# Patient Record
Sex: FEMALE | Race: WHITE | NOT HISPANIC OR LATINO | Employment: FULL TIME | URBAN - METROPOLITAN AREA
[De-identification: names, ages, dates, MRNs, and addresses within clinical notes are randomized per-mention and may not be internally consistent; named-entity substitution may affect disease eponyms.]

---

## 2017-04-29 ENCOUNTER — HOSPITAL ENCOUNTER (OUTPATIENT)
Dept: LAB | Facility: HOSPITAL | Age: 22
Discharge: HOME | End: 2017-04-29
Attending: NURSE PRACTITIONER | Admitting: NURSE PRACTITIONER

## 2017-04-29 LAB
BASOPHILS # BLD AUTO: 0 10*3/UL (ref 0–0.2)
BASOPHILS NFR BLD AUTO: 1 % (ref 0–2)
D DIMER PPP FEU-MCNC: 0.91 MG/L FEU (ref 0.17–0.59)
DIFFERENTIAL METHOD BLD: (no result)
EOSINOPHIL # BLD AUTO: 0.2 10*3/UL (ref 0–0.3)
EOSINOPHIL # BLD AUTO: 4 % (ref 0–3)
ERYTHROCYTE [DISTWIDTH] IN BLOOD BY AUTOMATED COUNT: 15.5 % (ref 11.5–14.5)
HCT VFR BLD AUTO: 34.7 % (ref 35–49)
HGB BLD-MCNC: 11.9 G/DL (ref 12–15)
LYMPHOCYTES # BLD AUTO: 1.1 10*3/UL (ref 0.8–4.8)
LYMPHOCYTES NFR BLD AUTO: 24 % (ref 18–42)
MCH RBC QN AUTO: 26.6 PG (ref 26–32)
MCHC RBC AUTO-ENTMCNC: 34.3 G/DL (ref 32–36)
MCV RBC AUTO: 77.7 FL (ref 80–94)
MONOCYTES # BLD AUTO: 0.6 10*3/UL (ref 0.1–1.3)
MONOCYTES NFR BLD AUTO: 13 % (ref 2–11)
NEUTROPHILS # BLD AUTO: 2.7 10*3/UL (ref 2.3–8.6)
NEUTROPHILS NFR BLD AUTO: 58 % (ref 50–75)
NRBC BLD AUTO-RTO: 0 /100{WBCS}
NRBC/RBC NFR BLD MANUAL: 0 10*3/UL
PLATELET # BLD AUTO: 303 10*3/UL (ref 150–450)
PMV BLD AUTO: 7.9 FL (ref 7.4–10.4)
RBC # BLD AUTO: 4.46 10*6/UL (ref 4–5.4)
WBC # BLD AUTO: 4.7 10*3/UL (ref 4.5–11.5)

## 2017-05-01 ENCOUNTER — HOSPITAL ENCOUNTER (OUTPATIENT)
Dept: CARDIOLOGY | Facility: HOSPITAL | Age: 22
Discharge: HOME | End: 2017-05-01
Attending: NURSE PRACTITIONER | Admitting: NURSE PRACTITIONER

## 2020-02-03 ENCOUNTER — APPOINTMENT (OUTPATIENT)
Dept: CT IMAGING | Facility: HOSPITAL | Age: 25
End: 2020-02-03

## 2020-02-03 ENCOUNTER — APPOINTMENT (OUTPATIENT)
Dept: GENERAL RADIOLOGY | Facility: HOSPITAL | Age: 25
End: 2020-02-03

## 2020-02-03 ENCOUNTER — HOSPITAL ENCOUNTER (EMERGENCY)
Facility: HOSPITAL | Age: 25
Discharge: HOME | End: 2020-02-03
Admitting: EMERGENCY MEDICINE

## 2020-02-03 VITALS
WEIGHT: 205.03 LBS | HEIGHT: 59 IN | BODY MASS INDEX: 41.33 KG/M2 | TEMPERATURE: 99.5 F | OXYGEN SATURATION: 100 % | HEART RATE: 104 BPM | SYSTOLIC BLOOD PRESSURE: 115 MMHG | DIASTOLIC BLOOD PRESSURE: 81 MMHG | RESPIRATION RATE: 18 BRPM

## 2020-02-03 DIAGNOSIS — J18.9 PNEUMONIA OF RIGHT LOWER LOBE DUE TO INFECTIOUS ORGANISM: Primary | ICD-10-CM

## 2020-02-03 LAB
ALBUMIN SERPL-MCNC: 4.3 G/DL (ref 3.5–5.2)
ALBUMIN/GLOB SERPL: 1.1 G/DL
ALP SERPL-CCNC: 78 U/L (ref 39–117)
ALT SERPL W P-5'-P-CCNC: 21 U/L (ref 1–33)
ANION GAP SERPL CALCULATED.3IONS-SCNC: 18 MMOL/L (ref 5–15)
AST SERPL-CCNC: 25 U/L (ref 1–32)
B-HCG UR QL: NEGATIVE
BACTERIA UR QL AUTO: ABNORMAL /HPF
BASOPHILS # BLD AUTO: 0.1 10*3/MM3 (ref 0–0.2)
BASOPHILS NFR BLD AUTO: 0.5 % (ref 0–1.5)
BILIRUB SERPL-MCNC: 0.6 MG/DL (ref 0.2–1.2)
BILIRUB UR QL STRIP: NEGATIVE
BUN BLD-MCNC: 11 MG/DL (ref 6–20)
BUN/CREAT SERPL: 13.3 (ref 7–25)
CALCIUM SPEC-SCNC: 9.5 MG/DL (ref 8.6–10.5)
CHLORIDE SERPL-SCNC: 100 MMOL/L (ref 98–107)
CLARITY UR: CLEAR
CO2 SERPL-SCNC: 22 MMOL/L (ref 22–29)
COLOR UR: YELLOW
CREAT BLD-MCNC: 0.83 MG/DL (ref 0.57–1)
D-LACTATE SERPL-SCNC: 2 MMOL/L (ref 0.5–2)
DEPRECATED RDW RBC AUTO: 41.6 FL (ref 37–54)
EOSINOPHIL # BLD AUTO: 0.1 10*3/MM3 (ref 0–0.4)
EOSINOPHIL NFR BLD AUTO: 0.4 % (ref 0.3–6.2)
ERYTHROCYTE [DISTWIDTH] IN BLOOD BY AUTOMATED COUNT: 14.9 % (ref 12.3–15.4)
FLUAV SUBTYP SPEC NAA+PROBE: NOT DETECTED
FLUBV RNA ISLT QL NAA+PROBE: NOT DETECTED
GFR SERPL CREATININE-BSD FRML MDRD: 85 ML/MIN/1.73
GLOBULIN UR ELPH-MCNC: 3.9 GM/DL
GLUCOSE BLD-MCNC: 135 MG/DL (ref 65–99)
GLUCOSE UR STRIP-MCNC: NEGATIVE MG/DL
HCT VFR BLD AUTO: 36.6 % (ref 34–46.6)
HGB BLD-MCNC: 12.3 G/DL (ref 12–15.9)
HGB UR QL STRIP.AUTO: NEGATIVE
HOLD SPECIMEN: NORMAL
HOLD SPECIMEN: NORMAL
HYALINE CASTS UR QL AUTO: ABNORMAL /LPF
KETONES UR QL STRIP: NEGATIVE
LEUKOCYTE ESTERASE UR QL STRIP.AUTO: NEGATIVE
LIPASE SERPL-CCNC: 29 U/L (ref 13–60)
LYMPHOCYTES # BLD AUTO: 0.6 10*3/MM3 (ref 0.7–3.1)
LYMPHOCYTES NFR BLD AUTO: 2.7 % (ref 19.6–45.3)
MCH RBC QN AUTO: 26.3 PG (ref 26.6–33)
MCHC RBC AUTO-ENTMCNC: 33.5 G/DL (ref 31.5–35.7)
MCV RBC AUTO: 78.5 FL (ref 79–97)
MONOCYTES # BLD AUTO: 0.7 10*3/MM3 (ref 0.1–0.9)
MONOCYTES NFR BLD AUTO: 3.1 % (ref 5–12)
NEUTROPHILS # BLD AUTO: 21.5 10*3/MM3 (ref 1.7–7)
NEUTROPHILS NFR BLD AUTO: 93.3 % (ref 42.7–76)
NITRITE UR QL STRIP: NEGATIVE
NRBC BLD AUTO-RTO: 0.1 /100 WBC (ref 0–0.2)
PH UR STRIP.AUTO: 6.5 [PH] (ref 5–8)
PLATELET # BLD AUTO: 379 10*3/MM3 (ref 140–450)
PMV BLD AUTO: 7.9 FL (ref 6–12)
POTASSIUM BLD-SCNC: 3.6 MMOL/L (ref 3.5–5.2)
PROT SERPL-MCNC: 8.2 G/DL (ref 6–8.5)
PROT UR QL STRIP: ABNORMAL
RBC # BLD AUTO: 4.66 10*6/MM3 (ref 3.77–5.28)
RBC # UR: ABNORMAL /HPF
REF LAB TEST METHOD: ABNORMAL
S PYO AG THROAT QL: NEGATIVE
SODIUM BLD-SCNC: 140 MMOL/L (ref 136–145)
SP GR UR STRIP: 1.02 (ref 1–1.03)
SQUAMOUS #/AREA URNS HPF: ABNORMAL /HPF
UROBILINOGEN UR QL STRIP: ABNORMAL
WBC NRBC COR # BLD: 23 10*3/MM3 (ref 3.4–10.8)
WBC UR QL AUTO: ABNORMAL /HPF
WHOLE BLOOD HOLD SPECIMEN: NORMAL
WHOLE BLOOD HOLD SPECIMEN: NORMAL

## 2020-02-03 PROCEDURE — 96375 TX/PRO/DX INJ NEW DRUG ADDON: CPT

## 2020-02-03 PROCEDURE — 96365 THER/PROPH/DIAG IV INF INIT: CPT

## 2020-02-03 PROCEDURE — 81001 URINALYSIS AUTO W/SCOPE: CPT | Performed by: PHYSICIAN ASSISTANT

## 2020-02-03 PROCEDURE — 87651 STREP A DNA AMP PROBE: CPT | Performed by: PHYSICIAN ASSISTANT

## 2020-02-03 PROCEDURE — 87150 DNA/RNA AMPLIFIED PROBE: CPT | Performed by: PHYSICIAN ASSISTANT

## 2020-02-03 PROCEDURE — 81025 URINE PREGNANCY TEST: CPT | Performed by: PHYSICIAN ASSISTANT

## 2020-02-03 PROCEDURE — 71045 X-RAY EXAM CHEST 1 VIEW: CPT

## 2020-02-03 PROCEDURE — 25010000002 LEVOFLOXACIN PER 250 MG

## 2020-02-03 PROCEDURE — 74176 CT ABD & PELVIS W/O CONTRAST: CPT

## 2020-02-03 PROCEDURE — 87147 CULTURE TYPE IMMUNOLOGIC: CPT | Performed by: PHYSICIAN ASSISTANT

## 2020-02-03 PROCEDURE — 87502 INFLUENZA DNA AMP PROBE: CPT | Performed by: PHYSICIAN ASSISTANT

## 2020-02-03 PROCEDURE — 80053 COMPREHEN METABOLIC PANEL: CPT | Performed by: PHYSICIAN ASSISTANT

## 2020-02-03 PROCEDURE — 83605 ASSAY OF LACTIC ACID: CPT | Performed by: PHYSICIAN ASSISTANT

## 2020-02-03 PROCEDURE — 81015 MICROSCOPIC EXAM OF URINE: CPT | Performed by: PHYSICIAN ASSISTANT

## 2020-02-03 PROCEDURE — 25010000002 KETOROLAC TROMETHAMINE PER 15 MG: Performed by: PHYSICIAN ASSISTANT

## 2020-02-03 PROCEDURE — 25010000002 PROMETHAZINE PER 50 MG: Performed by: PHYSICIAN ASSISTANT

## 2020-02-03 PROCEDURE — 83690 ASSAY OF LIPASE: CPT | Performed by: PHYSICIAN ASSISTANT

## 2020-02-03 PROCEDURE — 85025 COMPLETE CBC W/AUTO DIFF WBC: CPT | Performed by: PHYSICIAN ASSISTANT

## 2020-02-03 PROCEDURE — 81003 URINALYSIS AUTO W/O SCOPE: CPT | Performed by: PHYSICIAN ASSISTANT

## 2020-02-03 PROCEDURE — 99284 EMERGENCY DEPT VISIT MOD MDM: CPT

## 2020-02-03 PROCEDURE — 87040 BLOOD CULTURE FOR BACTERIA: CPT | Performed by: PHYSICIAN ASSISTANT

## 2020-02-03 RX ORDER — SODIUM CHLORIDE 0.9 % (FLUSH) 0.9 %
10 SYRINGE (ML) INJECTION AS NEEDED
Status: DISCONTINUED | OUTPATIENT
Start: 2020-02-03 | End: 2020-02-03 | Stop reason: HOSPADM

## 2020-02-03 RX ORDER — GUAIFENESIN AND CODEINE PHOSPHATE 100; 10 MG/5ML; MG/5ML
5 SOLUTION ORAL 4 TIMES DAILY PRN
Qty: 60 ML | Refills: 0 | Status: SHIPPED | OUTPATIENT
Start: 2020-02-03 | End: 2021-03-13

## 2020-02-03 RX ORDER — KETOROLAC TROMETHAMINE 15 MG/ML
15 INJECTION, SOLUTION INTRAMUSCULAR; INTRAVENOUS ONCE
Status: COMPLETED | OUTPATIENT
Start: 2020-02-03 | End: 2020-02-03

## 2020-02-03 RX ORDER — LEVOFLOXACIN 5 MG/ML
INJECTION, SOLUTION INTRAVENOUS
Status: COMPLETED
Start: 2020-02-03 | End: 2020-02-03

## 2020-02-03 RX ORDER — LEVOFLOXACIN 750 MG/1
750 TABLET ORAL DAILY
Qty: 6 TABLET | Refills: 0 | Status: SHIPPED | OUTPATIENT
Start: 2020-02-03 | End: 2021-03-13

## 2020-02-03 RX ORDER — LEVOFLOXACIN 5 MG/ML
750 INJECTION, SOLUTION INTRAVENOUS ONCE
Status: COMPLETED | OUTPATIENT
Start: 2020-02-03 | End: 2020-02-03

## 2020-02-03 RX ORDER — IBUPROFEN 600 MG/1
600 TABLET ORAL ONCE
Status: COMPLETED | OUTPATIENT
Start: 2020-02-03 | End: 2020-02-03

## 2020-02-03 RX ORDER — GUAIFENESIN AND CODEINE PHOSPHATE 100; 10 MG/5ML; MG/5ML
5 SOLUTION ORAL ONCE
Status: COMPLETED | OUTPATIENT
Start: 2020-02-03 | End: 2020-02-03

## 2020-02-03 RX ADMIN — LEVOFLOXACIN 750 MG: 5 INJECTION, SOLUTION INTRAVENOUS at 02:46

## 2020-02-03 RX ADMIN — IBUPROFEN 600 MG: 600 TABLET, FILM COATED ORAL at 04:19

## 2020-02-03 RX ADMIN — PROMETHAZINE HYDROCHLORIDE 12.5 MG: 25 INJECTION INTRAMUSCULAR; INTRAVENOUS at 00:44

## 2020-02-03 RX ADMIN — SODIUM CHLORIDE 1000 ML: 900 INJECTION, SOLUTION INTRAVENOUS at 00:46

## 2020-02-03 RX ADMIN — KETOROLAC TROMETHAMINE 15 MG: 15 INJECTION, SOLUTION INTRAMUSCULAR; INTRAVENOUS at 00:46

## 2020-02-03 RX ADMIN — GUAIFENESIN AND CODEINE PHOSPHATE 5 ML: 100; 10 SOLUTION ORAL at 03:19

## 2020-02-03 NOTE — ED PROVIDER NOTES
"Subjective   24-year-old with the onset of fever starting in the last 24 hours the patient also had the onset of flank pain today.  He has no history of kidney stones.  The patient says that she has had some sore throat as well as muscle aches and pains for several days.  She states that she is was dizzy today with exertion.  No reports of syncope or hemoptysis.  There is no reports of vomiting or diarrhea          Review of Systems    No past medical history on file.    Allergies   Allergen Reactions   • Cephalosporins Unknown - Low Severity   • Penicillins Unknown - Low Severity       No past surgical history on file.    No family history on file.    Social History     Socioeconomic History   • Marital status: Single     Spouse name: Not on file   • Number of children: Not on file   • Years of education: Not on file   • Highest education level: Not on file     Patient works in home health      Objective   Physical Exam  Alert [place] Coma Scale 15   HEENT: Pupils equal and reactive to light. Conjunctivae are not injected. normal tympanic membranes. Oropharynx and nares are normal.   Neck: Supple. Midline trachea. No JVD. No goiter.   Chest: Basilar rales noted on the right and equal breath sounds bilaterally regular rate and rhythm without murmur or rub.   Abdomen: Positive bowel sounds nontender nondistended. No rebound or peritoneal signs. No CVA tenderness.   Extremities no clubbing cyanosis or edema motor sensory exam is normal the full range of motion is intact   skin: Warm and dry, no rashes or petechia.   Lymphatic: No regional lymphadenopathy. No calf pain, swelling or Rebecca's sign    Procedures           ED Course  ED Course as of Feb 03 0250   Mon Feb 03, 2020   0033 Reports 1 day of right flank pain radiating to front. No associated nausea, vomiting. Also reports \"flu-like symptoms\" - such as dizziness, sore throat, and myalgias of 1 day. Primarily her back is bothering her more. Patient is very nervous " during interview and crying because of pain and nerves    [MG]   0048 Elevated WBC with tachycardia rate of 146. Patient's temperature oral 99.9. Possible kidney stone vs influenza source. Sepsis protocol initiated. 1L NS, toradol, phenergan, and rocephin IV ordered    [MG]   0218 She has history of cephalosporin allergy.  This was held.    [TH]      ED Course User Index  [MG] Karina Hazel PA-C  [TH] Rajeev Sorenson MD                                               [facility]  Number of Diagnoses or Management Options     Amount and/or Complexity of Data Reviewed  Clinical lab tests: reviewed  Tests in the radiology section of CPT®: reviewed  Decide to obtain previous medical records or to obtain history from someone other than the patient: yes  Independent visualization of images, tracings, or specimens: yes    Risk of Complications, Morbidity, and/or Mortality  Presenting problems: high  Diagnostic procedures: high  Management options: high  General comments: The patient was stable on the emergency department and stated she felt better with ER therapy.  The risks and benefits of fluoroquinolone therapy were discussed and the patient was given 750 mg of Levaquin IV.  The patient will be discharged with a prescription for Levaquin and Cheratussin AC.  The patient was stable at discharge and the mom vocalized understanding of discharge instructions and warnings    Patient Progress  Patient progress: improved      Final diagnoses:   Pneumonia of right lower lobe due to infectious organism (CMS/[facility])            Rajeev Sorenson MD  02/03/20 5730

## 2020-02-07 LAB
BACTERIA BLD CULT: ABNORMAL
BOTTLE TYPE: ABNORMAL

## 2020-02-08 LAB
BACTERIA SPEC AEROBE CULT: ABNORMAL
BACTERIA SPEC AEROBE CULT: NORMAL
GRAM STN SPEC: ABNORMAL
ISOLATED FROM: ABNORMAL

## 2020-03-25 ENCOUNTER — TRANSCRIBE ORDERS (OUTPATIENT)
Dept: ADMINISTRATIVE | Facility: HOSPITAL | Age: 25
End: 2020-03-25

## 2020-03-25 DIAGNOSIS — R10.11 ABDOMINAL PAIN, RIGHT UPPER QUADRANT: Primary | ICD-10-CM

## 2020-05-15 ENCOUNTER — TRANSCRIBE ORDERS (OUTPATIENT)
Dept: ADMINISTRATIVE | Facility: HOSPITAL | Age: 25
End: 2020-05-15

## 2020-05-15 DIAGNOSIS — R10.9 ABDOMINAL PAIN, UNSPECIFIED ABDOMINAL LOCATION: Primary | ICD-10-CM

## 2020-05-21 ENCOUNTER — HOSPITAL ENCOUNTER (OUTPATIENT)
Dept: ULTRASOUND IMAGING | Facility: HOSPITAL | Age: 25
Discharge: HOME | End: 2020-05-21
Admitting: FAMILY MEDICINE

## 2020-05-21 ENCOUNTER — HOSPITAL ENCOUNTER (OUTPATIENT)
Dept: NUCLEAR MEDICINE | Facility: HOSPITAL | Age: 25
Discharge: HOME | End: 2020-05-21

## 2020-05-21 DIAGNOSIS — R10.9 ABDOMINAL PAIN, UNSPECIFIED ABDOMINAL LOCATION: ICD-10-CM

## 2020-05-21 DIAGNOSIS — R10.11 ABDOMINAL PAIN, RIGHT UPPER QUADRANT: ICD-10-CM

## 2020-05-21 PROCEDURE — A9537 TC99M MEBROFENIN: HCPCS | Performed by: FAMILY MEDICINE

## 2020-05-21 PROCEDURE — 76705 ECHO EXAM OF ABDOMEN: CPT

## 2020-05-21 PROCEDURE — 0 TECHNETIUM TC 99M MEBROFENIN KIT: Performed by: FAMILY MEDICINE

## 2020-05-21 PROCEDURE — 78227 HEPATOBIL SYST IMAGE W/DRUG: CPT

## 2020-05-21 RX ORDER — KIT FOR THE PREPARATION OF TECHNETIUM TC 99M MEBROFENIN 45 MG/10ML
1 INJECTION, POWDER, LYOPHILIZED, FOR SOLUTION INTRAVENOUS
Status: COMPLETED | OUTPATIENT
Start: 2020-05-21 | End: 2020-05-21

## 2020-05-21 RX ADMIN — MEBROFENIN 1 DOSE: 45 INJECTION, POWDER, LYOPHILIZED, FOR SOLUTION INTRAVENOUS at 08:15

## 2020-09-10 ENCOUNTER — LAB (OUTPATIENT)
Dept: LAB | Facility: HOSPITAL | Age: 25
End: 2020-09-10

## 2020-09-10 ENCOUNTER — TRANSCRIBE ORDERS (OUTPATIENT)
Dept: ADMINISTRATIVE | Facility: HOSPITAL | Age: 25
End: 2020-09-10

## 2020-09-10 DIAGNOSIS — R73.9 HYPERGLYCEMIA: ICD-10-CM

## 2020-09-10 DIAGNOSIS — R53.83 FATIGUE, UNSPECIFIED TYPE: Primary | ICD-10-CM

## 2020-09-10 DIAGNOSIS — R53.83 FATIGUE, UNSPECIFIED TYPE: ICD-10-CM

## 2020-09-10 LAB
ALBUMIN SERPL-MCNC: 3.9 G/DL (ref 3.5–5.2)
ALBUMIN/GLOB SERPL: 0.8 G/DL
ALP SERPL-CCNC: 135 U/L (ref 39–117)
ALT SERPL W P-5'-P-CCNC: 134 U/L (ref 1–33)
ANION GAP SERPL CALCULATED.3IONS-SCNC: 16.6 MMOL/L (ref 5–15)
AST SERPL-CCNC: 69 U/L (ref 1–32)
BASOPHILS # BLD AUTO: 0.04 10*3/MM3 (ref 0–0.2)
BASOPHILS NFR BLD AUTO: 0.7 % (ref 0–1.5)
BILIRUB SERPL-MCNC: 0.9 MG/DL (ref 0–1.2)
BUN SERPL-MCNC: 34 MG/DL (ref 6–20)
BUN/CREAT SERPL: 14.8 (ref 7–25)
CALCIUM SPEC-SCNC: 9.4 MG/DL (ref 8.6–10.5)
CHLORIDE SERPL-SCNC: 97 MMOL/L (ref 98–107)
CO2 SERPL-SCNC: 21.4 MMOL/L (ref 22–29)
CREAT SERPL-MCNC: 2.29 MG/DL (ref 0.57–1)
DEPRECATED RDW RBC AUTO: 38.5 FL (ref 37–54)
EOSINOPHIL # BLD AUTO: 0.32 10*3/MM3 (ref 0–0.4)
EOSINOPHIL NFR BLD AUTO: 5.4 % (ref 0.3–6.2)
ERYTHROCYTE [DISTWIDTH] IN BLOOD BY AUTOMATED COUNT: 13.4 % (ref 12.3–15.4)
GFR SERPL CREATININE-BSD FRML MDRD: 26 ML/MIN/1.73
GLOBULIN UR ELPH-MCNC: 4.6 GM/DL
GLUCOSE SERPL-MCNC: 86 MG/DL (ref 65–99)
HBA1C MFR BLD: 4.6 % (ref 3.5–5.6)
HCT VFR BLD AUTO: 26.8 % (ref 34–46.6)
HGB BLD-MCNC: 9.2 G/DL (ref 12–15.9)
IMM GRANULOCYTES # BLD AUTO: 0.03 10*3/MM3 (ref 0–0.05)
IMM GRANULOCYTES NFR BLD AUTO: 0.5 % (ref 0–0.5)
LYMPHOCYTES # BLD AUTO: 0.82 10*3/MM3 (ref 0.7–3.1)
LYMPHOCYTES NFR BLD AUTO: 13.9 % (ref 19.6–45.3)
MCH RBC QN AUTO: 26.9 PG (ref 26.6–33)
MCHC RBC AUTO-ENTMCNC: 34.3 G/DL (ref 31.5–35.7)
MCV RBC AUTO: 78.4 FL (ref 79–97)
MONOCYTES # BLD AUTO: 0.56 10*3/MM3 (ref 0.1–0.9)
MONOCYTES NFR BLD AUTO: 9.5 % (ref 5–12)
NEUTROPHILS NFR BLD AUTO: 4.11 10*3/MM3 (ref 1.7–7)
NEUTROPHILS NFR BLD AUTO: 70 % (ref 42.7–76)
NRBC BLD AUTO-RTO: 0 /100 WBC (ref 0–0.2)
PLATELET # BLD AUTO: 385 10*3/MM3 (ref 140–450)
PMV BLD AUTO: 10.6 FL (ref 6–12)
POTASSIUM SERPL-SCNC: 3.1 MMOL/L (ref 3.5–5.2)
PROT SERPL-MCNC: 8.5 G/DL (ref 6–8.5)
RBC # BLD AUTO: 3.42 10*6/MM3 (ref 3.77–5.28)
SODIUM SERPL-SCNC: 135 MMOL/L (ref 136–145)
TSH SERPL DL<=0.05 MIU/L-ACNC: 3.94 UIU/ML (ref 0.27–4.2)
WBC # BLD AUTO: 5.88 10*3/MM3 (ref 3.4–10.8)

## 2020-09-10 PROCEDURE — 85025 COMPLETE CBC W/AUTO DIFF WBC: CPT

## 2020-09-10 PROCEDURE — 83036 HEMOGLOBIN GLYCOSYLATED A1C: CPT

## 2020-09-10 PROCEDURE — 36415 COLL VENOUS BLD VENIPUNCTURE: CPT

## 2020-09-10 PROCEDURE — 80053 COMPREHEN METABOLIC PANEL: CPT

## 2020-09-10 PROCEDURE — 84443 ASSAY THYROID STIM HORMONE: CPT

## 2020-10-09 ENCOUNTER — TRANSCRIBE ORDERS (OUTPATIENT)
Dept: ADMINISTRATIVE | Facility: HOSPITAL | Age: 25
End: 2020-10-09

## 2020-10-09 ENCOUNTER — LAB (OUTPATIENT)
Dept: LAB | Facility: HOSPITAL | Age: 25
End: 2020-10-09

## 2020-10-09 DIAGNOSIS — E87.6 HYPOPOTASSEMIA: ICD-10-CM

## 2020-10-09 DIAGNOSIS — N17.9 ACUTE RENAL FAILURE, UNSPECIFIED ACUTE RENAL FAILURE TYPE (HCC): ICD-10-CM

## 2020-10-09 DIAGNOSIS — E55.9 VITAMIN D DEFICIENCY: ICD-10-CM

## 2020-10-09 DIAGNOSIS — N17.9 ACUTE RENAL FAILURE, UNSPECIFIED ACUTE RENAL FAILURE TYPE (HCC): Primary | ICD-10-CM

## 2020-10-09 DIAGNOSIS — R80.9 PROTEINURIA, UNSPECIFIED TYPE: ICD-10-CM

## 2020-10-09 LAB
25(OH)D3 SERPL-MCNC: 35 NG/ML (ref 30–100)
ALBUMIN SERPL-MCNC: 4.2 G/DL (ref 3.5–5.2)
ALBUMIN/GLOB SERPL: 1.4 G/DL
ALP SERPL-CCNC: 73 U/L (ref 39–117)
ALT SERPL W P-5'-P-CCNC: 46 U/L (ref 1–33)
ANION GAP SERPL CALCULATED.3IONS-SCNC: 11.1 MMOL/L (ref 5–15)
AST SERPL-CCNC: 39 U/L (ref 1–32)
BACTERIA UR QL AUTO: ABNORMAL /HPF
BASOPHILS # BLD AUTO: 0.05 10*3/MM3 (ref 0–0.2)
BASOPHILS NFR BLD AUTO: 0.8 % (ref 0–1.5)
BILIRUB SERPL-MCNC: 0.6 MG/DL (ref 0–1.2)
BILIRUB UR QL STRIP: NEGATIVE
BUN SERPL-MCNC: 25 MG/DL (ref 6–20)
BUN/CREAT SERPL: 28.4 (ref 7–25)
C3 SERPL-MCNC: 154 MG/DL (ref 82–167)
C4 SERPL-MCNC: 20 MG/DL (ref 14–44)
CALCIUM SPEC-SCNC: 8.8 MG/DL (ref 8.6–10.5)
CHLORIDE SERPL-SCNC: 101 MMOL/L (ref 98–107)
CK SERPL-CCNC: 31 U/L (ref 20–180)
CLARITY UR: ABNORMAL
CO2 SERPL-SCNC: 25.9 MMOL/L (ref 22–29)
COLOR UR: YELLOW
CREAT SERPL-MCNC: 0.88 MG/DL (ref 0.57–1)
CREAT UR-MCNC: 75.3 MG/DL
DEPRECATED RDW RBC AUTO: 42 FL (ref 37–54)
EOSINOPHIL # BLD AUTO: 0.32 10*3/MM3 (ref 0–0.4)
EOSINOPHIL NFR BLD AUTO: 5 % (ref 0.3–6.2)
ERYTHROCYTE [DISTWIDTH] IN BLOOD BY AUTOMATED COUNT: 14.1 % (ref 12.3–15.4)
GFR SERPL CREATININE-BSD FRML MDRD: 79 ML/MIN/1.73
GLOBULIN UR ELPH-MCNC: 2.9 GM/DL
GLUCOSE SERPL-MCNC: 81 MG/DL (ref 65–99)
GLUCOSE UR STRIP-MCNC: NEGATIVE MG/DL
HBV SURFACE AG SERPL QL IA: NORMAL
HCT VFR BLD AUTO: 32.7 % (ref 34–46.6)
HCV AB SER DONR QL: NORMAL
HGB BLD-MCNC: 10.8 G/DL (ref 12–15.9)
HGB UR QL STRIP.AUTO: NEGATIVE
HYALINE CASTS UR QL AUTO: ABNORMAL /LPF
IMM GRANULOCYTES # BLD AUTO: 0.09 10*3/MM3 (ref 0–0.05)
IMM GRANULOCYTES NFR BLD AUTO: 1.4 % (ref 0–0.5)
IRON 24H UR-MRATE: 79 MCG/DL (ref 37–145)
KETONES UR QL STRIP: NEGATIVE
LEUKOCYTE ESTERASE UR QL STRIP.AUTO: ABNORMAL
LYMPHOCYTES # BLD AUTO: 0.99 10*3/MM3 (ref 0.7–3.1)
LYMPHOCYTES NFR BLD AUTO: 15.5 % (ref 19.6–45.3)
MAGNESIUM SERPL-MCNC: 1.4 MG/DL (ref 1.6–2.6)
MCH RBC QN AUTO: 27 PG (ref 26.6–33)
MCHC RBC AUTO-ENTMCNC: 33 G/DL (ref 31.5–35.7)
MCV RBC AUTO: 81.8 FL (ref 79–97)
MONOCYTES # BLD AUTO: 0.66 10*3/MM3 (ref 0.1–0.9)
MONOCYTES NFR BLD AUTO: 10.3 % (ref 5–12)
NEUTROPHILS NFR BLD AUTO: 4.29 10*3/MM3 (ref 1.7–7)
NEUTROPHILS NFR BLD AUTO: 67 % (ref 42.7–76)
NITRITE UR QL STRIP: NEGATIVE
NRBC BLD AUTO-RTO: 0 /100 WBC (ref 0–0.2)
PH UR STRIP.AUTO: 6.5 [PH] (ref 5–8)
PHOSPHATE SERPL-MCNC: 3.8 MG/DL (ref 2.5–4.5)
PLATELET # BLD AUTO: 296 10*3/MM3 (ref 140–450)
PMV BLD AUTO: 9.7 FL (ref 6–12)
POTASSIUM SERPL-SCNC: 3.9 MMOL/L (ref 3.5–5.2)
PROT SERPL-MCNC: 7.1 G/DL (ref 6–8.5)
PROT UR QL STRIP: NEGATIVE
PROT UR-MCNC: 8 MG/DL
PTH-INTACT SERPL-MCNC: 94.5 PG/ML (ref 15–65)
RBC # BLD AUTO: 4 10*6/MM3 (ref 3.77–5.28)
RBC # UR: ABNORMAL /HPF
REF LAB TEST METHOD: ABNORMAL
SODIUM SERPL-SCNC: 138 MMOL/L (ref 136–145)
SP GR UR STRIP: 1.02 (ref 1–1.03)
SQUAMOUS #/AREA URNS HPF: ABNORMAL /HPF
TSH SERPL DL<=0.05 MIU/L-ACNC: 4.41 UIU/ML (ref 0.27–4.2)
URATE SERPL-MCNC: 4.6 MG/DL (ref 2.4–5.7)
UROBILINOGEN UR QL STRIP: ABNORMAL
WBC # BLD AUTO: 6.4 10*3/MM3 (ref 3.4–10.8)
WBC UR QL AUTO: ABNORMAL /HPF

## 2020-10-09 PROCEDURE — 86334 IMMUNOFIX E-PHORESIS SERUM: CPT

## 2020-10-09 PROCEDURE — 83540 ASSAY OF IRON: CPT

## 2020-10-09 PROCEDURE — 87340 HEPATITIS B SURFACE AG IA: CPT

## 2020-10-09 PROCEDURE — 83970 ASSAY OF PARATHORMONE: CPT

## 2020-10-09 PROCEDURE — 80053 COMPREHEN METABOLIC PANEL: CPT

## 2020-10-09 PROCEDURE — 84100 ASSAY OF PHOSPHORUS: CPT

## 2020-10-09 PROCEDURE — 86160 COMPLEMENT ANTIGEN: CPT

## 2020-10-09 PROCEDURE — 86256 FLUORESCENT ANTIBODY TITER: CPT

## 2020-10-09 PROCEDURE — 82570 ASSAY OF URINE CREATININE: CPT

## 2020-10-09 PROCEDURE — 82784 ASSAY IGA/IGD/IGG/IGM EACH: CPT

## 2020-10-09 PROCEDURE — 83735 ASSAY OF MAGNESIUM: CPT

## 2020-10-09 PROCEDURE — 87086 URINE CULTURE/COLONY COUNT: CPT

## 2020-10-09 PROCEDURE — 82306 VITAMIN D 25 HYDROXY: CPT

## 2020-10-09 PROCEDURE — 84550 ASSAY OF BLOOD/URIC ACID: CPT

## 2020-10-09 PROCEDURE — 86038 ANTINUCLEAR ANTIBODIES: CPT

## 2020-10-09 PROCEDURE — 86803 HEPATITIS C AB TEST: CPT

## 2020-10-09 PROCEDURE — 81001 URINALYSIS AUTO W/SCOPE: CPT

## 2020-10-09 PROCEDURE — 85025 COMPLETE CBC W/AUTO DIFF WBC: CPT

## 2020-10-09 PROCEDURE — 84443 ASSAY THYROID STIM HORMONE: CPT

## 2020-10-09 PROCEDURE — 84156 ASSAY OF PROTEIN URINE: CPT

## 2020-10-09 PROCEDURE — 83520 IMMUNOASSAY QUANT NOS NONAB: CPT

## 2020-10-09 PROCEDURE — 82550 ASSAY OF CK (CPK): CPT

## 2020-10-09 PROCEDURE — 36415 COLL VENOUS BLD VENIPUNCTURE: CPT

## 2020-10-10 LAB — BACTERIA SPEC AEROBE CULT: NORMAL

## 2020-10-12 ENCOUNTER — LAB (OUTPATIENT)
Dept: LAB | Facility: HOSPITAL | Age: 25
End: 2020-10-12

## 2020-10-12 DIAGNOSIS — E87.6 HYPOPOTASSEMIA: ICD-10-CM

## 2020-10-12 DIAGNOSIS — E55.9 VITAMIN D DEFICIENCY: ICD-10-CM

## 2020-10-12 DIAGNOSIS — R80.9 PROTEINURIA, UNSPECIFIED TYPE: ICD-10-CM

## 2020-10-12 DIAGNOSIS — N17.9 ACUTE RENAL FAILURE, UNSPECIFIED ACUTE RENAL FAILURE TYPE (HCC): ICD-10-CM

## 2020-10-12 LAB
ANA SER QL: NEGATIVE
IGA SERPL-MCNC: 183 MG/DL (ref 87–352)
IGG SERPL-MCNC: 797 MG/DL (ref 586–1602)
IGM SERPL-MCNC: 104 MG/DL (ref 26–217)
PROT PATTERN SERPL IFE-IMP: NORMAL

## 2020-10-12 PROCEDURE — 86335 IMMUNFIX E-PHORSIS/URINE/CSF: CPT

## 2020-10-13 LAB
C-ANCA TITR SER IF: ABNORMAL TITER
MYELOPEROXIDASE AB SER IA-ACNC: <9 U/ML (ref 0–9)
P-ANCA ATYPICAL TITR SER IF: ABNORMAL TITER
P-ANCA TITR SER IF: ABNORMAL TITER
PROTEINASE3 AB SER IA-ACNC: <3.5 U/ML (ref 0–3.5)

## 2020-10-15 LAB — INTERPRETATION UR IFE-IMP: NORMAL

## 2021-03-13 PROBLEM — R21 RASH: Status: ACTIVE | Noted: 2019-02-27

## 2021-03-13 PROBLEM — B34.9 VIRAL INFECTION: Status: ACTIVE | Noted: 2021-03-13

## 2021-03-13 PROBLEM — L03.019 PARONYCHIA, FINGER: Status: ACTIVE | Noted: 2019-02-27

## 2021-03-13 PROBLEM — F32.A DEPRESSION: Status: ACTIVE | Noted: 2021-03-13

## 2021-03-13 PROBLEM — F90.9 ATTENTION DEFICIT HYPERACTIVITY DISORDER (ADHD): Status: ACTIVE | Noted: 2021-03-13

## 2021-07-15 ENCOUNTER — HOSPITAL ENCOUNTER (OUTPATIENT)
Facility: HOSPITAL | Age: 26
Setting detail: OBSERVATION
Discharge: HOME | End: 2021-07-16
Attending: INTERNAL MEDICINE | Admitting: INTERNAL MEDICINE

## 2021-07-15 ENCOUNTER — APPOINTMENT (OUTPATIENT)
Dept: GENERAL RADIOLOGY | Facility: HOSPITAL | Age: 26
End: 2021-07-15

## 2021-07-15 ENCOUNTER — APPOINTMENT (OUTPATIENT)
Dept: CT IMAGING | Facility: HOSPITAL | Age: 26
End: 2021-07-15

## 2021-07-15 DIAGNOSIS — R07.9 CHEST PAIN, UNSPECIFIED TYPE: ICD-10-CM

## 2021-07-15 DIAGNOSIS — R55 SYNCOPE, UNSPECIFIED SYNCOPE TYPE: ICD-10-CM

## 2021-07-15 DIAGNOSIS — R41.3 AMNESIA MEMORY LOSS: Primary | ICD-10-CM

## 2021-07-15 LAB
ALBUMIN SERPL-MCNC: 4.3 G/DL (ref 3.5–5.2)
ALBUMIN/GLOB SERPL: 1.3 G/DL
ALP SERPL-CCNC: 68 U/L (ref 39–117)
ALT SERPL W P-5'-P-CCNC: 13 U/L (ref 1–33)
AMPHET+METHAMPHET UR QL: NEGATIVE
ANION GAP SERPL CALCULATED.3IONS-SCNC: 14 MMOL/L (ref 5–15)
APAP SERPL-MCNC: <5 MCG/ML (ref 0–30)
APTT PPP: 31.3 SECONDS (ref 24–31)
AST SERPL-CCNC: 18 U/L (ref 1–32)
B-HCG UR QL: NEGATIVE
BACTERIA UR QL AUTO: ABNORMAL /HPF
BARBITURATES UR QL SCN: NEGATIVE
BASOPHILS # BLD AUTO: 0 10*3/MM3 (ref 0–0.2)
BASOPHILS NFR BLD AUTO: 0.6 % (ref 0–1.5)
BENZODIAZ UR QL SCN: NEGATIVE
BILIRUB SERPL-MCNC: 0.2 MG/DL (ref 0–1.2)
BILIRUB UR QL STRIP: NEGATIVE
BUN SERPL-MCNC: 11 MG/DL (ref 6–20)
BUN/CREAT SERPL: 13.9 (ref 7–25)
CALCIUM SPEC-SCNC: 9.1 MG/DL (ref 8.6–10.5)
CANNABINOIDS SERPL QL: NEGATIVE
CHLORIDE SERPL-SCNC: 105 MMOL/L (ref 98–107)
CLARITY UR: CLEAR
CO2 SERPL-SCNC: 21 MMOL/L (ref 22–29)
COCAINE UR QL: NEGATIVE
COLOR UR: YELLOW
CREAT SERPL-MCNC: 0.79 MG/DL (ref 0.57–1)
DEPRECATED RDW RBC AUTO: 43.8 FL (ref 37–54)
EOSINOPHIL # BLD AUTO: 0.1 10*3/MM3 (ref 0–0.4)
EOSINOPHIL NFR BLD AUTO: 1.7 % (ref 0.3–6.2)
ERYTHROCYTE [DISTWIDTH] IN BLOOD BY AUTOMATED COUNT: 16.3 % (ref 12.3–15.4)
ETHANOL UR QL: <0.01 %
GFR SERPL CREATININE-BSD FRML MDRD: 89 ML/MIN/1.73
GLOBULIN UR ELPH-MCNC: 3.4 GM/DL
GLUCOSE SERPL-MCNC: 94 MG/DL (ref 65–99)
GLUCOSE UR STRIP-MCNC: NEGATIVE MG/DL
HCT VFR BLD AUTO: 35.8 % (ref 34–46.6)
HGB BLD-MCNC: 11.9 G/DL (ref 12–15.9)
HGB UR QL STRIP.AUTO: ABNORMAL
HOLD SPECIMEN: NORMAL
HYALINE CASTS UR QL AUTO: ABNORMAL /LPF
INR PPP: 1.06 (ref 0.93–1.1)
KETONES UR QL STRIP: NEGATIVE
LEUKOCYTE ESTERASE UR QL STRIP.AUTO: NEGATIVE
LYMPHOCYTES # BLD AUTO: 1.2 10*3/MM3 (ref 0.7–3.1)
LYMPHOCYTES NFR BLD AUTO: 20.8 % (ref 19.6–45.3)
MCH RBC QN AUTO: 25.5 PG (ref 26.6–33)
MCHC RBC AUTO-ENTMCNC: 33.2 G/DL (ref 31.5–35.7)
MCV RBC AUTO: 76.9 FL (ref 79–97)
METHADONE UR QL SCN: NEGATIVE
MONOCYTES # BLD AUTO: 0.4 10*3/MM3 (ref 0.1–0.9)
MONOCYTES NFR BLD AUTO: 7.9 % (ref 5–12)
NEUTROPHILS NFR BLD AUTO: 3.8 10*3/MM3 (ref 1.7–7)
NEUTROPHILS NFR BLD AUTO: 69 % (ref 42.7–76)
NITRITE UR QL STRIP: NEGATIVE
NRBC BLD AUTO-RTO: 0 /100 WBC (ref 0–0.2)
OPIATES UR QL: NEGATIVE
OXYCODONE UR QL SCN: NEGATIVE
PH UR STRIP.AUTO: 7 [PH] (ref 5–8)
PLATELET # BLD AUTO: 342 10*3/MM3 (ref 140–450)
PMV BLD AUTO: 7.5 FL (ref 6–12)
POTASSIUM SERPL-SCNC: 3.5 MMOL/L (ref 3.5–5.2)
PROT SERPL-MCNC: 7.7 G/DL (ref 6–8.5)
PROT UR QL STRIP: ABNORMAL
PROTHROMBIN TIME: 11.7 SECONDS (ref 9.6–11.7)
RBC # BLD AUTO: 4.65 10*6/MM3 (ref 3.77–5.28)
RBC # UR: ABNORMAL /HPF
REF LAB TEST METHOD: ABNORMAL
SALICYLATES SERPL-MCNC: 0.8 MG/DL
SODIUM SERPL-SCNC: 140 MMOL/L (ref 136–145)
SP GR UR STRIP: 1.01 (ref 1–1.03)
SQUAMOUS #/AREA URNS HPF: ABNORMAL /HPF
TROPONIN T SERPL-MCNC: <0.01 NG/ML (ref 0–0.03)
UROBILINOGEN UR QL STRIP: ABNORMAL
WBC # BLD AUTO: 5.5 10*3/MM3 (ref 3.4–10.8)
WBC UR QL AUTO: ABNORMAL /HPF

## 2021-07-15 PROCEDURE — 80307 DRUG TEST PRSMV CHEM ANLYZR: CPT | Performed by: PHYSICIAN ASSISTANT

## 2021-07-15 PROCEDURE — 87635 SARS-COV-2 COVID-19 AMP PRB: CPT | Performed by: INTERNAL MEDICINE

## 2021-07-15 PROCEDURE — 71045 X-RAY EXAM CHEST 1 VIEW: CPT

## 2021-07-15 PROCEDURE — 81025 URINE PREGNANCY TEST: CPT | Performed by: PHYSICIAN ASSISTANT

## 2021-07-15 PROCEDURE — C9803 HOPD COVID-19 SPEC COLLECT: HCPCS

## 2021-07-15 PROCEDURE — 99285 EMERGENCY DEPT VISIT HI MDM: CPT

## 2021-07-15 PROCEDURE — 93005 ELECTROCARDIOGRAM TRACING: CPT

## 2021-07-15 PROCEDURE — 82077 ASSAY SPEC XCP UR&BREATH IA: CPT | Performed by: PHYSICIAN ASSISTANT

## 2021-07-15 PROCEDURE — 93005 ELECTROCARDIOGRAM TRACING: CPT | Performed by: INTERNAL MEDICINE

## 2021-07-15 PROCEDURE — 81001 URINALYSIS AUTO W/SCOPE: CPT | Performed by: PHYSICIAN ASSISTANT

## 2021-07-15 PROCEDURE — 85730 THROMBOPLASTIN TIME PARTIAL: CPT | Performed by: PHYSICIAN ASSISTANT

## 2021-07-15 PROCEDURE — 84484 ASSAY OF TROPONIN QUANT: CPT | Performed by: PHYSICIAN ASSISTANT

## 2021-07-15 PROCEDURE — 70450 CT HEAD/BRAIN W/O DYE: CPT

## 2021-07-15 PROCEDURE — 80179 DRUG ASSAY SALICYLATE: CPT | Performed by: PHYSICIAN ASSISTANT

## 2021-07-15 PROCEDURE — 80143 DRUG ASSAY ACETAMINOPHEN: CPT | Performed by: PHYSICIAN ASSISTANT

## 2021-07-15 PROCEDURE — 85610 PROTHROMBIN TIME: CPT | Performed by: PHYSICIAN ASSISTANT

## 2021-07-15 PROCEDURE — 80053 COMPREHEN METABOLIC PANEL: CPT | Performed by: PHYSICIAN ASSISTANT

## 2021-07-15 PROCEDURE — 85025 COMPLETE CBC W/AUTO DIFF WBC: CPT | Performed by: PHYSICIAN ASSISTANT

## 2021-07-15 RX ORDER — SODIUM CHLORIDE 0.9 % (FLUSH) 0.9 %
10 SYRINGE (ML) INJECTION AS NEEDED
Status: DISCONTINUED | OUTPATIENT
Start: 2021-07-15 | End: 2021-07-16 | Stop reason: HOSPADM

## 2021-07-15 RX ORDER — HYDROXYZINE HYDROCHLORIDE 25 MG/1
25 TABLET, FILM COATED ORAL ONCE
Status: COMPLETED | OUTPATIENT
Start: 2021-07-15 | End: 2021-07-15

## 2021-07-15 RX ADMIN — SODIUM CHLORIDE 1000 ML: 9 INJECTION, SOLUTION INTRAVENOUS at 21:26

## 2021-07-15 RX ADMIN — HYDROXYZINE HYDROCHLORIDE 25 MG: 25 TABLET, FILM COATED ORAL at 23:37

## 2021-07-15 NOTE — ED NOTES
Patient brought in by EMS when asking patient why she was brought in she doesn't recall.  She states all she remembers is driving in a van then ending up here.  She denies any c/o other than some slight chest pain.  Reports hx of anxiety and anxiety attacks.  When asked if that is what she thinks happened she states kind of but not for sure.       Candelario Montelongo RN  07/15/21 1950

## 2021-07-16 ENCOUNTER — APPOINTMENT (OUTPATIENT)
Dept: MRI IMAGING | Facility: HOSPITAL | Age: 26
End: 2021-07-16

## 2021-07-16 VITALS
HEART RATE: 82 BPM | DIASTOLIC BLOOD PRESSURE: 71 MMHG | HEIGHT: 60 IN | TEMPERATURE: 98.6 F | OXYGEN SATURATION: 98 % | BODY MASS INDEX: 44.74 KG/M2 | WEIGHT: 227.9 LBS | RESPIRATION RATE: 19 BRPM | SYSTOLIC BLOOD PRESSURE: 116 MMHG

## 2021-07-16 PROBLEM — F44.9 CONVERSION DISORDER: Status: ACTIVE | Noted: 2021-07-16

## 2021-07-16 PROBLEM — F41.1 GAD (GENERALIZED ANXIETY DISORDER): Status: ACTIVE | Noted: 2021-07-16

## 2021-07-16 LAB
QT INTERVAL: 386 MS
SARS-COV-2 RNA PNL SPEC NAA+PROBE: NOT DETECTED
TROPONIN T SERPL-MCNC: <0.01 NG/ML (ref 0–0.03)
TSH SERPL DL<=0.05 MIU/L-ACNC: 5 UIU/ML (ref 0.27–4.2)

## 2021-07-16 PROCEDURE — 84484 ASSAY OF TROPONIN QUANT: CPT | Performed by: PHYSICIAN ASSISTANT

## 2021-07-16 PROCEDURE — 36415 COLL VENOUS BLD VENIPUNCTURE: CPT | Performed by: PHYSICIAN ASSISTANT

## 2021-07-16 PROCEDURE — G0378 HOSPITAL OBSERVATION PER HR: HCPCS

## 2021-07-16 PROCEDURE — 99244 OFF/OP CNSLTJ NEW/EST MOD 40: CPT | Performed by: PSYCHIATRY & NEUROLOGY

## 2021-07-16 PROCEDURE — 84443 ASSAY THYROID STIM HORMONE: CPT | Performed by: INTERNAL MEDICINE

## 2021-07-16 PROCEDURE — 70551 MRI BRAIN STEM W/O DYE: CPT

## 2021-07-16 PROCEDURE — 99243 OFF/OP CNSLTJ NEW/EST LOW 30: CPT | Performed by: PSYCHIATRY & NEUROLOGY

## 2021-07-16 RX ORDER — FEXOFENADINE HCL 180 MG/1
180 TABLET ORAL DAILY
Status: ON HOLD | COMMUNITY
End: 2021-07-16

## 2021-07-16 RX ORDER — SODIUM CHLORIDE 0.9 % (FLUSH) 0.9 %
3-10 SYRINGE (ML) INJECTION AS NEEDED
Status: DISCONTINUED | OUTPATIENT
Start: 2021-07-16 | End: 2021-07-16 | Stop reason: HOSPADM

## 2021-07-16 RX ORDER — ACETAMINOPHEN 325 MG/1
650 TABLET ORAL EVERY 4 HOURS PRN
Status: DISCONTINUED | OUTPATIENT
Start: 2021-07-16 | End: 2021-07-16 | Stop reason: HOSPADM

## 2021-07-16 RX ORDER — SODIUM CHLORIDE 0.9 % (FLUSH) 0.9 %
3 SYRINGE (ML) INJECTION EVERY 12 HOURS SCHEDULED
Status: DISCONTINUED | OUTPATIENT
Start: 2021-07-16 | End: 2021-07-16 | Stop reason: HOSPADM

## 2021-07-16 RX ORDER — ONDANSETRON 2 MG/ML
4 INJECTION INTRAMUSCULAR; INTRAVENOUS EVERY 6 HOURS PRN
Status: DISCONTINUED | OUTPATIENT
Start: 2021-07-16 | End: 2021-07-16 | Stop reason: HOSPADM

## 2021-07-16 RX ADMIN — ACETAMINOPHEN 650 MG: 325 TABLET, FILM COATED ORAL at 02:14

## 2021-07-16 NOTE — CASE MANAGEMENT/SOCIAL WORK
Discharge Planning Assessment  [facility]     Patient Name: Arlene Brady  MRN: [id number]  Today's Date: 7/16/2021    Admit Date: 7/15/2021    Discharge Needs Assessment     Row Name 07/16/21 1715       Living Environment    Lives With  parent(s)    Current Living Arrangements  home/apartment/condo    Primary Care Provided by  self    Able to Return to Prior Arrangements  yes       Resource/Environmental Concerns    Resource/Environmental Concerns  none    Transportation Concerns  car, none       Transition Planning    Patient/Family Anticipates Transition to  home with family    Patient/Family Anticipated Services at Transition  none    Transportation Anticipated  car, drives self;family or friend will provide       Discharge Needs Assessment    Equipment Currently Used at Home  none    Concerns to be Addressed  no discharge needs identified    Anticipated Changes Related to Illness  none    Equipment Needed After Discharge  none        Discharge Plan     Row Name 07/16/21 1716       Plan    Plan  Routine d/c  to home      Plan Comments  Patient out of room for testing - spoke to mother in room. Patient is I with ADL's and  drives. PCP is [facility] and pharmacy is [facility] [employer] on [address]. [place] Today with no needs.        Continued Care and Services - Discharged on 7/16/2021 Admission date: 7/15/2021 - Discharge disposition: Home or Self Care       Expected Discharge Date and Time     Expected Discharge Date Expected Discharge Time    Jul 16, 2021         Demographic Summary     Row Name 07/16/21 1714       General Information    Admission Type  observation    Arrived From  emergency department    Referral Source  admission list    Reason for Consult  discharge planning    Preferred Language  English        Functional Status     Row Name 07/16/21 1715       Functional Status    Functional Status Comments  works full time       Functional Status, IADL    Medications  independent    Meal Preparation   independent    Housekeeping  independent    Laundry  independent    Shopping  independent       Mental Status Summary    Mental Status Comments  Patient out of room for testing - spoke to mother in room        Judith Cabral RN, CM  Office Phone [phone]  Cell [phone]

## 2021-07-16 NOTE — PLAN OF CARE
Goal Outcome Evaluation:      Patient to discharge home with planned follow up. Discharge education and instructions  provided. Patient's mother at bedside and will provide transportation.

## 2021-07-16 NOTE — H&P
"    Patient Care Team:  Daniel Sam MD as PCP - General (Family Medicine)    Chief complaint altered mental status    Subjective     Patient is a 25 y.o. female with history of anxiety and depression who presents with to the ER via ambulance from her dentist office.  Patient was in her usual state of health yesterday morning.  She had several fillings done at the dentist office yesterday afternoon.  She had not eaten all day.  She did well during the dental exam.  As she got up to leave however she felt dizzy.  She then sat down and appeared to go to sleep.  Her vital signs were normal.  Her mother was called to the office.  When her mother tried to talk to her she seemed to respond to her voice but would only briefly open her eyes.  When there was discussion of sending her to the hospital she had some shaking of her arms and legs.  She does not recall any events from arrival at the dentist office until she was in the ambulance on the way to the ER.  She states this morning she feels \"fine\" she did not sleep well but otherwise denies complaints.  She reports having anxiety issues for many years.  At some point during high school she took citalopram and then Wellbutrin.  She recalls stopping those medications because she felt she did not need them any longer and she had side effects of weight gain.  She has had intermittent problems with anxiety since then which have significantly escalated in the last few months.  She states she has had previous episodes of becoming unresponsive during periods of extreme stress.  She states that fireworks and gunshots are triggers for these episodes.  She has never sought medical attention for these episodes.    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  Growing up she had developmental delays, academic difficulties and fine motor difficulties.  Mother states she has never had any formal genetic testing and there has never been a clear " answer as to whether or not she has any congenital/genetic syndrome.    Review of Systems   Constitutional: Negative for activity change, appetite change, diaphoresis, fatigue and fever.   HENT: Negative for facial swelling.    Eyes: Negative for visual disturbance.   Respiratory: Negative for cough, shortness of breath, wheezing and stridor.    Cardiovascular: Negative for chest pain, palpitations and leg swelling.   Gastrointestinal: Negative for abdominal pain, diarrhea, nausea and vomiting.   Endocrine: Negative for polyuria.   Genitourinary: Negative for dysuria.   Musculoskeletal: Negative for arthralgias, back pain and gait problem.   Skin: Negative for rash and wound.   Neurological: Positive for tremors, syncope and weakness. Negative for speech difficulty, light-headedness and headaches.   Psychiatric/Behavioral: Negative for confusion and suicidal ideas. The patient is nervous/anxious.           History  Past Medical History:   Diagnosis Date   • ADHD (attention deficit hyperactivity disorder)    • Arthritis    • Asthma     exercise induced asthma    • Depression    • Disease of thyroid gland     thyroid nodule in the past      Past Surgical History:   Procedure Laterality Date   • ABDOMINAL SURGERY      choley   • TONSILLECTOMY       Family History   Problem Relation Age of Onset   • Hypertension Father    • Heart disease Father    • Hyperlipidemia Father    • Cancer Paternal Grandmother      Social History     Tobacco Use   • Smoking status: Never Smoker   • Smokeless tobacco: Never Used   Vaping Use   • Vaping Use: Never used   Substance Use Topics   • Alcohol use: Yes     Comment: very infrequent    • Drug use: Never     Medications Prior to Admission   Medication Sig Dispense Refill Last Dose   • Norethin-Eth Estrad-Fe Biphas (LO LOESTRIN FE PO) Take 1 tablet by mouth Daily.   Past Week at Unknown time     Allergies:  Cephalosporins and Penicillins    Objective     Vital Signs  Temp:  [97.4 °F (36.3  °C)-98.3 °F (36.8 °C)] 98.3 °F (36.8 °C)  Heart Rate:  [71-98] 98  Resp:  [13-18] 15  BP: (105-155)/(55-88) 117/76     Physical Exam:      General Appearance:    Alert, cooperative, in no acute distress, anxious   Head:    Normocephalic, without obvious abnormality, atraumatic   Eyes:            Lids and lashes normal, conjunctivae and sclerae normal, no   icterus, no pallor, corneas clear, PERRLA   Ears:    Ears appear intact with no abnormalities noted   Throat:   No oral lesions, no thrush, oral mucosa moist   Neck:   No adenopathy, supple, trachea midline, no thyromegaly, no   carotid bruit, no JVD   Lungs:     Clear to auscultation,respirations regular, even and                  unlabored    Heart:    Regular rhythm and normal rate, normal S1 and S2, no            murmur, no gallop, no rub, no click   Chest Wall:    No abnormalities observed   Abdomen:     Normal bowel sounds, no masses, no organomegaly, soft        non-tender, non-distended, no guarding, no rebound                tenderness   Extremities:  Short stature; small hands and feet with short fingers; short neck   Pulses:   Pulses palpable and equal bilaterally   Skin:   No bleeding, bruising or rash   Lymph nodes:   No palpable adenopathy   Neurologic:   Cranial nerves 2 - 12 grossly intact, sensation intact, DTR       present and equal bilaterally       Results Review:     Imaging Results (Last 24 Hours)     Procedure Component Value Units Date/Time    MRI Brain Without Contrast [[id number]] Collected: 07/16/21 1243     Updated: 07/16/21 1301    Narrative:         DATE OF EXAM:   7/16/2021 11:17 AM     PROCEDURE:   MRI BRAIN WO CONTRAST-     INDICATIONS:   memory loss; R41.3-Other amnesia; R07.9-Chest pain, unspecified;  R55-Syncope and collapse 25-year-old female     COMPARISON:  Head CT without contrast dated 7/15/2021     TECHNIQUE:   Routine magnetic resonance imaging of the brain was performed without  administration of contrast.     FINDINGS:    No restricted diffusion to suggest acute or subacute infarct.  No  intracranial mass, midline shift, intracranial hemorrhage, or pathologic  extraaxial fluid collection. The ventricular system is nondilated.   Brain volume is normal for patient's age.  The gray and white matter  signal characteristics are normal. No abnormal T2 signal changes.  The major intracranial flow voids are maintained.  The mastoid air cells  and paranasal sinuses are clear. The globes and extraocular muscles are  normal.  No cerebellar pontine angle masses.  Craniocervical junction is  normal.  Pituitary gland has normal size and signal intensity for  patient's age and gender.          Impression:      Normal brain MRI examination.        Electronically Signed By-Domenico Allen DO On:7/16/2021 12:59 PM  This report was finalized on [id number] by  Domenico Allen DO.    CT Head Without Contrast [[id number]] Collected: 07/15/21 2248     Updated: 07/15/21 2252    Narrative:         DATE OF EXAM:  7/15/2021 10:20 PM     PROCEDURE:   CT HEAD WO CONTRAST-     INDICATIONS: Dental procedure, doesn't feel right.  Mental status change, unknown cause     COMPARISON:  No Comparisons Available     TECHNIQUE:   Routine transaxial cuts were obtained through the head without the  administration of contrast. Automated exposure control and iterative  reconstruction methods were used.      FINDINGS:  There is no acute intracranial hemorrhage. No mass effect or midline  shift. Ventricles and cortical sulci are normally configured. No  calvarial fracture. There is paranasal sinus chronic thickening  involving the maxillary and ethmoid sinuses. The right frontal sinus is  fluid-filled. The posterior fossa is without acute abnormality.       Impression:      1. No acute intracranial abnormality.  2. Paranasal sinus disease.     Electronically Signed By-Cole Lynne MD On:7/15/2021 10:50 PM  This report was finalized on [id number] by  Cole Lynne MD.     XR Chest 1 View [[id number]] Collected: 07/15/21 2025     Updated: 07/15/21 2027    Narrative:         DATE OF EXAM:   7/15/2021 8:08 PM     PROCEDURE:   XR CHEST 1 VW-     INDICATIONS:   Chest pain protocol     COMPARISON:  03/16/2021     TECHNIQUE:   Portable chest radiograph.     FINDINGS:    Patient slightly rotated to left. Heart size within normal limits for  technique. Stable slight elevation of the right hemidiaphragm. No focal  consolidation, pneumothorax, or large effusion.       Impression:      No acute process.      Electronically Signed By-Cole Lynne MD On:7/15/2021 8:25 PM  This report was finalized on [id number] by  Cole yLnne MD.           Lab Results (last 24 hours)     Procedure Component Value Units Date/Time    Troponin [[id number]]  (Normal) Collected: 07/16/21 0115    Specimen: Blood Updated: 07/16/21 0141     Troponin T <0.010 ng/mL     Narrative:      Troponin T Reference Range:  <= 0.03 ng/mL-   Negative for AMI  >0.03 ng/mL-     Abnormal for myocardial necrosis.  Clinicians would have to utilize clinical acumen, EKG, Troponin and serial changes to determine if it is an Acute Myocardial Infarction or myocardial injury due to an underlying chronic condition.       Results may be falsely decreased if patient taking Biotin.      COVID PRE-OP / PRE-PROCEDURE SCREENING ORDER (NO ISOLATION) - Swab, Nasopharynx [[id number]]  (Normal) Collected: 07/15/21 2340    Specimen: Swab from Nasopharynx Updated: 07/16/21 0008    Narrative:      The following orders were created for panel order COVID PRE-OP / PRE-PROCEDURE SCREENING ORDER (NO ISOLATION) - Swab, Nasopharynx.  Procedure                               Abnormality         Status                     ---------                               -----------         ------                     COVID-19,CEPHEID,COR/JUAN...[[id number]]  Normal              Final result                 Please view results for these tests on the individual orders.     COVID-19,CEPHEID,COR/JUAN/PAD/ELDA IN-HOUSE(OR EMERGENT/ADD-ON),NP SWAB IN TRANSPORT MEDIA 3-4 HR TAT, RT-PCR - Swab, Nasopharynx [[id number]]  (Normal) Collected: 07/15/21 2340    Specimen: Swab from Nasopharynx Updated: 07/16/21 0008     COVID19 Not Detected    Narrative:      Fact sheet for providers: https://www.fda.gov/media/603991/download     Fact sheet for patients: https://www.fda.gov/media/223318/download  Fact sheet for providers: https://www.fda.gov/media/158893/download    Fact sheet for patients: https://www.fda.gov/media/885112/download    Test performed by PCR.    Ethanol [[id number]] Collected: 07/15/21 1956    Specimen: Blood Updated: 07/15/21 2340     Ethanol % <0.010 %     Narrative:      Plasma Ethanol Clinical Symptoms:    ETOH (%)               Clinical Symptom  .01-.05              No apparent influence  .03-.12              Euphoria, Diminished judgment and attention   .09-.25              Impaired comprehension, Muscle incoordination  .18-.30              Confusion, Staggered gait, Slurred speech  .25-.40              Markedly decreased response to stimuli, unable to stand or                        walk, vomitting, sleep or stupor  .35-.50              Comatose, Anesthesia, Subnormal body temperature        Extra Tubes [[id number]] Collected: 07/15/21 1956    Specimen: Blood Updated: 07/15/21 2100    Narrative:      The following orders were created for panel order Extra Tubes.  Procedure                               Abnormality         Status                     ---------                               -----------         ------                     Gold Top - SST[[id number]]                                   Final result                 Please view results for these tests on the individual orders.    Gold Top - SST [[id number]] Collected: 07/15/21 1956    Specimen: Blood Updated: 07/15/21 2100     Extra Tube Hold for add-ons.     Comment: Auto resulted.       Urine Drug Screen - Urine, Clean  Catch [[id number]]  (Normal) Collected: 07/15/21 1956    Specimen: Urine, Clean Catch Updated: 07/15/21 2033     Amphet/Methamphet, Screen Negative     Barbiturates Screen, Urine Negative     Benzodiazepine Screen, Urine Negative     Cocaine Screen, Urine Negative     Opiate Screen Negative     THC, Screen, Urine Negative     Methadone Screen, Urine Negative     Oxycodone Screen, Urine Negative    Narrative:      Negative Thresholds Per Drugs Screened:    Amphetamines                 500 ng/ml  Barbiturates                 200 ng/ml  Benzodiazepines              100 ng/ml  Cocaine                      300 ng/ml  Methadone                    300 ng/ml  Opiates                      300 ng/ml  Oxycodone                    100 ng/ml  THC                           50 ng/ml    The Normal Value for all drugs tested is negative. This report includes final unconfirmed screening results to be used for medical treatment purposes only. Unconfirmed results must not be used for non-medical purposes such as employment or legal testing. Clinical consideration should be applied to any drug of abuse test, particularly when unconfirmed results are used.          All urine drugs of abuse requests without chain of custody are for medical screening purposes only.  False positives are possible.      Salicylate Level [[id number]]  (Normal) Collected: 07/15/21 1956    Specimen: Blood Updated: 07/15/21 2028     Salicylate 0.8 mg/dL     Comprehensive Metabolic Panel [[id number]]  (Abnormal) Collected: 07/15/21 1956    Specimen: Blood Updated: 07/15/21 2028     Glucose 94 mg/dL      BUN 11 mg/dL      Creatinine 0.79 mg/dL      Sodium 140 mmol/L      Potassium 3.5 mmol/L      Chloride 105 mmol/L      CO2 21.0 mmol/L      Calcium 9.1 mg/dL      Total Protein 7.7 g/dL      Albumin 4.30 g/dL      ALT (SGPT) 13 U/L      AST (SGOT) 18 U/L      Alkaline Phosphatase 68 U/L      Total Bilirubin 0.2 mg/dL      eGFR Non African Amer 89 mL/min/1.73       Globulin 3.4 gm/dL      A/G Ratio 1.3 g/dL      BUN/Creatinine Ratio 13.9     Anion Gap 14.0 mmol/L     Narrative:      GFR Normal >60  Chronic Kidney Disease <60  Kidney Failure <15      Troponin [[id number]]  (Normal) Collected: 07/15/21 1956    Specimen: Blood Updated: 07/15/21 2028     Troponin T <0.010 ng/mL     Narrative:      Troponin T Reference Range:  <= 0.03 ng/mL-   Negative for AMI  >0.03 ng/mL-     Abnormal for myocardial necrosis.  Clinicians would have to utilize clinical acumen, EKG, Troponin and serial changes to determine if it is an Acute Myocardial Infarction or myocardial injury due to an underlying chronic condition.       Results may be falsely decreased if patient taking Biotin.      Acetaminophen Level [[id number]]  (Normal) Collected: 07/15/21 1956    Specimen: Blood Updated: 07/15/21 2028     Acetaminophen <5.0 mcg/mL     Narrative:      Acetaminophen Therapeutic Range  5-20 ug/mL      Hours after ingestion            Toxic Value    4 Hours                           150 ug/mL    8 Hours                            70 ug/mL   12 Hours                            40 ug/mL   16 Hours                            20 ug/mL    These values apply to a single ingestion only.     Protime-INR [[id number]]  (Normal) Collected: 07/15/21 1956    Specimen: Blood Updated: 07/15/21 2020     Protime 11.7 Seconds      INR 1.06    aPTT [[id number]]  (Abnormal) Collected: 07/15/21 1956    Specimen: Blood Updated: 07/15/21 2020     PTT 31.3 seconds     Urinalysis With Culture If Indicated - Urine, Clean Catch [[id number]]  (Abnormal) Collected: 07/15/21 1956    Specimen: Urine, Clean Catch Updated: 07/15/21 2014     Color, UA Yellow     Appearance, UA Clear     pH, UA 7.0     Specific Gravity, UA 1.010     Glucose, UA Negative     Ketones, UA Negative     Bilirubin, UA Negative     Blood, UA Large (3+)     Protein,  mg/dL (2+)     Leuk Esterase, UA Negative     Nitrite, UA Negative     Urobilinogen, UA 0.2  E.U./dL    Urinalysis, Microscopic Only - Urine, Clean Catch [[id number]]  (Abnormal) Collected: 07/15/21 1956    Specimen: Urine, Clean Catch Updated: 07/15/21 2014     RBC, UA 6-12 /HPF      WBC, UA 0-2 /HPF      Bacteria, UA None Seen /HPF      Squamous Epithelial Cells, UA 3-6 /HPF      Hyaline Casts, UA None Seen /LPF      Methodology Automated Microscopy    Pregnancy, Urine - Urine, Clean Catch [[id number]]  (Normal) Collected: 07/15/21 1956    Specimen: Urine, Clean Catch Updated: 07/15/21 2009     HCG, Urine QL Negative    CBC & Differential [[id number]]  (Abnormal) Collected: 07/15/21 1956    Specimen: Blood Updated: 07/15/21 2006    Narrative:      The following orders were created for panel order CBC & Differential.  Procedure                               Abnormality         Status                     ---------                               -----------         ------                     CBC Auto Differential[[id number]]        Abnormal            Final result                 Please view results for these tests on the individual orders.    CBC Auto Differential [[id number]]  (Abnormal) Collected: 07/15/21 1956    Specimen: Blood Updated: 07/15/21 2006     WBC 5.50 10*3/mm3      RBC 4.65 10*6/mm3      Hemoglobin 11.9 g/dL      Hematocrit 35.8 %      MCV 76.9 fL      MCH 25.5 pg      MCHC 33.2 g/dL      RDW 16.3 %      RDW-SD 43.8 fl      MPV 7.5 fL      Platelets 342 10*3/mm3      Neutrophil % 69.0 %      Lymphocyte % 20.8 %      Monocyte % 7.9 %      Eosinophil % 1.7 %      Basophil % 0.6 %      Neutrophils, Absolute 3.80 10*3/mm3      Lymphocytes, Absolute 1.20 10*3/mm3      Monocytes, Absolute 0.40 10*3/mm3      Eosinophils, Absolute 0.10 10*3/mm3      Basophils, Absolute 0.00 10*3/mm3      nRBC 0.0 /100 WBC            I reviewed the patient's new clinical results.    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 Gonzalez has been consulted.  Short stature -will discuss with patient and mother whether they want to pursue any sort of genetic evaluation to explain her cognitive dysfunction and physical dysmorphisms.     Anticipate discharge this afternoon.     I discussed the patient's findings and my recommendations with patient.     Arabella Phillips MD  07/16/21  13:49 EDT

## 2021-07-16 NOTE — CASE MANAGEMENT/SOCIAL WORK
Case Management Discharge Note                Selected Continued Care - Discharged on 7/16/2021 Admission date: 7/15/2021 - Discharge disposition: Home or Self Care                 Final Discharge Disposition Code: 01 - home or self-care
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
## 2021-07-16 NOTE — PLAN OF CARE
Problem: Adult Inpatient Plan of Care  Goal: Plan of Care Review  7/16/2021 0813 by Janeen Stiles, RN  Outcome: Ongoing, Progressing  Flowsheets  Taken 7/16/2021 0813  Progress: improving  Plan of Care Reviewed With: patient  Taken 7/16/2021 0125  Outcome Summary: pt is resting comfortably in bed with mother at bedside, VSS pt gets upset at thought of being sick at this time  7/16/2021 0125 by Janeen Stiles, RN  Outcome: Ongoing, Progressing  Flowsheets (Taken 7/16/2021 0125)  Progress: improving  Plan of Care Reviewed With: patient  Outcome Summary: pt is resting comfortably in bed with mother at bedside, VSS pt gets upset at thought of being sick at this time  Goal: Patient-Specific Goal (Individualized)  7/16/2021 0813 by Janeen Stiles RN  Outcome: Ongoing, Progressing  7/16/2021 0125 by Janeen Stiles RN  Outcome: Ongoing, Progressing  Goal: Absence of Hospital-Acquired Illness or Injury  7/16/2021 0813 by Janeen Stiles RN  Outcome: Ongoing, Progressing  7/16/2021 0125 by Janeen Stiles RN  Outcome: Ongoing, Progressing  Intervention: Identify and Manage Fall Risk  Recent Flowsheet Documentation  Taken 7/16/2021 0330 by Janeen Stiles RN  Safety Promotion/Fall Prevention:   safety round/check completed   room organization consistent   nonskid shoes/slippers when out of bed   lighting adjusted   fall prevention program maintained   clutter free environment maintained   assistive device/personal items within reach  Intervention: Prevent Skin Injury  Recent Flowsheet Documentation  Taken 7/16/2021 0100 by Janeen Stiles RN  Body Position: position changed independently  Intervention: Prevent Infection  Recent Flowsheet Documentation  Taken 7/16/2021 0330 by Janeen Stiles RN  Infection Prevention:   single patient room provided   rest/sleep promoted   hand hygiene promoted  Taken 7/16/2021 0100 by Janeen Stiles RN  Infection Prevention:   single patient room provided   rest/sleep promoted   hand hygiene  promoted  Goal: Optimal Comfort and Wellbeing  7/16/2021 0813 by Janeen Stiles, RN  Outcome: Ongoing, Progressing  7/16/2021 0125 by Janeen Stiles RN  Outcome: Ongoing, Progressing  Intervention: Provide Person-Centered Care  Recent Flowsheet Documentation  Taken 7/16/2021 0100 by Janeen Stiles, RN  Trust Relationship/Rapport:   choices provided   care explained   reassurance provided   questions encouraged   questions answered   empathic listening provided   emotional support provided  Goal: Readiness for Transition of Care  7/16/2021 0813 by Janeen Stiles, RN  Outcome: Ongoing, Progressing  7/16/2021 0125 by Janeen Stiles, RN  Outcome: Ongoing, Progressing  Intervention: Mutually Develop Transition Plan  Recent Flowsheet Documentation  Taken 7/16/2021 0636 by Janeen Stiles RN  Equipment Currently Used at Home: none  Transportation Anticipated: family or friend will provide  Patient/Family Anticipated Services at Transition: none  Patient/Family Anticipates Transition to: home   Goal Outcome Evaluation:  Plan of Care Reviewed With: patient        Progress: improving  Outcome Summary: pt is resting comfortably in bed with mother at bedside, VSS pt gets upset at thought of being sick at this time

## 2021-07-16 NOTE — PLAN OF CARE
Problem: Adult Inpatient Plan of Care  Goal: Plan of Care Review  Outcome: Ongoing, Progressing  Flowsheets (Taken 7/16/2021 0125)  Progress: improving  Plan of Care Reviewed With: patient  Outcome Summary: pt is resting comfortably in bed with mother at bedside, VSS pt gets upset at thought of being sick at this time  Goal: Patient-Specific Goal (Individualized)  Outcome: Ongoing, Progressing  Goal: Absence of Hospital-Acquired Illness or Injury  Outcome: Ongoing, Progressing  Goal: Optimal Comfort and Wellbeing  Outcome: Ongoing, Progressing  Goal: Readiness for Transition of Care  Outcome: Ongoing, Progressing   Goal Outcome Evaluation:  Plan of Care Reviewed With: patient        Progress: improving  Outcome Summary: pt is resting comfortably in bed with mother at bedside, VSS pt gets upset at thought of being sick at this time

## 2021-07-16 NOTE — CONSULTS
Primary Care Provider: Daniel Sam MD     Consult requested by: Arabella Phillips MD    Reason for Consultation: Neurological evaluation for memory problems    Arlene Brady is a 25 y.o. female *    History taken from: patient chart family    Chief complaint: syncope and memory problems.         SUBJECTIVE:    History of present illness: The patient is a 25 year old lady with multiple medical problems including ADHD, depression, anxiety disorder and panic attacks who went for dental work at dentist office where she had a possible episode of loss of consciousness.  The patient did not have bowel and bladder incontinence.  She did not remember the events before loss of consciousness.  She underwent MRI of Brain which was unremarkable. She denies double vision, speech and swallowing problems, focal weakness.     Review of Systems   Constitutional: Negative  HENT: Negative.    Eyes: Negative.    Respiratory: Negative.    Cardiovascular: Negative.    Gastrointestinal: Negative.    Genitourinary: Negative.    Musculoskeletal: Negative  Skin: Negative.    Neurological: Negative.    Hematological: Negative.    Psychiatric/Behavioral: depression, ADHD        PATIENT HISTORY:  Past Medical History:   Diagnosis Date   • ADHD (attention deficit hyperactivity disorder)    • Arthritis    • Asthma     exercise induced asthma    • Depression    • Disease of thyroid gland     thyroid nodule in the past    ,   Past Surgical History:   Procedure Laterality Date   • ABDOMINAL SURGERY      choley   • TONSILLECTOMY     ,   Family History   Problem Relation Age of Onset   • Hypertension Father    • Heart disease Father    • Hyperlipidemia Father    • Cancer Paternal Grandmother    ,   Social History     Tobacco Use   • Smoking status: Never Smoker   • Smokeless tobacco: Never Used   Vaping Use   • Vaping Use: Never used   Substance Use Topics   • Alcohol use: Yes     Comment: very infrequent    • Drug use: Never   ,   No  medications prior to admission.   , Scheduled Meds:  sodium chloride, 3 mL, Intravenous, Q12H    , Continuous Infusions:   , PRN Meds:  •  acetaminophen  •  ondansetron  •  sodium chloride  •  sodium chloride, Allergies:  Cephalosporins and Penicillins    ________________________________________________________        OBJECTIVE:  Upon today's exam, The patient is lying in bed in no apparent distress.  Head NC, AT, Neck supple, trachea midline.  Lungs CTA, no added breath sounds.  CV  S1-S2 no murmur.  Abdomen soft, non tender.  Ext no edema, no cyanosis.          Neurologic Exam  The patient is awake, alert, oriented x 3, speech is good. Follow commands.  CN EOMI, no facial droop. Tongue midline. Motor 5/5.  Sensory light touch intact.  Reflexes + , plantar mute. Cerebellum finger to nose intact.   ________________________________________________________   RESULTS REVIEW:    VITAL SIGNS:   Temp:  [97.4 °F (36.3 °C)-98.6 °F (37 °C)] 98.6 °F (37 °C)  Heart Rate:  [71-98] 82  Resp:  [13-19] 19  BP: (105-155)/(55-88) 116/71     LABS:  WBC   Date Value Ref Range Status   07/15/2021 5.50 3.40 - 10.80 10*3/mm3 Final     RBC   Date Value Ref Range Status   07/15/2021 4.65 3.77 - 5.28 10*6/mm3 Final     Hemoglobin   Date Value Ref Range Status   07/15/2021 11.9 (L) 12.0 - 15.9 g/dL Final     Hematocrit   Date Value Ref Range Status   07/15/2021 35.8 34.0 - 46.6 % Final     MCV   Date Value Ref Range Status   07/15/2021 76.9 (L) 79.0 - 97.0 fL Final     MCH   Date Value Ref Range Status   07/15/2021 25.5 (L) 26.6 - 33.0 pg Final     MCHC   Date Value Ref Range Status   07/15/2021 33.2 31.5 - 35.7 g/dL Final     RDW   Date Value Ref Range Status   07/15/2021 16.3 (H) 12.3 - 15.4 % Final     RDW-SD   Date Value Ref Range Status   07/15/2021 43.8 37.0 - 54.0 fl Final     MPV   Date Value Ref Range Status   07/15/2021 7.5 6.0 - 12.0 fL Final     Platelets   Date Value Ref Range Status   07/15/2021 342 140 - 450 10*3/mm3 Final      Neutrophil %   Date Value Ref Range Status   07/15/2021 69.0 42.7 - 76.0 % Final     Lymphocyte %   Date Value Ref Range Status   07/15/2021 20.8 19.6 - 45.3 % Final     Monocyte %   Date Value Ref Range Status   07/15/2021 7.9 5.0 - 12.0 % Final     Eosinophil %   Date Value Ref Range Status   07/15/2021 1.7 0.3 - 6.2 % Final     Basophil %   Date Value Ref Range Status   07/15/2021 0.6 0.0 - 1.5 % Final     Neutrophils, Absolute   Date Value Ref Range Status   07/15/2021 3.80 1.70 - 7.00 10*3/mm3 Final     Lymphocytes, Absolute   Date Value Ref Range Status   07/15/2021 1.20 0.70 - 3.10 10*3/mm3 Final     Monocytes, Absolute   Date Value Ref Range Status   07/15/2021 0.40 0.10 - 0.90 10*3/mm3 Final     Eosinophils, Absolute   Date Value Ref Range Status   07/15/2021 0.10 0.00 - 0.40 10*3/mm3 Final     Basophils, Absolute   Date Value Ref Range Status   07/15/2021 0.00 0.00 - 0.20 10*3/mm3 Final     nRBC   Date Value Ref Range Status   07/15/2021 0.0 0.0 - 0.2 /100 WBC Final     Glucose   Date Value Ref Range Status   07/15/2021 94 65 - 99 mg/dL Final     BUN   Date Value Ref Range Status   07/15/2021 11 6 - 20 mg/dL Final     Creatinine   Date Value Ref Range Status   07/15/2021 0.79 0.57 - 1.00 mg/dL Final     Sodium   Date Value Ref Range Status   07/15/2021 140 136 - 145 mmol/L Final     Potassium   Date Value Ref Range Status   07/15/2021 3.5 3.5 - 5.2 mmol/L Final     Chloride   Date Value Ref Range Status   07/15/2021 105 98 - 107 mmol/L Final     CO2   Date Value Ref Range Status   07/15/2021 21.0 (L) 22.0 - 29.0 mmol/L Final     Calcium   Date Value Ref Range Status   07/15/2021 9.1 8.6 - 10.5 mg/dL Final     Total Protein   Date Value Ref Range Status   07/15/2021 7.7 6.0 - 8.5 g/dL Final     Albumin   Date Value Ref Range Status   07/15/2021 4.30 3.50 - 5.20 g/dL Final     ALT (SGPT)   Date Value Ref Range Status   07/15/2021 13 1 - 33 U/L Final     AST (SGOT)   Date Value Ref Range Status    07/15/2021 18 1 - 32 U/L Final     Alkaline Phosphatase   Date Value Ref Range Status   07/15/2021 68 39 - 117 U/L Final     Total Bilirubin   Date Value Ref Range Status   07/15/2021 0.2 0.0 - 1.2 mg/dL Final     eGFR Non  Amer   Date Value Ref Range Status   07/15/2021 89 >60 mL/min/1.73 Final     BUN/Creatinine Ratio   Date Value Ref Range Status   07/15/2021 13.9 7.0 - 25.0 Final     Anion Gap   Date Value Ref Range Status   07/15/2021 14.0 5.0 - 15.0 mmol/L Final       Lab Results   Component Value Date    TSH 5.000 (H) 07/16/2021    HGBA1C 4.6 09/10/2020         IMAGING STUDIES:  CT Head Without Contrast    Result Date: 7/15/2021  1. No acute intracranial abnormality. 2. Paranasal sinus disease.  Electronically Signed By-Cole Lynne MD On:7/15/2021 10:50 PM This report was finalized on [id number] by  Cole Lynne MD.    MRI Brain Without Contrast    Result Date: 7/16/2021  Normal brain MRI examination.   Electronically Signed By-Domenico Allen DO On:7/16/2021 12:59 PM This report was finalized on [id number] by  Domenico Allen DO.    XR Chest 1 View    Result Date: 7/15/2021  No acute process.  Electronically Signed By-Cole Lynne MD On:7/15/2021 8:25 PM This report was finalized on [id number] by  Cole Lynne MD.      I reviewed the patient's new clinical results.      ________________________________________________________     PROBLEM LIST:    Amnesia memory loss    Conversion disorder    ISAIAS (generalized anxiety disorder)          Assessment/Plan   ASSESSMENT/PLAN:  The patient is a 25 year old lady with ADHD, anxiety disorder who had an episode of amnesia while she was undergoing dental procedure.  The clinical features are suggestive of vaso-vagal syncope.  Patient is encouraged to increase fluid intake.   ADHD, anxiety disorder  As per primary care MD.         I discussed the patient's findings and my recommendations with patient and nursing staff    Jt Masters,  MD  07/16/21  16:52 EDT

## 2021-07-26 ENCOUNTER — HOSPITAL ENCOUNTER (EMERGENCY)
Facility: HOSPITAL | Age: 26
Discharge: HOME | End: 2021-07-26
Attending: EMERGENCY MEDICINE | Admitting: EMERGENCY MEDICINE

## 2021-07-26 VITALS
RESPIRATION RATE: 20 BRPM | BODY MASS INDEX: 40.22 KG/M2 | HEIGHT: 63 IN | HEART RATE: 75 BPM | WEIGHT: 227 LBS | TEMPERATURE: 98.9 F | OXYGEN SATURATION: 100 % | DIASTOLIC BLOOD PRESSURE: 77 MMHG | SYSTOLIC BLOOD PRESSURE: 121 MMHG

## 2021-07-26 DIAGNOSIS — E86.0 DEHYDRATION: ICD-10-CM

## 2021-07-26 DIAGNOSIS — R55 SYNCOPE, UNSPECIFIED SYNCOPE TYPE: Primary | ICD-10-CM

## 2021-07-26 LAB
ALBUMIN SERPL-MCNC: 4.3 G/DL (ref 3.5–5.2)
ALBUMIN/GLOB SERPL: 1.3 G/DL
ALP SERPL-CCNC: 72 U/L (ref 39–117)
ALT SERPL W P-5'-P-CCNC: 22 U/L (ref 1–33)
AMPHET+METHAMPHET UR QL: NEGATIVE
ANION GAP SERPL CALCULATED.3IONS-SCNC: 13 MMOL/L (ref 5–15)
AST SERPL-CCNC: 25 U/L (ref 1–32)
B-HCG UR QL: NEGATIVE
BACTERIA UR QL AUTO: ABNORMAL /HPF
BARBITURATES UR QL SCN: NEGATIVE
BASOPHILS # BLD AUTO: 0 10*3/MM3 (ref 0–0.2)
BASOPHILS NFR BLD AUTO: 0.7 % (ref 0–1.5)
BENZODIAZ UR QL SCN: NEGATIVE
BILIRUB SERPL-MCNC: 0.4 MG/DL (ref 0–1.2)
BILIRUB UR QL STRIP: NEGATIVE
BUN SERPL-MCNC: 9 MG/DL (ref 6–20)
BUN/CREAT SERPL: 11.3 (ref 7–25)
CALCIUM SPEC-SCNC: 8.9 MG/DL (ref 8.6–10.5)
CANNABINOIDS SERPL QL: NEGATIVE
CHLORIDE SERPL-SCNC: 104 MMOL/L (ref 98–107)
CLARITY UR: CLEAR
CO2 SERPL-SCNC: 20 MMOL/L (ref 22–29)
COCAINE UR QL: NEGATIVE
COLOR UR: YELLOW
CREAT SERPL-MCNC: 0.8 MG/DL (ref 0.57–1)
DEPRECATED RDW RBC AUTO: 43.8 FL (ref 37–54)
EOSINOPHIL # BLD AUTO: 0.1 10*3/MM3 (ref 0–0.4)
EOSINOPHIL NFR BLD AUTO: 1.8 % (ref 0.3–6.2)
ERYTHROCYTE [DISTWIDTH] IN BLOOD BY AUTOMATED COUNT: 16.4 % (ref 12.3–15.4)
ETHANOL UR QL: <0.01 %
GFR SERPL CREATININE-BSD FRML MDRD: 88 ML/MIN/1.73
GLOBULIN UR ELPH-MCNC: 3.4 GM/DL
GLUCOSE SERPL-MCNC: 90 MG/DL (ref 65–99)
GLUCOSE UR STRIP-MCNC: NEGATIVE MG/DL
HCT VFR BLD AUTO: 33.8 % (ref 34–46.6)
HGB BLD-MCNC: 11.3 G/DL (ref 12–15.9)
HGB UR QL STRIP.AUTO: NEGATIVE
HOLD SPECIMEN: NORMAL
HYALINE CASTS UR QL AUTO: ABNORMAL /LPF
KETONES UR QL STRIP: NEGATIVE
LEUKOCYTE ESTERASE UR QL STRIP.AUTO: NEGATIVE
LYMPHOCYTES # BLD AUTO: 1.4 10*3/MM3 (ref 0.7–3.1)
LYMPHOCYTES NFR BLD AUTO: 23 % (ref 19.6–45.3)
MAGNESIUM SERPL-MCNC: 1.4 MG/DL (ref 1.6–2.6)
MCH RBC QN AUTO: 25.6 PG (ref 26.6–33)
MCHC RBC AUTO-ENTMCNC: 33.3 G/DL (ref 31.5–35.7)
MCV RBC AUTO: 76.9 FL (ref 79–97)
METHADONE UR QL SCN: NEGATIVE
MONOCYTES # BLD AUTO: 0.5 10*3/MM3 (ref 0.1–0.9)
MONOCYTES NFR BLD AUTO: 8.1 % (ref 5–12)
NEUTROPHILS NFR BLD AUTO: 4.1 10*3/MM3 (ref 1.7–7)
NEUTROPHILS NFR BLD AUTO: 66.4 % (ref 42.7–76)
NITRITE UR QL STRIP: NEGATIVE
NRBC BLD AUTO-RTO: 0 /100 WBC (ref 0–0.2)
OPIATES UR QL: NEGATIVE
OXYCODONE UR QL SCN: NEGATIVE
PH UR STRIP.AUTO: 7 [PH] (ref 5–8)
PLATELET # BLD AUTO: 351 10*3/MM3 (ref 140–450)
PMV BLD AUTO: 7.9 FL (ref 6–12)
POTASSIUM SERPL-SCNC: 3.4 MMOL/L (ref 3.5–5.2)
PROT SERPL-MCNC: 7.7 G/DL (ref 6–8.5)
PROT UR QL STRIP: ABNORMAL
RBC # BLD AUTO: 4.4 10*6/MM3 (ref 3.77–5.28)
RBC # UR: ABNORMAL /HPF
REF LAB TEST METHOD: ABNORMAL
SODIUM SERPL-SCNC: 137 MMOL/L (ref 136–145)
SP GR UR STRIP: 1.01 (ref 1–1.03)
SQUAMOUS #/AREA URNS HPF: ABNORMAL /HPF
TSH SERPL DL<=0.05 MIU/L-ACNC: 3.71 UIU/ML (ref 0.27–4.2)
UROBILINOGEN UR QL STRIP: ABNORMAL
WBC # BLD AUTO: 6.1 10*3/MM3 (ref 3.4–10.8)
WBC UR QL AUTO: ABNORMAL /HPF

## 2021-07-26 PROCEDURE — 80307 DRUG TEST PRSMV CHEM ANLYZR: CPT | Performed by: EMERGENCY MEDICINE

## 2021-07-26 PROCEDURE — 82077 ASSAY SPEC XCP UR&BREATH IA: CPT | Performed by: EMERGENCY MEDICINE

## 2021-07-26 PROCEDURE — 93005 ELECTROCARDIOGRAM TRACING: CPT | Performed by: EMERGENCY MEDICINE

## 2021-07-26 PROCEDURE — 84443 ASSAY THYROID STIM HORMONE: CPT | Performed by: EMERGENCY MEDICINE

## 2021-07-26 PROCEDURE — 83735 ASSAY OF MAGNESIUM: CPT | Performed by: EMERGENCY MEDICINE

## 2021-07-26 PROCEDURE — 81025 URINE PREGNANCY TEST: CPT | Performed by: EMERGENCY MEDICINE

## 2021-07-26 PROCEDURE — 80053 COMPREHEN METABOLIC PANEL: CPT | Performed by: EMERGENCY MEDICINE

## 2021-07-26 PROCEDURE — 81001 URINALYSIS AUTO W/SCOPE: CPT | Performed by: EMERGENCY MEDICINE

## 2021-07-26 PROCEDURE — 99284 EMERGENCY DEPT VISIT MOD MDM: CPT

## 2021-07-26 PROCEDURE — 85025 COMPLETE CBC W/AUTO DIFF WBC: CPT | Performed by: EMERGENCY MEDICINE

## 2021-07-26 RX ORDER — SODIUM CHLORIDE 0.9 % (FLUSH) 0.9 %
10 SYRINGE (ML) INJECTION AS NEEDED
Status: DISCONTINUED | OUTPATIENT
Start: 2021-07-26 | End: 2021-07-26 | Stop reason: HOSPADM

## 2021-07-26 RX ADMIN — SODIUM CHLORIDE 1000 ML: 9 INJECTION, SOLUTION INTRAVENOUS at 18:59

## 2021-07-26 NOTE — ED PROVIDER NOTES
Subjective   History of Present Illness  Possible syncope  25-year-old female was at work today and states she had not eaten today because she not yet taken her lunch break she started to work at 6 AM.  States she is not drinking much.  She reportedly has had some nasal congestion and a scratchy throat but otherwise reports no shortness of breath or chest pain or headache or focal numbness or weakness.  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  Parents report that she had been evaluated for this about a week ago with similar symptoms and had negative MRI and diagnosed with pseudoseizures.  Review of Systems   HENT: Positive for congestion and sore throat.    Eyes: Negative.    Respiratory: Negative.    Cardiovascular: Negative.    Gastrointestinal: Negative.    Genitourinary: Negative.    Musculoskeletal: Negative.    Skin: Negative.    Neurological: Positive for syncope and weakness.   Psychiatric/Behavioral: Negative.        Past Medical History:   Diagnosis Date   • ADHD (attention deficit hyperactivity disorder)    • Arthritis    • Asthma     exercise induced asthma    • Depression    • Disease of thyroid gland     thyroid nodule in the past        Allergies   Allergen Reactions   • Cephalosporins Rash   • Penicillins Rash       Past Surgical History:   Procedure Laterality Date   • ABDOMINAL SURGERY      choley   • TONSILLECTOMY         Family History   Problem Relation Age of Onset   • Hypertension Father    • Heart disease Father    • Hyperlipidemia Father    • Cancer Paternal Grandmother        Social History     Socioeconomic History   • Marital status: Single     Spouse name: Not on file   • Number of children: Not on file   • Years of education: Not on file   • Highest education level: Not on file   Tobacco Use   • Smoking status: Never Smoker   • Smokeless tobacco: Never Used   Vaping Use   • Vaping Use: Never used   Substance and Sexual Activity   • Alcohol use: Yes      "Comment: very infrequent    • Drug use: Never   • Sexual activity: Not Currently        Prior to Admission medications    Medication Sig Start Date End Date Taking? Authorizing Provider   Norethin-Eth Estrad-Fe Biphas (LO LOESTRIN FE PO) Take 1 tablet by mouth Daily.    Emergency, Nurse Kya RN       /68   Pulse 73   Temp 97.8 °F (36.6 °C) (Oral)   Resp 18   Ht 160 cm (63\")   Wt 103 kg (227 lb)   LMP 07/16/2021   SpO2 100%   BMI 40.21 kg/m²   I examined the patient using the appropriate personal protective equipment.          Objective   Physical Exam  General: Well-developed obese female awake and alert, no acute distress  Eyes: Pupils round and reactive, sclera nonicteric  HEENT: Mucous membranes somewhat dry, no mucosal swelling  Neck: Supple, no nuchal rigidity, no lymphadenopathy  Respirations: Respirations nonlabored, equal breath sounds bilaterally, clear lungs  Heart regular rate and rhythm, no murmurs rubs or gallops,   Abdomen soft, mildly tender palpation mid abdomen, no rebound or guarding, nondistended, no hepatosplenomegaly, no hernia, no mass, normal bowel sounds, no CVA tenderness  Extremities no clubbing cyanosis or edema, calves are symmetric and nontender  Neuro cranial nerves grossly intact, no focal limb deficits  Psych oriented, somewhat anxious,  Skin no rash, brisk cap refill  Procedures           ED Course      Prior to Admission medications    Medication Sig Start Date End Date Taking? Authorizing Provider   Norethin-Eth Estrad-Fe Biphas (LO LOESTRIN FE PO) Take 1 tablet by mouth Daily.    Emergency, Nurse Epic, RN     Results for orders placed or performed during the hospital encounter of 07/26/21   Pregnancy, Urine - Urine, Clean Catch    Specimen: Urine, Clean Catch   Result Value Ref Range    HCG, Urine QL Negative Negative   Comprehensive Metabolic Panel    Specimen: Arm, Right; Blood   Result Value Ref Range    Glucose 90 65 - 99 mg/dL    BUN 9 6 - 20 mg/dL    " Creatinine 0.80 0.57 - 1.00 mg/dL    Sodium 137 136 - 145 mmol/L    Potassium 3.4 (L) 3.5 - 5.2 mmol/L    Chloride 104 98 - 107 mmol/L    CO2 20.0 (L) 22.0 - 29.0 mmol/L    Calcium 8.9 8.6 - 10.5 mg/dL    Total Protein 7.7 6.0 - 8.5 g/dL    Albumin 4.30 3.50 - 5.20 g/dL    ALT (SGPT) 22 1 - 33 U/L    AST (SGOT) 25 1 - 32 U/L    Alkaline Phosphatase 72 39 - 117 U/L    Total Bilirubin 0.4 0.0 - 1.2 mg/dL    eGFR Non African Amer 88 >60 mL/min/1.73    Globulin 3.4 gm/dL    A/G Ratio 1.3 g/dL    BUN/Creatinine Ratio 11.3 7.0 - 25.0    Anion Gap 13.0 5.0 - 15.0 mmol/L   TSH    Specimen: Arm, Right; Blood   Result Value Ref Range    TSH 3.710 0.270 - 4.200 uIU/mL   Magnesium    Specimen: Arm, Right; Blood   Result Value Ref Range    Magnesium 1.4 (L) 1.6 - 2.6 mg/dL   Ethanol    Specimen: Arm, Right; Blood   Result Value Ref Range    Ethanol % <0.010 %   Urine Drug Screen - Urine, Clean Catch    Specimen: Urine, Clean Catch   Result Value Ref Range    Amphet/Methamphet, Screen Negative Negative    Barbiturates Screen, Urine Negative Negative    Benzodiazepine Screen, Urine Negative Negative    Cocaine Screen, Urine Negative Negative    Opiate Screen Negative Negative    THC, Screen, Urine Negative Negative    Methadone Screen, Urine Negative Negative    Oxycodone Screen, Urine Negative Negative   Urinalysis With Microscopic If Indicated (No Culture) - Urine, Clean Catch    Specimen: Urine, Clean Catch   Result Value Ref Range    Color, UA Yellow Yellow, Straw    Appearance, UA Clear Clear    pH, UA 7.0 5.0 - 8.0    Specific Gravity, UA 1.013 1.005 - 1.030    Glucose, UA Negative Negative    Ketones, UA Negative Negative    Bilirubin, UA Negative Negative    Blood, UA Negative Negative    Protein,  mg/dL (2+) (A) Negative    Leuk Esterase, UA Negative Negative    Nitrite, UA Negative Negative    Urobilinogen, UA 1.0 E.U./dL 0.2 - 1.0 E.U./dL   CBC Auto Differential    Specimen: Arm, Right; Blood   Result Value Ref  Range    WBC 6.10 3.40 - 10.80 10*3/mm3    RBC 4.40 3.77 - 5.28 10*6/mm3    Hemoglobin 11.3 (L) 12.0 - 15.9 g/dL    Hematocrit 33.8 (L) 34.0 - 46.6 %    MCV 76.9 (L) 79.0 - 97.0 fL    MCH 25.6 (L) 26.6 - 33.0 pg    MCHC 33.3 31.5 - 35.7 g/dL    RDW 16.4 (H) 12.3 - 15.4 %    RDW-SD 43.8 37.0 - 54.0 fl    MPV 7.9 6.0 - 12.0 fL    Platelets 351 140 - 450 10*3/mm3    Neutrophil % 66.4 42.7 - 76.0 %    Lymphocyte % 23.0 19.6 - 45.3 %    Monocyte % 8.1 5.0 - 12.0 %    Eosinophil % 1.8 0.3 - 6.2 %    Basophil % 0.7 0.0 - 1.5 %    Neutrophils, Absolute 4.10 1.70 - 7.00 10*3/mm3    Lymphocytes, Absolute 1.40 0.70 - 3.10 10*3/mm3    Monocytes, Absolute 0.50 0.10 - 0.90 10*3/mm3    Eosinophils, Absolute 0.10 0.00 - 0.40 10*3/mm3    Basophils, Absolute 0.00 0.00 - 0.20 10*3/mm3    nRBC 0.0 0.0 - 0.2 /100 WBC   Urinalysis, Microscopic Only - Urine, Clean Catch    Specimen: Urine, Clean Catch   Result Value Ref Range    RBC, UA 31-50 (A) None Seen /HPF    WBC, UA 6-12 (A) None Seen /HPF    Bacteria, UA Trace (A) None Seen /HPF    Squamous Epithelial Cells, UA 3-6 (A) None Seen, 0-2 /HPF    Hyaline Casts, UA 0-2 None Seen /LPF    Methodology Automated Microscopy    ECG 12 Lead   Result Value Ref Range    QT Interval 399 ms   Gold Top - SST   Result Value Ref Range    Extra Tube Hold for add-ons.         My EKG interpretation sinus rhythm, rate of 74, no acute abnormalities                                  MDM  I reviewed the MRI report from recent visits which was normal brain MRI.  Patient has normal neurologic exam.  She was feeling much better after IV fluids.  She describes having fasted since 630 yesterday evening and has had little fluid intake.  I suspect this is contributing to what is likely a syncopal episode today.  She states she was diagnosed with pseudoseizures on the last hospital presentation.  They report she has not had any EEG.  We discussed neurology follow-up and referral for further rule out of seizures and  consideration of EEG and they are agreeable to that outpatient work-up plan.  She is discharged in good condition was given warning signs for return.  Final diagnoses:   Syncope, unspecified syncope type   Dehydration       ED Disposition  ED Disposition     ED Disposition Condition Comment    Discharge Stable           Grief, Daniel RICE MD  [address]  [facility] [address]  [phone]    Schedule an appointment as soon as possible for a visit in 3 days      [facility]  [address]  [facility] [address]  [phone]  Schedule an appointment as soon as possible for a visit in 3 days           Medication List      No changes were made to your prescriptions during this visit.          Wayne Quintanilla MD  07/26/21 2019

## 2021-07-27 LAB — QT INTERVAL: 399 MS

## 2021-07-27 NOTE — DISCHARGE INSTRUCTIONS
Rest, drink plenty of fluids, avoid prolonged fasting, eat a potassium/magnesium rich food daily.  Return for altered mental status, recurrent fainting, vomiting, shortness of breath, fever or any other concerns.  No driving until cleared by your primary care physician and neurology.

## 2022-02-18 ENCOUNTER — PATIENT ROUNDING (BHMG ONLY) (OUTPATIENT)
Dept: PSYCHIATRY | Facility: CLINIC | Age: 27
End: 2022-02-18

## 2022-02-18 ENCOUNTER — OFFICE VISIT (OUTPATIENT)
Dept: PSYCHIATRY | Facility: CLINIC | Age: 27
End: 2022-02-18

## 2022-02-18 DIAGNOSIS — F33.1 MAJOR DEPRESSIVE DISORDER, RECURRENT EPISODE, MODERATE: Primary | Chronic | ICD-10-CM

## 2022-02-18 DIAGNOSIS — F41.1 GAD (GENERALIZED ANXIETY DISORDER): Chronic | ICD-10-CM

## 2022-02-18 DIAGNOSIS — F90.0 ADHD, PREDOMINANTLY INATTENTIVE TYPE: Chronic | ICD-10-CM

## 2022-02-18 DIAGNOSIS — F43.12 CHRONIC POSTTRAUMATIC STRESS DISORDER: Chronic | ICD-10-CM

## 2022-02-18 PROCEDURE — 90792 PSYCH DIAG EVAL W/MED SRVCS: CPT | Performed by: PSYCHIATRY & NEUROLOGY

## 2022-02-18 RX ORDER — HYDROXYZINE HYDROCHLORIDE 10 MG/1
10 TABLET, FILM COATED ORAL DAILY PRN
Qty: 30 TABLET | Refills: 1 | Status: SHIPPED | OUTPATIENT
Start: 2022-02-18 | End: 2022-03-24
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
## 2022-02-18 NOTE — PROGRESS NOTES
February 18, 2022    Hello, may I speak with Arlene Brady?    My name is Radha Moctezuma      I am  with [facility]  [facility] [place] [address].    Before we get started may I verify your date of birth? 1995    I am calling to officially welcome you to our practice and ask about your recent visit. Is this a good time to talk? yes    Tell me about your visit with us. What things went well?  The patient reported the visit was pretty good. I asked her what she meant and  she shared that she was asked some pretty hard questions. She said the provider explained she had to ask these types of questions to determine what medication she felt would work best for her and if therapy should be recommended as well. The patient stated the provider recommended both medication and therapy. A referral list for therapist was shared with her at the end of her visit.  She asked me why we didn't have therapy here. I told her we are looking at expanding our services in the near future. She was very happy to hear that.        We're always looking for ways to make our patients' experiences even better. Do you have recommendations on ways we may improve?  Yes  She would like therapy at this location.    Overall were you satisfied with your first visit to our practice? yes       I appreciate you taking the time to speak with me today. Is there anything else I can do for you? no      Thank you, and have a great day.

## 2022-02-18 NOTE — PATIENT INSTRUCTIONS
Attention Deficit Hyperactivity Disorder, Adult  Attention deficit hyperactivity disorder (ADHD) is a mental health disorder that starts during childhood (neurodevelopmental disorder). For many people with ADHD, the disorder continues into the adult years. Treatment can help you manage your symptoms.  What are the causes?  The exact cause of ADHD is not known. Most experts believe genetics and environmental factors contribute to ADHD.  What increases the risk?  The following factors may make you more likely to develop this condition:  · Having a family history of ADHD.  · Being male.  · Being born to a mother who smoked or drank alcohol during pregnancy.  · Being exposed to lead or other toxins in the womb or early in life.  · Being born before 37 weeks of pregnancy (prematurely) or at a low birth weight.  · Having experienced a brain injury.  What are the signs or symptoms?  Symptoms of this condition depend on the type of ADHD. The two main types are inattentive and hyperactive-impulsive. Some people may have symptoms of both types.  Symptoms of the inattentive type include:  · Difficulty paying attention.  · Making careless mistakes.  · Not following instructions.  · Being disorganized.  · Avoiding tasks that require time and attention.  · Losing and forgetting things.  · Being easily distracted.  Symptoms of the hyperactive-impulsive type include:  · Restlessness.  · Talking too much.  · Interrupting.  · Difficulty with:  ? Sitting still.  ? Feeling motivated.  ? Relaxing.  ? Waiting in line or waiting for a turn.  In adults, this condition may lead to certain problems, such as:  · Keeping jobs.  · Performing tasks at work.  · Having stable relationships.  · Being on time or keeping to a schedule.  How is this diagnosed?  This condition is diagnosed based on your current symptoms and your history of symptoms. The diagnosis can be made by a health care provider such as a primary care provider or a mental health  care specialist.  Your health care provider may use a symptom checklist or a behavior rating scale to evaluate your symptoms. He or she may also want to talk with people who have observed your behaviors throughout your life.  How is this treated?  This condition can be treated with medicines and behavior therapy. Medicines may be the best option to reduce impulsive behaviors and improve attention. Your health care provider may recommend:  · Stimulant medicines. These are the most common medicines used for adult ADHD. They affect certain chemicals in the brain (neurotransmitters) and improve your ability to control your symptoms.  · A non-stimulant medicine for adult ADHD (atomoxetine). This medicine increases a neurotransmitter called norepinephrine. It may take weeks to months to see effects from this medicine.  Counseling and behavioral management are also important for treating ADHD. Counseling is often used along with medicine. Your health care provider may suggest:  · Cognitive behavioral therapy (CBT). This type of therapy teaches you to replace negative thoughts and actions with positive thoughts and actions. When used as part of ADHD treatment, this therapy may also include:  ? Coping strategies for organization, time management, impulse control, and stress reduction.  ? Mindfulness and meditation training.  · Behavioral management. You may work with a  who is specially trained to help people with ADHD manage and organize activities and function more effectively.  Follow these instructions at home:  Medicines    · Take over-the-counter and prescription medicines only as told by your health care provider.  · Talk with your health care provider about the possible side effects of your medicines and how to manage them.    Lifestyle    · Do not use drugs.  · Do not drink alcohol if:  ? Your health care provider tells you not to drink.  ? You are pregnant, may be pregnant, or are planning to become  pregnant.  · If you drink alcohol:  ? Limit how much you use to:  § 0-1 drink a day for women.  § 0-2 drinks a day for men.  ? Be aware of how much alcohol is in your drink. In the [place] one drink equals one 12 oz bottle of beer (355 mL), one 5 oz glass of wine (148 mL), or one 1½ oz glass of hard liquor (44 mL).  · Get enough sleep.  · Eat a healthy diet.  · Exercise regularly. Exercise can help to reduce stress and anxiety.    General instructions  · Learn as much as you can about adult ADHD, and work closely with your health care providers to find the treatments that work best for you.  · Follow the same schedule each day.  · Use reminder devices like notes, calendars, and phone apps to stay on time and organized.  · Keep all follow-up visits as told by your health care provider and therapist. This is important.  Where to find more information  A health care provider may be able to recommend resources that are available online or over the phone. You could start with:  · Attention Deficit Disorder Association (ADDA): www.add.org  · National [place] of Mental Health (NIM): www.nimh.nih.gov  Contact a health care provider if:  · Your symptoms continue to cause problems.  · You have side effects from your medicine, such as:  ? Repeated muscle twitches, coughing, or speech outbursts.  ? Sleep problems.  ? Loss of appetite.  ? Dizziness.  ? Unusually fast heartbeat.  ? Stomach pains.  ? Headaches.  · You are struggling with anxiety, depression, or substance abuse.  Get help right away if you:  · Have a severe reaction to a medicine.  If you ever feel like you may hurt yourself or others, or have thoughts about taking your own life, get help right away. You can go to the nearest emergency department or call:  · Your local emergency services (911 in the [place]  · A suicide crisis helpline, such as the National Suicide Prevention Lifeline at 1-839.584.8932. This is open 24 hours a day.  Summary  · ADHD is a mental  health disorder that starts during childhood (neurodevelopmental disorder) and often continues into the adult years.  · The exact cause of ADHD is not known. Most experts believe genetics and environmental factors contribute to ADHD.  · There is no cure for ADHD, but treatment with medicine, cognitive behavioral therapy, or behavioral management can help you manage your condition.  This information is not intended to replace advice given to you by your health care provider. Make sure you discuss any questions you have with your health care provider.  Document Revised: 05/11/2020 Document Reviewed: 05/11/2020  [employer] Patient Education © 2021 [employer]

## 2022-03-23 DIAGNOSIS — F41.1 GAD (GENERALIZED ANXIETY DISORDER): Chronic | ICD-10-CM

## 2022-03-24 RX ORDER — HYDROXYZINE HYDROCHLORIDE 10 MG/1
TABLET, FILM COATED ORAL
Qty: 30 TABLET | Refills: 0 | Status: SHIPPED | OUTPATIENT
Start: 2022-03-24 | End: 2022-04-19

## 2022-04-01 ENCOUNTER — OFFICE VISIT (OUTPATIENT)
Dept: PSYCHIATRY | Facility: CLINIC | Age: 27
End: 2022-04-01

## 2022-04-01 DIAGNOSIS — F44.9 CONVERSION DISORDER: ICD-10-CM

## 2022-04-01 DIAGNOSIS — F33.1 MAJOR DEPRESSIVE DISORDER, RECURRENT EPISODE, MODERATE: Primary | Chronic | ICD-10-CM

## 2022-04-01 DIAGNOSIS — F41.1 GAD (GENERALIZED ANXIETY DISORDER): Chronic | ICD-10-CM

## 2022-04-01 DIAGNOSIS — F90.0 ADHD, PREDOMINANTLY INATTENTIVE TYPE: Chronic | ICD-10-CM

## 2022-04-01 PROCEDURE — 99214 OFFICE O/P EST MOD 30 MIN: CPT | Performed by: PSYCHIATRY & NEUROLOGY

## 2022-04-01 RX ORDER — SERTRALINE HYDROCHLORIDE 100 MG/1
100 TABLET, FILM COATED ORAL DAILY
Qty: 30 TABLET | Refills: 2 | Status: SHIPPED | OUTPATIENT
Start: 2022-04-01 | End: 2022-04-26
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
## 2022-04-17 DIAGNOSIS — F41.1 GAD (GENERALIZED ANXIETY DISORDER): Chronic | ICD-10-CM

## 2022-04-19 RX ORDER — HYDROXYZINE HYDROCHLORIDE 10 MG/1
TABLET, FILM COATED ORAL
Qty: 30 TABLET | Refills: 0 | Status: SHIPPED | OUTPATIENT
Start: 2022-04-19 | End: 2022-04-27 | Stop reason: SDUPTHER

## 2022-04-26 RX ORDER — SERTRALINE HYDROCHLORIDE 100 MG/1
TABLET, FILM COATED ORAL
Qty: 30 TABLET | Refills: 2 | Status: SHIPPED | OUTPATIENT
Start: 2022-04-26 | End: 2022-08-02

## 2022-04-27 ENCOUNTER — TELEPHONE (OUTPATIENT)
Dept: PSYCHIATRY | Facility: CLINIC | Age: 27
End: 2022-04-27

## 2022-04-27 DIAGNOSIS — F41.1 GAD (GENERALIZED ANXIETY DISORDER): Chronic | ICD-10-CM

## 2022-04-27 RX ORDER — HYDROXYZINE HYDROCHLORIDE 10 MG/1
10 TABLET, FILM COATED ORAL DAILY PRN
Qty: 90 TABLET | Refills: 1 | Status: SHIPPED | OUTPATIENT
Start: 2022-04-27

## 2022-04-27 NOTE — TELEPHONE ENCOUNTER
Fax from [facility] in [place] [address] requesting 90 day supply of hydroxyzine hcl 10mg tablets.

## 2022-05-17 ENCOUNTER — APPOINTMENT (OUTPATIENT)
Dept: GENERAL RADIOLOGY | Facility: HOSPITAL | Age: 27
End: 2022-05-17

## 2022-05-17 ENCOUNTER — APPOINTMENT (OUTPATIENT)
Dept: CT IMAGING | Facility: HOSPITAL | Age: 27
End: 2022-05-17

## 2022-05-17 ENCOUNTER — HOSPITAL ENCOUNTER (EMERGENCY)
Facility: HOSPITAL | Age: 27
Discharge: HOME | End: 2022-05-17
Attending: EMERGENCY MEDICINE | Admitting: EMERGENCY MEDICINE

## 2022-05-17 VITALS
SYSTOLIC BLOOD PRESSURE: 106 MMHG | RESPIRATION RATE: 16 BRPM | OXYGEN SATURATION: 99 % | TEMPERATURE: 98.5 F | HEART RATE: 74 BPM | HEIGHT: 65 IN | BODY MASS INDEX: 37.82 KG/M2 | DIASTOLIC BLOOD PRESSURE: 71 MMHG | WEIGHT: 227 LBS
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
## 2022-05-17 DIAGNOSIS — M25.561 ACUTE PAIN OF BOTH KNEES: ICD-10-CM

## 2022-05-17 DIAGNOSIS — S23.9XXA THORACIC SPRAIN: ICD-10-CM

## 2022-05-17 DIAGNOSIS — F44.5 PSEUDOSEIZURE: ICD-10-CM

## 2022-05-17 DIAGNOSIS — S13.9XXA NECK SPRAIN, INITIAL ENCOUNTER: ICD-10-CM

## 2022-05-17 DIAGNOSIS — R79.0 LOW MAGNESIUM LEVEL: ICD-10-CM

## 2022-05-17 DIAGNOSIS — S33.5XXA LUMBAR SPRAIN, INITIAL ENCOUNTER: ICD-10-CM

## 2022-05-17 DIAGNOSIS — M25.562 ACUTE PAIN OF BOTH KNEES: ICD-10-CM

## 2022-05-17 LAB
ALBUMIN SERPL-MCNC: 3.8 G/DL (ref 3.5–5.2)
ALBUMIN/GLOB SERPL: 1.2 G/DL
ALP SERPL-CCNC: 93 U/L (ref 39–117)
ALT SERPL W P-5'-P-CCNC: 52 U/L (ref 1–33)
ANION GAP SERPL CALCULATED.3IONS-SCNC: 13 MMOL/L (ref 5–15)
AST SERPL-CCNC: 39 U/L (ref 1–32)
BASOPHILS # BLD AUTO: 0 10*3/MM3 (ref 0–0.2)
BASOPHILS NFR BLD AUTO: 0.6 % (ref 0–1.5)
BILIRUB SERPL-MCNC: 0.5 MG/DL (ref 0–1.2)
BUN SERPL-MCNC: 15 MG/DL (ref 6–20)
BUN/CREAT SERPL: 21.1 (ref 7–25)
CALCIUM SPEC-SCNC: 8.5 MG/DL (ref 8.6–10.5)
CHLORIDE SERPL-SCNC: 102 MMOL/L (ref 98–107)
CK SERPL-CCNC: 86 U/L (ref 20–180)
CO2 SERPL-SCNC: 23 MMOL/L (ref 22–29)
CREAT SERPL-MCNC: 0.71 MG/DL (ref 0.57–1)
DEPRECATED RDW RBC AUTO: 42.9 FL (ref 37–54)
EGFRCR SERPLBLD CKD-EPI 2021: 121.2 ML/MIN/1.73
EOSINOPHIL # BLD AUTO: 0.2 10*3/MM3 (ref 0–0.4)
EOSINOPHIL NFR BLD AUTO: 3.1 % (ref 0.3–6.2)
ERYTHROCYTE [DISTWIDTH] IN BLOOD BY AUTOMATED COUNT: 15 % (ref 12.3–15.4)
GLOBULIN UR ELPH-MCNC: 3.1 GM/DL
GLUCOSE SERPL-MCNC: 116 MG/DL (ref 65–99)
HCT VFR BLD AUTO: 33.6 % (ref 34–46.6)
HGB BLD-MCNC: 11.1 G/DL (ref 12–15.9)
LYMPHOCYTES # BLD AUTO: 0.8 10*3/MM3 (ref 0.7–3.1)
LYMPHOCYTES NFR BLD AUTO: 12.4 % (ref 19.6–45.3)
MAGNESIUM SERPL-MCNC: 1.3 MG/DL (ref 1.6–2.6)
MCH RBC QN AUTO: 26.8 PG (ref 26.6–33)
MCHC RBC AUTO-ENTMCNC: 32.9 G/DL (ref 31.5–35.7)
MCV RBC AUTO: 81.6 FL (ref 79–97)
MONOCYTES # BLD AUTO: 0.4 10*3/MM3 (ref 0.1–0.9)
MONOCYTES NFR BLD AUTO: 7 % (ref 5–12)
NEUTROPHILS NFR BLD AUTO: 4.9 10*3/MM3 (ref 1.7–7)
NEUTROPHILS NFR BLD AUTO: 76.9 % (ref 42.7–76)
NRBC BLD AUTO-RTO: 0 /100 WBC (ref 0–0.2)
PLATELET # BLD AUTO: 302 10*3/MM3 (ref 140–450)
PMV BLD AUTO: 7.7 FL (ref 6–12)
POTASSIUM SERPL-SCNC: 3.6 MMOL/L (ref 3.5–5.2)
PROT SERPL-MCNC: 6.9 G/DL (ref 6–8.5)
RBC # BLD AUTO: 4.12 10*6/MM3 (ref 3.77–5.28)
SODIUM SERPL-SCNC: 138 MMOL/L (ref 136–145)
WBC NRBC COR # BLD: 6.4 10*3/MM3 (ref 3.4–10.8)

## 2022-05-17 PROCEDURE — 72072 X-RAY EXAM THORAC SPINE 3VWS: CPT

## 2022-05-17 PROCEDURE — 99284 EMERGENCY DEPT VISIT MOD MDM: CPT

## 2022-05-17 PROCEDURE — 71046 X-RAY EXAM CHEST 2 VIEWS: CPT

## 2022-05-17 PROCEDURE — 72110 X-RAY EXAM L-2 SPINE 4/>VWS: CPT

## 2022-05-17 PROCEDURE — 70450 CT HEAD/BRAIN W/O DYE: CPT

## 2022-05-17 PROCEDURE — 93005 ELECTROCARDIOGRAM TRACING: CPT | Performed by: EMERGENCY MEDICINE

## 2022-05-17 PROCEDURE — 85025 COMPLETE CBC W/AUTO DIFF WBC: CPT | Performed by: EMERGENCY MEDICINE

## 2022-05-17 PROCEDURE — 82550 ASSAY OF CK (CPK): CPT | Performed by: EMERGENCY MEDICINE

## 2022-05-17 PROCEDURE — 73564 X-RAY EXAM KNEE 4 OR MORE: CPT

## 2022-05-17 PROCEDURE — 80053 COMPREHEN METABOLIC PANEL: CPT | Performed by: EMERGENCY MEDICINE

## 2022-05-17 PROCEDURE — 83735 ASSAY OF MAGNESIUM: CPT | Performed by: EMERGENCY MEDICINE

## 2022-05-17 PROCEDURE — 72125 CT NECK SPINE W/O DYE: CPT

## 2022-05-17 RX ORDER — ACETAMINOPHEN 500 MG
1000 TABLET ORAL ONCE
Status: COMPLETED | OUTPATIENT
Start: 2022-05-17 | End: 2022-05-17

## 2022-05-17 RX ADMIN — Medication 400 MG: at 09:27

## 2022-05-17 RX ADMIN — ACETAMINOPHEN 1000 MG: 500 TABLET ORAL at 08:16
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
## 2022-05-17 NOTE — DISCHARGE INSTRUCTIONS
Rest plenty of fluids take a magnesium supplement daily.  Follow-up with your neurologist call today and follow-up this week.  No driving no heights or ladders or any other activity if you have a seizure you could hurt yourself or others.  Unable to resume these activities until cleared by your neurologist.  Return for reoccurrence of seizures altered mental status fever vomiting rash or  any other new or worsening problems or concerns.

## 2022-05-22 LAB — QT INTERVAL: 383 MS

## 2022-06-03 ENCOUNTER — TELEPHONE (OUTPATIENT)
Dept: PSYCHIATRY | Facility: CLINIC | Age: 27
End: 2022-06-03

## 2022-06-03 NOTE — TELEPHONE ENCOUNTER
She can try to take zoloft at bed time , if still has severe fatigue, then decrease zoloft back to 50 mg and see if sxs improve

## 2022-06-03 NOTE — TELEPHONE ENCOUNTER
Pt says she already takes zoloft at night, but will start taking the 50 mg and call us before she needs a new script.
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
## 2022-07-17 ENCOUNTER — APPOINTMENT (OUTPATIENT)
Dept: GENERAL RADIOLOGY | Facility: HOSPITAL | Age: 27
End: 2022-07-17

## 2022-07-17 ENCOUNTER — HOSPITAL ENCOUNTER (EMERGENCY)
Facility: HOSPITAL | Age: 27
Discharge: HOME | End: 2022-07-17
Attending: EMERGENCY MEDICINE | Admitting: EMERGENCY MEDICINE

## 2022-07-17 VITALS
HEIGHT: 60 IN | HEART RATE: 111 BPM | TEMPERATURE: 98 F | DIASTOLIC BLOOD PRESSURE: 78 MMHG | BODY MASS INDEX: 46.31 KG/M2 | OXYGEN SATURATION: 98 % | WEIGHT: 235.89 LBS | RESPIRATION RATE: 18 BRPM | SYSTOLIC BLOOD PRESSURE: 117 MMHG

## 2022-07-17 DIAGNOSIS — S63.616A SPRAIN OF RIGHT LITTLE FINGER, UNSPECIFIED SITE OF DIGIT, INITIAL ENCOUNTER: Primary | ICD-10-CM

## 2022-07-17 PROCEDURE — 99283 EMERGENCY DEPT VISIT LOW MDM: CPT

## 2022-07-17 PROCEDURE — 73130 X-RAY EXAM OF HAND: CPT

## 2022-07-17 RX ORDER — ACETAMINOPHEN 500 MG
1000 TABLET ORAL ONCE
Status: COMPLETED | OUTPATIENT
Start: 2022-07-17 | End: 2022-07-17

## 2022-07-17 RX ADMIN — ACETAMINOPHEN 1000 MG: 500 TABLET, FILM COATED ORAL at 21:47
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
## 2022-08-02 RX ORDER — SERTRALINE HYDROCHLORIDE 100 MG/1
TABLET, FILM COATED ORAL
Qty: 90 TABLET | Refills: 1 | Status: SHIPPED | OUTPATIENT
Start: 2022-08-02 | End: 2022-10-19

## 2022-09-28 ENCOUNTER — OFFICE (OUTPATIENT)
Dept: URBAN - METROPOLITAN AREA CLINIC 64 | Facility: CLINIC | Age: 27
End: 2022-09-28

## 2022-09-28 VITALS
WEIGHT: 237 LBS | DIASTOLIC BLOOD PRESSURE: 70 MMHG | HEART RATE: 80 BPM | SYSTOLIC BLOOD PRESSURE: 114 MMHG | HEIGHT: 60 IN

## 2022-09-28 DIAGNOSIS — K60.2 ANAL FISSURE, UNSPECIFIED: ICD-10-CM

## 2022-09-28 DIAGNOSIS — R19.4 CHANGE IN BOWEL HABIT: ICD-10-CM

## 2022-09-28 DIAGNOSIS — R10.30 LOWER ABDOMINAL PAIN, UNSPECIFIED: ICD-10-CM

## 2022-09-28 DIAGNOSIS — K59.00 CONSTIPATION, UNSPECIFIED: ICD-10-CM

## 2022-09-28 DIAGNOSIS — K62.5 HEMORRHAGE OF ANUS AND RECTUM: ICD-10-CM

## 2022-09-28 PROCEDURE — 99204 OFFICE O/P NEW MOD 45 MIN: CPT | Performed by: INTERNAL MEDICINE

## 2022-10-04 ENCOUNTER — ON CAMPUS - OUTPATIENT (OUTPATIENT)
Dept: URBAN - METROPOLITAN AREA HOSPITAL 2 | Facility: HOSPITAL | Age: 27
End: 2022-10-04

## 2022-10-04 VITALS
DIASTOLIC BLOOD PRESSURE: 75 MMHG | SYSTOLIC BLOOD PRESSURE: 148 MMHG | WEIGHT: 236 LBS | RESPIRATION RATE: 14 BRPM | HEART RATE: 83 BPM | HEART RATE: 80 BPM | RESPIRATION RATE: 16 BRPM | SYSTOLIC BLOOD PRESSURE: 134 MMHG | DIASTOLIC BLOOD PRESSURE: 63 MMHG | SYSTOLIC BLOOD PRESSURE: 114 MMHG | DIASTOLIC BLOOD PRESSURE: 78 MMHG | OXYGEN SATURATION: 95 % | HEIGHT: 60 IN | HEART RATE: 84 BPM | SYSTOLIC BLOOD PRESSURE: 136 MMHG | HEART RATE: 103 BPM | SYSTOLIC BLOOD PRESSURE: 126 MMHG | HEART RATE: 104 BPM | RESPIRATION RATE: 17 BRPM | RESPIRATION RATE: 18 BRPM | OXYGEN SATURATION: 98 % | DIASTOLIC BLOOD PRESSURE: 86 MMHG | TEMPERATURE: 97.2 F | SYSTOLIC BLOOD PRESSURE: 120 MMHG | DIASTOLIC BLOOD PRESSURE: 67 MMHG | HEART RATE: 98 BPM | DIASTOLIC BLOOD PRESSURE: 32 MMHG | HEART RATE: 90 BPM | OXYGEN SATURATION: 99 % | DIASTOLIC BLOOD PRESSURE: 97 MMHG | SYSTOLIC BLOOD PRESSURE: 130 MMHG

## 2022-10-04 DIAGNOSIS — K59.00 CONSTIPATION, UNSPECIFIED: ICD-10-CM

## 2022-10-04 DIAGNOSIS — K62.5 HEMORRHAGE OF ANUS AND RECTUM: ICD-10-CM

## 2022-10-04 PROCEDURE — 45378 DIAGNOSTIC COLONOSCOPY: CPT | Performed by: INTERNAL MEDICINE

## 2022-10-19 ENCOUNTER — OFFICE VISIT (OUTPATIENT)
Dept: PSYCHIATRY | Facility: CLINIC | Age: 27
End: 2022-10-19

## 2022-10-19 DIAGNOSIS — F90.0 ADHD, PREDOMINANTLY INATTENTIVE TYPE: Chronic | ICD-10-CM

## 2022-10-19 DIAGNOSIS — F33.1 MAJOR DEPRESSIVE DISORDER, RECURRENT EPISODE, MODERATE: Primary | Chronic | ICD-10-CM

## 2022-10-19 DIAGNOSIS — F44.9 CONVERSION DISORDER: ICD-10-CM

## 2022-10-19 DIAGNOSIS — F41.1 GAD (GENERALIZED ANXIETY DISORDER): ICD-10-CM

## 2022-10-19 PROCEDURE — 99214 OFFICE O/P EST MOD 30 MIN: CPT | Performed by: PSYCHIATRY & NEUROLOGY

## 2022-10-19 RX ORDER — ATOMOXETINE 40 MG/1
CAPSULE ORAL
Qty: 60 CAPSULE | Refills: 2 | Status: SHIPPED | OUTPATIENT
Start: 2022-10-19 | End: 2022-11-11
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
## 2022-11-11 DIAGNOSIS — F90.0 ADHD, PREDOMINANTLY INATTENTIVE TYPE: Chronic | ICD-10-CM

## 2022-11-11 RX ORDER — ATOMOXETINE 40 MG/1
CAPSULE ORAL
Qty: 60 CAPSULE | Refills: 2 | Status: SHIPPED | OUTPATIENT
Start: 2022-11-11

## 2022-11-11 NOTE — TELEPHONE ENCOUNTER
Rx Refill Note  Requested Prescriptions     Pending Prescriptions Disp Refills   • atomoxetine (STRATTERA) 40 MG capsule [Pharmacy Med Name: ATOMOXETINE HCL 40 MG CAPSULE] 60 capsule 2     Sig: TAKE 1 CAPSULE BY MOUTH EVERY MORNING FOR 5 DAYS, THEN INCREASE TO 2 CAPS EVERY MORNING      Last office visit with prescribing clinician: 10/19/2022      Next office visit with prescribing clinician: 4/19/2023   Office Visit with Gemma Gonzalez MD (10/19/2022)       Urine Drug Screen - Urine, Clean Catch (07/26/2021 18:47)      Eula Garcia MA  11/11/22, 09:43 EST

## 2022-12-01 ENCOUNTER — OFFICE (OUTPATIENT)
Dept: URBAN - METROPOLITAN AREA CLINIC 64 | Facility: CLINIC | Age: 27
End: 2022-12-01

## 2022-12-01 VITALS
DIASTOLIC BLOOD PRESSURE: 69 MMHG | SYSTOLIC BLOOD PRESSURE: 107 MMHG | HEIGHT: 60 IN | WEIGHT: 239 LBS | HEART RATE: 84 BPM

## 2022-12-01 DIAGNOSIS — K62.5 HEMORRHAGE OF ANUS AND RECTUM: ICD-10-CM

## 2022-12-01 DIAGNOSIS — K64.8 OTHER HEMORRHOIDS: ICD-10-CM

## 2022-12-01 DIAGNOSIS — K64.4 RESIDUAL HEMORRHOIDAL SKIN TAGS: ICD-10-CM

## 2022-12-01 DIAGNOSIS — K59.00 CONSTIPATION, UNSPECIFIED: ICD-10-CM

## 2022-12-01 PROCEDURE — 99213 OFFICE O/P EST LOW 20 MIN: CPT | Performed by: NURSE PRACTITIONER

## 2023-01-12 ENCOUNTER — OFFICE (OUTPATIENT)
Dept: URBAN - METROPOLITAN AREA CLINIC 64 | Facility: CLINIC | Age: 28
End: 2023-01-12

## 2023-01-12 VITALS — HEIGHT: 60 IN

## 2023-01-12 DIAGNOSIS — K64.8 OTHER HEMORRHOIDS: ICD-10-CM

## 2023-01-12 PROCEDURE — 46221 LIGATION OF HEMORRHOID(S): CPT | Performed by: INTERNAL MEDICINE

## 2023-01-12 RX ORDER — LINACLOTIDE 72 UG/1
72 CAPSULE, GELATIN COATED ORAL
Qty: 90 | Refills: 3 | Status: ACTIVE
Start: 2023-01-12

## 2023-01-12 NOTE — SERVICEHPINOTES
br
 Still Constipated, not taking any laxatives.  Felt like Linzess helped but only took the samples.  Feels like hemorrhoids stick out and stay sticking out, at least grade 2-3.br
br

## 2023-04-12 DIAGNOSIS — F33.1 MAJOR DEPRESSIVE DISORDER, RECURRENT EPISODE, MODERATE: Chronic | ICD-10-CM

## 2023-04-12 DIAGNOSIS — F41.1 GAD (GENERALIZED ANXIETY DISORDER): ICD-10-CM

## 2023-04-19 ENCOUNTER — OFFICE VISIT (OUTPATIENT)
Dept: PSYCHIATRY | Facility: CLINIC | Age: 28
End: 2023-04-19
Payer: COMMERCIAL

## 2023-04-19 DIAGNOSIS — F33.1 MAJOR DEPRESSIVE DISORDER, RECURRENT EPISODE, MODERATE: Primary | Chronic | ICD-10-CM

## 2023-04-19 DIAGNOSIS — F44.9 CONVERSION DISORDER: ICD-10-CM

## 2023-04-19 DIAGNOSIS — F90.0 ADHD, PREDOMINANTLY INATTENTIVE TYPE: Chronic | ICD-10-CM

## 2023-04-19 DIAGNOSIS — F41.1 GAD (GENERALIZED ANXIETY DISORDER): ICD-10-CM

## 2023-04-19 RX ORDER — ATOMOXETINE 80 MG/1
80 CAPSULE ORAL DAILY
Qty: 90 CAPSULE | Refills: 0 | Status: SHIPPED | OUTPATIENT
Start: 2023-04-19
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
## 2023-06-19 ENCOUNTER — TELEPHONE (OUTPATIENT)
Dept: ONCOLOGY | Facility: CLINIC | Age: 28
End: 2023-06-19
Payer: COMMERCIAL

## 2023-06-21 PROBLEM — R19.4 CHANGE IN BOWEL HABITS: Status: ACTIVE | Noted: 2023-06-21

## 2023-06-21 PROBLEM — K64.8 HEMORRHOIDS, INTERNAL: Status: ACTIVE | Noted: 2023-06-21

## 2023-06-21 PROBLEM — K64.4 HEMORRHOIDS, EXTERNAL: Status: ACTIVE | Noted: 2023-06-21

## 2023-06-21 PROBLEM — J45.998 OTHER ASTHMA: Status: ACTIVE | Noted: 2023-06-21

## 2023-06-21 PROBLEM — R10.30 LOWER ABDOMINAL PAIN, UNSPECIFIED: Status: ACTIVE | Noted: 2023-06-21

## 2023-06-21 PROBLEM — K58.9 IRRITABLE BOWEL SYNDROME: Status: ACTIVE | Noted: 2023-06-21

## 2023-06-21 PROBLEM — K60.2 ANAL FISSURE: Status: ACTIVE | Noted: 2023-06-21

## 2023-06-21 PROBLEM — M54.9 BACK PAIN: Status: ACTIVE | Noted: 2023-06-21

## 2023-06-21 PROBLEM — K62.5 RECTAL BLEEDING: Status: ACTIVE | Noted: 2023-06-21

## 2023-06-21 PROBLEM — R56.9 UNSPECIFIED CONVULSIONS: Status: ACTIVE | Noted: 2023-06-21

## 2023-06-21 PROBLEM — F32.9 MAJOR DEPRESSIVE DISORDER, SINGLE EPISODE, UNSPECIFIED: Status: ACTIVE | Noted: 2023-06-21

## 2023-07-06 PROBLEM — D64.9 ANEMIA: Status: ACTIVE | Noted: 2023-07-06

## 2023-07-06 PROBLEM — R07.9 CHEST PAIN: Status: ACTIVE | Noted: 2023-07-06

## 2023-07-07 PROBLEM — E83.42 HYPOMAGNESEMIA: Status: ACTIVE | Noted: 2023-07-07

## 2023-07-08 ENCOUNTER — INPATIENT HOSPITAL (OUTPATIENT)
Dept: URBAN - METROPOLITAN AREA HOSPITAL 84 | Facility: HOSPITAL | Age: 28
End: 2023-07-08

## 2023-07-08 DIAGNOSIS — K62.5 HEMORRHAGE OF ANUS AND RECTUM: ICD-10-CM

## 2023-07-08 PROCEDURE — 99222 1ST HOSP IP/OBS MODERATE 55: CPT | Performed by: NURSE PRACTITIONER

## 2023-07-25 ENCOUNTER — HOSPITAL ENCOUNTER (OUTPATIENT)
Dept: INFUSION THERAPY | Facility: HOSPITAL | Age: 28
Discharge: HOME | End: 2023-07-25
Payer: COMMERCIAL

## 2023-07-25 DIAGNOSIS — D50.9 IRON DEFICIENCY ANEMIA, UNSPECIFIED IRON DEFICIENCY ANEMIA TYPE: Primary | ICD-10-CM

## 2023-07-25 DIAGNOSIS — D50.9 IRON DEFICIENCY ANEMIA, UNSPECIFIED IRON DEFICIENCY ANEMIA TYPE: ICD-10-CM

## 2023-07-25 LAB
ABO GROUP BLD: NORMAL
ANISOCYTOSIS BLD QL: ABNORMAL
BASOPHILS # BLD MANUAL: 0.24 10*3/MM3 (ref 0–0.2)
BASOPHILS NFR BLD MANUAL: 3 % (ref 0–1.5)
BB HOLD TUBE: NORMAL
BLD GP AB SCN SERPL QL: POSITIVE
DACRYOCYTES BLD QL SMEAR: ABNORMAL
DEPRECATED RDW RBC AUTO: 68.7 FL (ref 37–54)
EOSINOPHIL # BLD MANUAL: 0.41 10*3/MM3 (ref 0–0.4)
EOSINOPHIL NFR BLD MANUAL: 5 % (ref 0.3–6.2)
ERYTHROCYTE [DISTWIDTH] IN BLOOD BY AUTOMATED COUNT: 19.8 % (ref 12.3–15.4)
HCT VFR BLD AUTO: 22.6 % (ref 34–46.6)
HGB BLD-MCNC: 7.3 G/DL (ref 12–15.9)
LYMPHOCYTES # BLD MANUAL: 0.89 10*3/MM3 (ref 0.7–3.1)
LYMPHOCYTES NFR BLD MANUAL: 2 % (ref 5–12)
MACROCYTES BLD QL SMEAR: ABNORMAL
MCH RBC QN AUTO: 32.5 PG (ref 26.6–33)
MCHC RBC AUTO-ENTMCNC: 32.2 G/DL (ref 31.5–35.7)
MCV RBC AUTO: 100.9 FL (ref 79–97)
METAMYELOCYTES NFR BLD MANUAL: 7 % (ref 0–0)
MONOCYTES # BLD: 0.16 10*3/MM3 (ref 0.1–0.9)
NEUTROPHILS # BLD AUTO: 5.83 10*3/MM3 (ref 1.7–7)
NEUTROPHILS NFR BLD MANUAL: 50 % (ref 42.7–76)
NEUTS BAND NFR BLD MANUAL: 22 % (ref 0–5)
NRBC SPEC MANUAL: 1 /100 WBC (ref 0–0.2)
PLAT MORPH BLD: NORMAL
PLATELET # BLD AUTO: 343 10*3/MM3 (ref 140–450)
PMV BLD AUTO: 6.7 FL (ref 6–12)
POIKILOCYTOSIS BLD QL SMEAR: ABNORMAL
POLYCHROMASIA BLD QL SMEAR: ABNORMAL
RBC # BLD AUTO: 2.24 10*6/MM3 (ref 3.77–5.28)
RH BLD: POSITIVE
SCAN SLIDE: NORMAL
T&S EXPIRATION DATE: NORMAL
TOXIC GRANULATION: ABNORMAL
VARIANT LYMPHS NFR BLD MANUAL: 1 % (ref 0–5)
VARIANT LYMPHS NFR BLD MANUAL: 10 % (ref 19.6–45.3)
WBC NRBC COR # BLD: 8.1 10*3/MM3 (ref 3.4–10.8)

## 2023-07-25 PROCEDURE — 86870 RBC ANTIBODY IDENTIFICATION: CPT | Performed by: INTERNAL MEDICINE

## 2023-07-25 PROCEDURE — 85025 COMPLETE CBC W/AUTO DIFF WBC: CPT | Performed by: INTERNAL MEDICINE

## 2023-07-25 PROCEDURE — 86850 RBC ANTIBODY SCREEN: CPT | Performed by: INTERNAL MEDICINE

## 2023-07-25 PROCEDURE — 85007 BL SMEAR W/DIFF WBC COUNT: CPT | Performed by: INTERNAL MEDICINE

## 2023-07-25 PROCEDURE — 86922 COMPATIBILITY TEST ANTIGLOB: CPT

## 2023-07-25 PROCEDURE — 86900 BLOOD TYPING SEROLOGIC ABO: CPT | Performed by: INTERNAL MEDICINE

## 2023-07-25 PROCEDURE — 86901 BLOOD TYPING SEROLOGIC RH(D): CPT | Performed by: INTERNAL MEDICINE

## 2023-07-25 RX ORDER — SODIUM CHLORIDE 9 MG/ML
250 INJECTION, SOLUTION INTRAVENOUS AS NEEDED
Status: CANCELLED | OUTPATIENT
Start: 2023-07-25

## 2023-07-25 RX ORDER — SODIUM CHLORIDE 9 MG/ML
250 INJECTION, SOLUTION INTRAVENOUS AS NEEDED
Status: DISCONTINUED | OUTPATIENT
Start: 2023-07-25 | End: 2023-07-27 | Stop reason: HOSPADM

## 2023-07-26 ENCOUNTER — HOSPITAL ENCOUNTER (OUTPATIENT)
Dept: INFUSION THERAPY | Facility: HOSPITAL | Age: 28
Discharge: HOME | End: 2023-07-26
Payer: COMMERCIAL

## 2023-07-26 VITALS
SYSTOLIC BLOOD PRESSURE: 134 MMHG | TEMPERATURE: 98.2 F | OXYGEN SATURATION: 97 % | DIASTOLIC BLOOD PRESSURE: 75 MMHG | RESPIRATION RATE: 16 BRPM | HEART RATE: 84 BPM

## 2023-07-26 DIAGNOSIS — D64.9 ANEMIA, UNSPECIFIED TYPE: ICD-10-CM

## 2023-07-26 DIAGNOSIS — D64.9 ANEMIA, UNSPECIFIED TYPE: Primary | ICD-10-CM

## 2023-07-26 LAB — WARM AUTOANTIBODY: NORMAL

## 2023-07-26 PROCEDURE — 36415 COLL VENOUS BLD VENIPUNCTURE: CPT

## 2023-07-26 PROCEDURE — 96374 THER/PROPH/DIAG INJ IV PUSH: CPT

## 2023-07-26 PROCEDURE — 63710000001 DIPHENHYDRAMINE PER 50 MG: Performed by: INTERNAL MEDICINE

## 2023-07-26 PROCEDURE — 36430 TRANSFUSION BLD/BLD COMPNT: CPT

## 2023-07-26 PROCEDURE — 25010000002 METHYLPREDNISOLONE PER 125 MG: Performed by: INTERNAL MEDICINE

## 2023-07-26 PROCEDURE — P9040 RBC LEUKOREDUCED IRRADIATED: HCPCS

## 2023-07-26 PROCEDURE — 86900 BLOOD TYPING SEROLOGIC ABO: CPT

## 2023-07-26 PROCEDURE — P9016 RBC LEUKOCYTES REDUCED: HCPCS

## 2023-07-26 RX ORDER — METHYLPREDNISOLONE SODIUM SUCCINATE 125 MG/2ML
60 INJECTION, POWDER, LYOPHILIZED, FOR SOLUTION INTRAMUSCULAR; INTRAVENOUS ONCE
Status: CANCELLED | OUTPATIENT
Start: 2023-07-26 | End: 2023-07-26

## 2023-07-26 RX ORDER — METHYLPREDNISOLONE SODIUM SUCCINATE 125 MG/2ML
60 INJECTION, POWDER, LYOPHILIZED, FOR SOLUTION INTRAMUSCULAR; INTRAVENOUS ONCE
Status: COMPLETED | OUTPATIENT
Start: 2023-07-26 | End: 2023-07-26

## 2023-07-26 RX ORDER — DIPHENHYDRAMINE HCL 25 MG
25 CAPSULE ORAL ONCE
Status: CANCELLED | OUTPATIENT
Start: 2023-07-26 | End: 2023-07-26

## 2023-07-26 RX ORDER — ACETAMINOPHEN 325 MG/1
650 TABLET ORAL ONCE
Status: COMPLETED | OUTPATIENT
Start: 2023-07-26 | End: 2023-07-26

## 2023-07-26 RX ORDER — ACETAMINOPHEN 325 MG/1
650 TABLET ORAL ONCE
Status: CANCELLED | OUTPATIENT
Start: 2023-07-26 | End: 2023-07-26

## 2023-07-26 RX ORDER — SODIUM CHLORIDE 9 MG/ML
250 INJECTION, SOLUTION INTRAVENOUS AS NEEDED
Status: CANCELLED | OUTPATIENT
Start: 2023-07-26

## 2023-07-26 RX ORDER — DIPHENHYDRAMINE HCL 25 MG
25 CAPSULE ORAL ONCE
Status: COMPLETED | OUTPATIENT
Start: 2023-07-26 | End: 2023-07-26

## 2023-07-26 RX ORDER — SODIUM CHLORIDE 9 MG/ML
250 INJECTION, SOLUTION INTRAVENOUS AS NEEDED
Status: DISCONTINUED | OUTPATIENT
Start: 2023-07-26 | End: 2023-07-28 | Stop reason: HOSPADM

## 2023-07-26 RX ADMIN — DIPHENHYDRAMINE HYDROCHLORIDE 25 MG: 25 CAPSULE ORAL at 12:24

## 2023-07-26 RX ADMIN — ACETAMINOPHEN 650 MG: 325 TABLET, FILM COATED ORAL at 12:24

## 2023-07-26 RX ADMIN — METHYLPREDNISOLONE SODIUM SUCCINATE 60 MG: 125 INJECTION, POWDER, FOR SOLUTION INTRAMUSCULAR; INTRAVENOUS at 12:27

## 2023-07-27 LAB
BH BB BLOOD EXPIRATION DATE: NORMAL
BH BB BLOOD TYPE BARCODE: 600
BH BB DISPENSE STATUS: NORMAL
BH BB PRODUCT CODE: NORMAL
BH BB UNIT NUMBER: NORMAL
CROSSMATCH INTERPRETATION: NORMAL
UNIT  ABO: NORMAL
UNIT  RH: NORMAL

## 2023-07-28 NOTE — PROGRESS NOTES
Hematology/Oncology Outpatient Follow Up    PATIENT NAME:Arlene Brady  :1995  MRN: [id number]  PRIMARY CARE PHYSICIAN: Daniel Sam MD  REFERRING PHYSICIAN: Daniel Sam MD    Chief Complaint   Patient presents with    Follow-up     Iron deficiency anemia        HISTORY OF PRESENT ILLNESS:     This is a 27 year old female has been referred secondary to anemia.  Patient has a longtime history of anemia.  She has rectal bleeding and had colonoscopy done a few days ago by Dr Vinny Lagos . She has internal and external hemorrhoids. She has a follow up with Dr Lagos.     Patient is amenorrheic for about 6 months.  She had history of menorrhagia. She is on hormone pills to regulate her cycles.   She has low energy, she was on oral iron supplements for a month. She has since ran out of her iron tablets.     Review of her CBCs indicate that she has had anemia with hemoglobin ranging between 8 and 12.3 g per DL.  Today her white count is 8, hemoglobin is 8, MCV is 101 and platelets are 352 with essentially unremarkable differentials.     She denies having blood in her urine     She is single, She is a CNA     Patient does not smoke and drinks occasionally     There is no family history of hematological problems.     Her mother had colon cancer,      2023: Methylmalonic acid level was normal at 343 patient had anemia work-up including a ferritin level which was 167, C-reactive protein was high at 1.96 BUN was 13, creatinine 0.9 LDH was 323 iron profile showed a elevated serum iron and iron saturation, haptoglobin was normal, reticulocyte count was elevated to 16 she has low folate at 3.1  Past Medical History:   Diagnosis Date    ADHD (attention deficit hyperactivity disorder)     Arthritis     Asthma     exercise induced asthma     Depression     Seizures        Past Surgical History:   Procedure Laterality Date    ABDOMINAL SURGERY      choley    TONSILLECTOMY           Current Outpatient  Medications:     nystatin-triamcinolone (MYCOLOG) 848488-1.1 UNIT/GM-% ointment, APPLY 1 APPLICATION TOPICALLY TWICE DAILY FOR 21 DAYS, Disp: , Rfl:     silver sulfadiazine (SILVADENE, SSD) 1 % cream, APPLY 1 APPLICATION TOPICALLY TWICE A DAY FOR 20 DAYS, Disp: , Rfl:     atomoxetine (STRATTERA) 80 MG capsule, Take 1 capsule by mouth Daily., Disp: 90 capsule, Rfl: 0    fexofenadine (ALLEGRA) 180 MG tablet, Take 1 tablet by mouth Daily., Disp: , Rfl:     folic acid (FOLVITE) 1 MG tablet, Take 1 tablet by mouth Daily., Disp: 90 tablet, Rfl: 0    hydrocortisone (ANUSOL-HC) 25 MG suppository, Insert 1 suppository into the rectum 2 (Two) Times a Day., Disp: 14 suppository, Rfl: 0    Junel 1/20 1-20 MG-MCG per tablet, Take 1 tablet by mouth Every Evening., Disp: , Rfl:     OXcarbazepine (TRILEPTAL) 300 MG tablet, Take 1.5 tablets by mouth 2 (Two) Times a Day., Disp: , Rfl:     sertraline (ZOLOFT) 50 MG tablet, Take 1 tablet by mouth Daily., Disp: 90 tablet, Rfl: 1    Allergies   Allergen Reactions    Cephalosporins Rash    Penicillins Rash       Family History   Problem Relation Age of Onset    Hypertension Father     Heart disease Father     Hyperlipidemia Father     Cancer Paternal Grandmother        Cancer-related family history includes Cancer in her paternal grandmother.    Social History     Tobacco Use    Smoking status: Never    Smokeless tobacco: Never   Vaping Use    Vaping Use: Never used   Substance Use Topics    Alcohol use: Yes     Comment: very infrequent     Drug use: Never         I have reviewed and confirmed the accuracy of the patient's history: Chief complaint, HPI, ROS, and Subjective as entered by the MA/LPN/RN. Bronwyn Burns MD 07/31/23          SUBJECTIVE:     Patient is here today for follow-up    REVIEW OF SYSTEMS:    Review of Systems   Constitutional:  Positive for fatigue. Negative for chills and fever.   HENT:  Negative for congestion, drooling, ear discharge, rhinorrhea, sinus  "pressure and tinnitus.    Eyes:  Negative for photophobia, pain and discharge.   Respiratory:  Negative for apnea, choking and stridor.    Cardiovascular:  Negative for palpitations.   Gastrointestinal:  Negative for abdominal distention, abdominal pain and anal bleeding.   Endocrine: Negative for polydipsia and polyphagia.   Genitourinary:  Negative for decreased urine volume, flank pain and genital sores.   Musculoskeletal:  Negative for gait problem, neck pain and neck stiffness.   Skin:  Negative for color change, rash and wound.   Neurological:  Negative for tremors, seizures, syncope, facial asymmetry and speech difficulty.   Hematological:  Negative for adenopathy.   Psychiatric/Behavioral:  Negative for agitation, confusion, hallucinations and self-injury. The patient is not hyperactive.        OBJECTIVE:    Vitals:    07/31/23 1042   BP: 137/84   Pulse: 89   Resp: 20   Temp: 98 °F (36.7 °C)   TempSrc: Infrared   SpO2: 99%   Weight: 108 kg (237 lb)   Height: 152.4 cm (60\")   PainSc: 0-No pain     Body mass index is 46.29 kg/m².    ECOG  (1) Restricted in physically strenuous activity, ambulatory and able to do work of light nature    Physical Exam  Vitals and nursing note reviewed.   Constitutional:       General: She is not in acute distress.     Appearance: She is not diaphoretic.   HENT:      Head: Normocephalic and atraumatic.   Eyes:      General: No scleral icterus.        Right eye: No discharge.         Left eye: No discharge.      Conjunctiva/sclera: Conjunctivae normal.   Neck:      Thyroid: No thyromegaly.   Cardiovascular:      Rate and Rhythm: Normal rate and regular rhythm.      Heart sounds: Normal heart sounds.      No friction rub. No gallop.   Pulmonary:      Effort: Pulmonary effort is normal. No respiratory distress.      Breath sounds: No stridor. No wheezing.   Abdominal:      General: Bowel sounds are normal.      Palpations: Abdomen is soft. There is no mass.      Tenderness: There is " no abdominal tenderness. There is no guarding or rebound.   Musculoskeletal:         General: No tenderness. Normal range of motion.      Cervical back: Normal range of motion and neck supple.   Lymphadenopathy:      Cervical: No cervical adenopathy.   Skin:     General: Skin is warm.      Findings: No erythema or rash.   Neurological:      Mental Status: She is alert and oriented to person, place, and time.      Motor: No abnormal muscle tone.   Psychiatric:         Behavior: Behavior normal.         RECENT LABS    WBC   Date Value Ref Range Status   07/31/2023 7.45 3.40 - 10.80 10*3/mm3 Final     RBC   Date Value Ref Range Status   07/31/2023 2.68 (L) 3.77 - 5.28 10*6/mm3 Final     Hemoglobin   Date Value Ref Range Status   07/31/2023 8.2 (L) 12.0 - 15.9 g/dL Final     Hematocrit   Date Value Ref Range Status   07/31/2023 26.8 (L) 34.0 - 46.6 % Final     MCV   Date Value Ref Range Status   07/31/2023 100.0 (H) 79.0 - 97.0 fL Final     MCH   Date Value Ref Range Status   07/31/2023 30.6 26.6 - 33.0 pg Final     MCHC   Date Value Ref Range Status   07/31/2023 30.6 (L) 31.5 - 35.7 g/dL Final     RDW   Date Value Ref Range Status   07/31/2023 18.9 (H) 12.3 - 15.4 % Final     RDW-SD   Date Value Ref Range Status   07/31/2023 63.9 (H) 37.0 - 54.0 fl Final     MPV   Date Value Ref Range Status   07/31/2023 9.0 6.0 - 12.0 fL Final     Platelets   Date Value Ref Range Status   07/31/2023 307 140 - 450 10*3/mm3 Final     Neutrophil %   Date Value Ref Range Status   07/31/2023 73.5 42.7 - 76.0 % Final     Lymphocyte %   Date Value Ref Range Status   07/31/2023 15.8 (L) 19.6 - 45.3 % Final     Monocyte %   Date Value Ref Range Status   07/31/2023 6.8 5.0 - 12.0 % Final     Eosinophil %   Date Value Ref Range Status   07/31/2023 3.4 0.3 - 6.2 % Final     Basophil %   Date Value Ref Range Status   07/31/2023 0.5 0.0 - 1.5 % Final     Immature Grans %   Date Value Ref Range Status   10/09/2020 1.4 (H) 0.0 - 0.5 % Final      Neutrophils, Absolute   Date Value Ref Range Status   07/31/2023 5.47 1.70 - 7.00 10*3/mm3 Final     Lymphocytes, Absolute   Date Value Ref Range Status   07/31/2023 1.18 0.70 - 3.10 10*3/mm3 Final     Monocytes, Absolute   Date Value Ref Range Status   07/31/2023 0.51 0.10 - 0.90 10*3/mm3 Final     Eosinophils, Absolute   Date Value Ref Range Status   07/31/2023 0.25 0.00 - 0.40 10*3/mm3 Final     Basophils, Absolute   Date Value Ref Range Status   07/31/2023 0.04 0.00 - 0.20 10*3/mm3 Final     Immature Grans, Absolute   Date Value Ref Range Status   10/09/2020 0.09 (H) 0.00 - 0.05 10*3/mm3 Final     nRBC   Date Value Ref Range Status   07/25/2023 1.0 (H) 0.0 - 0.2 /100 WBC Final   07/07/2023 0.3 (H) 0.0 - 0.2 /100 WBC Final       Lab Results   Component Value Date    GLUCOSE 121 (H) 07/09/2023    BUN 22 (H) 07/09/2023    CREATININE 0.78 07/09/2023    EGFRIFNONA 88 07/26/2021    BCR 28.2 (H) 07/09/2023    K 3.5 07/09/2023    CO2 20.0 (L) 07/09/2023    CALCIUM 8.6 07/09/2023    PROTENTOTREF 6.8 06/26/2023    ALBUMIN 3.7 07/06/2023    LABIL2 1.0 06/26/2023    AST 19 07/06/2023    ALT 24 07/06/2023         Assessment & Plan     Anemia, unspecified type  - CBC & Differential    Iron deficiency anemia, unspecified iron deficiency anemia type  - CBC & Differential      ASSESSMENT:    Iron deficiency anemia, unspecified iron deficiency anemia type  - CBC & Differential        Macrocytic Anemia work-up reviewed.  Peripheral smear was unremarkable and additional workup will be recommended  Fatigue secondary to anemia  Folate deficiency  History of seizures  Rectal bleeding : Recent  colonoscopy by Dr Vinny Lagos .  Per patient was found to have hemorrhoids.  I have requested for the records.  May benefit from hemorrhoidal banding           Plans     Workup ordered to include CMP, iron studies, G6PD haptoglobin level reticulocyte count LDH and  GI records  Follow-up in 4 weeks        Patient verbalized understanding and  is in agreement of the above plan.              I spent 30 total minutes, face-to-face, caring for Arlene today. 90% of this time involved counseling and/or coordination of care as documented within this note.

## 2023-07-31 ENCOUNTER — OFFICE VISIT (OUTPATIENT)
Dept: ONCOLOGY | Facility: CLINIC | Age: 28
End: 2023-07-31
Payer: COMMERCIAL

## 2023-07-31 ENCOUNTER — LAB (OUTPATIENT)
Dept: LAB | Facility: HOSPITAL | Age: 28
End: 2023-07-31
Payer: COMMERCIAL

## 2023-07-31 VITALS
SYSTOLIC BLOOD PRESSURE: 137 MMHG | HEIGHT: 60 IN | TEMPERATURE: 98 F | RESPIRATION RATE: 20 BRPM | OXYGEN SATURATION: 99 % | WEIGHT: 237 LBS | BODY MASS INDEX: 46.53 KG/M2 | DIASTOLIC BLOOD PRESSURE: 84 MMHG | HEART RATE: 89 BPM

## 2023-07-31 DIAGNOSIS — D64.9 ANEMIA, UNSPECIFIED TYPE: Primary | ICD-10-CM

## 2023-07-31 DIAGNOSIS — D50.9 IRON DEFICIENCY ANEMIA, UNSPECIFIED IRON DEFICIENCY ANEMIA TYPE: ICD-10-CM

## 2023-07-31 PROBLEM — D50.0 IRON DEFICIENCY ANEMIA DUE TO CHRONIC BLOOD LOSS: Status: ACTIVE | Noted: 2023-07-31

## 2023-07-31 LAB
ALBUMIN SERPL-MCNC: 3.8 G/DL (ref 3.5–5.2)
ALBUMIN/GLOB SERPL: 1.1 G/DL
ALP SERPL-CCNC: 82 U/L (ref 39–117)
ALT SERPL W P-5'-P-CCNC: 24 U/L (ref 1–33)
ANION GAP SERPL CALCULATED.3IONS-SCNC: 15 MMOL/L (ref 5–15)
AST SERPL-CCNC: 26 U/L (ref 1–32)
BASOPHILS # BLD AUTO: 0.04 10*3/MM3 (ref 0–0.2)
BASOPHILS NFR BLD AUTO: 0.5 % (ref 0–1.5)
BILIRUB SERPL-MCNC: 1 MG/DL (ref 0–1.2)
BUN SERPL-MCNC: 22 MG/DL (ref 6–20)
BUN/CREAT SERPL: 31.9 (ref 7–25)
CALCIUM SPEC-SCNC: 9.1 MG/DL (ref 8.6–10.5)
CHLORIDE SERPL-SCNC: 101 MMOL/L (ref 98–107)
CO2 SERPL-SCNC: 20 MMOL/L (ref 22–29)
CREAT SERPL-MCNC: 0.69 MG/DL (ref 0.57–1)
DEPRECATED RDW RBC AUTO: 63.9 FL (ref 37–54)
EGFRCR SERPLBLD CKD-EPI 2021: 122.9 ML/MIN/1.73
EOSINOPHIL # BLD AUTO: 0.25 10*3/MM3 (ref 0–0.4)
EOSINOPHIL NFR BLD AUTO: 3.4 % (ref 0.3–6.2)
ERYTHROCYTE [DISTWIDTH] IN BLOOD BY AUTOMATED COUNT: 18.9 % (ref 12.3–15.4)
FERRITIN SERPL-MCNC: 201.1 NG/ML (ref 13–150)
GLOBULIN UR ELPH-MCNC: 3.5 GM/DL
GLUCOSE SERPL-MCNC: 110 MG/DL (ref 65–99)
HAPTOGLOB SERPL-MCNC: 75 MG/DL (ref 30–200)
HCT VFR BLD AUTO: 26.8 % (ref 34–46.6)
HGB BLD-MCNC: 8.2 G/DL (ref 12–15.9)
HOLD SPECIMEN: NORMAL
HOLD SPECIMEN: NORMAL
IRON 24H UR-MRATE: 113 MCG/DL (ref 37–145)
IRON SATN MFR SERPL: 27 % (ref 20–50)
LDH SERPL-CCNC: 342 U/L (ref 135–214)
LYMPHOCYTES # BLD AUTO: 1.18 10*3/MM3 (ref 0.7–3.1)
LYMPHOCYTES NFR BLD AUTO: 15.8 % (ref 19.6–45.3)
MCH RBC QN AUTO: 30.6 PG (ref 26.6–33)
MCHC RBC AUTO-ENTMCNC: 30.6 G/DL (ref 31.5–35.7)
MCV RBC AUTO: 100 FL (ref 79–97)
MONOCYTES # BLD AUTO: 0.51 10*3/MM3 (ref 0.1–0.9)
MONOCYTES NFR BLD AUTO: 6.8 % (ref 5–12)
NEUTROPHILS NFR BLD AUTO: 5.47 10*3/MM3 (ref 1.7–7)
NEUTROPHILS NFR BLD AUTO: 73.5 % (ref 42.7–76)
PLATELET # BLD AUTO: 307 10*3/MM3 (ref 140–450)
PMV BLD AUTO: 9 FL (ref 6–12)
POTASSIUM SERPL-SCNC: 3.6 MMOL/L (ref 3.5–5.2)
PROT SERPL-MCNC: 7.3 G/DL (ref 6–8.5)
RBC # BLD AUTO: 2.68 10*6/MM3 (ref 3.77–5.28)
RETICS # AUTO: 0.48 10*6/MM3 (ref 0.02–0.13)
RETICS/RBC NFR AUTO: 17.99 % (ref 0.7–1.9)
SODIUM SERPL-SCNC: 136 MMOL/L (ref 136–145)
TIBC SERPL-MCNC: 411 MCG/DL (ref 298–536)
TRANSFERRIN SERPL-MCNC: 276 MG/DL (ref 200–360)
WBC NRBC COR # BLD: 7.45 10*3/MM3 (ref 3.4–10.8)

## 2023-07-31 PROCEDURE — 83615 LACTATE (LD) (LDH) ENZYME: CPT | Performed by: INTERNAL MEDICINE

## 2023-07-31 PROCEDURE — 36415 COLL VENOUS BLD VENIPUNCTURE: CPT

## 2023-07-31 PROCEDURE — 83010 ASSAY OF HAPTOGLOBIN QUANT: CPT | Performed by: INTERNAL MEDICINE

## 2023-07-31 PROCEDURE — 82728 ASSAY OF FERRITIN: CPT | Performed by: INTERNAL MEDICINE

## 2023-07-31 PROCEDURE — 85045 AUTOMATED RETICULOCYTE COUNT: CPT | Performed by: INTERNAL MEDICINE

## 2023-07-31 PROCEDURE — 84466 ASSAY OF TRANSFERRIN: CPT | Performed by: INTERNAL MEDICINE

## 2023-07-31 PROCEDURE — 83540 ASSAY OF IRON: CPT | Performed by: INTERNAL MEDICINE

## 2023-07-31 PROCEDURE — 80053 COMPREHEN METABOLIC PANEL: CPT | Performed by: INTERNAL MEDICINE

## 2023-07-31 PROCEDURE — 85025 COMPLETE CBC W/AUTO DIFF WBC: CPT

## 2023-07-31 RX ORDER — NYSTATIN AND TRIAMCINOLONE ACETONIDE 100000; 1 [USP'U]/G; MG/G
OINTMENT TOPICAL
COMMUNITY
Start: 2023-07-11

## 2023-08-02 LAB
G6PD BLD QN: 513 U/10E12 RBC (ref 155–399)
RBC # BLD AUTO: 2.8 X10E6/UL (ref 3.77–5.28)
STFR SERPL-SCNC: 102.7 NMOL/L (ref 12.2–27.3)

## 2023-08-03 ENCOUNTER — DOCUMENTATION (OUTPATIENT)
Dept: ONCOLOGY | Facility: CLINIC | Age: 28
End: 2023-08-03
Payer: COMMERCIAL

## 2023-08-04 ENCOUNTER — OFFICE VISIT (OUTPATIENT)
Dept: ONCOLOGY | Facility: CLINIC | Age: 28
End: 2023-08-04
Payer: COMMERCIAL

## 2023-08-04 ENCOUNTER — DOCUMENTATION (OUTPATIENT)
Dept: ONCOLOGY | Facility: CLINIC | Age: 28
End: 2023-08-04

## 2023-08-04 ENCOUNTER — HOSPITAL ENCOUNTER (OUTPATIENT)
Dept: ONCOLOGY | Facility: HOSPITAL | Age: 28
Discharge: HOME | End: 2023-08-04
Payer: COMMERCIAL

## 2023-08-04 VITALS
TEMPERATURE: 98 F | RESPIRATION RATE: 16 BRPM | HEIGHT: 60 IN | DIASTOLIC BLOOD PRESSURE: 63 MMHG | WEIGHT: 241 LBS | BODY MASS INDEX: 47.32 KG/M2 | OXYGEN SATURATION: 99 % | SYSTOLIC BLOOD PRESSURE: 98 MMHG | HEART RATE: 83 BPM

## 2023-08-04 VITALS
BODY MASS INDEX: 47.32 KG/M2 | SYSTOLIC BLOOD PRESSURE: 114 MMHG | DIASTOLIC BLOOD PRESSURE: 75 MMHG | RESPIRATION RATE: 18 BRPM | WEIGHT: 241 LBS | OXYGEN SATURATION: 100 % | TEMPERATURE: 98 F | HEART RATE: 87 BPM | HEIGHT: 60 IN

## 2023-08-04 DIAGNOSIS — D50.0 IRON DEFICIENCY ANEMIA DUE TO CHRONIC BLOOD LOSS: Primary | ICD-10-CM

## 2023-08-04 DIAGNOSIS — D50.9 IRON DEFICIENCY ANEMIA, UNSPECIFIED IRON DEFICIENCY ANEMIA TYPE: ICD-10-CM

## 2023-08-04 DIAGNOSIS — D64.9 ANEMIA, UNSPECIFIED TYPE: Primary | ICD-10-CM

## 2023-08-04 DIAGNOSIS — D64.9 ANEMIA, UNSPECIFIED TYPE: ICD-10-CM

## 2023-08-04 LAB
BASOPHILS # BLD AUTO: 0.04 10*3/MM3 (ref 0–0.2)
BASOPHILS NFR BLD AUTO: 0.4 % (ref 0–1.5)
DEPRECATED RDW RBC AUTO: 63.9 FL (ref 37–54)
EOSINOPHIL # BLD AUTO: 0.24 10*3/MM3 (ref 0–0.4)
EOSINOPHIL NFR BLD AUTO: 2.5 % (ref 0.3–6.2)
ERYTHROCYTE [DISTWIDTH] IN BLOOD BY AUTOMATED COUNT: 18.9 % (ref 12.3–15.4)
HCT VFR BLD AUTO: 24.2 % (ref 34–46.6)
HGB BLD-MCNC: 7.5 G/DL (ref 12–15.9)
LYMPHOCYTES # BLD AUTO: 1.01 10*3/MM3 (ref 0.7–3.1)
LYMPHOCYTES NFR BLD AUTO: 10.4 % (ref 19.6–45.3)
MCH RBC QN AUTO: 32.1 PG (ref 26.6–33)
MCHC RBC AUTO-ENTMCNC: 31 G/DL (ref 31.5–35.7)
MCV RBC AUTO: 103.4 FL (ref 79–97)
MONOCYTES # BLD AUTO: 0.31 10*3/MM3 (ref 0.1–0.9)
MONOCYTES NFR BLD AUTO: 3.2 % (ref 5–12)
NEUTROPHILS NFR BLD AUTO: 8.13 10*3/MM3 (ref 1.7–7)
NEUTROPHILS NFR BLD AUTO: 83.5 % (ref 42.7–76)
PLATELET # BLD AUTO: 330 10*3/MM3 (ref 140–450)
PMV BLD AUTO: 9.2 FL (ref 6–12)
RBC # BLD AUTO: 2.34 10*6/MM3 (ref 3.77–5.28)
WBC NRBC COR # BLD: 9.73 10*3/MM3 (ref 3.4–10.8)

## 2023-08-04 PROCEDURE — 25010000002 METHYLPREDNISOLONE PER 125 MG: Performed by: NURSE PRACTITIONER

## 2023-08-04 PROCEDURE — 25010000002 IRON SUCROSE PER 1 MG: Performed by: INTERNAL MEDICINE

## 2023-08-04 PROCEDURE — 85025 COMPLETE CBC W/AUTO DIFF WBC: CPT

## 2023-08-04 PROCEDURE — 96366 THER/PROPH/DIAG IV INF ADDON: CPT

## 2023-08-04 PROCEDURE — 96365 THER/PROPH/DIAG IV INF INIT: CPT

## 2023-08-04 PROCEDURE — 96375 TX/PRO/DX INJ NEW DRUG ADDON: CPT

## 2023-08-04 PROCEDURE — 25010000002 DIPHENHYDRAMINE PER 50 MG: Performed by: NURSE PRACTITIONER

## 2023-08-04 RX ORDER — SODIUM CHLORIDE 9 MG/ML
250 INJECTION, SOLUTION INTRAVENOUS ONCE
OUTPATIENT
Start: 2023-08-11

## 2023-08-04 RX ORDER — PANTOPRAZOLE SODIUM 40 MG/1
40 TABLET, DELAYED RELEASE ORAL DAILY
Qty: 30 TABLET | Refills: 2 | Status: SHIPPED | OUTPATIENT
Start: 2023-08-04

## 2023-08-04 RX ORDER — METHYLPREDNISOLONE SODIUM SUCCINATE 125 MG/2ML
60 INJECTION, POWDER, LYOPHILIZED, FOR SOLUTION INTRAMUSCULAR; INTRAVENOUS ONCE
Status: COMPLETED | OUTPATIENT
Start: 2023-08-04 | End: 2023-08-04

## 2023-08-04 RX ORDER — DIPHENHYDRAMINE HYDROCHLORIDE 50 MG/ML
25 INJECTION INTRAMUSCULAR; INTRAVENOUS ONCE
Status: COMPLETED | OUTPATIENT
Start: 2023-08-04 | End: 2023-08-04

## 2023-08-04 RX ORDER — SODIUM CHLORIDE 9 MG/ML
250 INJECTION, SOLUTION INTRAVENOUS ONCE
Status: COMPLETED | OUTPATIENT
Start: 2023-08-04 | End: 2023-08-04

## 2023-08-04 RX ORDER — ACETAMINOPHEN 325 MG/1
650 TABLET ORAL ONCE
OUTPATIENT
Start: 2023-08-11

## 2023-08-04 RX ORDER — METHYLPREDNISOLONE SODIUM SUCCINATE 125 MG/2ML
60 INJECTION, POWDER, LYOPHILIZED, FOR SOLUTION INTRAMUSCULAR; INTRAVENOUS ONCE
OUTPATIENT
Start: 2023-08-11

## 2023-08-04 RX ORDER — METHYLPREDNISOLONE SODIUM SUCCINATE 125 MG/2ML
60 INJECTION, POWDER, LYOPHILIZED, FOR SOLUTION INTRAMUSCULAR; INTRAVENOUS ONCE
Status: CANCELLED | OUTPATIENT
Start: 2023-08-04

## 2023-08-04 RX ORDER — ACETAMINOPHEN 325 MG/1
650 TABLET ORAL ONCE
Status: COMPLETED | OUTPATIENT
Start: 2023-08-04 | End: 2023-08-04

## 2023-08-04 RX ORDER — SODIUM CHLORIDE 9 MG/ML
250 INJECTION, SOLUTION INTRAVENOUS ONCE
OUTPATIENT
Start: 2023-08-18

## 2023-08-04 RX ORDER — METHYLPREDNISOLONE SODIUM SUCCINATE 125 MG/2ML
60 INJECTION, POWDER, LYOPHILIZED, FOR SOLUTION INTRAMUSCULAR; INTRAVENOUS ONCE
OUTPATIENT
Start: 2023-08-18

## 2023-08-04 RX ORDER — PREDNISONE 50 MG/1
50 TABLET ORAL 2 TIMES DAILY
Qty: 28 TABLET | Refills: 0 | Status: SHIPPED | OUTPATIENT
Start: 2023-08-04

## 2023-08-04 RX ORDER — ACETAMINOPHEN 325 MG/1
650 TABLET ORAL ONCE
Status: CANCELLED | OUTPATIENT
Start: 2023-08-04

## 2023-08-04 RX ORDER — ACETAMINOPHEN 325 MG/1
650 TABLET ORAL ONCE
OUTPATIENT
Start: 2023-08-18

## 2023-08-04 RX ADMIN — ACETAMINOPHEN 650 MG: 325 TABLET ORAL at 10:36

## 2023-08-04 RX ADMIN — DIPHENHYDRAMINE HYDROCHLORIDE 25 MG: 50 INJECTION, SOLUTION INTRAMUSCULAR; INTRAVENOUS at 09:30

## 2023-08-04 RX ADMIN — METHYLPREDNISOLONE SODIUM SUCCINATE 60 MG: 125 INJECTION, POWDER, FOR SOLUTION INTRAMUSCULAR; INTRAVENOUS at 10:37

## 2023-08-04 RX ADMIN — IRON SUCROSE 300 MG: 20 INJECTION, SOLUTION INTRAVENOUS at 08:38

## 2023-08-04 RX ADMIN — SODIUM CHLORIDE 250 ML: 9 INJECTION, SOLUTION INTRAVENOUS at 08:35

## 2023-08-07 ENCOUNTER — LAB (OUTPATIENT)
Dept: LAB | Facility: HOSPITAL | Age: 28
End: 2023-08-07
Payer: COMMERCIAL

## 2023-08-07 DIAGNOSIS — D64.9 ANEMIA, UNSPECIFIED TYPE: ICD-10-CM

## 2023-08-07 DIAGNOSIS — D50.0 IRON DEFICIENCY ANEMIA DUE TO CHRONIC BLOOD LOSS: Primary | ICD-10-CM

## 2023-08-07 DIAGNOSIS — D50.0 IRON DEFICIENCY ANEMIA DUE TO CHRONIC BLOOD LOSS: ICD-10-CM

## 2023-08-07 LAB
BASOPHILS # BLD AUTO: 0.03 10*3/MM3 (ref 0–0.2)
BASOPHILS NFR BLD AUTO: 0.3 % (ref 0–1.5)
DEPRECATED RDW RBC AUTO: 71.3 FL (ref 37–54)
EOSINOPHIL # BLD AUTO: 0.01 10*3/MM3 (ref 0–0.4)
EOSINOPHIL NFR BLD AUTO: 0.1 % (ref 0.3–6.2)
ERYTHROCYTE [DISTWIDTH] IN BLOOD BY AUTOMATED COUNT: 19.6 % (ref 12.3–15.4)
HCT VFR BLD AUTO: 26.5 % (ref 34–46.6)
HGB A MFR BLD ELPH: 97.2 % (ref 96.4–98.8)
HGB A2 MFR BLD ELPH: 2.1 % (ref 1.8–3.2)
HGB BLD-MCNC: 8 G/DL (ref 12–15.9)
HGB F MFR BLD ELPH: 0.7 % (ref 0–2)
HGB FRACT BLD-IMP: NORMAL
HGB S MFR BLD ELPH: 0 %
LYMPHOCYTES # BLD AUTO: 1.62 10*3/MM3 (ref 0.7–3.1)
LYMPHOCYTES NFR BLD AUTO: 13.9 % (ref 19.6–45.3)
MCH RBC QN AUTO: 32.3 PG (ref 26.6–33)
MCHC RBC AUTO-ENTMCNC: 30.2 G/DL (ref 31.5–35.7)
MCV RBC AUTO: 106.9 FL (ref 79–97)
MONOCYTES # BLD AUTO: 0.77 10*3/MM3 (ref 0.1–0.9)
MONOCYTES NFR BLD AUTO: 6.6 % (ref 5–12)
NEUTROPHILS NFR BLD AUTO: 79.1 % (ref 42.7–76)
NEUTROPHILS NFR BLD AUTO: 9.19 10*3/MM3 (ref 1.7–7)
PLATELET # BLD AUTO: 353 10*3/MM3 (ref 140–450)
PMV BLD AUTO: 8.8 FL (ref 6–12)
RBC # BLD AUTO: 2.48 10*6/MM3 (ref 3.77–5.28)
WBC NRBC COR # BLD: 11.62 10*3/MM3 (ref 3.4–10.8)

## 2023-08-07 PROCEDURE — 36415 COLL VENOUS BLD VENIPUNCTURE: CPT

## 2023-08-07 PROCEDURE — 85025 COMPLETE CBC W/AUTO DIFF WBC: CPT

## 2023-08-07 NOTE — PROGRESS NOTES
Hematology/Oncology Outpatient Follow Up    PATIENT NAME:Arlene Brady  :1995  MRN: [id number]  PRIMARY CARE PHYSICIAN: Daniel Sam MD  REFERRING PHYSICIAN: Daniel Sam MD    Chief Complaint   Patient presents with    Follow-up     Anemia, unspecified type            HISTORY OF PRESENT ILLNESS:     This is a 27 year old female has been referred secondary to anemia.  Patient has a longtime history of anemia.  She has rectal bleeding and had colonoscopy done a few days ago by Dr Vinny Lagos . She has internal and external hemorrhoids. She has a follow up with Dr Lagos.     Patient is amenorrheic for about 6 months.  She had history of menorrhagia. She is on hormone pills to regulate her cycles.   She has low energy, she was on oral iron supplements for a month. She has since ran out of her iron tablets.     Review of her CBCs indicate that she has had anemia with hemoglobin ranging between 8 and 12.3 g per DL.  Today her white count is 8, hemoglobin is 8, MCV is 101 and platelets are 352 with essentially unremarkable differentials.     She denies having blood in her urine     She is single, She is a CNA     Patient does not smoke and drinks occasionally     There is no family history of hematological problems.     Her mother had colon cancer,      2023: Methylmalonic acid level was normal at 343 patient had anemia work-up including a ferritin level which was 167, C-reactive protein was high at 1.96 BUN was 13, creatinine 0.9 LDH was 323 iron profile showed a elevated serum iron and iron saturation, haptoglobin was normal, reticulocyte count was elevated to 16 she has low folate at 3.1  Past Medical History:   Diagnosis Date    ADHD (attention deficit hyperactivity disorder)     Arthritis     Asthma     exercise induced asthma     Depression     Seizures        Past Surgical History:   Procedure Laterality Date    ABDOMINAL SURGERY      choley    TONSILLECTOMY           Current  Outpatient Medications:     atomoxetine (STRATTERA) 80 MG capsule, Take 1 capsule by mouth Daily., Disp: 90 capsule, Rfl: 0    fexofenadine (ALLEGRA) 180 MG tablet, Take 1 tablet by mouth Daily., Disp: , Rfl:     folic acid (FOLVITE) 1 MG tablet, Take 1 tablet by mouth Daily., Disp: 90 tablet, Rfl: 0    hydrocortisone (ANUSOL-HC) 25 MG suppository, Insert 1 suppository into the rectum 2 (Two) Times a Day., Disp: 14 suppository, Rfl: 0    Junel 1/20 1-20 MG-MCG per tablet, Take 1 tablet by mouth Every Evening., Disp: , Rfl:     nystatin-triamcinolone (MYCOLOG) 325187-4.1 UNIT/GM-% ointment, APPLY 1 APPLICATION TOPICALLY TWICE DAILY FOR 21 DAYS, Disp: , Rfl:     OXcarbazepine (TRILEPTAL) 300 MG tablet, Take 1.5 tablets by mouth 2 (Two) Times a Day., Disp: , Rfl:     pantoprazole (PROTONIX) 40 MG EC tablet, Take 1 tablet by mouth Daily., Disp: 30 tablet, Rfl: 2    predniSONE (DELTASONE) 50 MG tablet, Take 1 tablet by mouth 2 (Two) Times a Day. Take with food, Disp: 28 tablet, Rfl: 0    sertraline (ZOLOFT) 50 MG tablet, Take 1 tablet by mouth Daily., Disp: 90 tablet, Rfl: 1    silver sulfadiazine (SILVADENE, SSD) 1 % cream, APPLY 1 APPLICATION TOPICALLY TWICE A DAY FOR 20 DAYS, Disp: , Rfl:   No current facility-administered medications for this visit.    Allergies   Allergen Reactions    Cephalosporins Rash    Penicillins Rash       Family History   Problem Relation Age of Onset    Hypertension Father     Heart disease Father     Hyperlipidemia Father     Cancer Paternal Grandmother        Cancer-related family history includes Cancer in her paternal grandmother.    Social History     Tobacco Use    Smoking status: Never    Smokeless tobacco: Never   Vaping Use    Vaping Use: Never used   Substance Use Topics    Alcohol use: Yes     Comment: very infrequent     Drug use: Never     I have reviewed and confirmed the accuracy of the patient's history: Chief complaint, HPI, ROS, and Subjective as entered by the MA/LPN/RN.  "Bronwyn Burns MD 08/11/23        SUBJECTIVE:      Beginning to feel slightly better.  Patient is on steroids.  Hemoglobin is up to 9.7 g per DL today.  She is still having low energy.        REVIEW OF SYSTEMS:      Review of Systems   Constitutional:  Positive for fatigue.   Neurological:  Positive for weakness.     OBJECTIVE:    Vitals:    08/11/23 0817   BP: 112/59   Pulse: 71   Resp: 16   Temp: 97.6 øF (36.4 øC)   TempSrc: Oral   SpO2: 98%   Weight: 109 kg (240 lb 4.8 oz)   Height: 152.4 cm (60\")       Body mass index is 46.93 kg/mý.    ECOG  (1) Restricted in physically strenuous activity, ambulatory and able to do work of light nature    Physical Exam  Vitals reviewed.   Constitutional:       General: She is not in acute distress.     Appearance: Normal appearance. She is not ill-appearing, toxic-appearing or diaphoretic.      Comments: Drowsy   HENT:      Head: Normocephalic and atraumatic.   Eyes:      Extraocular Movements: Extraocular movements intact.   Cardiovascular:      Rate and Rhythm: Normal rate and regular rhythm.      Heart sounds: No murmur heard.  Pulmonary:      Effort: Pulmonary effort is normal. No respiratory distress.      Breath sounds: Normal breath sounds. No stridor. No wheezing.   Abdominal:      General: Bowel sounds are normal.      Palpations: Abdomen is soft.   Musculoskeletal:         General: Normal range of motion.      Cervical back: Normal range of motion.   Skin:     General: Skin is warm and dry.      Findings: No rash.   Neurological:      Mental Status: She is alert and oriented to person, place, and time.   Psychiatric:         Mood and Affect: Mood normal.         Behavior: Behavior normal.     I have reexamined the patient and the results are consistent with the previously documented exam. Bronwyn Burns MD        RECENT LABS    WBC   Date Value Ref Range Status   08/11/2023 12.72 (H) 3.40 - 10.80 10*3/mm3 Final     RBC   Date Value Ref Range Status "   08/11/2023 3.05 (L) 3.77 - 5.28 10*6/mm3 Final   07/31/2023 2.80 (L) 3.77 - 5.28 x10E6/uL Final     Hemoglobin   Date Value Ref Range Status   08/11/2023 9.7 (L) 12.0 - 15.9 g/dL Final     Hematocrit   Date Value Ref Range Status   08/11/2023 31.6 (L) 34.0 - 46.6 % Final     MCV   Date Value Ref Range Status   08/11/2023 103.6 (H) 79.0 - 97.0 fL Final     MCH   Date Value Ref Range Status   08/11/2023 31.8 26.6 - 33.0 pg Final     MCHC   Date Value Ref Range Status   08/11/2023 30.7 (L) 31.5 - 35.7 g/dL Final     RDW   Date Value Ref Range Status   08/11/2023 17.2 (H) 12.3 - 15.4 % Final     RDW-SD   Date Value Ref Range Status   08/11/2023 61.9 (H) 37.0 - 54.0 fl Final     MPV   Date Value Ref Range Status   08/11/2023 8.9 6.0 - 12.0 fL Final     Platelets   Date Value Ref Range Status   08/11/2023 385 140 - 450 10*3/mm3 Final     Neutrophil %   Date Value Ref Range Status   08/11/2023 88.5 (H) 42.7 - 76.0 % Final     Lymphocyte %   Date Value Ref Range Status   08/11/2023 8.2 (L) 19.6 - 45.3 % Final     Monocyte %   Date Value Ref Range Status   08/11/2023 2.9 (L) 5.0 - 12.0 % Final     Eosinophil %   Date Value Ref Range Status   08/11/2023 0.2 (L) 0.3 - 6.2 % Final     Basophil %   Date Value Ref Range Status   08/11/2023 0.2 0.0 - 1.5 % Final     Immature Grans %   Date Value Ref Range Status   10/09/2020 1.4 (H) 0.0 - 0.5 % Final     Neutrophils, Absolute   Date Value Ref Range Status   08/11/2023 11.27 (H) 1.70 - 7.00 10*3/mm3 Final     Lymphocytes, Absolute   Date Value Ref Range Status   08/11/2023 1.04 0.70 - 3.10 10*3/mm3 Final     Monocytes, Absolute   Date Value Ref Range Status   08/11/2023 0.37 0.10 - 0.90 10*3/mm3 Final     Eosinophils, Absolute   Date Value Ref Range Status   08/11/2023 0.02 0.00 - 0.40 10*3/mm3 Final     Basophils, Absolute   Date Value Ref Range Status   08/11/2023 0.02 0.00 - 0.20 10*3/mm3 Final     Immature Grans, Absolute   Date Value Ref Range Status   10/09/2020 0.09 (H)  0.00 - 0.05 10*3/mm3 Final     nRBC   Date Value Ref Range Status   07/25/2023 1.0 (H) 0.0 - 0.2 /100 WBC Final   07/07/2023 0.3 (H) 0.0 - 0.2 /100 WBC Final       Lab Results   Component Value Date    GLUCOSE 110 (H) 07/31/2023    BUN 22 (H) 07/31/2023    CREATININE 0.69 07/31/2023    EGFRIFNONA 88 07/26/2021    BCR 31.9 (H) 07/31/2023    K 3.6 07/31/2023    CO2 20.0 (L) 07/31/2023    CALCIUM 9.1 07/31/2023    PROTENTOTREF 6.8 06/26/2023    ALBUMIN 3.8 07/31/2023    LABIL2 1.0 06/26/2023    AST 26 07/31/2023    ALT 24 07/31/2023         Assessment & Plan     Anemia, unspecified type  - CBC & Differential  - Protein Elec + Interp, Serum  - Immunofixation electrophoresis  - IgG  - IgM  - IgA    Chest pain, unspecified type  - CT Soft Tissue Neck With Contrast  - CT Chest With Contrast  - CT Abdomen Pelvis With Contrast    Autoimmune hemolytic anemia  - CT Soft Tissue Neck With Contrast  - CT Chest With Contrast  - CT Abdomen Pelvis With Contrast        ASSESSMENT:    Iron deficiency anemia, unspecified iron deficiency anemia type  - CBC & Differential        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  Rule out lymphoproliferative disease.  Peripheral blood for flow cytometry shows increased kappa lambda ratio detected rare phenotypic aberrancy of the neutrophils as well as monocytes, possibility of a myeloid disorder was also entertained.  PNH screen was negative and G6PD was not deficient.  For now patient will continue on steroids.  Folate deficiency: On folate replacement therapy  History of seizures  Rectal bleeding : Recent  colonoscopy by Dr Vinny Lagos .  I have requested for the records  Possible sensitivity reaction to Venofer: Patient's epigastric chest pain complaints seem to predate the infusion but she does also complain of itching on the bilateral forearms during the infusion.  Venofer was stopped and normal saline was ran along with 25 mg Benadryl IV given with  resolution of the symptoms.           Plans     Obtain CT scan neck chest abdomen pelvis with contrast to evaluate for lymphoproliferative disorder  Check SPEP with [facility] and quantitative immunoglobulins today  Repeat hemolysis labs today  Patient will continue to stay off work for now, notes given till 18th of August  Follow-up with NP next week  Premedicate with Tylenol 650 mg, Solu-Medrol 60 mg, Benadryl 25 mg IV prior to resuming Venofer today and prior to subsequent cycle days.  Schedule bone marrow aspiration and biopsy to further evaluate the aberrancy noted on her flow cytometry to rule out a myeloid disorder  Discussed with patient and her mother who is present today  All questions answered          Patient verbalized understanding and is in agreement of the above plan.            I spent 40 total minutes, face-to-face, caring for Arlene today. 90% of this time involved counseling and/or coordination of care as documented within this note.

## 2023-08-10 LAB — CD59 CELLS BLD QL: NORMAL

## 2023-08-11 ENCOUNTER — HOSPITAL ENCOUNTER (OUTPATIENT)
Dept: ONCOLOGY | Facility: HOSPITAL | Age: 28
Discharge: HOME | End: 2023-08-11
Payer: COMMERCIAL

## 2023-08-11 ENCOUNTER — OFFICE VISIT (OUTPATIENT)
Dept: ONCOLOGY | Facility: CLINIC | Age: 28
End: 2023-08-11
Payer: COMMERCIAL

## 2023-08-11 VITALS
HEART RATE: 64 BPM | RESPIRATION RATE: 16 BRPM | HEIGHT: 60 IN | TEMPERATURE: 97.6 F | DIASTOLIC BLOOD PRESSURE: 68 MMHG | WEIGHT: 239.3 LBS | SYSTOLIC BLOOD PRESSURE: 112 MMHG | OXYGEN SATURATION: 98 % | BODY MASS INDEX: 46.98 KG/M2

## 2023-08-11 VITALS
SYSTOLIC BLOOD PRESSURE: 112 MMHG | RESPIRATION RATE: 16 BRPM | BODY MASS INDEX: 47.18 KG/M2 | DIASTOLIC BLOOD PRESSURE: 59 MMHG | HEIGHT: 60 IN | TEMPERATURE: 97.6 F | HEART RATE: 71 BPM | WEIGHT: 240.3 LBS | OXYGEN SATURATION: 98 %

## 2023-08-11 DIAGNOSIS — R07.9 CHEST PAIN, UNSPECIFIED TYPE: ICD-10-CM

## 2023-08-11 DIAGNOSIS — D64.9 ANEMIA, UNSPECIFIED TYPE: ICD-10-CM

## 2023-08-11 DIAGNOSIS — D64.9 ANEMIA, UNSPECIFIED TYPE: Primary | ICD-10-CM

## 2023-08-11 DIAGNOSIS — D59.10 AUTOIMMUNE HEMOLYTIC ANEMIA: ICD-10-CM

## 2023-08-11 DIAGNOSIS — D50.0 IRON DEFICIENCY ANEMIA DUE TO CHRONIC BLOOD LOSS: Primary | ICD-10-CM

## 2023-08-11 LAB
ANNOTATION COMMENT IMP: NORMAL
ASSESSMENT OF LEUKOCYTES: NORMAL
BASOPHILS # BLD AUTO: 0.02 10*3/MM3 (ref 0–0.2)
BASOPHILS NFR BLD AUTO: 0.2 % (ref 0–1.5)
CLINICAL INFO: NORMAL
DEPRECATED RDW RBC AUTO: 61.9 FL (ref 37–54)
EOSINOPHIL # BLD AUTO: 0.02 10*3/MM3 (ref 0–0.4)
EOSINOPHIL NFR BLD AUTO: 0.2 % (ref 0.3–6.2)
ERYTHROCYTE [DISTWIDTH] IN BLOOD BY AUTOMATED COUNT: 17.2 % (ref 12.3–15.4)
GATING STRATEGY: NORMAL
HCT VFR BLD AUTO: 31.6 % (ref 34–46.6)
HGB BLD-MCNC: 9.7 G/DL (ref 12–15.9)
IMMUNOPHENOTYPING STUDY: NORMAL
LABORATORY COMMENT REPORT: NORMAL
LYMPHOCYTES # BLD AUTO: 1.04 10*3/MM3 (ref 0.7–3.1)
LYMPHOCYTES NFR BLD AUTO: 8.2 % (ref 19.6–45.3)
MCH RBC QN AUTO: 31.8 PG (ref 26.6–33)
MCHC RBC AUTO-ENTMCNC: 30.7 G/DL (ref 31.5–35.7)
MCV RBC AUTO: 103.6 FL (ref 79–97)
MONOCYTES # BLD AUTO: 0.37 10*3/MM3 (ref 0.1–0.9)
MONOCYTES NFR BLD AUTO: 2.9 % (ref 5–12)
NEUTROPHILS NFR BLD AUTO: 11.27 10*3/MM3 (ref 1.7–7)
NEUTROPHILS NFR BLD AUTO: 88.5 % (ref 42.7–76)
PATH INTERP SPEC-IMP: NORMAL
PATHOLOGIST NAME: NORMAL
PATHOLOGY REVIEW: YES
PLATELET # BLD AUTO: 385 10*3/MM3 (ref 140–450)
PMV BLD AUTO: 8.9 FL (ref 6–12)
RBC # BLD AUTO: 3.05 10*6/MM3 (ref 3.77–5.28)
RETICS # AUTO: 0.48 10*6/MM3 (ref 0.02–0.13)
RETICS/RBC NFR AUTO: 15.92 % (ref 0.7–1.9)
SPECIMEN SOURCE: NORMAL
VIABLE CELLS NFR SPEC: NORMAL %
WBC NRBC COR # BLD: 12.72 10*3/MM3 (ref 3.4–10.8)

## 2023-08-11 PROCEDURE — 25010000002 DIPHENHYDRAMINE PER 50 MG: Performed by: NURSE PRACTITIONER

## 2023-08-11 PROCEDURE — 85045 AUTOMATED RETICULOCYTE COUNT: CPT | Performed by: INTERNAL MEDICINE

## 2023-08-11 PROCEDURE — 96367 TX/PROPH/DG ADDL SEQ IV INF: CPT

## 2023-08-11 PROCEDURE — 25010000002 METHYLPREDNISOLONE PER 125 MG: Performed by: NURSE PRACTITIONER

## 2023-08-11 PROCEDURE — 25010000002 IRON SUCROSE PER 1 MG: Performed by: NURSE PRACTITIONER

## 2023-08-11 PROCEDURE — 96366 THER/PROPH/DIAG IV INF ADDON: CPT

## 2023-08-11 PROCEDURE — 96365 THER/PROPH/DIAG IV INF INIT: CPT

## 2023-08-11 PROCEDURE — 85025 COMPLETE CBC W/AUTO DIFF WBC: CPT | Performed by: NURSE PRACTITIONER

## 2023-08-11 RX ORDER — ACETAMINOPHEN 325 MG/1
650 TABLET ORAL ONCE
Status: COMPLETED | OUTPATIENT
Start: 2023-08-11 | End: 2023-08-11

## 2023-08-11 RX ORDER — SODIUM CHLORIDE 9 MG/ML
250 INJECTION, SOLUTION INTRAVENOUS ONCE
Status: COMPLETED | OUTPATIENT
Start: 2023-08-11 | End: 2023-08-11

## 2023-08-11 RX ORDER — METHYLPREDNISOLONE SODIUM SUCCINATE 125 MG/2ML
60 INJECTION, POWDER, LYOPHILIZED, FOR SOLUTION INTRAMUSCULAR; INTRAVENOUS ONCE
Status: COMPLETED | OUTPATIENT
Start: 2023-08-11 | End: 2023-08-11

## 2023-08-11 RX ADMIN — ACETAMINOPHEN 650 MG: 325 TABLET ORAL at 08:42

## 2023-08-11 RX ADMIN — DIPHENHYDRAMINE HYDROCHLORIDE 25 MG: 50 INJECTION, SOLUTION INTRAMUSCULAR; INTRAVENOUS at 08:53

## 2023-08-11 RX ADMIN — SODIUM CHLORIDE 250 ML: 9 INJECTION, SOLUTION INTRAVENOUS at 08:41

## 2023-08-11 RX ADMIN — IRON SUCROSE 300 MG: 20 INJECTION, SOLUTION INTRAVENOUS at 09:17

## 2023-08-11 RX ADMIN — METHYLPREDNISOLONE SODIUM SUCCINATE 60 MG: 125 INJECTION, POWDER, FOR SOLUTION INTRAMUSCULAR; INTRAVENOUS at 08:43

## 2023-08-11 NOTE — LETTER
2023       No Recipients    Patient: Arlene Brady   YOB: 1995   Date of Visit: 2023       Dear Daniel Sam MD    Arlene Brady was in my office today. Below is a copy of my note.    If you have questions, please do not hesitate to call me. I look forward to following Arlene along with you.         Sincerely,        Bronwyn Burns MD        CC:   No Recipients     Hematology/Oncology Outpatient Follow Up    PATIENT NAME:Arlene Brady  :1995  MRN: [id number]  PRIMARY CARE PHYSICIAN: Daniel Sam MD  REFERRING PHYSICIAN: Daniel Sam MD    Chief Complaint   Patient presents with    Follow-up     Anemia, unspecified type            HISTORY OF PRESENT ILLNESS:     This is a 27 year old female has been referred secondary to anemia.  Patient has a longtime history of anemia.  She has rectal bleeding and had colonoscopy done a few days ago by Dr Vinny Lagos . She has internal and external hemorrhoids. She has a follow up with Dr Lagos.     Patient is amenorrheic for about 6 months.  She had history of menorrhagia. She is on hormone pills to regulate her cycles.   She has low energy, she was on oral iron supplements for a month. She has since ran out of her iron tablets.     Review of her CBCs indicate that she has had anemia with hemoglobin ranging between 8 and 12.3 g per DL.  Today her white count is 8, hemoglobin is 8, MCV is 101 and platelets are 352 with essentially unremarkable differentials.     She denies having blood in her urine     She is single, She is a CNA     Patient does not smoke and drinks occasionally     There is no family history of hematological problems.     Her mother had colon cancer,      2023: Methylmalonic acid level was normal at 343 patient had anemia work-up including a ferritin level which was 167, C-reactive protein was high at 1.96 BUN was 13, creatinine 0.9 LDH was 323 iron profile showed a elevated  serum iron and iron saturation, haptoglobin was normal, reticulocyte count was elevated to 16 she has low folate at 3.1  Past Medical History:   Diagnosis Date    ADHD (attention deficit hyperactivity disorder)     Arthritis     Asthma     exercise induced asthma     Depression     Seizures        Past Surgical History:   Procedure Laterality Date    ABDOMINAL SURGERY      choley    TONSILLECTOMY           Current Outpatient Medications:     atomoxetine (STRATTERA) 80 MG capsule, Take 1 capsule by mouth Daily., Disp: 90 capsule, Rfl: 0    fexofenadine (ALLEGRA) 180 MG tablet, Take 1 tablet by mouth Daily., Disp: , Rfl:     folic acid (FOLVITE) 1 MG tablet, Take 1 tablet by mouth Daily., Disp: 90 tablet, Rfl: 0    hydrocortisone (ANUSOL-HC) 25 MG suppository, Insert 1 suppository into the rectum 2 (Two) Times a Day., Disp: 14 suppository, Rfl: 0    Junel 1/20 1-20 MG-MCG per tablet, Take 1 tablet by mouth Every Evening., Disp: , Rfl:     nystatin-triamcinolone (MYCOLOG) 024038-2.1 UNIT/GM-% ointment, APPLY 1 APPLICATION TOPICALLY TWICE DAILY FOR 21 DAYS, Disp: , Rfl:     OXcarbazepine (TRILEPTAL) 300 MG tablet, Take 1.5 tablets by mouth 2 (Two) Times a Day., Disp: , Rfl:     pantoprazole (PROTONIX) 40 MG EC tablet, Take 1 tablet by mouth Daily., Disp: 30 tablet, Rfl: 2    predniSONE (DELTASONE) 50 MG tablet, Take 1 tablet by mouth 2 (Two) Times a Day. Take with food, Disp: 28 tablet, Rfl: 0    sertraline (ZOLOFT) 50 MG tablet, Take 1 tablet by mouth Daily., Disp: 90 tablet, Rfl: 1    silver sulfadiazine (SILVADENE, SSD) 1 % cream, APPLY 1 APPLICATION TOPICALLY TWICE A DAY FOR 20 DAYS, Disp: , Rfl:   No current facility-administered medications for this visit.    Allergies   Allergen Reactions    Cephalosporins Rash    Penicillins Rash       Family History   Problem Relation Age of Onset    Hypertension Father     Heart disease Father     Hyperlipidemia Father     Cancer Paternal Grandmother   "      Cancer-related family history includes Cancer in her paternal grandmother.    Social History     Tobacco Use    Smoking status: Never    Smokeless tobacco: Never   Vaping Use    Vaping Use: Never used   Substance Use Topics    Alcohol use: Yes     Comment: very infrequent     Drug use: Never     I have reviewed and confirmed the accuracy of the patient's history: Chief complaint, HPI, ROS, and Subjective as entered by the MA/LPN/RN. Bronwyn Shelley Burns MD 08/11/23        SUBJECTIVE:      Beginning to feel slightly better.  Patient is on steroids.  Hemoglobin is up to 9.7 g per DL today.  She is still having low energy.        REVIEW OF SYSTEMS:      Review of Systems   Constitutional:  Positive for fatigue.   Neurological:  Positive for weakness.     OBJECTIVE:    Vitals:    08/11/23 0817   BP: 112/59   Pulse: 71   Resp: 16   Temp: 97.6 øF (36.4 øC)   TempSrc: Oral   SpO2: 98%   Weight: 109 kg (240 lb 4.8 oz)   Height: 152.4 cm (60\")       Body mass index is 46.93 kg/mý.    ECOG  (1) Restricted in physically strenuous activity, ambulatory and able to do work of light nature    Physical Exam  Vitals reviewed.   Constitutional:       General: She is not in acute distress.     Appearance: Normal appearance. She is not ill-appearing, toxic-appearing or diaphoretic.      Comments: Drowsy   HENT:      Head: Normocephalic and atraumatic.   Eyes:      Extraocular Movements: Extraocular movements intact.   Cardiovascular:      Rate and Rhythm: Normal rate and regular rhythm.      Heart sounds: No murmur heard.  Pulmonary:      Effort: Pulmonary effort is normal. No respiratory distress.      Breath sounds: Normal breath sounds. No stridor. No wheezing.   Abdominal:      General: Bowel sounds are normal.      Palpations: Abdomen is soft.   Musculoskeletal:         General: Normal range of motion.      Cervical back: Normal range of motion.   Skin:     General: Skin is warm and dry.      Findings: No rash. "   Neurological:      Mental Status: She is alert and oriented to person, place, and time.   Psychiatric:         Mood and Affect: Mood normal.         Behavior: Behavior normal.     I have reexamined the patient and the results are consistent with the previously documented exam. Bronwyn Burns MD        RECENT LABS    WBC   Date Value Ref Range Status   08/11/2023 12.72 (H) 3.40 - 10.80 10*3/mm3 Final     RBC   Date Value Ref Range Status   08/11/2023 3.05 (L) 3.77 - 5.28 10*6/mm3 Final   07/31/2023 2.80 (L) 3.77 - 5.28 x10E6/uL Final     Hemoglobin   Date Value Ref Range Status   08/11/2023 9.7 (L) 12.0 - 15.9 g/dL Final     Hematocrit   Date Value Ref Range Status   08/11/2023 31.6 (L) 34.0 - 46.6 % Final     MCV   Date Value Ref Range Status   08/11/2023 103.6 (H) 79.0 - 97.0 fL Final     MCH   Date Value Ref Range Status   08/11/2023 31.8 26.6 - 33.0 pg Final     MCHC   Date Value Ref Range Status   08/11/2023 30.7 (L) 31.5 - 35.7 g/dL Final     RDW   Date Value Ref Range Status   08/11/2023 17.2 (H) 12.3 - 15.4 % Final     RDW-SD   Date Value Ref Range Status   08/11/2023 61.9 (H) 37.0 - 54.0 fl Final     MPV   Date Value Ref Range Status   08/11/2023 8.9 6.0 - 12.0 fL Final     Platelets   Date Value Ref Range Status   08/11/2023 385 140 - 450 10*3/mm3 Final     Neutrophil %   Date Value Ref Range Status   08/11/2023 88.5 (H) 42.7 - 76.0 % Final     Lymphocyte %   Date Value Ref Range Status   08/11/2023 8.2 (L) 19.6 - 45.3 % Final     Monocyte %   Date Value Ref Range Status   08/11/2023 2.9 (L) 5.0 - 12.0 % Final     Eosinophil %   Date Value Ref Range Status   08/11/2023 0.2 (L) 0.3 - 6.2 % Final     Basophil %   Date Value Ref Range Status   08/11/2023 0.2 0.0 - 1.5 % Final     Immature Grans %   Date Value Ref Range Status   10/09/2020 1.4 (H) 0.0 - 0.5 % Final     Neutrophils, Absolute   Date Value Ref Range Status   08/11/2023 11.27 (H) 1.70 - 7.00 10*3/mm3 Final     Lymphocytes, Absolute   Date  Value Ref Range Status   08/11/2023 1.04 0.70 - 3.10 10*3/mm3 Final     Monocytes, Absolute   Date Value Ref Range Status   08/11/2023 0.37 0.10 - 0.90 10*3/mm3 Final     Eosinophils, Absolute   Date Value Ref Range Status   08/11/2023 0.02 0.00 - 0.40 10*3/mm3 Final     Basophils, Absolute   Date Value Ref Range Status   08/11/2023 0.02 0.00 - 0.20 10*3/mm3 Final     Immature Grans, Absolute   Date Value Ref Range Status   10/09/2020 0.09 (H) 0.00 - 0.05 10*3/mm3 Final     nRBC   Date Value Ref Range Status   07/25/2023 1.0 (H) 0.0 - 0.2 /100 WBC Final   07/07/2023 0.3 (H) 0.0 - 0.2 /100 WBC Final       Lab Results   Component Value Date    GLUCOSE 110 (H) 07/31/2023    BUN 22 (H) 07/31/2023    CREATININE 0.69 07/31/2023    EGFRIFNONA 88 07/26/2021    BCR 31.9 (H) 07/31/2023    K 3.6 07/31/2023    CO2 20.0 (L) 07/31/2023    CALCIUM 9.1 07/31/2023    PROTENTOTREF 6.8 06/26/2023    ALBUMIN 3.8 07/31/2023    LABIL2 1.0 06/26/2023    AST 26 07/31/2023    ALT 24 07/31/2023         Assessment & Plan     Anemia, unspecified type  - CBC & Differential  - Protein Elec + Interp, Serum  - Immunofixation electrophoresis  - IgG  - IgM  - IgA    Chest pain, unspecified type  - CT Soft Tissue Neck With Contrast  - CT Chest With Contrast  - CT Abdomen Pelvis With Contrast    Autoimmune hemolytic anemia  - CT Soft Tissue Neck With Contrast  - CT Chest With Contrast  - CT Abdomen Pelvis With Contrast        ASSESSMENT:    Iron deficiency anemia, unspecified iron deficiency anemia type  - CBC & Differential        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  Rule out lymphoproliferative disease.  Peripheral blood for flow cytometry shows increased kappa lambda ratio detected rare phenotypic aberrancy of the neutrophils as well as monocytes, possibility of a myeloid disorder was also entertained.  PNH screen was negative and G6PD was not deficient.  For now patient will continue on  steroids.  Folate deficiency: On folate replacement therapy  History of seizures  Rectal bleeding : Recent  colonoscopy by Dr Vinny Lagos .  I have requested for the records  Possible sensitivity reaction to Venofer: Patient's epigastric chest pain complaints seem to predate the infusion but she does also complain of itching on the bilateral forearms during the infusion.  Venofer was stopped and normal saline was ran along with 25 mg Benadryl IV given with resolution of the symptoms.           Plans     Obtain CT scan neck chest abdomen pelvis with contrast to evaluate for lymphoproliferative disorder  Check SPEP with [facility]CE[facility] and quantitative immunoglobulins today  Repeat hemolysis labs today  Patient will continue to stay off work for now, notes given till 18th of August  Follow-up with NP next week  Premedicate with Tylenol 650 mg, Solu-Medrol 60 mg, Benadryl 25 mg IV prior to resuming Venofer today and prior to subsequent cycle days.  Schedule bone marrow aspiration and biopsy to further evaluate the aberrancy noted on her flow cytometry to rule out a myeloid disorder  Discussed with patient and her mother who is present today  All questions answered          Patient verbalized understanding and is in agreement of the above plan.            I spent 40 total minutes, face-to-face, caring for Arlene today. 90% of this time involved counseling and/or coordination of care as documented within this note.

## 2023-08-14 ENCOUNTER — TELEPHONE (OUTPATIENT)
Dept: ONCOLOGY | Facility: CLINIC | Age: 28
End: 2023-08-14

## 2023-08-14 DIAGNOSIS — D64.9 ANEMIA, UNSPECIFIED TYPE: Primary | ICD-10-CM

## 2023-08-14 DIAGNOSIS — D59.10 AUTOIMMUNE HEMOLYTIC ANEMIA: ICD-10-CM

## 2023-08-14 LAB
ALBUMIN SERPL ELPH-MCNC: 3.5 G/DL (ref 2.9–4.4)
ALBUMIN/GLOB SERPL: 1.1 {RATIO} (ref 0.7–1.7)
ALPHA1 GLOB SERPL ELPH-MCNC: 0.3 G/DL (ref 0–0.4)
ALPHA2 GLOB SERPL ELPH-MCNC: 0.9 G/DL (ref 0.4–1)
B-GLOBULIN SERPL ELPH-MCNC: 1.1 G/DL (ref 0.7–1.3)
GAMMA GLOB SERPL ELPH-MCNC: 1.1 G/DL (ref 0.4–1.8)
GLOBULIN SER-MCNC: 3.4 G/DL (ref 2.2–3.9)
IGA SERPL-MCNC: 225 MG/DL (ref 87–352)
IGG SERPL-MCNC: 940 MG/DL (ref 586–1602)
IGM SERPL-MCNC: 146 MG/DL (ref 26–217)
INTERPRETATION SERPL IEP-IMP: NORMAL
LAB AP CASE REPORT: NORMAL
LABORATORY COMMENT REPORT: NORMAL
M PROTEIN SERPL ELPH-MCNC: NORMAL G/DL
PATH REPORT.FINAL DX SPEC: NORMAL
PROT SERPL-MCNC: 6.9 G/DL (ref 6–8.5)

## 2023-08-14 NOTE — TELEPHONE ENCOUNTER
Caller: Arlene Brady    \  Best call back number: [phone]    What is the best time to reach you: ANYTIME    Who are you requesting to speak with (clinical staff, provider,  specific staff member): CLINICAL     What was the call regarding: PATIENT CALLING HER CT BONE MARROW IS SCHEDULED FOR 8/25/2023, IS THAT OKAY OR DOES IT NEED TO BE SEEN SOONER?    CALL TO DISCUSS

## 2023-08-16 ENCOUNTER — HOSPITAL ENCOUNTER (OUTPATIENT)
Dept: PET IMAGING | Facility: HOSPITAL | Age: 28
Discharge: HOME | End: 2023-08-16
Admitting: INTERNAL MEDICINE
Payer: COMMERCIAL

## 2023-08-16 DIAGNOSIS — D59.10 AUTOIMMUNE HEMOLYTIC ANEMIA: ICD-10-CM

## 2023-08-16 DIAGNOSIS — R07.9 CHEST PAIN, UNSPECIFIED TYPE: ICD-10-CM

## 2023-08-16 PROCEDURE — 25510000001 IOPAMIDOL PER 1 ML: Performed by: INTERNAL MEDICINE

## 2023-08-16 PROCEDURE — 74177 CT ABD & PELVIS W/CONTRAST: CPT

## 2023-08-16 PROCEDURE — 71260 CT THORAX DX C+: CPT

## 2023-08-16 PROCEDURE — 70491 CT SOFT TISSUE NECK W/DYE: CPT

## 2023-08-16 PROCEDURE — 25510000001 IOPAMIDOL 61 % SOLUTION: Performed by: INTERNAL MEDICINE

## 2023-08-16 RX ADMIN — IOPAMIDOL 100 ML: 755 INJECTION, SOLUTION INTRAVENOUS at 09:00

## 2023-08-16 RX ADMIN — IOPAMIDOL 30 ML: 612 INJECTION, SOLUTION INTRAVENOUS at 09:33

## 2023-08-17 NOTE — PROGRESS NOTES
Hematology/Oncology Outpatient Follow Up    PATIENT NAME:Arlene Brady  :1995  MRN: [id number]  PRIMARY CARE PHYSICIAN: Daniel Sam MD  REFERRING PHYSICIAN: No ref. provider found    Chief Complaint   Patient presents with    Follow-up     Iron deficiency anemia due to chronic blood loss          HISTORY OF PRESENT ILLNESS:     This is a 27 year old female has been referred secondary to anemia.  Patient has a longtime history of anemia.  She has rectal bleeding and had colonoscopy done a few days ago by Dr Vinny Lagos . She has internal and external hemorrhoids. She has a follow up with Dr Lagos.     Patient is amenorrheic for about 6 months.  She had history of menorrhagia. She is on hormone pills to regulate her cycles.   She has low energy, she was on oral iron supplements for a month. She has since ran out of her iron tablets.     Review of her CBCs indicate that she has had anemia with hemoglobin ranging between 8 and 12.3 g per DL.  Today her white count is 8, hemoglobin is 8, MCV is 101 and platelets are 352 with essentially unremarkable differentials.     She denies having blood in her urine     She is single, She is a CNA     Patient does not smoke and drinks occasionally     There is no family history of hematological problems.     Her mother had colon cancer,      2023: Methylmalonic acid level was normal at 343 patient had anemia work-up including a ferritin level which was 167, C-reactive protein was high at 1.96 BUN was 13, creatinine 0.9 LDH was 323 iron profile showed a elevated serum iron and iron saturation, haptoglobin was normal, reticulocyte count was elevated to 16 she has low folate at 3.1  2023: WBC 11.62, hemoglobin 8.0 g/dL, .9, platelets 353,000.  Flow cytometry showed an increased kappa lambda ratio, neutrophils with left shifted maturation and rare phenotypic aberrancy, monocytes with phenotypic aberrancy.  Bone marrow biopsy with molecular and  cytogenetic studies indicated to evaluate for myeloid neoplasm.  8/16/2023 CT of the neck, chest, abdomen and pelvis with contrast showed no evidence of definite pathologic lymphadenopathy concerning for lymphoproliferative disorder.  No acute findings present in the neck, chest, abdomen or pelvis.      Past Medical History:   Diagnosis Date    ADHD (attention deficit hyperactivity disorder)     Arthritis     Asthma     exercise induced asthma     Depression     Seizures        Past Surgical History:   Procedure Laterality Date    ABDOMINAL SURGERY      choley    TONSILLECTOMY           Current Outpatient Medications:     atomoxetine (STRATTERA) 80 MG capsule, Take 1 capsule by mouth Daily., Disp: 90 capsule, Rfl: 0    fexofenadine (ALLEGRA) 180 MG tablet, Take 1 tablet by mouth Daily., Disp: , Rfl:     folic acid (FOLVITE) 1 MG tablet, Take 1 tablet by mouth Daily., Disp: 90 tablet, Rfl: 0    hydrocortisone (ANUSOL-HC) 25 MG suppository, Insert 1 suppository into the rectum 2 (Two) Times a Day., Disp: 14 suppository, Rfl: 0    Junel 1/20 1-20 MG-MCG per tablet, Take 1 tablet by mouth Every Evening., Disp: , Rfl:     nystatin-triamcinolone (MYCOLOG) 874113-2.1 UNIT/GM-% ointment, APPLY 1 APPLICATION TOPICALLY TWICE DAILY FOR 21 DAYS, Disp: , Rfl:     OXcarbazepine (TRILEPTAL) 300 MG tablet, Take 1.5 tablets by mouth 2 (Two) Times a Day., Disp: , Rfl:     pantoprazole (PROTONIX) 40 MG EC tablet, Take 1 tablet by mouth Daily., Disp: 30 tablet, Rfl: 2    predniSONE (DELTASONE) 50 MG tablet, Take 1 tablet by mouth 2 (Two) Times a Day. Take with food, Disp: 28 tablet, Rfl: 0    sertraline (ZOLOFT) 50 MG tablet, Take 1 tablet by mouth Daily., Disp: 90 tablet, Rfl: 1    silver sulfadiazine (SILVADENE, SSD) 1 % cream, APPLY 1 APPLICATION TOPICALLY TWICE A DAY FOR 20 DAYS, Disp: , Rfl:   No current facility-administered medications for this visit.    Facility-Administered Medications Ordered in Other Visits:      "methylPREDNISolone sodium succinate (SOLU-Medrol) injection 60 mg, 60 mg, Intravenous, Once, Cony Puri, APRN    Allergies   Allergen Reactions    Cephalosporins Rash    Penicillins Rash       Family History   Problem Relation Age of Onset    Hypertension Father     Heart disease Father     Hyperlipidemia Father     Cancer Paternal Grandmother        Cancer-related family history includes Cancer in her paternal grandmother.    Social History     Tobacco Use    Smoking status: Never    Smokeless tobacco: Never   Vaping Use    Vaping Use: Never used   Substance Use Topics    Alcohol use: Yes     Comment: very infrequent     Drug use: Never     I have reviewed and confirmed the accuracy of the patient's history: Chief complaint, HPI, ROS, and Subjective as entered by the MA/LPN/RN. ELSY Ruiz 08/18/23        SUBJECTIVE:  Arlene is here today for her routine follow-up and her last week of IV iron replacement.  She is accompanied by her mother at the visit.  She reports that she has been compliant with her twice daily steroids and also with her PPI for GI protection.  She has been having improvement in her energy.  She still does continue to have intermittent chest pain and notes that she feels dizzy when straining with bowel movements.          REVIEW OF SYSTEMS:      Review of Systems   Constitutional:  Positive for fatigue.   Neurological:  Positive for weakness.   Per subjective  OBJECTIVE:    Vitals:    08/18/23 0816   BP: 118/77   Pulse: 87   Resp: 16   SpO2: 97%   Weight: 107 kg (235 lb 14.3 oz)   Height: 152.4 cm (60\")   PainSc: 0-No pain       Body mass index is 46.07 kg/mý.    ECOG  (1) Restricted in physically strenuous activity, ambulatory and able to do work of light nature    Physical Exam  Vitals reviewed.   Constitutional:       General: She is not in acute distress.     Appearance: Normal appearance. She is not ill-appearing, toxic-appearing or diaphoretic.   HENT:      Head: " Normocephalic and atraumatic.   Eyes:      Extraocular Movements: Extraocular movements intact.   Cardiovascular:      Rate and Rhythm: Normal rate and regular rhythm.      Heart sounds: No murmur heard.  Pulmonary:      Effort: Pulmonary effort is normal. No respiratory distress.      Breath sounds: Normal breath sounds. No stridor. No wheezing.   Abdominal:      General: Bowel sounds are normal.      Palpations: Abdomen is soft.   Musculoskeletal:         General: Normal range of motion.      Cervical back: Normal range of motion.   Skin:     General: Skin is warm and dry.      Findings: No rash.   Neurological:      Mental Status: She is alert and oriented to person, place, and time.   Psychiatric:         Mood and Affect: Mood normal.         Behavior: Behavior normal.     I have reexamined the patient and the results are consistent with the previously documented exam. Cony Puri, ELSY        RECENT LABS    WBC   Date Value Ref Range Status   08/18/2023 10.15 3.40 - 10.80 10*3/mm3 Final     RBC   Date Value Ref Range Status   08/18/2023 3.54 (L) 3.77 - 5.28 10*6/mm3 Final   07/31/2023 2.80 (L) 3.77 - 5.28 x10E6/uL Final     Hemoglobin   Date Value Ref Range Status   08/18/2023 11.4 (L) 12.0 - 15.9 g/dL Final     Hematocrit   Date Value Ref Range Status   08/18/2023 35.6 34.0 - 46.6 % Final     MCV   Date Value Ref Range Status   08/18/2023 100.6 (H) 79.0 - 97.0 fL Final     MCH   Date Value Ref Range Status   08/18/2023 32.2 26.6 - 33.0 pg Final     MCHC   Date Value Ref Range Status   08/18/2023 32.0 31.5 - 35.7 g/dL Final     RDW   Date Value Ref Range Status   08/18/2023 14.9 12.3 - 15.4 % Final     RDW-SD   Date Value Ref Range Status   08/18/2023 53.5 37.0 - 54.0 fl Final     MPV   Date Value Ref Range Status   08/18/2023 8.9 6.0 - 12.0 fL Final     Platelets   Date Value Ref Range Status   08/18/2023 332 140 - 450 10*3/mm3 Final     Neutrophil %   Date Value Ref Range Status   08/18/2023 86.6 (H)  42.7 - 76.0 % Final     Lymphocyte %   Date Value Ref Range Status   08/18/2023 7.7 (L) 19.6 - 45.3 % Final     Monocyte %   Date Value Ref Range Status   08/18/2023 5.5 5.0 - 12.0 % Final     Eosinophil %   Date Value Ref Range Status   08/18/2023 0.1 (L) 0.3 - 6.2 % Final     Basophil %   Date Value Ref Range Status   08/18/2023 0.1 0.0 - 1.5 % Final     Immature Grans %   Date Value Ref Range Status   10/09/2020 1.4 (H) 0.0 - 0.5 % Final     Neutrophils, Absolute   Date Value Ref Range Status   08/18/2023 8.79 (H) 1.70 - 7.00 10*3/mm3 Final     Lymphocytes, Absolute   Date Value Ref Range Status   08/18/2023 0.78 0.70 - 3.10 10*3/mm3 Final     Monocytes, Absolute   Date Value Ref Range Status   08/18/2023 0.56 0.10 - 0.90 10*3/mm3 Final     Eosinophils, Absolute   Date Value Ref Range Status   08/18/2023 0.01 0.00 - 0.40 10*3/mm3 Final     Basophils, Absolute   Date Value Ref Range Status   08/18/2023 0.01 0.00 - 0.20 10*3/mm3 Final     Immature Grans, Absolute   Date Value Ref Range Status   10/09/2020 0.09 (H) 0.00 - 0.05 10*3/mm3 Final     nRBC   Date Value Ref Range Status   07/25/2023 1.0 (H) 0.0 - 0.2 /100 WBC Final   07/07/2023 0.3 (H) 0.0 - 0.2 /100 WBC Final       Lab Results   Component Value Date    GLUCOSE 110 (H) 07/31/2023    BUN 22 (H) 07/31/2023    CREATININE 0.69 07/31/2023    EGFRIFNONA 88 07/26/2021    BCR 31.9 (H) 07/31/2023    K 3.6 07/31/2023    CO2 20.0 (L) 07/31/2023    CALCIUM 9.1 07/31/2023    PROTENTOTREF 6.9 08/11/2023    ALBUMIN 3.5 08/11/2023    LABIL2 1.1 08/11/2023    AST 26 07/31/2023    ALT 24 07/31/2023         Assessment & Plan     Anemia, unspecified type  - CBC & Differential  - Comprehensive Metabolic Panel        ASSESSMENT:    Iron deficiency anemia, unspecified iron deficiency anemia type  - CBC & Differential        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  Rule out lymphoproliferative disease.  Peripheral blood for flow cytometry  shows increased kappa lambda ratio detected rare phenotypic aberrancy of the neutrophils as well as monocytes, possibility of a myeloid disorder was also entertained.  PNH screen was negative and G6PD was not deficient.  For now patient will continue on steroids.  Folate deficiency: On folate replacement therapy  History of seizures  Rectal bleeding : Recent  colonoscopy by Dr Vinny Lagos .  I have requested for the records  Possible sensitivity reaction to Venofer: Patient's epigastric chest pain complaints seem to predate the infusion but she does also complain of itching on the bilateral forearms during the infusion.  Venofer was stopped and normal saline was ran along with 25 mg Benadryl IV given with resolution of the symptoms.           Plans     Continue prednisone 50 mg p.o. twice daily with food and with PPI prophylaxis at this time, likely will begin to wean next week after recheck of CBC  Reviewed CT neck, chest, abdomen and pelvis with the patient and her mother  Repeat hemolysis labs today  Patient is feeling improved and will check with her work to see if there may be a ability to return to work with an 8-hour day restriction and light duty for the next 2 weeks until reassessed.  Recheck CBC in 1 week  Premedicate with Tylenol 650 mg, Solu-Medrol 60 mg, Benadryl 25 mg IV prior to resuming Venofer today and prior to subsequent cycle days.  Schedule bone marrow aspiration and biopsy to further evaluate the aberrancy noted on her flow cytometry to rule out a myeloid disorder.  This has been scheduled for 8/25/2023  Discussed with patient and her mother who is present today  Follow-up with Dr. Burns on 8/29/2023 as previously scheduled  All questions answered          Patient verbalized understanding and is in agreement of the above plan.            I spent 30 total minutes, face-to-face, caring for Arlene today. 90% of this time involved counseling and/or coordination of care as documented within this  note.

## 2023-08-18 ENCOUNTER — HOSPITAL ENCOUNTER (OUTPATIENT)
Dept: ONCOLOGY | Facility: HOSPITAL | Age: 28
Discharge: HOME | End: 2023-08-18
Payer: COMMERCIAL

## 2023-08-18 ENCOUNTER — OFFICE VISIT (OUTPATIENT)
Dept: ONCOLOGY | Facility: CLINIC | Age: 28
End: 2023-08-18
Payer: COMMERCIAL

## 2023-08-18 VITALS
OXYGEN SATURATION: 97 % | HEART RATE: 64 BPM | RESPIRATION RATE: 16 BRPM | DIASTOLIC BLOOD PRESSURE: 80 MMHG | WEIGHT: 235.5 LBS | SYSTOLIC BLOOD PRESSURE: 121 MMHG | HEIGHT: 60 IN | BODY MASS INDEX: 46.23 KG/M2

## 2023-08-18 VITALS
BODY MASS INDEX: 46.31 KG/M2 | DIASTOLIC BLOOD PRESSURE: 77 MMHG | OXYGEN SATURATION: 97 % | HEART RATE: 87 BPM | WEIGHT: 235.89 LBS | HEIGHT: 60 IN | RESPIRATION RATE: 16 BRPM | SYSTOLIC BLOOD PRESSURE: 118 MMHG

## 2023-08-18 DIAGNOSIS — D59.10 AUTOIMMUNE HEMOLYTIC ANEMIA: ICD-10-CM

## 2023-08-18 DIAGNOSIS — D64.9 ANEMIA, UNSPECIFIED TYPE: Primary | ICD-10-CM

## 2023-08-18 DIAGNOSIS — D50.0 IRON DEFICIENCY ANEMIA DUE TO CHRONIC BLOOD LOSS: Primary | ICD-10-CM

## 2023-08-18 LAB
ALBUMIN SERPL-MCNC: 4 G/DL (ref 3.5–5.2)
ALBUMIN/GLOB SERPL: 1.6 G/DL
ALP SERPL-CCNC: 44 U/L (ref 39–117)
ALT SERPL W P-5'-P-CCNC: 50 U/L (ref 1–33)
ANION GAP SERPL CALCULATED.3IONS-SCNC: 14 MMOL/L (ref 5–15)
AST SERPL-CCNC: 16 U/L (ref 1–32)
BASOPHILS # BLD AUTO: 0.01 10*3/MM3 (ref 0–0.2)
BASOPHILS NFR BLD AUTO: 0.1 % (ref 0–1.5)
BILIRUB SERPL-MCNC: 0.4 MG/DL (ref 0–1.2)
BUN SERPL-MCNC: 34 MG/DL (ref 6–20)
BUN/CREAT SERPL: 38.2 (ref 7–25)
CALCIUM SPEC-SCNC: 8.6 MG/DL (ref 8.6–10.5)
CHLORIDE SERPL-SCNC: 105 MMOL/L (ref 98–107)
CO2 SERPL-SCNC: 20 MMOL/L (ref 22–29)
CREAT SERPL-MCNC: 0.89 MG/DL (ref 0.57–1)
DEPRECATED RDW RBC AUTO: 53.5 FL (ref 37–54)
EGFRCR SERPLBLD CKD-EPI 2021: 91.8 ML/MIN/1.73
EOSINOPHIL # BLD AUTO: 0.01 10*3/MM3 (ref 0–0.4)
EOSINOPHIL NFR BLD AUTO: 0.1 % (ref 0.3–6.2)
ERYTHROCYTE [DISTWIDTH] IN BLOOD BY AUTOMATED COUNT: 14.9 % (ref 12.3–15.4)
GLOBULIN UR ELPH-MCNC: 2.5 GM/DL
GLUCOSE SERPL-MCNC: 125 MG/DL (ref 65–99)
HAPTOGLOB SERPL-MCNC: 213 MG/DL (ref 30–200)
HCT VFR BLD AUTO: 35.6 % (ref 34–46.6)
HGB BLD-MCNC: 11.4 G/DL (ref 12–15.9)
LDH SERPL-CCNC: 225 U/L (ref 135–214)
LYMPHOCYTES # BLD AUTO: 0.78 10*3/MM3 (ref 0.7–3.1)
LYMPHOCYTES NFR BLD AUTO: 7.7 % (ref 19.6–45.3)
MCH RBC QN AUTO: 32.2 PG (ref 26.6–33)
MCHC RBC AUTO-ENTMCNC: 32 G/DL (ref 31.5–35.7)
MCV RBC AUTO: 100.6 FL (ref 79–97)
MONOCYTES # BLD AUTO: 0.56 10*3/MM3 (ref 0.1–0.9)
MONOCYTES NFR BLD AUTO: 5.5 % (ref 5–12)
NEUTROPHILS NFR BLD AUTO: 8.79 10*3/MM3 (ref 1.7–7)
NEUTROPHILS NFR BLD AUTO: 86.6 % (ref 42.7–76)
PLATELET # BLD AUTO: 332 10*3/MM3 (ref 140–450)
PMV BLD AUTO: 8.9 FL (ref 6–12)
POTASSIUM SERPL-SCNC: 3.7 MMOL/L (ref 3.5–5.2)
PROT SERPL-MCNC: 6.5 G/DL (ref 6–8.5)
RBC # BLD AUTO: 3.54 10*6/MM3 (ref 3.77–5.28)
SODIUM SERPL-SCNC: 139 MMOL/L (ref 136–145)
WBC NRBC COR # BLD: 10.15 10*3/MM3 (ref 3.4–10.8)

## 2023-08-18 PROCEDURE — 96365 THER/PROPH/DIAG IV INF INIT: CPT

## 2023-08-18 PROCEDURE — 80053 COMPREHEN METABOLIC PANEL: CPT | Performed by: NURSE PRACTITIONER

## 2023-08-18 PROCEDURE — 96366 THER/PROPH/DIAG IV INF ADDON: CPT

## 2023-08-18 PROCEDURE — 25010000002 DIPHENHYDRAMINE PER 50 MG: Performed by: NURSE PRACTITIONER

## 2023-08-18 PROCEDURE — 36415 COLL VENOUS BLD VENIPUNCTURE: CPT | Performed by: NURSE PRACTITIONER

## 2023-08-18 PROCEDURE — 25010000002 IRON SUCROSE PER 1 MG: Performed by: NURSE PRACTITIONER

## 2023-08-18 PROCEDURE — 83615 LACTATE (LD) (LDH) ENZYME: CPT | Performed by: INTERNAL MEDICINE

## 2023-08-18 PROCEDURE — 96367 TX/PROPH/DG ADDL SEQ IV INF: CPT

## 2023-08-18 PROCEDURE — 85025 COMPLETE CBC W/AUTO DIFF WBC: CPT | Performed by: NURSE PRACTITIONER

## 2023-08-18 PROCEDURE — 83010 ASSAY OF HAPTOGLOBIN QUANT: CPT | Performed by: INTERNAL MEDICINE

## 2023-08-18 RX ORDER — METHYLPREDNISOLONE SODIUM SUCCINATE 125 MG/2ML
60 INJECTION, POWDER, LYOPHILIZED, FOR SOLUTION INTRAMUSCULAR; INTRAVENOUS ONCE
Status: DISCONTINUED | OUTPATIENT
Start: 2023-08-18 | End: 2023-08-19 | Stop reason: HOSPADM

## 2023-08-18 RX ORDER — ACETAMINOPHEN 325 MG/1
650 TABLET ORAL ONCE
Status: COMPLETED | OUTPATIENT
Start: 2023-08-18 | End: 2023-08-18

## 2023-08-18 RX ORDER — SODIUM CHLORIDE 9 MG/ML
250 INJECTION, SOLUTION INTRAVENOUS ONCE
Status: COMPLETED | OUTPATIENT
Start: 2023-08-18 | End: 2023-08-18

## 2023-08-18 RX ADMIN — DIPHENHYDRAMINE HYDROCHLORIDE 25 MG: 50 INJECTION, SOLUTION INTRAMUSCULAR; INTRAVENOUS at 08:43

## 2023-08-18 RX ADMIN — IRON SUCROSE 300 MG: 20 INJECTION, SOLUTION INTRAVENOUS at 09:28

## 2023-08-18 RX ADMIN — SODIUM CHLORIDE 250 ML: 9 INJECTION, SOLUTION INTRAVENOUS at 08:40

## 2023-08-18 RX ADMIN — ACETAMINOPHEN 650 MG: 325 TABLET ORAL at 08:29

## 2023-08-18 NOTE — PROGRESS NOTES
Patient is her for venofer 300mg 3rd dose. Cony FORDE NP notified of patient being on prednisone 50mg BID and asked if she still needed the solu-medrol today. Patient was about to take her home dose of steroids. Per Cony FORDE NP do not give the solu-medrol since she is taking her home dose of prednisone 50mg and that should be good. Discussed with mom and patient and they both verbalized understanding. PIV started with positive blood return and labs sent per protocol. Patient tolerated treatment-she was very sleep at discharged and a little dizzy and a small amount of chest pain that mom stated was normal for her and she had one of her last visits, she had also talked to the Cony FORDE NP about it today in her appt. Patient stated that the dizziness was better and the chest pain went away after sitting up for a bit. Patient stated she was ready to be discharged when asked. Patient discharged with mom. Cony FORDE NP was notified of the chest pain at discharge and stated she had told them to follow-up with there PCP during the appt to see what is causing the chest pain.

## 2023-08-25 ENCOUNTER — HOSPITAL ENCOUNTER (OUTPATIENT)
Dept: CT IMAGING | Facility: HOSPITAL | Age: 28
Discharge: HOME | End: 2023-08-25
Payer: COMMERCIAL

## 2023-08-25 VITALS
RESPIRATION RATE: 12 BRPM | DIASTOLIC BLOOD PRESSURE: 44 MMHG | SYSTOLIC BLOOD PRESSURE: 98 MMHG | TEMPERATURE: 98.4 F | HEART RATE: 72 BPM | OXYGEN SATURATION: 99 %

## 2023-08-25 DIAGNOSIS — D64.9 ANEMIA, UNSPECIFIED TYPE: ICD-10-CM

## 2023-08-25 DIAGNOSIS — D59.10 AUTOIMMUNE HEMOLYTIC ANEMIA: ICD-10-CM

## 2023-08-25 LAB
APTT PPP: 26.5 SECONDS (ref 24–31)
B-HCG UR QL: NEGATIVE
BASOPHILS # BLD AUTO: 0 10*3/MM3 (ref 0–0.2)
BASOPHILS NFR BLD AUTO: 0.7 % (ref 0–1.5)
DEPRECATED RDW RBC AUTO: 59.1 FL (ref 37–54)
EOSINOPHIL # BLD AUTO: 0.2 10*3/MM3 (ref 0–0.4)
EOSINOPHIL NFR BLD AUTO: 4.7 % (ref 0.3–6.2)
ERYTHROCYTE [DISTWIDTH] IN BLOOD BY AUTOMATED COUNT: 16.9 % (ref 12.3–15.4)
HCT VFR BLD AUTO: 30.5 % (ref 34–46.6)
HGB BLD-MCNC: 10.2 G/DL (ref 12–15.9)
INR PPP: <0.93 (ref 0.93–1.1)
LYMPHOCYTES # BLD AUTO: 1.1 10*3/MM3 (ref 0.7–3.1)
LYMPHOCYTES NFR BLD AUTO: 23.5 % (ref 19.6–45.3)
MCH RBC QN AUTO: 33.5 PG (ref 26.6–33)
MCHC RBC AUTO-ENTMCNC: 33.5 G/DL (ref 31.5–35.7)
MCV RBC AUTO: 99.9 FL (ref 79–97)
MONOCYTES # BLD AUTO: 0.5 10*3/MM3 (ref 0.1–0.9)
MONOCYTES NFR BLD AUTO: 10.1 % (ref 5–12)
NEUTROPHILS NFR BLD AUTO: 3 10*3/MM3 (ref 1.7–7)
NEUTROPHILS NFR BLD AUTO: 61 % (ref 42.7–76)
NRBC BLD AUTO-RTO: 0.1 /100 WBC (ref 0–0.2)
PLATELET # BLD AUTO: 219 10*3/MM3 (ref 140–450)
PMV BLD AUTO: 7.2 FL (ref 6–12)
PROTHROMBIN TIME: 9.8 SECONDS (ref 9.6–11.7)
RBC # BLD AUTO: 3.06 10*6/MM3 (ref 3.77–5.28)
WBC NRBC COR # BLD: 4.9 10*3/MM3 (ref 3.4–10.8)

## 2023-08-25 PROCEDURE — 85610 PROTHROMBIN TIME: CPT | Performed by: RADIOLOGY

## 2023-08-25 PROCEDURE — 25010000002 ONDANSETRON PER 1 MG: Performed by: RADIOLOGY

## 2023-08-25 PROCEDURE — 25010000002 DIPHENHYDRAMINE PER 50 MG: Performed by: RADIOLOGY

## 2023-08-25 PROCEDURE — 25010000002 FENTANYL CITRATE (PF) 50 MCG/ML SOLUTION: Performed by: RADIOLOGY

## 2023-08-25 PROCEDURE — 81025 URINE PREGNANCY TEST: CPT | Performed by: RADIOLOGY

## 2023-08-25 PROCEDURE — C1830 POWER BONE MARROW BX NEEDLE: HCPCS

## 2023-08-25 PROCEDURE — 85025 COMPLETE CBC W/AUTO DIFF WBC: CPT | Performed by: RADIOLOGY

## 2023-08-25 PROCEDURE — 99152 MOD SED SAME PHYS/QHP 5/>YRS: CPT

## 2023-08-25 PROCEDURE — 77012 CT SCAN FOR NEEDLE BIOPSY: CPT

## 2023-08-25 PROCEDURE — 0 LIDOCAINE 1 % SOLUTION: Performed by: RADIOLOGY

## 2023-08-25 PROCEDURE — 85730 THROMBOPLASTIN TIME PARTIAL: CPT | Performed by: RADIOLOGY

## 2023-08-25 PROCEDURE — 25010000002 MIDAZOLAM PER 1 MG: Performed by: RADIOLOGY

## 2023-08-25 RX ORDER — FENTANYL CITRATE 50 UG/ML
INJECTION, SOLUTION INTRAMUSCULAR; INTRAVENOUS AS NEEDED
Status: DISCONTINUED | OUTPATIENT
Start: 2023-08-25 | End: 2023-08-26 | Stop reason: HOSPADM

## 2023-08-25 RX ORDER — ONDANSETRON 2 MG/ML
INJECTION INTRAMUSCULAR; INTRAVENOUS AS NEEDED
Status: DISCONTINUED | OUTPATIENT
Start: 2023-08-25 | End: 2023-08-26 | Stop reason: HOSPADM

## 2023-08-25 RX ORDER — SODIUM CHLORIDE 0.9 % (FLUSH) 0.9 %
10 SYRINGE (ML) INJECTION EVERY 12 HOURS SCHEDULED
Status: DISCONTINUED | OUTPATIENT
Start: 2023-08-25 | End: 2023-08-26 | Stop reason: HOSPADM

## 2023-08-25 RX ORDER — DIPHENHYDRAMINE HYDROCHLORIDE 50 MG/ML
50 INJECTION INTRAMUSCULAR; INTRAVENOUS ONCE
Status: COMPLETED | OUTPATIENT
Start: 2023-08-25 | End: 2023-08-25

## 2023-08-25 RX ORDER — SODIUM CHLORIDE 9 MG/ML
75 INJECTION, SOLUTION INTRAVENOUS CONTINUOUS
Status: DISCONTINUED | OUTPATIENT
Start: 2023-08-25 | End: 2023-08-26 | Stop reason: HOSPADM

## 2023-08-25 RX ORDER — LIDOCAINE HYDROCHLORIDE 10 MG/ML
INJECTION, SOLUTION INFILTRATION; PERINEURAL AS NEEDED
Status: DISCONTINUED | OUTPATIENT
Start: 2023-08-25 | End: 2023-08-26 | Stop reason: HOSPADM

## 2023-08-25 RX ORDER — SODIUM CHLORIDE 0.9 % (FLUSH) 0.9 %
10 SYRINGE (ML) INJECTION AS NEEDED
Status: DISCONTINUED | OUTPATIENT
Start: 2023-08-25 | End: 2023-08-26 | Stop reason: HOSPADM

## 2023-08-25 RX ORDER — MIDAZOLAM HYDROCHLORIDE 1 MG/ML
INJECTION INTRAMUSCULAR; INTRAVENOUS AS NEEDED
Status: DISCONTINUED | OUTPATIENT
Start: 2023-08-25 | End: 2023-08-26 | Stop reason: HOSPADM

## 2023-08-25 RX ADMIN — MIDAZOLAM 1 MG: 1 INJECTION INTRAMUSCULAR; INTRAVENOUS at 09:43

## 2023-08-25 RX ADMIN — FENTANYL CITRATE 100 MCG: 50 INJECTION, SOLUTION INTRAMUSCULAR; INTRAVENOUS at 09:37

## 2023-08-25 RX ADMIN — SODIUM CHLORIDE 75 ML/HR: 9 INJECTION, SOLUTION INTRAVENOUS at 09:06

## 2023-08-25 RX ADMIN — MIDAZOLAM 1 MG: 1 INJECTION INTRAMUSCULAR; INTRAVENOUS at 09:37

## 2023-08-25 RX ADMIN — Medication 10 ML: at 09:06

## 2023-08-25 RX ADMIN — ONDANSETRON 4 MG: 2 INJECTION INTRAMUSCULAR; INTRAVENOUS at 09:41

## 2023-08-25 RX ADMIN — FENTANYL CITRATE 50 MCG: 50 INJECTION, SOLUTION INTRAMUSCULAR; INTRAVENOUS at 09:55

## 2023-08-25 RX ADMIN — FENTANYL CITRATE 50 MCG: 50 INJECTION, SOLUTION INTRAMUSCULAR; INTRAVENOUS at 09:53

## 2023-08-25 RX ADMIN — DIPHENHYDRAMINE HYDROCHLORIDE 50 MG: 50 INJECTION, SOLUTION INTRAMUSCULAR; INTRAVENOUS at 10:45

## 2023-08-25 RX ADMIN — LIDOCAINE HYDROCHLORIDE 10 ML: 10 INJECTION, SOLUTION INFILTRATION; PERINEURAL at 09:52

## 2023-08-25 NOTE — H&P
[facility]   Interventional Radiology H&P    Patient Name: Arlene Brady  : 1995  MRN: [id number]  Primary Care Physician:  Daniel Sam MD  Referring Physician: Bronwyn Burns, *  Date of admission: 2023    Subjective   Subjective     HPI:  Arlene Brady is a 27 y.o. female with anemia.    Review of Systems:   Constitutional no fever,  no weight loss       Otolaryngeal no difficulty swallowing   Cardiovascular no chest pain   Pulmonary no cough, no sputum production   Gastrointestinal no constipation, no diarrhea                         Personal History       Past Medical/Surgical History:   Past Medical History:   Diagnosis Date    ADHD (attention deficit hyperactivity disorder)     Arthritis     Asthma     exercise induced asthma     Depression     Seizures      Past Surgical History:   Procedure Laterality Date    ABDOMINAL SURGERY      choley    TONSILLECTOMY         Social History:  reports that she has never smoked. She has never used smokeless tobacco. She reports current alcohol use. She reports that she does not use drugs.    Medications:  (Not in a hospital admission)    Current medications:  sodium chloride, 10 mL, Intravenous, Q12H      Current IV drips:  sodium chloride, 75 mL/hr, Last Rate: 75 mL/hr (23 0906)        Allergies:  Allergies   Allergen Reactions    Cephalosporins Rash    Penicillins Rash       Objective    Objective     Vitals:   Temp:  [98.4 øF (36.9 øC)] 98.4 øF (36.9 øC)  Heart Rate:  [85] 85  Resp:  [13] 13  BP: (110)/(58) 110/58      Physical Exam:   Constitutional: Awake, alert, No acute distress    Respiratory: Clear to auscultation bilaterally, nonlabored respirations    Cardiovascular: RRR, no murmurs, rubs, or gallops, palpable pedal pulses bilaterally   Gastrointestinal: Positive bowel sounds, soft, nontender, nondistended        ASA SCALE ASSESSMENT:  2-Mild to moderate systemic disease, medically well controlled, with no  functional limitation    MALLAMPATI CLASSIFICATION:  3-Only the soft palate and base of uvula are visible       Result Review        Result Review:     No results found for: NA    No results found for: K    No results found for: CL    No results found for: PLASMABICARB    No results found for: BUN    No results found for: CREATININE    No results found for: CALCIUM        No components found for: GLUCOSE.*  Results from last 7 days   Lab Units 08/25/23  0843   WBC 10*3/mm3 4.90   HEMOGLOBIN g/dL 10.2*   HEMATOCRIT % 30.5*   PLATELETS 10*3/mm3 219      Results from last 7 days   Lab Units 08/25/23  0843   INR  <0.93*           Assessment / Plan     Assesment:   Anemia.      Plan:   Bone marrow biopsy.    The risks and benefits of the procedure were discussed with the patient.    Electronically signed by Shady Zaragoza MD, 08/25/23, 9:21 AM EDT.

## 2023-08-26 LAB — Lab: NORMAL

## 2023-08-28 DIAGNOSIS — D59.10 AUTOIMMUNE HEMOLYTIC ANEMIA: Primary | ICD-10-CM

## 2023-08-28 DIAGNOSIS — D64.9 ANEMIA, UNSPECIFIED TYPE: ICD-10-CM

## 2023-08-28 LAB
CYTO UR: NORMAL
LAB AP CASE REPORT: NORMAL
LAB AP DIAGNOSIS COMMENT: NORMAL
LAB AP FLOW CYTOMETRY SUMMARY: NORMAL
PATH REPORT.FINAL DX SPEC: NORMAL
PATH REPORT.GROSS SPEC: NORMAL

## 2023-08-28 RX ORDER — PREDNISONE 50 MG/1
50 TABLET ORAL DAILY
Qty: 28 TABLET | Refills: 0 | Status: SHIPPED | OUTPATIENT
Start: 2023-08-28 | End: 2023-08-28

## 2023-08-28 RX ORDER — PREDNISONE 50 MG/1
50 TABLET ORAL 2 TIMES DAILY
Qty: 28 TABLET | Refills: 0 | Status: SHIPPED | OUTPATIENT
Start: 2023-08-28 | End: 2023-08-29

## 2023-08-28 RX ORDER — PANTOPRAZOLE SODIUM 40 MG/1
40 TABLET, DELAYED RELEASE ORAL DAILY
Qty: 30 TABLET | Refills: 2 | Status: SHIPPED | OUTPATIENT
Start: 2023-08-28

## 2023-08-29 ENCOUNTER — DOCUMENTATION (OUTPATIENT)
Dept: ONCOLOGY | Facility: CLINIC | Age: 28
End: 2023-08-29

## 2023-08-29 ENCOUNTER — LAB (OUTPATIENT)
Dept: LAB | Facility: HOSPITAL | Age: 28
End: 2023-08-29
Payer: COMMERCIAL

## 2023-08-29 ENCOUNTER — OFFICE VISIT (OUTPATIENT)
Dept: ONCOLOGY | Facility: CLINIC | Age: 28
End: 2023-08-29
Payer: COMMERCIAL

## 2023-08-29 VITALS
HEIGHT: 60 IN | TEMPERATURE: 98 F | BODY MASS INDEX: 47.91 KG/M2 | SYSTOLIC BLOOD PRESSURE: 107 MMHG | HEART RATE: 93 BPM | OXYGEN SATURATION: 98 % | WEIGHT: 244 LBS | RESPIRATION RATE: 20 BRPM | DIASTOLIC BLOOD PRESSURE: 79 MMHG

## 2023-08-29 DIAGNOSIS — D64.9 ANEMIA, UNSPECIFIED TYPE: Primary | ICD-10-CM

## 2023-08-29 LAB
BASOPHILS # BLD AUTO: 0.04 10*3/MM3 (ref 0–0.2)
BASOPHILS NFR BLD AUTO: 0.8 % (ref 0–1.5)
DEPRECATED RDW RBC AUTO: 53.9 FL (ref 37–54)
EOSINOPHIL # BLD AUTO: 0.17 10*3/MM3 (ref 0–0.4)
EOSINOPHIL NFR BLD AUTO: 3.4 % (ref 0.3–6.2)
ERYTHROCYTE [DISTWIDTH] IN BLOOD BY AUTOMATED COUNT: 14.8 % (ref 12.3–15.4)
HAPTOGLOB SERPL-MCNC: <10 MG/DL (ref 30–200)
HCT VFR BLD AUTO: 28.7 % (ref 34–46.6)
HGB BLD-MCNC: 9.2 G/DL (ref 12–15.9)
HOLD SPECIMEN: NORMAL
HOLD SPECIMEN: NORMAL
LDH SERPL-CCNC: 404 U/L (ref 135–214)
LYMPHOCYTES # BLD AUTO: 1.04 10*3/MM3 (ref 0.7–3.1)
LYMPHOCYTES NFR BLD AUTO: 21 % (ref 19.6–45.3)
MCH RBC QN AUTO: 33.3 PG (ref 26.6–33)
MCHC RBC AUTO-ENTMCNC: 32.1 G/DL (ref 31.5–35.7)
MCV RBC AUTO: 104 FL (ref 79–97)
MONOCYTES # BLD AUTO: 0.34 10*3/MM3 (ref 0.1–0.9)
MONOCYTES NFR BLD AUTO: 6.9 % (ref 5–12)
NEUTROPHILS NFR BLD AUTO: 3.36 10*3/MM3 (ref 1.7–7)
NEUTROPHILS NFR BLD AUTO: 67.9 % (ref 42.7–76)
PLATELET # BLD AUTO: 231 10*3/MM3 (ref 140–450)
PMV BLD AUTO: 9.2 FL (ref 6–12)
RBC # BLD AUTO: 2.76 10*6/MM3 (ref 3.77–5.28)
RETICS # AUTO: 0.44 10*6/MM3 (ref 0.02–0.13)
RETICS/RBC NFR AUTO: 16.06 % (ref 0.7–1.9)
WBC NRBC COR # BLD: 4.95 10*3/MM3 (ref 3.4–10.8)

## 2023-08-29 PROCEDURE — 85045 AUTOMATED RETICULOCYTE COUNT: CPT | Performed by: INTERNAL MEDICINE

## 2023-08-29 PROCEDURE — 85025 COMPLETE CBC W/AUTO DIFF WBC: CPT

## 2023-08-29 PROCEDURE — 83615 LACTATE (LD) (LDH) ENZYME: CPT | Performed by: INTERNAL MEDICINE

## 2023-08-29 PROCEDURE — 36415 COLL VENOUS BLD VENIPUNCTURE: CPT

## 2023-08-29 PROCEDURE — 83010 ASSAY OF HAPTOGLOBIN QUANT: CPT | Performed by: INTERNAL MEDICINE

## 2023-08-29 RX ORDER — PREDNISONE 20 MG/1
20 TABLET ORAL 2 TIMES DAILY
Qty: 120 TABLET | Refills: 1 | Status: SHIPPED | OUTPATIENT
Start: 2023-08-29 | End: 2023-08-30 | Stop reason: SDUPTHER

## 2023-08-29 NOTE — LETTER
2023     Daniel Sam MD  [id number]  [place] [address]    Patient: Arlene Brady   YOB: 1995   Date of Visit: 2023     Dear Daniel Sam MD:       Thank you for referring Arlene Brady to me for evaluation. Below are the relevant portions of my assessment and plan of care.    If you have questions, please do not hesitate to call me. I look forward to following Arlene along with you.         Sincerely,        Bronwyn Burns MD        CC: No Recipients    Bronwyn Burns MD  23 0062  Sign when Signing Visit   Hematology/Oncology Outpatient Follow Up    PATIENT NAME:Arlene Brady  :1995  MRN: [id number]  PRIMARY CARE PHYSICIAN: Daniel Sam MD  REFERRING PHYSICIAN: No ref. provider found    Chief Complaint   Patient presents with    Follow-up     Anemia, unspecified type          HISTORY OF PRESENT ILLNESS:     This is a 27 year old female has been referred secondary to anemia.  Patient has a longtime history of anemia.  She has rectal bleeding and had colonoscopy done a few days ago by Dr Vinny Lagos . She has internal and external hemorrhoids. She has a follow up with Dr Lagos.     Patient is amenorrheic for about 6 months.  She had history of menorrhagia. She is on hormone pills to regulate her cycles.   She has low energy, she was on oral iron supplements for a month. She has since ran out of her iron tablets.     Review of her CBCs indicate that she has had anemia with hemoglobin ranging between 8 and 12.3 g per DL.  Today her white count is 8, hemoglobin is 8, MCV is 101 and platelets are 352 with essentially unremarkable differentials.     She denies having blood in her urine     She is single, She is a CNA     Patient does not smoke and drinks occasionally     There is no family history of hematological problems.     Her mother had colon cancer,      2023: Methylmalonic acid level was normal at 343  patient had anemia work-up including a ferritin level which was 167, C-reactive protein was high at 1.96 BUN was 13, creatinine 0.9 LDH was 323 iron profile showed a elevated serum iron and iron saturation, haptoglobin was normal, reticulocyte count was elevated to 16 she has low folate at 3.1  8/7/2023: WBC 11.62, hemoglobin 8.0 g/dL, .9, platelets 353,000.  Flow cytometry showed an increased kappa lambda ratio, neutrophils with left shifted maturation and rare phenotypic aberrancy, monocytes with phenotypic aberrancy.  Bone marrow biopsy with molecular and cytogenetic studies indicated to evaluate for myeloid neoplasm.  8/16/2023 CT of the neck, chest, abdomen and pelvis with contrast showed no evidence of definite pathologic lymphadenopathy concerning for lymphoproliferative disorder.  No acute findings present in the neck, chest, abdomen or pelvis.  8/25/2023: Patient had bone marrow aspiration and biopsy which basically showed normocellular bone marrow for age, decreased iron stores, negative for involvement by malignant lymphoma or metastatic malignancy.  Flow cytometry was unremarkable with no immunophenotypic abnormalities noted.      Past Medical History:   Diagnosis Date    ADHD (attention deficit hyperactivity disorder)     Arthritis     Asthma     exercise induced asthma     Depression     Seizures        Past Surgical History:   Procedure Laterality Date    ABDOMINAL SURGERY      choley    TONSILLECTOMY           Current Outpatient Medications:     atomoxetine (STRATTERA) 80 MG capsule, Take 1 capsule by mouth Daily., Disp: 90 capsule, Rfl: 0    fexofenadine (ALLEGRA) 180 MG tablet, Take 1 tablet by mouth Daily., Disp: , Rfl:     folic acid (FOLVITE) 1 MG tablet, Take 1 tablet by mouth Daily., Disp: 90 tablet, Rfl: 0    hydrocortisone (ANUSOL-HC) 25 MG suppository, Insert 1 suppository into the rectum 2 (Two) Times a Day., Disp: 14 suppository, Rfl: 0    Junel 1/20 1-20 MG-MCG per  "tablet, Take 1 tablet by mouth Every Evening., Disp: , Rfl:     nystatin-triamcinolone (MYCOLOG) 689807-5.1 UNIT/GM-% ointment, APPLY 1 APPLICATION TOPICALLY TWICE DAILY FOR 21 DAYS, Disp: , Rfl:     OXcarbazepine (TRILEPTAL) 300 MG tablet, Take 1.5 tablets by mouth 2 (Two) Times a Day., Disp: , Rfl:     pantoprazole (PROTONIX) 40 MG EC tablet, Take 1 tablet by mouth Daily., Disp: 30 tablet, Rfl: 2    predniSONE (DELTASONE) 50 MG tablet, Take 1 tablet by mouth 2 (Two) Times a Day. Take with food, Disp: 28 tablet, Rfl: 0    sertraline (ZOLOFT) 50 MG tablet, Take 1 tablet by mouth Daily., Disp: 90 tablet, Rfl: 1    silver sulfadiazine (SILVADENE, SSD) 1 % cream, APPLY 1 APPLICATION TOPICALLY TWICE A DAY FOR 20 DAYS, Disp: , Rfl:     Allergies   Allergen Reactions    Cephalosporins Rash    Penicillins Rash       Family History   Problem Relation Age of Onset    Hypertension Father     Heart disease Father     Hyperlipidemia Father     Cancer Paternal Grandmother        Cancer-related family history includes Cancer in her paternal grandmother.    Social History     Tobacco Use    Smoking status: Never    Smokeless tobacco: Never   Vaping Use    Vaping Use: Never used   Substance Use Topics    Alcohol use: Yes     Comment: very infrequent     Drug use: Never     I have reviewed and confirmed the accuracy of the patient's history: Chief complaint, HPI, ROS, and Subjective as entered by the MA/LPN/RN. Bronwyn Burns MD 08/29/23        SUBJECTIVE:      Patient ran out of her prednisone. Hemoglobin is down to 9.2 g/dl. She is no longer bleeding.          REVIEW OF SYSTEMS:      Review of Systems   Constitutional:  Positive for fatigue.   Neurological:  Positive for weakness.     Per subjective    OBJECTIVE:    Vitals:    08/29/23 1427   BP: 107/79   Pulse: 93   Resp: 20   Temp: 98 øF (36.7 øC)   TempSrc: Infrared   SpO2: 98%   Weight: 111 kg (244 lb)   Height: 152.4 cm (60\")   PainSc:   6   PainLoc: " Back       Body mass index is 47.65 kg/mý.    ECOG  (1) Restricted in physically strenuous activity, ambulatory and able to do work of light nature    Physical Exam  Vitals reviewed.   Constitutional:       General: She is not in acute distress.     Appearance: Normal appearance. She is not ill-appearing, toxic-appearing or diaphoretic.   HENT:      Head: Normocephalic and atraumatic.   Eyes:      Extraocular Movements: Extraocular movements intact.   Cardiovascular:      Rate and Rhythm: Normal rate and regular rhythm.      Heart sounds: No murmur heard.  Pulmonary:      Effort: Pulmonary effort is normal. No respiratory distress.      Breath sounds: Normal breath sounds. No stridor. No wheezing.   Abdominal:      General: Bowel sounds are normal.      Palpations: Abdomen is soft.   Musculoskeletal:         General: Normal range of motion.      Cervical back: Normal range of motion.   Skin:     General: Skin is warm and dry.      Findings: No rash.   Neurological:      Mental Status: She is alert and oriented to person, place, and time.   Psychiatric:         Mood and Affect: Mood normal.         Behavior: Behavior normal.       I have reexamined the patient and the results are consistent with the previously documented exam. Bronwyn Burns MD      RECENT LABS    WBC   Date Value Ref Range Status   08/29/2023 4.95 3.40 - 10.80 10*3/mm3 Final     RBC   Date Value Ref Range Status   08/29/2023 2.76 (L) 3.77 - 5.28 10*6/mm3 Final   07/31/2023 2.80 (L) 3.77 - 5.28 x10E6/uL Final     Hemoglobin   Date Value Ref Range Status   08/29/2023 9.2 (L) 12.0 - 15.9 g/dL Final     Hematocrit   Date Value Ref Range Status   08/29/2023 28.7 (L) 34.0 - 46.6 % Final     MCV   Date Value Ref Range Status   08/29/2023 104.0 (H) 79.0 - 97.0 fL Final     MCH   Date Value Ref Range Status   08/29/2023 33.3 (H) 26.6 - 33.0 pg Final     MCHC   Date Value Ref Range Status   08/29/2023 32.1 31.5 - 35.7 g/dL Final     RDW   Date Value  Ref Range Status   08/29/2023 14.8 12.3 - 15.4 % Final     RDW-SD   Date Value Ref Range Status   08/29/2023 53.9 37.0 - 54.0 fl Final     MPV   Date Value Ref Range Status   08/29/2023 9.2 6.0 - 12.0 fL Final     Platelets   Date Value Ref Range Status   08/29/2023 231 140 - 450 10*3/mm3 Final     Neutrophil %   Date Value Ref Range Status   08/29/2023 67.9 42.7 - 76.0 % Final     Lymphocyte %   Date Value Ref Range Status   08/29/2023 21.0 19.6 - 45.3 % Final     Monocyte %   Date Value Ref Range Status   08/29/2023 6.9 5.0 - 12.0 % Final     Eosinophil %   Date Value Ref Range Status   08/29/2023 3.4 0.3 - 6.2 % Final     Basophil %   Date Value Ref Range Status   08/29/2023 0.8 0.0 - 1.5 % Final     Immature Grans %   Date Value Ref Range Status   10/09/2020 1.4 (H) 0.0 - 0.5 % Final     Neutrophils, Absolute   Date Value Ref Range Status   08/29/2023 3.36 1.70 - 7.00 10*3/mm3 Final     Lymphocytes, Absolute   Date Value Ref Range Status   08/29/2023 1.04 0.70 - 3.10 10*3/mm3 Final     Monocytes, Absolute   Date Value Ref Range Status   08/29/2023 0.34 0.10 - 0.90 10*3/mm3 Final     Eosinophils, Absolute   Date Value Ref Range Status   08/29/2023 0.17 0.00 - 0.40 10*3/mm3 Final     Basophils, Absolute   Date Value Ref Range Status   08/29/2023 0.04 0.00 - 0.20 10*3/mm3 Final     Immature Grans, Absolute   Date Value Ref Range Status   10/09/2020 0.09 (H) 0.00 - 0.05 10*3/mm3 Final     nRBC   Date Value Ref Range Status   08/25/2023 0.1 0.0 - 0.2 /100 WBC Final       Lab Results   Component Value Date    GLUCOSE 125 (H) 08/18/2023    BUN 34 (H) 08/18/2023    CREATININE 0.89 08/18/2023    EGFRIFNONA 88 07/26/2021    BCR 38.2 (H) 08/18/2023    K 3.7 08/18/2023    CO2 20.0 (L) 08/18/2023    CALCIUM 8.6 08/18/2023    PROTENTOTREF 6.9 08/11/2023    ALBUMIN 4.0 08/18/2023    LABIL2 1.1 08/11/2023    AST 16 08/18/2023    ALT 50 (H) 08/18/2023         Assessment & Plan     Anemia, unspecified type  - CBC &  Differential        ASSESSMENT:    Iron deficiency anemia, unspecified iron deficiency anemia type  - CBC & Differential        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  Rule out lymphoproliferative disease.  Peripheral blood for flow cytometry shows increased kappa lambda ratio detected rare phenotypic aberrancy of the neutrophils as well as monocytes, possibility of a myeloid disorder was also entertained.  PNH screen was negative and G6PD was not deficient.  For now patient will continue on steroids.  She had a hemoglobin response but ran out of prednisone and today she is down to 9.5 g per DL from 11 g per DL  Folate deficiency: On folate replacement therapy.  We will continue the same  History of seizures  Rectal bleeding : Recent  colonoscopy by Dr Vinny Lagos .  She no longer has rectal bleeding and she has completed her course of iron.  Patient to follow-up with GI  Possible sensitivity reaction to Venofer: Patient's epigastric chest pain complaints seem to predate the infusion but she does also complain of itching on the bilateral forearms during the infusion.  Venofer was stopped and normal saline was ran along with 25 mg Benadryl IV given with resolution of the symptoms.           Plans     Continue prednisone 40 mg every 12 with food.  Refills were given  Continue PPI  Weekly CBCs and hopefully be able to wean prednisone  Reviewed CT neck, chest, abdomen and pelvis with the patient and her mother  Repeat hemolysis labs today  She needs work restriction which we will take care for her  Premedicate with Tylenol 650 mg, Solu-Medrol 60 mg, Benadryl 25 mg IV prior to resuming Venofer today and prior to subsequent cycle days.  Reviewed her bone marrow results with patient and her mother who is present today  Check reticulocyte count LDH haptoglobin level  Follow-up with me in 2 weeks  Follow-up with NP in 1 week  All questions answered          Patient verbalized  understanding and is in agreement of the above plan.      I spent 40 total minutes, face-to-face, caring for Arlene today. 90% of this time involved counseling and/or coordination of care as documented within this note.         I spent 40 total minutes, face-to-face, caring for Arlene today. 90% of this time involved counseling and/or coordination of care as documented within this note.

## 2023-08-29 NOTE — PROGRESS NOTES
Hematology/Oncology Outpatient Follow Up    PATIENT NAME:Arlene Brady  :1995  MRN: [id number]  PRIMARY CARE PHYSICIAN: Daniel Sam MD  REFERRING PHYSICIAN: No ref. provider found    Chief Complaint   Patient presents with    Follow-up     Anemia, unspecified type          HISTORY OF PRESENT ILLNESS:     This is a 27 year old female has been referred secondary to anemia.  Patient has a longtime history of anemia.  She has rectal bleeding and had colonoscopy done a few days ago by Dr Vinny Lagos . She has internal and external hemorrhoids. She has a follow up with Dr Lagos.     Patient is amenorrheic for about 6 months.  She had history of menorrhagia. She is on hormone pills to regulate her cycles.   She has low energy, she was on oral iron supplements for a month. She has since ran out of her iron tablets.     Review of her CBCs indicate that she has had anemia with hemoglobin ranging between 8 and 12.3 g per DL.  Today her white count is 8, hemoglobin is 8, MCV is 101 and platelets are 352 with essentially unremarkable differentials.     She denies having blood in her urine     She is single, She is a CNA     Patient does not smoke and drinks occasionally     There is no family history of hematological problems.     Her mother had colon cancer,      2023: Methylmalonic acid level was normal at 343 patient had anemia work-up including a ferritin level which was 167, C-reactive protein was high at 1.96 BUN was 13, creatinine 0.9 LDH was 323 iron profile showed a elevated serum iron and iron saturation, haptoglobin was normal, reticulocyte count was elevated to 16 she has low folate at 3.1  2023: WBC 11.62, hemoglobin 8.0 g/dL, .9, platelets 353,000.  Flow cytometry showed an increased kappa lambda ratio, neutrophils with left shifted maturation and rare phenotypic aberrancy, monocytes with phenotypic aberrancy.  Bone marrow biopsy with molecular and cytogenetic studies  indicated to evaluate for myeloid neoplasm.  8/16/2023 CT of the neck, chest, abdomen and pelvis with contrast showed no evidence of definite pathologic lymphadenopathy concerning for lymphoproliferative disorder.  No acute findings present in the neck, chest, abdomen or pelvis.  8/25/2023: Patient had bone marrow aspiration and biopsy which basically showed normocellular bone marrow for age, decreased iron stores, negative for involvement by malignant lymphoma or metastatic malignancy.  Flow cytometry was unremarkable with no immunophenotypic abnormalities noted.      Past Medical History:   Diagnosis Date    ADHD (attention deficit hyperactivity disorder)     Arthritis     Asthma     exercise induced asthma     Depression     Seizures        Past Surgical History:   Procedure Laterality Date    ABDOMINAL SURGERY      choley    TONSILLECTOMY           Current Outpatient Medications:     atomoxetine (STRATTERA) 80 MG capsule, Take 1 capsule by mouth Daily., Disp: 90 capsule, Rfl: 0    fexofenadine (ALLEGRA) 180 MG tablet, Take 1 tablet by mouth Daily., Disp: , Rfl:     folic acid (FOLVITE) 1 MG tablet, Take 1 tablet by mouth Daily., Disp: 90 tablet, Rfl: 0    hydrocortisone (ANUSOL-HC) 25 MG suppository, Insert 1 suppository into the rectum 2 (Two) Times a Day., Disp: 14 suppository, Rfl: 0    Junel 1/20 1-20 MG-MCG per tablet, Take 1 tablet by mouth Every Evening., Disp: , Rfl:     nystatin-triamcinolone (MYCOLOG) 121715-3.1 UNIT/GM-% ointment, APPLY 1 APPLICATION TOPICALLY TWICE DAILY FOR 21 DAYS, Disp: , Rfl:     OXcarbazepine (TRILEPTAL) 300 MG tablet, Take 1.5 tablets by mouth 2 (Two) Times a Day., Disp: , Rfl:     pantoprazole (PROTONIX) 40 MG EC tablet, Take 1 tablet by mouth Daily., Disp: 30 tablet, Rfl: 2    predniSONE (DELTASONE) 50 MG tablet, Take 1 tablet by mouth 2 (Two) Times a Day. Take with food, Disp: 28 tablet, Rfl: 0    sertraline (ZOLOFT) 50 MG tablet, Take 1 tablet by mouth Daily., Disp: 90  "tablet, Rfl: 1    silver sulfadiazine (SILVADENE, SSD) 1 % cream, APPLY 1 APPLICATION TOPICALLY TWICE A DAY FOR 20 DAYS, Disp: , Rfl:     Allergies   Allergen Reactions    Cephalosporins Rash    Penicillins Rash       Family History   Problem Relation Age of Onset    Hypertension Father     Heart disease Father     Hyperlipidemia Father     Cancer Paternal Grandmother        Cancer-related family history includes Cancer in her paternal grandmother.    Social History     Tobacco Use    Smoking status: Never    Smokeless tobacco: Never   Vaping Use    Vaping Use: Never used   Substance Use Topics    Alcohol use: Yes     Comment: very infrequent     Drug use: Never     I have reviewed and confirmed the accuracy of the patient's history: Chief complaint, HPI, ROS, and Subjective as entered by the MA/LPN/RN. Bronwyn Burns MD 08/29/23        SUBJECTIVE:      Patient ran out of her prednisone. Hemoglobin is down to 9.2 g/dl. She is no longer bleeding.          REVIEW OF SYSTEMS:      Review of Systems   Constitutional:  Positive for fatigue.   Neurological:  Positive for weakness.     Per subjective    OBJECTIVE:    Vitals:    08/29/23 1427   BP: 107/79   Pulse: 93   Resp: 20   Temp: 98 øF (36.7 øC)   TempSrc: Infrared   SpO2: 98%   Weight: 111 kg (244 lb)   Height: 152.4 cm (60\")   PainSc:   6   PainLoc: Back       Body mass index is 47.65 kg/mý.    ECOG  (1) Restricted in physically strenuous activity, ambulatory and able to do work of light nature    Physical Exam  Vitals reviewed.   Constitutional:       General: She is not in acute distress.     Appearance: Normal appearance. She is not ill-appearing, toxic-appearing or diaphoretic.   HENT:      Head: Normocephalic and atraumatic.   Eyes:      Extraocular Movements: Extraocular movements intact.   Cardiovascular:      Rate and Rhythm: Normal rate and regular rhythm.      Heart sounds: No murmur heard.  Pulmonary:      Effort: Pulmonary effort is normal. No " respiratory distress.      Breath sounds: Normal breath sounds. No stridor. No wheezing.   Abdominal:      General: Bowel sounds are normal.      Palpations: Abdomen is soft.   Musculoskeletal:         General: Normal range of motion.      Cervical back: Normal range of motion.   Skin:     General: Skin is warm and dry.      Findings: No rash.   Neurological:      Mental Status: She is alert and oriented to person, place, and time.   Psychiatric:         Mood and Affect: Mood normal.         Behavior: Behavior normal.       I have reexamined the patient and the results are consistent with the previously documented exam. Bronwyn Burns MD      RECENT LABS    WBC   Date Value Ref Range Status   08/29/2023 4.95 3.40 - 10.80 10*3/mm3 Final     RBC   Date Value Ref Range Status   08/29/2023 2.76 (L) 3.77 - 5.28 10*6/mm3 Final   07/31/2023 2.80 (L) 3.77 - 5.28 x10E6/uL Final     Hemoglobin   Date Value Ref Range Status   08/29/2023 9.2 (L) 12.0 - 15.9 g/dL Final     Hematocrit   Date Value Ref Range Status   08/29/2023 28.7 (L) 34.0 - 46.6 % Final     MCV   Date Value Ref Range Status   08/29/2023 104.0 (H) 79.0 - 97.0 fL Final     MCH   Date Value Ref Range Status   08/29/2023 33.3 (H) 26.6 - 33.0 pg Final     MCHC   Date Value Ref Range Status   08/29/2023 32.1 31.5 - 35.7 g/dL Final     RDW   Date Value Ref Range Status   08/29/2023 14.8 12.3 - 15.4 % Final     RDW-SD   Date Value Ref Range Status   08/29/2023 53.9 37.0 - 54.0 fl Final     MPV   Date Value Ref Range Status   08/29/2023 9.2 6.0 - 12.0 fL Final     Platelets   Date Value Ref Range Status   08/29/2023 231 140 - 450 10*3/mm3 Final     Neutrophil %   Date Value Ref Range Status   08/29/2023 67.9 42.7 - 76.0 % Final     Lymphocyte %   Date Value Ref Range Status   08/29/2023 21.0 19.6 - 45.3 % Final     Monocyte %   Date Value Ref Range Status   08/29/2023 6.9 5.0 - 12.0 % Final     Eosinophil %   Date Value Ref Range Status   08/29/2023 3.4 0.3 -  6.2 % Final     Basophil %   Date Value Ref Range Status   08/29/2023 0.8 0.0 - 1.5 % Final     Immature Grans %   Date Value Ref Range Status   10/09/2020 1.4 (H) 0.0 - 0.5 % Final     Neutrophils, Absolute   Date Value Ref Range Status   08/29/2023 3.36 1.70 - 7.00 10*3/mm3 Final     Lymphocytes, Absolute   Date Value Ref Range Status   08/29/2023 1.04 0.70 - 3.10 10*3/mm3 Final     Monocytes, Absolute   Date Value Ref Range Status   08/29/2023 0.34 0.10 - 0.90 10*3/mm3 Final     Eosinophils, Absolute   Date Value Ref Range Status   08/29/2023 0.17 0.00 - 0.40 10*3/mm3 Final     Basophils, Absolute   Date Value Ref Range Status   08/29/2023 0.04 0.00 - 0.20 10*3/mm3 Final     Immature Grans, Absolute   Date Value Ref Range Status   10/09/2020 0.09 (H) 0.00 - 0.05 10*3/mm3 Final     nRBC   Date Value Ref Range Status   08/25/2023 0.1 0.0 - 0.2 /100 WBC Final       Lab Results   Component Value Date    GLUCOSE 125 (H) 08/18/2023    BUN 34 (H) 08/18/2023    CREATININE 0.89 08/18/2023    EGFRIFNONA 88 07/26/2021    BCR 38.2 (H) 08/18/2023    K 3.7 08/18/2023    CO2 20.0 (L) 08/18/2023    CALCIUM 8.6 08/18/2023    PROTENTOTREF 6.9 08/11/2023    ALBUMIN 4.0 08/18/2023    LABIL2 1.1 08/11/2023    AST 16 08/18/2023    ALT 50 (H) 08/18/2023         Assessment & Plan     Anemia, unspecified type  - CBC & Differential        ASSESSMENT:    Iron deficiency anemia, unspecified iron deficiency anemia type  - CBC & Differential        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  Rule out lymphoproliferative disease.  Peripheral blood for flow cytometry shows increased kappa lambda ratio detected rare phenotypic aberrancy of the neutrophils as well as monocytes, possibility of a myeloid disorder was also entertained.  PNH screen was negative and G6PD was not deficient.  For now patient will continue on steroids.  She had a hemoglobin response but ran out of prednisone and today she is down to  9.5 g per DL from 11 g per DL  Folate deficiency: On folate replacement therapy.  We will continue the same  History of seizures  Rectal bleeding : Recent  colonoscopy by Dr Vinny Lagos .  She no longer has rectal bleeding and she has completed her course of iron.  Patient to follow-up with GI  Possible sensitivity reaction to Venofer: Patient's epigastric chest pain complaints seem to predate the infusion but she does also complain of itching on the bilateral forearms during the infusion.  Venofer was stopped and normal saline was ran along with 25 mg Benadryl IV given with resolution of the symptoms.           Plans     Continue prednisone 40 mg every 12 with food.  Refills were given  Continue PPI  Weekly CBCs and hopefully be able to wean prednisone  Reviewed CT neck, chest, abdomen and pelvis with the patient and her mother  Repeat hemolysis labs today  She needs work restriction which we will take care for her  Premedicate with Tylenol 650 mg, Solu-Medrol 60 mg, Benadryl 25 mg IV prior to resuming Venofer today and prior to subsequent cycle days.  Reviewed her bone marrow results with patient and her mother who is present today  Check reticulocyte count LDH haptoglobin level  Follow-up with me in 2 weeks  Follow-up with NP in 1 week  All questions answered          Patient verbalized understanding and is in agreement of the above plan.      I spent 40 total minutes, face-to-face, caring for Arlene today. 90% of this time involved counseling and/or coordination of care as documented within this note.         I spent 40 total minutes, face-to-face, caring for Arlene today. 90% of this time involved counseling and/or coordination of care as documented within this note.

## 2023-08-30 RX ORDER — PREDNISONE 20 MG/1
40 TABLET ORAL 2 TIMES DAILY
Qty: 120 TABLET | Refills: 1 | Status: SHIPPED | OUTPATIENT
Start: 2023-08-30

## 2023-09-01 ENCOUNTER — TELEPHONE (OUTPATIENT)
Dept: ONCOLOGY | Facility: CLINIC | Age: 28
End: 2023-09-01

## 2023-09-01 NOTE — TELEPHONE ENCOUNTER
Caller: Arlene Brady    Relationship to patient: Self    Best call back number: [phone]     Chief complaint: R/S    Type of visit: LAB & FOLLOW UP    Requested date: THURSDAY OR FRIDAY OF NEXT WEEK    If rescheduling, when is the original appointment: 9/5     Additional notes: PT IS RETURNING TO WORK ON 9/5, CAN'T COME THAT DAY AND CAN'T COME 9/6, PLEASE CALL BACK TO ADVISE/RESCHEDULE.

## 2023-09-01 NOTE — PROGRESS NOTES
Hematology/Oncology Outpatient Follow Up    PATIENT NAME:Arlene Brady  :1995  MRN: [id number]  PRIMARY CARE PHYSICIAN: Daniel Sam MD  REFERRING PHYSICIAN: Daniel Sam MD    Chief Complaint   Patient presents with    Follow-up     1 week follow up  Iron deficiency anemia due to chronic blood loss            HISTORY OF PRESENT ILLNESS:     This is a 27 year old female has been referred secondary to anemia.  Patient has a longtime history of anemia.  She has rectal bleeding and had colonoscopy done a few days ago by Dr Vinny Lagos . She has internal and external hemorrhoids. She has a follow up with Dr Lagos.     Patient is amenorrheic for about 6 months.  She had history of menorrhagia. She is on hormone pills to regulate her cycles.   She has low energy, she was on oral iron supplements for a month. She has since ran out of her iron tablets.     Review of her CBCs indicate that she has had anemia with hemoglobin ranging between 8 and 12.3 g per DL.  Today her white count is 8, hemoglobin is 8, MCV is 101 and platelets are 352 with essentially unremarkable differentials.     She denies having blood in her urine     She is single, She is a CNA     Patient does not smoke and drinks occasionally     There is no family history of hematological problems.     Her mother had colon cancer,      2023: Methylmalonic acid level was normal at 343 patient had anemia work-up including a ferritin level which was 167, C-reactive protein was high at 1.96 BUN was 13, creatinine 0.9 LDH was 323 iron profile showed a elevated serum iron and iron saturation, haptoglobin was normal, reticulocyte count was elevated to 16 she has low folate at 3.1  2023: WBC 11.62, hemoglobin 8.0 g/dL, .9, platelets 353,000.  Flow cytometry showed an increased kappa lambda ratio, neutrophils with left shifted maturation and rare phenotypic aberrancy, monocytes with phenotypic aberrancy.  Bone marrow biopsy with  molecular and cytogenetic studies indicated to evaluate for myeloid neoplasm.  8/16/2023 CT of the neck, chest, abdomen and pelvis with contrast showed no evidence of definite pathologic lymphadenopathy concerning for lymphoproliferative disorder.  No acute findings present in the neck, chest, abdomen or pelvis.  8/25/2023: Patient had bone marrow aspiration and biopsy which basically showed normocellular bone marrow for age, decreased iron stores, negative for involvement by malignant lymphoma or metastatic malignancy.  Flow cytometry was unremarkable with no immunophenotypic abnormalities noted.      Past Medical History:   Diagnosis Date    ADHD (attention deficit hyperactivity disorder)     Arthritis     Asthma     exercise induced asthma     Depression     Seizures        Past Surgical History:   Procedure Laterality Date    ABDOMINAL SURGERY      choley    TONSILLECTOMY           Current Outpatient Medications:     fexofenadine (ALLEGRA) 180 MG tablet, Take 1 tablet by mouth Daily., Disp: , Rfl:     folic acid (FOLVITE) 1 MG tablet, Take 1 tablet by mouth Daily., Disp: 90 tablet, Rfl: 0    Junel 1/20 1-20 MG-MCG per tablet, Take 1 tablet by mouth Every Evening., Disp: , Rfl:     nystatin-triamcinolone (MYCOLOG) 284938-7.1 UNIT/GM-% ointment, APPLY 1 APPLICATION TOPICALLY TWICE DAILY FOR 21 DAYS, Disp: , Rfl:     OXcarbazepine (TRILEPTAL) 300 MG tablet, Take 1.5 tablets by mouth 2 (Two) Times a Day., Disp: , Rfl:     pantoprazole (PROTONIX) 40 MG EC tablet, Take 1 tablet by mouth Daily., Disp: 30 tablet, Rfl: 2    predniSONE (DELTASONE) 20 MG tablet, Take 2 tablets by mouth 2 (Two) Times a Day., Disp: 120 tablet, Rfl: 1    sertraline (ZOLOFT) 50 MG tablet, Take 1 tablet by mouth Daily., Disp: 90 tablet, Rfl: 1    silver sulfadiazine (SILVADENE, SSD) 1 % cream, APPLY 1 APPLICATION TOPICALLY TWICE A DAY FOR 20 DAYS, Disp: , Rfl:     atomoxetine (STRATTERA) 80 MG capsule, Take 1 capsule by mouth Daily. (Patient  "not taking: Reported on 9/6/2023), Disp: 90 capsule, Rfl: 0    Allergies   Allergen Reactions    Cephalosporins Rash    Penicillins Rash       Family History   Problem Relation Age of Onset    Hypertension Father     Heart disease Father     Hyperlipidemia Father     Cancer Paternal Grandmother        Cancer-related family history includes Cancer in her paternal grandmother.    Social History     Tobacco Use    Smoking status: Never    Smokeless tobacco: Never   Vaping Use    Vaping Use: Never used   Substance Use Topics    Alcohol use: Yes     Comment: very infrequent     Drug use: Never     I have reviewed and confirmed the accuracy of the patient's history: Chief complaint, HPI, ROS, and Subjective as entered by the MA/LPN/RN. Cony Puri, APRN 09/06/23        SUBJECTIVE:    Arlene Brady reports a pain score of 7.  Given her pain assessment as noted, treatment options were discussed and the following options were decided upon as a follow-up plan to address the patient's pain:  Continue current management by her primary care .     Arlene is here today for her routine CBC and follow-up.  She had a decrease in her hemoglobin last week due to running out of her steroids.  She reports good compliance with her steroids since that time.  She is also taking her folic acid supplement.  She reports she returned to work yesterday and felt that her energy level was good.  The only reason the return to work was difficult for her was because of her lower back pain.  She denies any signs or symptoms of bleeding.          REVIEW OF SYSTEMS:      Review of Systems   Musculoskeletal:  Positive for back pain.     Per subjective    OBJECTIVE:    Vitals:    09/06/23 1008   BP: 91/60   Pulse: 76   SpO2: 97%   Weight: 113 kg (249 lb)   Height: 152.4 cm (60\")   PainSc:   7   PainLoc: Back       Body mass index is 48.63 kg/m².    ECOG  (1) Restricted in physically strenuous activity, ambulatory and able to do work of " light nature    Physical Exam  Vitals reviewed.   Constitutional:       General: She is not in acute distress.     Appearance: Normal appearance. She is not ill-appearing, toxic-appearing or diaphoretic.   HENT:      Head: Normocephalic and atraumatic.   Eyes:      Extraocular Movements: Extraocular movements intact.   Cardiovascular:      Rate and Rhythm: Normal rate and regular rhythm.      Heart sounds: No murmur heard.  Pulmonary:      Effort: Pulmonary effort is normal. No respiratory distress.      Breath sounds: Normal breath sounds. No stridor. No wheezing.   Abdominal:      General: Bowel sounds are normal.      Palpations: Abdomen is soft.   Musculoskeletal:         General: Normal range of motion.      Cervical back: Normal range of motion.   Skin:     General: Skin is warm and dry.      Findings: No rash.   Neurological:      Mental Status: She is alert and oriented to person, place, and time.   Psychiatric:         Mood and Affect: Mood normal.         Behavior: Behavior normal.       I have reexamined the patient and the results are consistent with the previously documented exam. Cony Puri, APRN      RECENT LABS    WBC   Date Value Ref Range Status   09/06/2023 10.24 3.40 - 10.80 10*3/mm3 Final     RBC   Date Value Ref Range Status   09/06/2023 3.68 (L) 3.77 - 5.28 10*6/mm3 Final   07/31/2023 2.80 (L) 3.77 - 5.28 x10E6/uL Final     Hemoglobin   Date Value Ref Range Status   09/06/2023 11.8 (L) 12.0 - 15.9 g/dL Final     Hematocrit   Date Value Ref Range Status   09/06/2023 37.1 34.0 - 46.6 % Final     MCV   Date Value Ref Range Status   09/06/2023 100.8 (H) 79.0 - 97.0 fL Final     MCH   Date Value Ref Range Status   09/06/2023 32.1 26.6 - 33.0 pg Final     MCHC   Date Value Ref Range Status   09/06/2023 31.8 31.5 - 35.7 g/dL Final     RDW   Date Value Ref Range Status   09/06/2023 13.6 12.3 - 15.4 % Final     RDW-SD   Date Value Ref Range Status   09/06/2023 48.5 37.0 - 54.0 fl Final     MPV    Date Value Ref Range Status   09/06/2023 8.3 6.0 - 12.0 fL Final     Platelets   Date Value Ref Range Status   09/06/2023 367 140 - 450 10*3/mm3 Final     Neutrophil %   Date Value Ref Range Status   09/06/2023 83.6 (H) 42.7 - 76.0 % Final     Lymphocyte %   Date Value Ref Range Status   09/06/2023 11.1 (L) 19.6 - 45.3 % Final     Monocyte %   Date Value Ref Range Status   09/06/2023 5.3 5.0 - 12.0 % Final     Eosinophil %   Date Value Ref Range Status   09/06/2023 0.0 (L) 0.3 - 6.2 % Final     Basophil %   Date Value Ref Range Status   09/06/2023 0.0 0.0 - 1.5 % Final     Immature Grans %   Date Value Ref Range Status   10/09/2020 1.4 (H) 0.0 - 0.5 % Final     Neutrophils, Absolute   Date Value Ref Range Status   09/06/2023 8.56 (H) 1.70 - 7.00 10*3/mm3 Final     Lymphocytes, Absolute   Date Value Ref Range Status   09/06/2023 1.14 0.70 - 3.10 10*3/mm3 Final     Monocytes, Absolute   Date Value Ref Range Status   09/06/2023 0.54 0.10 - 0.90 10*3/mm3 Final     Eosinophils, Absolute   Date Value Ref Range Status   09/06/2023 0.00 0.00 - 0.40 10*3/mm3 Final     Basophils, Absolute   Date Value Ref Range Status   09/06/2023 0.00 0.00 - 0.20 10*3/mm3 Final     Immature Grans, Absolute   Date Value Ref Range Status   10/09/2020 0.09 (H) 0.00 - 0.05 10*3/mm3 Final     nRBC   Date Value Ref Range Status   08/25/2023 0.1 0.0 - 0.2 /100 WBC Final       Lab Results   Component Value Date    GLUCOSE 125 (H) 08/18/2023    BUN 34 (H) 08/18/2023    CREATININE 0.89 08/18/2023    EGFRIFNONA 88 07/26/2021    BCR 38.2 (H) 08/18/2023    K 3.7 08/18/2023    CO2 20.0 (L) 08/18/2023    CALCIUM 8.6 08/18/2023    PROTENTOTREF 6.9 08/11/2023    ALBUMIN 4.0 08/18/2023    LABIL2 1.1 08/11/2023    AST 16 08/18/2023    ALT 50 (H) 08/18/2023         Assessment & Plan     Autoimmune hemolytic anemia  - CBC & Differential        ASSESSMENT:    Iron deficiency anemia, unspecified iron deficiency anemia type  - CBC & Differential        Autoimmune  hemolytic anemia on steroids hemoglobin has improved to 11.4 g per DL.  Rule out lymphoproliferative disease.  Peripheral blood for flow cytometry shows increased kappa lambda ratio detected rare phenotypic aberrancy of the neutrophils as well as monocytes, possibility of a myeloid disorder was also entertained.  PNH screen was negative and G6PD was not deficient.  For now patient will continue on steroids.  She had a hemoglobin response but ran out of prednisone and last week she was down to 9.5 g per DL from 11 g per DL.  Currently improved to 11.8 g/dL.  Folate deficiency: On folate replacement therapy.  We will continue the same  History of seizures  Rectal bleeding : Recent  colonoscopy by Dr Vinny Lagos .  She no longer has rectal bleeding and she has completed her course of iron.  Patient to follow-up with GI  Possible sensitivity reaction to Venofer: Patient's epigastric chest pain complaints seem to predate the infusion but she does also complain of itching on the bilateral forearms during the infusion.  Venofer was stopped and normal saline was ran along with 25 mg Benadryl IV given with resolution of the symptoms.           Plans     Continue prednisone 40 mg every 12 with food.  Patient has refills.  Continue PPI.  Reviewed  Continue weekly CBC.  Reviewed today showing hemoglobin response to 11.8 g/dL with resumption of her steroids.  Continue folic acid supplement  Reviewed CT neck, chest, abdomen and pelvis with the patient and her mother  Reviewed repeat hemolysis labs showing low haptoglobin, elevated reticulocyte and LDH consistent with hemolysis while she was off of her steroids.  She needs work restriction which we will take care for her.  This has been completed  Premedicate with Tylenol 650 mg, Solu-Medrol 60 mg, Benadryl 25 mg IV prior to Venofer for any subsequent cycles needed in the future.  Reviewed her bone marrow results with patient and her mother who is present today.  This was done  with Dr. Burns at prior visit.  Follow-up with Dr. Burns 1 week.  If hemoglobin remains stable and improved likely will begin weaning her prednisone.  All questions answered    Patient verbalized understanding and is in agreement of the above plan.      I spent 30 total minutes, face-to-face, caring for Arlene today. 90% of this time involved counseling and/or coordination of care as documented within this note.

## 2023-09-05 LAB — CYTOGENETICS RESULT: NORMAL

## 2023-09-06 ENCOUNTER — APPOINTMENT (OUTPATIENT)
Dept: LAB | Facility: HOSPITAL | Age: 28
End: 2023-09-06
Payer: COMMERCIAL

## 2023-09-06 ENCOUNTER — OFFICE VISIT (OUTPATIENT)
Dept: ONCOLOGY | Facility: CLINIC | Age: 28
End: 2023-09-06
Payer: COMMERCIAL

## 2023-09-06 VITALS
DIASTOLIC BLOOD PRESSURE: 60 MMHG | SYSTOLIC BLOOD PRESSURE: 91 MMHG | HEIGHT: 60 IN | WEIGHT: 249 LBS | HEART RATE: 76 BPM | OXYGEN SATURATION: 97 % | BODY MASS INDEX: 48.88 KG/M2

## 2023-09-06 DIAGNOSIS — D50.0 IRON DEFICIENCY ANEMIA DUE TO CHRONIC BLOOD LOSS: Primary | ICD-10-CM

## 2023-09-06 DIAGNOSIS — D59.10 AUTOIMMUNE HEMOLYTIC ANEMIA: Primary | ICD-10-CM

## 2023-09-06 LAB
BASOPHILS # BLD AUTO: 0 10*3/MM3 (ref 0–0.2)
BASOPHILS NFR BLD AUTO: 0 % (ref 0–1.5)
CCV RESULT: NORMAL
CYTO UR: NORMAL
DEPRECATED RDW RBC AUTO: 48.5 FL (ref 37–54)
EOSINOPHIL # BLD AUTO: 0 10*3/MM3 (ref 0–0.4)
EOSINOPHIL NFR BLD AUTO: 0 % (ref 0.3–6.2)
ERYTHROCYTE [DISTWIDTH] IN BLOOD BY AUTOMATED COUNT: 13.6 % (ref 12.3–15.4)
HCT VFR BLD AUTO: 37.1 % (ref 34–46.6)
HGB BLD-MCNC: 11.8 G/DL (ref 12–15.9)
HOLD SPECIMEN: NORMAL
HOLD SPECIMEN: NORMAL
LAB AP CASE REPORT: NORMAL
LAB AP CYTOGENETICS REPORT,ADDENDUM: NORMAL
LAB AP DIAGNOSIS COMMENT: NORMAL
LAB AP FLOW CYTOMETRY SUMMARY: NORMAL
LYMPHOCYTES # BLD AUTO: 1.14 10*3/MM3 (ref 0.7–3.1)
LYMPHOCYTES NFR BLD AUTO: 11.1 % (ref 19.6–45.3)
MCH RBC QN AUTO: 32.1 PG (ref 26.6–33)
MCHC RBC AUTO-ENTMCNC: 31.8 G/DL (ref 31.5–35.7)
MCV RBC AUTO: 100.8 FL (ref 79–97)
MONOCYTES # BLD AUTO: 0.54 10*3/MM3 (ref 0.1–0.9)
MONOCYTES NFR BLD AUTO: 5.3 % (ref 5–12)
NEUTROPHILS NFR BLD AUTO: 8.56 10*3/MM3 (ref 1.7–7)
NEUTROPHILS NFR BLD AUTO: 83.6 % (ref 42.7–76)
PATH REPORT.FINAL DX SPEC: NORMAL
PATH REPORT.GROSS SPEC: NORMAL
PLATELET # BLD AUTO: 367 10*3/MM3 (ref 140–450)
PMV BLD AUTO: 8.3 FL (ref 6–12)
RBC # BLD AUTO: 3.68 10*6/MM3 (ref 3.77–5.28)
WBC NRBC COR # BLD: 10.24 10*3/MM3 (ref 3.4–10.8)

## 2023-09-06 PROCEDURE — 85025 COMPLETE CBC W/AUTO DIFF WBC: CPT | Performed by: NURSE PRACTITIONER

## 2023-09-06 PROCEDURE — 36415 COLL VENOUS BLD VENIPUNCTURE: CPT

## 2023-09-13 NOTE — PROGRESS NOTES
Hematology/Oncology Outpatient Follow Up    PATIENT NAME:Arlene Brady  :1995  MRN: [id number]  PRIMARY CARE PHYSICIAN: Daniel Sam MD  REFERRING PHYSICIAN: Daniel Sam MD    Chief Complaint   Patient presents with    Follow-up     Anemia, unspecified type          HISTORY OF PRESENT ILLNESS:     This is a 27 year old female has been referred secondary to anemia.  Patient has a longtime history of anemia.  She has rectal bleeding and had colonoscopy done a few days ago by Dr Vinny Lagos . She has internal and external hemorrhoids. She has a follow up with Dr Lagos.     Patient is amenorrheic for about 6 months.  She had history of menorrhagia. She is on hormone pills to regulate her cycles.   She has low energy, she was on oral iron supplements for a month. She has since ran out of her iron tablets.     Review of her CBCs indicate that she has had anemia with hemoglobin ranging between 8 and 12.3 g per DL.  Today her white count is 8, hemoglobin is 8, MCV is 101 and platelets are 352 with essentially unremarkable differentials.     She denies having blood in her urine     She is single, She is a CNA     Patient does not smoke and drinks occasionally     There is no family history of hematological problems.     Her mother had colon cancer,      2023: Methylmalonic acid level was normal at 343 patient had anemia work-up including a ferritin level which was 167, C-reactive protein was high at 1.96 BUN was 13, creatinine 0.9 LDH was 323 iron profile showed a elevated serum iron and iron saturation, haptoglobin was normal, reticulocyte count was elevated to 16 she has low folate at 3.1  2023: WBC 11.62, hemoglobin 8.0 g/dL, .9, platelets 353,000.  Flow cytometry showed an increased kappa lambda ratio, neutrophils with left shifted maturation and rare phenotypic aberrancy, monocytes with phenotypic aberrancy.  Bone marrow biopsy with molecular and cytogenetic studies indicated  to evaluate for myeloid neoplasm.  8/16/2023 CT of the neck, chest, abdomen and pelvis with contrast showed no evidence of definite pathologic lymphadenopathy concerning for lymphoproliferative disorder.  No acute findings present in the neck, chest, abdomen or pelvis.  8/25/2023: Patient had bone marrow aspiration and biopsy which basically showed normocellular bone marrow for age, decreased iron stores, negative for involvement by malignant lymphoma or metastatic malignancy.  Flow cytometry was unremarkable with no immunophenotypic abnormalities noted.  Cytogenetics showed a normal female karyotype      Past Medical History:   Diagnosis Date    ADHD (attention deficit hyperactivity disorder)     Arthritis     Asthma     exercise induced asthma     Depression     Seizures        Past Surgical History:   Procedure Laterality Date    ABDOMINAL SURGERY      choley    TONSILLECTOMY           Current Outpatient Medications:     atomoxetine (STRATTERA) 80 MG capsule, Take 1 capsule by mouth Daily. (Patient not taking: Reported on 9/6/2023), Disp: 90 capsule, Rfl: 0    fexofenadine (ALLEGRA) 180 MG tablet, Take 1 tablet by mouth Daily., Disp: , Rfl:     folic acid (FOLVITE) 1 MG tablet, Take 1 tablet by mouth Daily., Disp: 90 tablet, Rfl: 0    Junel 1/20 1-20 MG-MCG per tablet, Take 1 tablet by mouth Every Evening., Disp: , Rfl:     nystatin-triamcinolone (MYCOLOG) 445233-2.1 UNIT/GM-% ointment, APPLY 1 APPLICATION TOPICALLY TWICE DAILY FOR 21 DAYS, Disp: , Rfl:     OXcarbazepine (TRILEPTAL) 300 MG tablet, Take 1.5 tablets by mouth 2 (Two) Times a Day., Disp: , Rfl:     pantoprazole (PROTONIX) 40 MG EC tablet, Take 1 tablet by mouth Daily., Disp: 30 tablet, Rfl: 2    predniSONE (DELTASONE) 20 MG tablet, Take 2 tablets by mouth 2 (Two) Times a Day., Disp: 120 tablet, Rfl: 1    sertraline (ZOLOFT) 50 MG tablet, Take 1 tablet by mouth Daily., Disp: 90 tablet, Rfl: 1    silver sulfadiazine (SILVADENE, SSD) 1 % cream, APPLY 1  "APPLICATION TOPICALLY TWICE A DAY FOR 20 DAYS, Disp: , Rfl:     Allergies   Allergen Reactions    Cephalosporins Rash    Penicillins Rash       Family History   Problem Relation Age of Onset    Hypertension Father     Heart disease Father     Hyperlipidemia Father     Cancer Paternal Grandmother        Cancer-related family history includes Cancer in her paternal grandmother.    Social History     Tobacco Use    Smoking status: Never    Smokeless tobacco: Never   Vaping Use    Vaping Use: Never used   Substance Use Topics    Alcohol use: Yes     Comment: very infrequent     Drug use: Never       I have reviewed and confirmed the accuracy of the patient's history: Chief complaint, HPI, ROS, and Subjective as entered by the MA/LPN/RN. Bronwyn Burns MD 09/14/23        SUBJECTIVE:    Arlene Brady reports a pain score of 0.  Given her pain assessment as noted, treatment options were discussed and the following options were decided upon as a follow-up plan to address the patient's pain:  Continue current management by her primary care .     Patient is here today for follow-up.  She had rectal bleeding a few days ago and has not followed up with a GI          REVIEW OF SYSTEMS:      Review of Systems   Musculoskeletal:  Positive for back pain.     Per subjective    OBJECTIVE:    Vitals:    09/14/23 1147   BP: 112/73   Pulse: 89   Resp: 20   Temp: 98 °F (36.7 °C)   TempSrc: Infrared   SpO2: 96%   Weight: 113 kg (250 lb)   Height: 152.4 cm (60\")   PainSc: 0-No pain       Body mass index is 48.82 kg/m².    ECOG  (1) Restricted in physically strenuous activity, ambulatory and able to do work of light nature    Physical Exam  Vitals reviewed.   Constitutional:       General: She is not in acute distress.     Appearance: Normal appearance. She is not ill-appearing, toxic-appearing or diaphoretic.   HENT:      Head: Normocephalic and atraumatic.   Eyes:      Extraocular Movements: Extraocular movements intact. "   Cardiovascular:      Rate and Rhythm: Normal rate and regular rhythm.      Heart sounds: No murmur heard.  Pulmonary:      Effort: Pulmonary effort is normal. No respiratory distress.      Breath sounds: Normal breath sounds. No stridor. No wheezing.   Abdominal:      General: Bowel sounds are normal.      Palpations: Abdomen is soft.   Musculoskeletal:         General: Normal range of motion.      Cervical back: Normal range of motion.   Skin:     General: Skin is warm and dry.      Findings: No rash.   Neurological:      Mental Status: She is alert and oriented to person, place, and time.   Psychiatric:         Mood and Affect: Mood normal.         Behavior: Behavior normal.     I have reexamined the patient and the results are consistent with the previously documented exam. Bronwyn Burns MD        RECENT LABS    WBC   Date Value Ref Range Status   09/14/2023 12.07 (H) 3.40 - 10.80 10*3/mm3 Final     RBC   Date Value Ref Range Status   09/14/2023 4.00 3.77 - 5.28 10*6/mm3 Final   07/31/2023 2.80 (L) 3.77 - 5.28 x10E6/uL Final     Hemoglobin   Date Value Ref Range Status   09/14/2023 12.7 12.0 - 15.9 g/dL Final     Hematocrit   Date Value Ref Range Status   09/14/2023 39.4 34.0 - 46.6 % Final     MCV   Date Value Ref Range Status   09/14/2023 98.5 (H) 79.0 - 97.0 fL Final     MCH   Date Value Ref Range Status   09/14/2023 31.8 26.6 - 33.0 pg Final     MCHC   Date Value Ref Range Status   09/14/2023 32.2 31.5 - 35.7 g/dL Final     RDW   Date Value Ref Range Status   09/14/2023 13.3 12.3 - 15.4 % Final     RDW-SD   Date Value Ref Range Status   09/14/2023 47.2 37.0 - 54.0 fl Final     MPV   Date Value Ref Range Status   09/14/2023 8.5 6.0 - 12.0 fL Final     Platelets   Date Value Ref Range Status   09/14/2023 343 140 - 450 10*3/mm3 Final     Neutrophil %   Date Value Ref Range Status   09/14/2023 74.8 42.7 - 76.0 % Final     Lymphocyte %   Date Value Ref Range Status   09/14/2023 11.1 (L) 19.6 - 45.3 %  Final     Monocyte %   Date Value Ref Range Status   09/14/2023 13.3 (H) 5.0 - 12.0 % Final     Eosinophil %   Date Value Ref Range Status   09/14/2023 0.6 0.3 - 6.2 % Final     Basophil %   Date Value Ref Range Status   09/14/2023 0.2 0.0 - 1.5 % Final     Immature Grans %   Date Value Ref Range Status   10/09/2020 1.4 (H) 0.0 - 0.5 % Final     Neutrophils, Absolute   Date Value Ref Range Status   09/14/2023 9.02 (H) 1.70 - 7.00 10*3/mm3 Final     Lymphocytes, Absolute   Date Value Ref Range Status   09/14/2023 1.34 0.70 - 3.10 10*3/mm3 Final     Monocytes, Absolute   Date Value Ref Range Status   09/14/2023 1.61 (H) 0.10 - 0.90 10*3/mm3 Final     Eosinophils, Absolute   Date Value Ref Range Status   09/14/2023 0.07 0.00 - 0.40 10*3/mm3 Final     Basophils, Absolute   Date Value Ref Range Status   09/14/2023 0.03 0.00 - 0.20 10*3/mm3 Final     Immature Grans, Absolute   Date Value Ref Range Status   10/09/2020 0.09 (H) 0.00 - 0.05 10*3/mm3 Final     nRBC   Date Value Ref Range Status   08/25/2023 0.1 0.0 - 0.2 /100 WBC Final       Lab Results   Component Value Date    GLUCOSE 125 (H) 08/18/2023    BUN 34 (H) 08/18/2023    CREATININE 0.89 08/18/2023    EGFRIFNONA 88 07/26/2021    BCR 38.2 (H) 08/18/2023    K 3.7 08/18/2023    CO2 20.0 (L) 08/18/2023    CALCIUM 8.6 08/18/2023    PROTENTOTREF 6.9 08/11/2023    ALBUMIN 4.0 08/18/2023    LABIL2 1.1 08/11/2023    AST 16 08/18/2023    ALT 50 (H) 08/18/2023         Assessment & Plan     Anemia, unspecified type  - CBC & Differential        ASSESSMENT and plans:    Iron deficiency anemia, unspecified iron deficiency anemia type  - CBC & Differential        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  Rule out lymphoproliferative disease.  Peripheral blood for flow cytometry shows increased kappa lambda ratio detected rare phenotypic aberrancy of the neutrophils as well as monocytes, possibility of a myeloid disorder was also  entertained.  PNH screen was negative and G6PD was not deficient.  For now patient will continue on steroids.  But will begin steroid taper due to normalization of her hemoglobin down to 12.7 g per DL today.  Status post bone marrow aspiration and biopsy which was essentially unremarkable  Folate deficiency: Continue folic acid 1 mg p.o. daily  History of seizures  Rectal bleeding : Recent  colonoscopy by Dr Vinny Lagos .  She no longer has rectal bleeding and she has completed her course of iron.  Follow-up with GI encouraged  Possible sensitivity reaction to Venofer: Patient's epigastric chest pain complaints seem to predate the infusion but she does also complain of itching on the bilateral forearms during the infusion.  Venofer was stopped and normal saline was ran along with 25 mg Benadryl IV given with resolution of the symptoms.           Plans     Change prednisone to 40 mg in the morning and 20 mg at night and continue weaning as long as her hemoglobin stays above 10 g per DL  Continue PPI  Continue weekly CBC.  Continue folic acid supplement  Follow-up 2 weeks with me in 1 week with NP for continued steroid wean.  All questions answered    Patient verbalized understanding and is in agreement of the above plan.     I spent 30 total minutes, face-to-face, caring for Arlene today. 90% of this time involved counseling and/or coordination of care as documented within this note.

## 2023-09-14 ENCOUNTER — OFFICE VISIT (OUTPATIENT)
Dept: ONCOLOGY | Facility: CLINIC | Age: 28
End: 2023-09-14
Payer: COMMERCIAL

## 2023-09-14 ENCOUNTER — LAB (OUTPATIENT)
Dept: LAB | Facility: HOSPITAL | Age: 28
End: 2023-09-14
Payer: COMMERCIAL

## 2023-09-14 VITALS
HEART RATE: 89 BPM | HEIGHT: 60 IN | DIASTOLIC BLOOD PRESSURE: 73 MMHG | RESPIRATION RATE: 20 BRPM | OXYGEN SATURATION: 96 % | TEMPERATURE: 98 F | BODY MASS INDEX: 49.08 KG/M2 | WEIGHT: 250 LBS | SYSTOLIC BLOOD PRESSURE: 112 MMHG

## 2023-09-14 DIAGNOSIS — D64.9 ANEMIA, UNSPECIFIED TYPE: ICD-10-CM

## 2023-09-14 DIAGNOSIS — D64.9 ANEMIA, UNSPECIFIED TYPE: Primary | ICD-10-CM

## 2023-09-14 DIAGNOSIS — D50.0 IRON DEFICIENCY ANEMIA DUE TO CHRONIC BLOOD LOSS: Primary | ICD-10-CM

## 2023-09-14 LAB
BASOPHILS # BLD AUTO: 0.03 10*3/MM3 (ref 0–0.2)
BASOPHILS NFR BLD AUTO: 0.2 % (ref 0–1.5)
DEPRECATED RDW RBC AUTO: 47.2 FL (ref 37–54)
EOSINOPHIL # BLD AUTO: 0.07 10*3/MM3 (ref 0–0.4)
EOSINOPHIL NFR BLD AUTO: 0.6 % (ref 0.3–6.2)
ERYTHROCYTE [DISTWIDTH] IN BLOOD BY AUTOMATED COUNT: 13.3 % (ref 12.3–15.4)
HCT VFR BLD AUTO: 39.4 % (ref 34–46.6)
HGB BLD-MCNC: 12.7 G/DL (ref 12–15.9)
HOLD SPECIMEN: NORMAL
LYMPHOCYTES # BLD AUTO: 1.34 10*3/MM3 (ref 0.7–3.1)
LYMPHOCYTES NFR BLD AUTO: 11.1 % (ref 19.6–45.3)
MCH RBC QN AUTO: 31.8 PG (ref 26.6–33)
MCHC RBC AUTO-ENTMCNC: 32.2 G/DL (ref 31.5–35.7)
MCV RBC AUTO: 98.5 FL (ref 79–97)
MONOCYTES # BLD AUTO: 1.61 10*3/MM3 (ref 0.1–0.9)
MONOCYTES NFR BLD AUTO: 13.3 % (ref 5–12)
NEUTROPHILS NFR BLD AUTO: 74.8 % (ref 42.7–76)
NEUTROPHILS NFR BLD AUTO: 9.02 10*3/MM3 (ref 1.7–7)
PLATELET # BLD AUTO: 343 10*3/MM3 (ref 140–450)
PMV BLD AUTO: 8.5 FL (ref 6–12)
RBC # BLD AUTO: 4 10*6/MM3 (ref 3.77–5.28)
WBC NRBC COR # BLD: 12.07 10*3/MM3 (ref 3.4–10.8)

## 2023-09-14 PROCEDURE — 36415 COLL VENOUS BLD VENIPUNCTURE: CPT

## 2023-09-14 PROCEDURE — 85025 COMPLETE CBC W/AUTO DIFF WBC: CPT

## 2023-09-15 NOTE — PROGRESS NOTES
Hematology/Oncology Outpatient Follow Up    PATIENT NAME:Arlene Brady  :1995  MRN: [id number]  PRIMARY CARE PHYSICIAN: Daniel Sam MD  REFERRING PHYSICIAN: Daniel Sam MD    Chief Complaint   Patient presents with    Follow-up     Iron deficiency anemia due to chronic blood loss            HISTORY OF PRESENT ILLNESS:     This is a 27 year old female has been referred secondary to anemia.  Patient has a longtime history of anemia.  She has rectal bleeding and had colonoscopy done a few days ago by Dr Vinny Lagos . She has internal and external hemorrhoids. She has a follow up with Dr Lagos.     Patient is amenorrheic for about 6 months.  She had history of menorrhagia. She is on hormone pills to regulate her cycles.   She has low energy, she was on oral iron supplements for a month. She has since ran out of her iron tablets.     Review of her CBCs indicate that she has had anemia with hemoglobin ranging between 8 and 12.3 g per DL.  Today her white count is 8, hemoglobin is 8, MCV is 101 and platelets are 352 with essentially unremarkable differentials.     She denies having blood in her urine     She is single, She is a CNA     Patient does not smoke and drinks occasionally     There is no family history of hematological problems.     Her mother had colon cancer,      2023: Methylmalonic acid level was normal at 343 patient had anemia work-up including a ferritin level which was 167, C-reactive protein was high at 1.96 BUN was 13, creatinine 0.9 LDH was 323 iron profile showed a elevated serum iron and iron saturation, haptoglobin was normal, reticulocyte count was elevated to 16 she has low folate at 3.1  2023: WBC 11.62, hemoglobin 8.0 g/dL, .9, platelets 353,000.  Flow cytometry showed an increased kappa lambda ratio, neutrophils with left shifted maturation and rare phenotypic aberrancy, monocytes with phenotypic aberrancy.  Bone marrow biopsy with molecular and  cytogenetic studies indicated to evaluate for myeloid neoplasm.  8/16/2023 CT of the neck, chest, abdomen and pelvis with contrast showed no evidence of definite pathologic lymphadenopathy concerning for lymphoproliferative disorder.  No acute findings present in the neck, chest, abdomen or pelvis.  8/25/2023: Patient had bone marrow aspiration and biopsy which basically showed normocellular bone marrow for age, decreased iron stores, negative for involvement by malignant lymphoma or metastatic malignancy.  Flow cytometry was unremarkable with no immunophenotypic abnormalities noted.  Cytogenetics showed a normal female karyotype      Past Medical History:   Diagnosis Date    ADHD (attention deficit hyperactivity disorder)     Arthritis     Asthma     exercise induced asthma     Depression     Seizures        Past Surgical History:   Procedure Laterality Date    ABDOMINAL SURGERY      choley    TONSILLECTOMY           Current Outpatient Medications:     fexofenadine (ALLEGRA) 180 MG tablet, Take 1 tablet by mouth Daily., Disp: , Rfl:     folic acid (FOLVITE) 1 MG tablet, Take 1 tablet by mouth Daily., Disp: 90 tablet, Rfl: 0    Junel 1/20 1-20 MG-MCG per tablet, Take 1 tablet by mouth Every Evening., Disp: , Rfl:     nystatin-triamcinolone (MYCOLOG) 802877-7.1 UNIT/GM-% ointment, APPLY 1 APPLICATION TOPICALLY TWICE DAILY FOR 21 DAYS, Disp: , Rfl:     OXcarbazepine (TRILEPTAL) 300 MG tablet, Take 1.5 tablets by mouth 2 (Two) Times a Day., Disp: , Rfl:     pantoprazole (PROTONIX) 40 MG EC tablet, Take 1 tablet by mouth Daily., Disp: 30 tablet, Rfl: 2    predniSONE (DELTASONE) 20 MG tablet, Take 2 tablets by mouth 2 (Two) Times a Day. (Patient taking differently: Take 2 tablets by mouth 2 (Two) Times a Day. Two tablets in QAM One tablet QHS), Disp: 120 tablet, Rfl: 1    sertraline (ZOLOFT) 50 MG tablet, Take 1 tablet by mouth Daily., Disp: 90 tablet, Rfl: 1    silver sulfadiazine (SILVADENE, SSD) 1 % cream, APPLY 1  APPLICATION TOPICALLY TWICE A DAY FOR 20 DAYS, Disp: , Rfl:     atomoxetine (STRATTERA) 80 MG capsule, Take 1 capsule by mouth Daily. (Patient not taking: Reported on 9/6/2023), Disp: 90 capsule, Rfl: 0    Allergies   Allergen Reactions    Cephalosporins Rash    Penicillins Rash       Family History   Problem Relation Age of Onset    Hypertension Father     Heart disease Father     Hyperlipidemia Father     Cancer Paternal Grandmother        Cancer-related family history includes Cancer in her paternal grandmother.    Social History     Tobacco Use    Smoking status: Never    Smokeless tobacco: Never   Vaping Use    Vaping Use: Never used   Substance Use Topics    Alcohol use: Yes     Comment: very infrequent     Drug use: Never       I have reviewed and confirmed the accuracy of the patient's history: Chief complaint, HPI, ROS, and Subjective as entered by the MA/LPN/RN. Cony Puri, APRN 09/19/23        SUBJECTIVE:    Arlene Brady reports a pain score of 0.  Given her pain assessment as noted, treatment options were discussed and the following options were decided upon as a follow-up plan to address the patient's pain:  Continue current management by her primary care .     Arlene is here for her routine weekly follow-up.  She reports she has been doing well in the interim.  She does note that she had been concerned about of what her blood pressure and pulse might be today because she had an abnormal reading at home where her SBP was in the 160s and her pulse in the 90s.  Review of her vital signs today shows all are within normal limits.  Discussed that if she continues to get unusual readings on the home machine she can bring it with her to an appointment to check it against the readings here.  She has no signs or symptoms of bleeding reported today.          REVIEW OF SYSTEMS:      Review of Systems   Musculoskeletal:  Positive for back pain.     Per subjective    OBJECTIVE:    Vitals:     "09/19/23 1025   BP: 118/77   Pulse: 88   Resp: 16   Temp: 98.1 °F (36.7 °C)   SpO2: 99%   Weight: 114 kg (251 lb 3.2 oz)   Height: 152.4 cm (60\")   PainSc: 0-No pain       Body mass index is 49.06 kg/m².    ECOG  (1) Restricted in physically strenuous activity, ambulatory and able to do work of light nature    Physical Exam  Vitals reviewed.   Constitutional:       General: She is not in acute distress.     Appearance: Normal appearance. She is not ill-appearing, toxic-appearing or diaphoretic.   HENT:      Head: Normocephalic and atraumatic.   Eyes:      Extraocular Movements: Extraocular movements intact.   Cardiovascular:      Rate and Rhythm: Normal rate and regular rhythm.      Heart sounds: No murmur heard.  Pulmonary:      Effort: Pulmonary effort is normal. No respiratory distress.      Breath sounds: Normal breath sounds. No stridor. No wheezing.   Abdominal:      General: Bowel sounds are normal.      Palpations: Abdomen is soft.   Musculoskeletal:         General: Normal range of motion.      Cervical back: Normal range of motion.   Skin:     General: Skin is warm and dry.      Findings: No rash.   Neurological:      Mental Status: She is alert and oriented to person, place, and time.   Psychiatric:         Mood and Affect: Mood normal.         Behavior: Behavior normal.     I have reexamined the patient and the results are consistent with the previously documented exam. Cony Puri, ELSY        RECENT LABS    WBC   Date Value Ref Range Status   09/19/2023 12.94 (H) 3.40 - 10.80 10*3/mm3 Final     RBC   Date Value Ref Range Status   09/19/2023 4.08 3.77 - 5.28 10*6/mm3 Final   07/31/2023 2.80 (L) 3.77 - 5.28 x10E6/uL Final     Hemoglobin   Date Value Ref Range Status   09/19/2023 13.0 12.0 - 15.9 g/dL Final     Hematocrit   Date Value Ref Range Status   09/19/2023 40.2 34.0 - 46.6 % Final     MCV   Date Value Ref Range Status   09/19/2023 98.5 (H) 79.0 - 97.0 fL Final     MCH   Date Value Ref " Range Status   09/19/2023 31.9 26.6 - 33.0 pg Final     MCHC   Date Value Ref Range Status   09/19/2023 32.3 31.5 - 35.7 g/dL Final     RDW   Date Value Ref Range Status   09/19/2023 13.5 12.3 - 15.4 % Final     RDW-SD   Date Value Ref Range Status   09/19/2023 47.4 37.0 - 54.0 fl Final     MPV   Date Value Ref Range Status   09/19/2023 9.4 6.0 - 12.0 fL Final     Platelets   Date Value Ref Range Status   09/19/2023 268 140 - 450 10*3/mm3 Final     Neutrophil %   Date Value Ref Range Status   09/19/2023 71.5 42.7 - 76.0 % Final     Lymphocyte %   Date Value Ref Range Status   09/19/2023 18.0 (L) 19.6 - 45.3 % Final     Monocyte %   Date Value Ref Range Status   09/19/2023 9.0 5.0 - 12.0 % Final     Eosinophil %   Date Value Ref Range Status   09/19/2023 1.2 0.3 - 6.2 % Final     Basophil %   Date Value Ref Range Status   09/19/2023 0.3 0.0 - 1.5 % Final     Immature Grans %   Date Value Ref Range Status   10/09/2020 1.4 (H) 0.0 - 0.5 % Final     Neutrophils, Absolute   Date Value Ref Range Status   09/19/2023 9.25 (H) 1.70 - 7.00 10*3/mm3 Final     Lymphocytes, Absolute   Date Value Ref Range Status   09/19/2023 2.33 0.70 - 3.10 10*3/mm3 Final     Monocytes, Absolute   Date Value Ref Range Status   09/19/2023 1.17 (H) 0.10 - 0.90 10*3/mm3 Final     Eosinophils, Absolute   Date Value Ref Range Status   09/19/2023 0.15 0.00 - 0.40 10*3/mm3 Final     Basophils, Absolute   Date Value Ref Range Status   09/19/2023 0.04 0.00 - 0.20 10*3/mm3 Final     Immature Grans, Absolute   Date Value Ref Range Status   10/09/2020 0.09 (H) 0.00 - 0.05 10*3/mm3 Final     nRBC   Date Value Ref Range Status   08/25/2023 0.1 0.0 - 0.2 /100 WBC Final       Lab Results   Component Value Date    GLUCOSE 125 (H) 08/18/2023    BUN 34 (H) 08/18/2023    CREATININE 0.89 08/18/2023    EGFRIFNONA 88 07/26/2021    BCR 38.2 (H) 08/18/2023    K 3.7 08/18/2023    CO2 20.0 (L) 08/18/2023    CALCIUM 8.6 08/18/2023    PROTENTOTREF 6.9 08/11/2023    ALBUMIN  4.0 08/18/2023    LABIL2 1.1 08/11/2023    AST 16 08/18/2023    ALT 50 (H) 08/18/2023         Assessment & Plan     Autoimmune hemolytic anemia  - CBC & Differential        ASSESSMENT and plans:    Iron deficiency anemia, unspecified iron deficiency anemia type  - CBC & Differential        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  Rule out lymphoproliferative disease.  Peripheral blood for flow cytometry shows increased kappa lambda ratio detected rare phenotypic aberrancy of the neutrophils as well as monocytes, possibility of a myeloid disorder was also entertained.  PNH screen was negative and G6PD was not deficient.  For now patient will continue on steroids.  But will begin steroid taper due to normalization of her hemoglobin down to 12.7 g per DL.  We will continue her steroid taper as her hemoglobin has improved to 13.0 g/dL today.  Status post bone marrow aspiration and biopsy which was essentially unremarkable  Folate deficiency: Continue folic acid 1 mg p.o. daily  History of seizures  Rectal bleeding : Recent  colonoscopy by Dr Vinny Lagos .  She no longer has rectal bleeding and she has completed her course of iron.  Follow-up with GI encouraged.  Possible sensitivity reaction to Venofer: Patient's epigastric chest pain complaints seem to predate the infusion but she does also complain of itching on the bilateral forearms during the infusion.  Venofer was stopped and normal saline was ran along with 25 mg Benadryl IV given with resolution of the symptoms.           Plans     Change prednisone to 20 mg in the morning and 20 mg at night and continue weaning as long as her hemoglobin stays above 10 g per DL  Continue PPI  Continue weekly CBC.  Continue folic acid supplement  Follow-up in 1 week with Dr. Burns or sooner if needed for continued steroid weaning  All questions answered    Patient verbalized understanding and is in agreement of the above plan.     I spent  30 total minutes, face-to-face, caring for Arlene today. 90% of this time involved counseling and/or coordination of care as documented within this note.

## 2023-09-19 ENCOUNTER — APPOINTMENT (OUTPATIENT)
Dept: LAB | Facility: HOSPITAL | Age: 28
End: 2023-09-19
Payer: COMMERCIAL

## 2023-09-19 ENCOUNTER — OFFICE VISIT (OUTPATIENT)
Dept: ONCOLOGY | Facility: CLINIC | Age: 28
End: 2023-09-19
Payer: COMMERCIAL

## 2023-09-19 VITALS
SYSTOLIC BLOOD PRESSURE: 118 MMHG | HEART RATE: 88 BPM | RESPIRATION RATE: 16 BRPM | HEIGHT: 60 IN | OXYGEN SATURATION: 99 % | WEIGHT: 251.2 LBS | DIASTOLIC BLOOD PRESSURE: 77 MMHG | TEMPERATURE: 98.1 F | BODY MASS INDEX: 49.32 KG/M2

## 2023-09-19 DIAGNOSIS — D59.10 AUTOIMMUNE HEMOLYTIC ANEMIA: Primary | ICD-10-CM

## 2023-09-19 DIAGNOSIS — D50.0 IRON DEFICIENCY ANEMIA DUE TO CHRONIC BLOOD LOSS: Primary | ICD-10-CM

## 2023-09-19 LAB
BASOPHILS # BLD AUTO: 0.04 10*3/MM3 (ref 0–0.2)
BASOPHILS NFR BLD AUTO: 0.3 % (ref 0–1.5)
DEPRECATED RDW RBC AUTO: 47.4 FL (ref 37–54)
EOSINOPHIL # BLD AUTO: 0.15 10*3/MM3 (ref 0–0.4)
EOSINOPHIL NFR BLD AUTO: 1.2 % (ref 0.3–6.2)
ERYTHROCYTE [DISTWIDTH] IN BLOOD BY AUTOMATED COUNT: 13.5 % (ref 12.3–15.4)
HCT VFR BLD AUTO: 40.2 % (ref 34–46.6)
HGB BLD-MCNC: 13 G/DL (ref 12–15.9)
HOLD SPECIMEN: NORMAL
HOLD SPECIMEN: NORMAL
LYMPHOCYTES # BLD AUTO: 2.33 10*3/MM3 (ref 0.7–3.1)
LYMPHOCYTES NFR BLD AUTO: 18 % (ref 19.6–45.3)
MCH RBC QN AUTO: 31.9 PG (ref 26.6–33)
MCHC RBC AUTO-ENTMCNC: 32.3 G/DL (ref 31.5–35.7)
MCV RBC AUTO: 98.5 FL (ref 79–97)
MONOCYTES # BLD AUTO: 1.17 10*3/MM3 (ref 0.1–0.9)
MONOCYTES NFR BLD AUTO: 9 % (ref 5–12)
NEUTROPHILS NFR BLD AUTO: 71.5 % (ref 42.7–76)
NEUTROPHILS NFR BLD AUTO: 9.25 10*3/MM3 (ref 1.7–7)
PLATELET # BLD AUTO: 268 10*3/MM3 (ref 140–450)
PMV BLD AUTO: 9.4 FL (ref 6–12)
RBC # BLD AUTO: 4.08 10*6/MM3 (ref 3.77–5.28)
WBC NRBC COR # BLD: 12.94 10*3/MM3 (ref 3.4–10.8)

## 2023-09-19 PROCEDURE — 85025 COMPLETE CBC W/AUTO DIFF WBC: CPT | Performed by: NURSE PRACTITIONER

## 2023-09-19 PROCEDURE — 36415 COLL VENOUS BLD VENIPUNCTURE: CPT

## 2023-09-25 ENCOUNTER — TELEPHONE (OUTPATIENT)
Dept: ONCOLOGY | Facility: CLINIC | Age: 28
End: 2023-09-25

## 2023-09-25 NOTE — TELEPHONE ENCOUNTER
Caller: Arlene Brady    Relationship to patient: Self    Best call back number: [phone]    Chief complaint: RESCHEDULE    Type of visit: LAB AND FOLLOW UP    Requested date: 10-2     If rescheduling, when is the original appointment: 10-3     Additional notes:PLEASE ADVISE

## 2023-10-02 ENCOUNTER — LAB (OUTPATIENT)
Dept: LAB | Facility: HOSPITAL | Age: 28
End: 2023-10-02
Payer: COMMERCIAL

## 2023-10-02 ENCOUNTER — OFFICE VISIT (OUTPATIENT)
Dept: ONCOLOGY | Facility: CLINIC | Age: 28
End: 2023-10-02
Payer: COMMERCIAL

## 2023-10-02 VITALS
WEIGHT: 260 LBS | RESPIRATION RATE: 20 BRPM | OXYGEN SATURATION: 98 % | SYSTOLIC BLOOD PRESSURE: 95 MMHG | DIASTOLIC BLOOD PRESSURE: 52 MMHG | HEART RATE: 88 BPM | HEIGHT: 60 IN | TEMPERATURE: 98 F | BODY MASS INDEX: 51.04 KG/M2

## 2023-10-02 DIAGNOSIS — D64.9 ANEMIA, UNSPECIFIED TYPE: ICD-10-CM

## 2023-10-02 DIAGNOSIS — D50.0 IRON DEFICIENCY ANEMIA DUE TO CHRONIC BLOOD LOSS: ICD-10-CM

## 2023-10-02 DIAGNOSIS — D64.9 ANEMIA, UNSPECIFIED TYPE: Primary | ICD-10-CM

## 2023-10-02 DIAGNOSIS — D50.0 IRON DEFICIENCY ANEMIA DUE TO CHRONIC BLOOD LOSS: Primary | ICD-10-CM

## 2023-10-02 LAB
BASOPHILS # BLD AUTO: 0.02 10*3/MM3 (ref 0–0.2)
BASOPHILS NFR BLD AUTO: 0.2 % (ref 0–1.5)
DEPRECATED RDW RBC AUTO: 49.4 FL (ref 37–54)
EOSINOPHIL # BLD AUTO: 0.03 10*3/MM3 (ref 0–0.4)
EOSINOPHIL NFR BLD AUTO: 0.2 % (ref 0.3–6.2)
ERYTHROCYTE [DISTWIDTH] IN BLOOD BY AUTOMATED COUNT: 13.9 % (ref 12.3–15.4)
HCT VFR BLD AUTO: 39.1 % (ref 34–46.6)
HGB BLD-MCNC: 13 G/DL (ref 12–15.9)
HOLD SPECIMEN: NORMAL
HOLD SPECIMEN: NORMAL
LYMPHOCYTES # BLD AUTO: 1.28 10*3/MM3 (ref 0.7–3.1)
LYMPHOCYTES NFR BLD AUTO: 10.4 % (ref 19.6–45.3)
MCH RBC QN AUTO: 33.2 PG (ref 26.6–33)
MCHC RBC AUTO-ENTMCNC: 33.2 G/DL (ref 31.5–35.7)
MCV RBC AUTO: 100 FL (ref 79–97)
MONOCYTES # BLD AUTO: 0.58 10*3/MM3 (ref 0.1–0.9)
MONOCYTES NFR BLD AUTO: 4.7 % (ref 5–12)
NEUTROPHILS NFR BLD AUTO: 10.43 10*3/MM3 (ref 1.7–7)
NEUTROPHILS NFR BLD AUTO: 84.5 % (ref 42.7–76)
PLATELET # BLD AUTO: 276 10*3/MM3 (ref 140–450)
PMV BLD AUTO: 8.8 FL (ref 6–12)
RBC # BLD AUTO: 3.91 10*6/MM3 (ref 3.77–5.28)
WBC NRBC COR # BLD: 12.34 10*3/MM3 (ref 3.4–10.8)

## 2023-10-02 PROCEDURE — 36415 COLL VENOUS BLD VENIPUNCTURE: CPT

## 2023-10-02 PROCEDURE — 85025 COMPLETE CBC W/AUTO DIFF WBC: CPT

## 2023-10-02 RX ORDER — BACLOFEN 10 MG/1
1 TABLET ORAL EVERY 12 HOURS SCHEDULED
COMMUNITY
Start: 2023-09-27

## 2023-10-02 RX ORDER — ACETAMINOPHEN AND CODEINE PHOSPHATE 300; 30 MG/1; MG/1
1 TABLET ORAL EVERY 12 HOURS SCHEDULED
COMMUNITY
Start: 2023-09-27

## 2023-10-02 NOTE — PROGRESS NOTES
Hematology/Oncology Outpatient Follow Up    PATIENT NAME:Arlene Brady  :1995  MRN: [id number]  PRIMARY CARE PHYSICIAN: Daniel Sam MD  REFERRING PHYSICIAN: Daniel Sam MD    Chief Complaint   Patient presents with   • Follow-up     Anemia, unspecified type          HISTORY OF PRESENT ILLNESS:     This is a 28 year old female has been referred secondary to anemia.  Patient has a longtime history of anemia.  She has rectal bleeding and had colonoscopy done a few days ago by Dr Vinny Lagos . She has internal and external hemorrhoids. She has a follow up with Dr Lagos.     Patient is amenorrheic for about 6 months.  She had history of menorrhagia. She is on hormone pills to regulate her cycles.   She has low energy, she was on oral iron supplements for a month. She has since ran out of her iron tablets.     Review of her CBCs indicate that she has had anemia with hemoglobin ranging between 8 and 12.3 g per DL.  Today her white count is 8, hemoglobin is 8, MCV is 101 and platelets are 352 with essentially unremarkable differentials.     She denies having blood in her urine     She is single, She is a CNA     Patient does not smoke and drinks occasionally     There is no family history of hematological problems.     Her mother had colon cancer,      2023: Methylmalonic acid level was normal at 343 patient had anemia work-up including a ferritin level which was 167, C-reactive protein was high at 1.96 BUN was 13, creatinine 0.9 LDH was 323 iron profile showed a elevated serum iron and iron saturation, haptoglobin was normal, reticulocyte count was elevated to 16 she has low folate at 3.1  2023: WBC 11.62, hemoglobin 8.0 g/dL, .9, platelets 353,000.  Flow cytometry showed an increased kappa lambda ratio, neutrophils with left shifted maturation and rare phenotypic aberrancy, monocytes with phenotypic aberrancy.  Bone marrow biopsy with molecular and cytogenetic studies  indicated to evaluate for myeloid neoplasm.  8/16/2023 CT of the neck, chest, abdomen and pelvis with contrast showed no evidence of definite pathologic lymphadenopathy concerning for lymphoproliferative disorder.  No acute findings present in the neck, chest, abdomen or pelvis.  8/25/2023: Patient had bone marrow aspiration and biopsy which basically showed normocellular bone marrow for age, decreased iron stores, negative for involvement by malignant lymphoma or metastatic malignancy.  Flow cytometry was unremarkable with no immunophenotypic abnormalities noted.  Cytogenetics showed a normal female karyotype      Past Medical History:   Diagnosis Date   • ADHD (attention deficit hyperactivity disorder)    • Arthritis    • Asthma     exercise induced asthma    • Depression    • Seizures        Past Surgical History:   Procedure Laterality Date   • ABDOMINAL SURGERY      choley   • TONSILLECTOMY           Current Outpatient Medications:   •  acetaminophen-codeine (TYLENOL with CODEINE #3) 300-30 MG per tablet, Take 1 tablet by mouth Every 12 (Twelve) Hours., Disp: , Rfl:   •  baclofen (LIORESAL) 10 MG tablet, Take 1 tablet by mouth Every 12 (Twelve) Hours., Disp: , Rfl:   •  atomoxetine (STRATTERA) 80 MG capsule, Take 1 capsule by mouth Daily. (Patient not taking: Reported on 9/6/2023), Disp: 90 capsule, Rfl: 0  •  fexofenadine (ALLEGRA) 180 MG tablet, Take 1 tablet by mouth Daily., Disp: , Rfl:   •  folic acid (FOLVITE) 1 MG tablet, Take 1 tablet by mouth Daily., Disp: 90 tablet, Rfl: 0  •  Junel 1/20 1-20 MG-MCG per tablet, Take 1 tablet by mouth Every Evening., Disp: , Rfl:   •  nystatin-triamcinolone (MYCOLOG) 767639-3.1 UNIT/GM-% ointment, APPLY 1 APPLICATION TOPICALLY TWICE DAILY FOR 21 DAYS, Disp: , Rfl:   •  OXcarbazepine (TRILEPTAL) 300 MG tablet, Take 1.5 tablets by mouth 2 (Two) Times a Day., Disp: , Rfl:   •  pantoprazole (PROTONIX) 40 MG EC tablet, Take 1 tablet by mouth Daily., Disp: 30 tablet, Rfl:  "2  •  predniSONE (DELTASONE) 20 MG tablet, Take 2 tablets by mouth 2 (Two) Times a Day. (Patient taking differently: Take 2 tablets by mouth 2 (Two) Times a Day. Two tablets in QAM One tablet QHS), Disp: 120 tablet, Rfl: 1  •  sertraline (ZOLOFT) 50 MG tablet, Take 1 tablet by mouth Daily., Disp: 90 tablet, Rfl: 1  •  silver sulfadiazine (SILVADENE, SSD) 1 % cream, APPLY 1 APPLICATION TOPICALLY TWICE A DAY FOR 20 DAYS, Disp: , Rfl:     Allergies   Allergen Reactions   • Cephalosporins Rash   • Penicillins Rash       Family History   Problem Relation Age of Onset   • Hypertension Father    • Heart disease Father    • Hyperlipidemia Father    • Cancer Paternal Grandmother        Cancer-related family history includes Cancer in her paternal grandmother.    Social History     Tobacco Use   • Smoking status: Never   • Smokeless tobacco: Never   Vaping Use   • Vaping Use: Never used   Substance Use Topics   • Alcohol use: Yes     Comment: very infrequent    • Drug use: Never         I have reviewed and confirmed the accuracy of the patient's history: Chief complaint, HPI, ROS, and Subjective as entered by the MA/LPN/RN. Bronwyn Burns MD 10/02/23          SUBJECTIVE:    Arlene Brady reports a pain score of 6.  Given her pain assessment as noted, treatment options were discussed and the following options were decided upon as a follow-up plan to address the patient's pain:  Continue current management by her primary care .     Patient denies any new issues.  She is continuing to tolerate prednisone wean        REVIEW OF SYSTEMS:      Review of Systems   Musculoskeletal:  Positive for back pain.     Per subjective    OBJECTIVE:    Vitals:    10/02/23 1519   BP: 95/52   Pulse: 88   Resp: 20   Temp: 98 °F (36.7 °C)   TempSrc: Infrared   SpO2: 98%   Weight: 118 kg (260 lb)   Height: 152.4 cm (60\")   PainSc:   6   PainLoc: Back       Body mass index is 50.78 kg/m².    ECOG  (1) Restricted in physically strenuous " activity, ambulatory and able to do work of light nature    Physical Exam  Vitals reviewed.   Constitutional:       General: She is not in acute distress.     Appearance: Normal appearance. She is not ill-appearing, toxic-appearing or diaphoretic.   HENT:      Head: Normocephalic and atraumatic.   Eyes:      Extraocular Movements: Extraocular movements intact.   Cardiovascular:      Rate and Rhythm: Normal rate and regular rhythm.      Heart sounds: No murmur heard.  Pulmonary:      Effort: Pulmonary effort is normal. No respiratory distress.      Breath sounds: Normal breath sounds. No stridor. No wheezing.   Abdominal:      General: Bowel sounds are normal.      Palpations: Abdomen is soft.   Musculoskeletal:         General: Normal range of motion.      Cervical back: Normal range of motion.   Skin:     General: Skin is warm and dry.      Findings: No rash.   Neurological:      Mental Status: She is alert and oriented to person, place, and time.   Psychiatric:         Mood and Affect: Mood normal.         Behavior: Behavior normal.     I have reexamined the patient and the results are consistent with the previously documented exam. Bronwynnara Burns MD        RECENT LABS    WBC   Date Value Ref Range Status   10/02/2023 12.34 (H) 3.40 - 10.80 10*3/mm3 Final     RBC   Date Value Ref Range Status   10/02/2023 3.91 3.77 - 5.28 10*6/mm3 Final   07/31/2023 2.80 (L) 3.77 - 5.28 x10E6/uL Final     Hemoglobin   Date Value Ref Range Status   10/02/2023 13.0 12.0 - 15.9 g/dL Final     Hematocrit   Date Value Ref Range Status   10/02/2023 39.1 34.0 - 46.6 % Final     MCV   Date Value Ref Range Status   10/02/2023 100.0 (H) 79.0 - 97.0 fL Final     MCH   Date Value Ref Range Status   10/02/2023 33.2 (H) 26.6 - 33.0 pg Final     MCHC   Date Value Ref Range Status   10/02/2023 33.2 31.5 - 35.7 g/dL Final     RDW   Date Value Ref Range Status   10/02/2023 13.9 12.3 - 15.4 % Final     RDW-SD   Date Value Ref Range Status    10/02/2023 49.4 37.0 - 54.0 fl Final     MPV   Date Value Ref Range Status   10/02/2023 8.8 6.0 - 12.0 fL Final     Platelets   Date Value Ref Range Status   10/02/2023 276 140 - 450 10*3/mm3 Final     Neutrophil %   Date Value Ref Range Status   10/02/2023 84.5 (H) 42.7 - 76.0 % Final     Lymphocyte %   Date Value Ref Range Status   10/02/2023 10.4 (L) 19.6 - 45.3 % Final     Monocyte %   Date Value Ref Range Status   10/02/2023 4.7 (L) 5.0 - 12.0 % Final     Eosinophil %   Date Value Ref Range Status   10/02/2023 0.2 (L) 0.3 - 6.2 % Final     Basophil %   Date Value Ref Range Status   10/02/2023 0.2 0.0 - 1.5 % Final     Immature Grans %   Date Value Ref Range Status   10/09/2020 1.4 (H) 0.0 - 0.5 % Final     Neutrophils, Absolute   Date Value Ref Range Status   10/02/2023 10.43 (H) 1.70 - 7.00 10*3/mm3 Final     Lymphocytes, Absolute   Date Value Ref Range Status   10/02/2023 1.28 0.70 - 3.10 10*3/mm3 Final     Monocytes, Absolute   Date Value Ref Range Status   10/02/2023 0.58 0.10 - 0.90 10*3/mm3 Final     Eosinophils, Absolute   Date Value Ref Range Status   10/02/2023 0.03 0.00 - 0.40 10*3/mm3 Final     Basophils, Absolute   Date Value Ref Range Status   10/02/2023 0.02 0.00 - 0.20 10*3/mm3 Final     Immature Grans, Absolute   Date Value Ref Range Status   10/09/2020 0.09 (H) 0.00 - 0.05 10*3/mm3 Final     nRBC   Date Value Ref Range Status   08/25/2023 0.1 0.0 - 0.2 /100 WBC Final       Lab Results   Component Value Date    GLUCOSE 125 (H) 08/18/2023    BUN 34 (H) 08/18/2023    CREATININE 0.89 08/18/2023    EGFRIFNONA 88 07/26/2021    BCR 38.2 (H) 08/18/2023    K 3.7 08/18/2023    CO2 20.0 (L) 08/18/2023    CALCIUM 8.6 08/18/2023    PROTENTOTREF 6.9 08/11/2023    ALBUMIN 4.0 08/18/2023    LABIL2 1.1 08/11/2023    AST 16 08/18/2023    ALT 50 (H) 08/18/2023         Assessment & Plan     Anemia, unspecified type  - CBC & Differential    Iron deficiency anemia due to chronic blood loss  - CBC &  Differential        ASSESSMENT and plans:    Iron deficiency anemia, unspecified iron deficiency anemia type  - CBC & Differential        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  Rule out lymphoproliferative disease.  Peripheral blood for flow cytometry shows increased kappa lambda ratio detected rare phenotypic aberrancy of the neutrophils as well as monocytes, possibility of a myeloid disorder was also entertained.  PNH screen was negative and G6PD was not deficient.  For now patient will continue on steroids.  But will begin steroid taper due to normalization of her hemoglobin down to 12.7 g per DL.  We will continue her steroid taper as her hemoglobin has improved to 13.0 g/dL today.  Continue weaning  Status post bone marrow aspiration and biopsy which was essentially unremarkable  Folate deficiency: Continue folic acid 1 mg p.o. daily till off prednisone  History of seizures  Rectal bleeding : Recent  colonoscopy by Dr Vinny Lagos .  She no longer has rectal bleeding and she has completed her course of iron.  Follow-up with GI encouraged.  She has an appointment coming up  Possible sensitivity reaction to Venofer: Patient's epigastric chest pain complaints seem to predate the infusion but she does also complain of itching on the bilateral forearms during the infusion.  Venofer was stopped and normal saline was ran along with 25 mg Benadryl IV given with resolution of the symptoms.           Plans     Change prednisone to 20 mg in the morning continue weaning as long as her hemoglobin stays above 10 g per DL  Continue PPI  CBC next week and if hemoglobin remains adequate we will wean down on the prednisone to 10 mg daily  CBC reviewed  Continue folic acid supplement  Follow up in 2 weeks  All questions answered    Patient verbalized understanding and is in agreement of the above plan.         I spent 30 total minutes, face-to-face, caring for Arlene ponce. 90% of this  time involved counseling and/or coordination of care as documented within this note.

## 2023-10-09 ENCOUNTER — HOSPITAL ENCOUNTER (OUTPATIENT)
Dept: CARDIOLOGY | Facility: HOSPITAL | Age: 28
Discharge: HOME | End: 2023-10-09
Admitting: INTERNAL MEDICINE
Payer: COMMERCIAL

## 2023-10-09 ENCOUNTER — LAB (OUTPATIENT)
Dept: LAB | Facility: HOSPITAL | Age: 28
End: 2023-10-09
Payer: COMMERCIAL

## 2023-10-09 DIAGNOSIS — D50.0 IRON DEFICIENCY ANEMIA DUE TO CHRONIC BLOOD LOSS: ICD-10-CM

## 2023-10-09 DIAGNOSIS — M79.89 LEG SWELLING: ICD-10-CM

## 2023-10-09 DIAGNOSIS — M79.89 LEG SWELLING: Primary | ICD-10-CM

## 2023-10-09 DIAGNOSIS — D64.9 ANEMIA, UNSPECIFIED TYPE: ICD-10-CM

## 2023-10-09 LAB
BASOPHILS # BLD AUTO: 0.03 10*3/MM3 (ref 0–0.2)
BASOPHILS NFR BLD AUTO: 0.3 % (ref 0–1.5)
BH CV LOWER VASCULAR LEFT COMMON FEMORAL AUGMENT: NORMAL
BH CV LOWER VASCULAR LEFT COMMON FEMORAL COMPETENT: NORMAL
BH CV LOWER VASCULAR LEFT COMMON FEMORAL COMPRESS: NORMAL
BH CV LOWER VASCULAR LEFT COMMON FEMORAL PHASIC: NORMAL
BH CV LOWER VASCULAR LEFT COMMON FEMORAL SPONT: NORMAL
BH CV LOWER VASCULAR LEFT DISTAL FEMORAL COMPRESS: NORMAL
BH CV LOWER VASCULAR LEFT GREATER SAPH AK COMPRESS: NORMAL
BH CV LOWER VASCULAR LEFT GREATER SAPH BK COMPRESS: NORMAL
BH CV LOWER VASCULAR LEFT MID FEMORAL AUGMENT: NORMAL
BH CV LOWER VASCULAR LEFT MID FEMORAL COMPETENT: NORMAL
BH CV LOWER VASCULAR LEFT MID FEMORAL COMPRESS: NORMAL
BH CV LOWER VASCULAR LEFT MID FEMORAL PHASIC: NORMAL
BH CV LOWER VASCULAR LEFT MID FEMORAL SPONT: NORMAL
BH CV LOWER VASCULAR LEFT PERONEAL COMPRESS: NORMAL
BH CV LOWER VASCULAR LEFT POPLITEAL AUGMENT: NORMAL
BH CV LOWER VASCULAR LEFT POPLITEAL COMPETENT: NORMAL
BH CV LOWER VASCULAR LEFT POPLITEAL COMPRESS: NORMAL
BH CV LOWER VASCULAR LEFT POPLITEAL PHASIC: NORMAL
BH CV LOWER VASCULAR LEFT POPLITEAL SPONT: NORMAL
BH CV LOWER VASCULAR LEFT POSTERIOR TIBIAL COMPRESS: NORMAL
BH CV LOWER VASCULAR LEFT PROXIMAL FEMORAL COMPRESS: NORMAL
BH CV LOWER VASCULAR LEFT SAPHENOFEMORAL JUNCTION COMPRESS: NORMAL
BH CV LOWER VASCULAR RIGHT COMMON FEMORAL AUGMENT: NORMAL
BH CV LOWER VASCULAR RIGHT COMMON FEMORAL COMPETENT: NORMAL
BH CV LOWER VASCULAR RIGHT COMMON FEMORAL COMPRESS: NORMAL
BH CV LOWER VASCULAR RIGHT COMMON FEMORAL PHASIC: NORMAL
BH CV LOWER VASCULAR RIGHT COMMON FEMORAL SPONT: NORMAL
BH CV LOWER VASCULAR RIGHT DISTAL FEMORAL COMPRESS: NORMAL
BH CV LOWER VASCULAR RIGHT GREATER SAPH AK COMPRESS: NORMAL
BH CV LOWER VASCULAR RIGHT GREATER SAPH BK COMPRESS: NORMAL
BH CV LOWER VASCULAR RIGHT MID FEMORAL AUGMENT: NORMAL
BH CV LOWER VASCULAR RIGHT MID FEMORAL COMPETENT: NORMAL
BH CV LOWER VASCULAR RIGHT MID FEMORAL COMPRESS: NORMAL
BH CV LOWER VASCULAR RIGHT MID FEMORAL PHASIC: NORMAL
BH CV LOWER VASCULAR RIGHT MID FEMORAL SPONT: NORMAL
BH CV LOWER VASCULAR RIGHT PERONEAL COMPRESS: NORMAL
BH CV LOWER VASCULAR RIGHT POPLITEAL AUGMENT: NORMAL
BH CV LOWER VASCULAR RIGHT POPLITEAL COMPETENT: NORMAL
BH CV LOWER VASCULAR RIGHT POPLITEAL COMPRESS: NORMAL
BH CV LOWER VASCULAR RIGHT POPLITEAL PHASIC: NORMAL
BH CV LOWER VASCULAR RIGHT POPLITEAL SPONT: NORMAL
BH CV LOWER VASCULAR RIGHT POSTERIOR TIBIAL COMPRESS: NORMAL
BH CV LOWER VASCULAR RIGHT PROXIMAL FEMORAL COMPRESS: NORMAL
BH CV LOWER VASCULAR RIGHT SAPHENOFEMORAL JUNCTION COMPRESS: NORMAL
BH CV VAS PRELIMINARY FINDINGS SCRIPTING: 1
DEPRECATED RDW RBC AUTO: 47.6 FL (ref 37–54)
EOSINOPHIL # BLD AUTO: 0.15 10*3/MM3 (ref 0–0.4)
EOSINOPHIL NFR BLD AUTO: 1.5 % (ref 0.3–6.2)
ERYTHROCYTE [DISTWIDTH] IN BLOOD BY AUTOMATED COUNT: 13.6 % (ref 12.3–15.4)
HCT VFR BLD AUTO: 37.2 % (ref 34–46.6)
HGB BLD-MCNC: 12 G/DL (ref 12–15.9)
LYMPHOCYTES # BLD AUTO: 1.59 10*3/MM3 (ref 0.7–3.1)
LYMPHOCYTES NFR BLD AUTO: 15.7 % (ref 19.6–45.3)
MCH RBC QN AUTO: 32.2 PG (ref 26.6–33)
MCHC RBC AUTO-ENTMCNC: 32.3 G/DL (ref 31.5–35.7)
MCV RBC AUTO: 99.7 FL (ref 79–97)
MONOCYTES # BLD AUTO: 0.63 10*3/MM3 (ref 0.1–0.9)
MONOCYTES NFR BLD AUTO: 6.2 % (ref 5–12)
NEUTROPHILS NFR BLD AUTO: 7.73 10*3/MM3 (ref 1.7–7)
NEUTROPHILS NFR BLD AUTO: 76.3 % (ref 42.7–76)
PLATELET # BLD AUTO: 274 10*3/MM3 (ref 140–450)
PMV BLD AUTO: 8.5 FL (ref 6–12)
RBC # BLD AUTO: 3.73 10*6/MM3 (ref 3.77–5.28)
WBC NRBC COR # BLD: 10.13 10*3/MM3 (ref 3.4–10.8)

## 2023-10-09 PROCEDURE — 93970 EXTREMITY STUDY: CPT

## 2023-10-09 PROCEDURE — 36415 COLL VENOUS BLD VENIPUNCTURE: CPT

## 2023-10-09 PROCEDURE — 85025 COMPLETE CBC W/AUTO DIFF WBC: CPT

## 2023-10-09 RX ORDER — PANTOPRAZOLE SODIUM 40 MG/1
40 TABLET, DELAYED RELEASE ORAL DAILY
Qty: 90 TABLET | Refills: 3 | Status: SHIPPED | OUTPATIENT
Start: 2023-10-09

## 2023-10-09 RX ORDER — FOLIC ACID 1 MG/1
1000 TABLET ORAL DAILY
Qty: 90 TABLET | Refills: 0 | Status: SHIPPED | OUTPATIENT
Start: 2023-10-09

## 2023-10-16 NOTE — PROGRESS NOTES
Hematology/Oncology Outpatient Follow Up    PATIENT NAME:Arlene Brady  :1995  MRN: [id number]  PRIMARY CARE PHYSICIAN: Daniel Sam MD  REFERRING PHYSICIAN: Daniel Sam MD    Chief Complaint   Patient presents with    Follow-up     Anemia, unspecified type          HISTORY OF PRESENT ILLNESS:     This is a 28 year old female has been referred secondary to anemia.  Patient has a longtime history of anemia.  She has rectal bleeding and had colonoscopy done a few days ago by Dr Vinny Lagos . She has internal and external hemorrhoids. She has a follow up with Dr Lagos.     Patient is amenorrheic for about 6 months.  She had history of menorrhagia. She is on hormone pills to regulate her cycles.   She has low energy, she was on oral iron supplements for a month. She has since ran out of her iron tablets.     Review of her CBCs indicate that she has had anemia with hemoglobin ranging between 8 and 12.3 g per DL.  Today her white count is 8, hemoglobin is 8, MCV is 101 and platelets are 352 with essentially unremarkable differentials.     She denies having blood in her urine     She is single, She is a CNA     Patient does not smoke and drinks occasionally     There is no family history of hematological problems.     Her mother had colon cancer,      2023: Methylmalonic acid level was normal at 343 patient had anemia work-up including a ferritin level which was 167, C-reactive protein was high at 1.96 BUN was 13, creatinine 0.9 LDH was 323 iron profile showed a elevated serum iron and iron saturation, haptoglobin was normal, reticulocyte count was elevated to 16 she has low folate at 3.1  2023: WBC 11.62, hemoglobin 8.0 g/dL, .9, platelets 353,000.  Flow cytometry showed an increased kappa lambda ratio, neutrophils with left shifted maturation and rare phenotypic aberrancy, monocytes with phenotypic aberrancy.  Bone marrow biopsy with molecular and cytogenetic studies indicated  to evaluate for myeloid neoplasm.  8/16/2023 CT of the neck, chest, abdomen and pelvis with contrast showed no evidence of definite pathologic lymphadenopathy concerning for lymphoproliferative disorder.  No acute findings present in the neck, chest, abdomen or pelvis.  8/25/2023: Patient had bone marrow aspiration and biopsy which basically showed normocellular bone marrow for age, decreased iron stores, negative for involvement by malignant lymphoma or metastatic malignancy.  Flow cytometry was unremarkable with no immunophenotypic abnormalities noted.  Cytogenetics showed a normal female karyotype      Past Medical History:   Diagnosis Date    ADHD (attention deficit hyperactivity disorder)     Arthritis     Asthma     exercise induced asthma     Depression     Seizures        Past Surgical History:   Procedure Laterality Date    ABDOMINAL SURGERY      choley    TONSILLECTOMY           Current Outpatient Medications:     nystatin (MYCOSTATIN) 899662 UNIT/GM cream, , Disp: , Rfl:     acetaminophen-codeine (TYLENOL with CODEINE #3) 300-30 MG per tablet, Take 1 tablet by mouth Every 12 (Twelve) Hours., Disp: , Rfl:     atomoxetine (STRATTERA) 80 MG capsule, Take 1 capsule by mouth Daily. (Patient not taking: Reported on 9/6/2023), Disp: 90 capsule, Rfl: 0    baclofen (LIORESAL) 10 MG tablet, Take 1 tablet by mouth Every 12 (Twelve) Hours., Disp: , Rfl:     fexofenadine (ALLEGRA) 180 MG tablet, Take 1 tablet by mouth Daily., Disp: , Rfl:     folic acid (FOLVITE) 1 MG tablet, TAKE 1 TABLET BY MOUTH EVERY DAY, Disp: 90 tablet, Rfl: 0    folic acid (FOLVITE) 1 MG tablet, Take 1 tablet by mouth Daily., Disp: 30 tablet, Rfl: 2    Junel 1/20 1-20 MG-MCG per tablet, Take 1 tablet by mouth Every Evening., Disp: , Rfl:     nystatin-triamcinolone (MYCOLOG) 315318-1.1 UNIT/GM-% ointment, APPLY 1 APPLICATION TOPICALLY TWICE DAILY FOR 21 DAYS, Disp: , Rfl:     OXcarbazepine (TRILEPTAL) 300 MG tablet, Take 1.5 tablets by mouth 2  (Two) Times a Day., Disp: , Rfl:     pantoprazole (PROTONIX) 40 MG EC tablet, TAKE 1 TABLET BY MOUTH EVERY DAY, Disp: 90 tablet, Rfl: 3    predniSONE (DELTASONE) 20 MG tablet, Take 2 tablets by mouth 2 (Two) Times a Day. (Patient taking differently: Take 2 tablets by mouth 2 (Two) Times a Day. Two tablets in QAM One tablet QHS), Disp: 120 tablet, Rfl: 1    sertraline (ZOLOFT) 50 MG tablet, Take 1 tablet by mouth Daily., Disp: 90 tablet, Rfl: 1    silver sulfadiazine (SILVADENE, SSD) 1 % cream, APPLY 1 APPLICATION TOPICALLY TWICE A DAY FOR 20 DAYS, Disp: , Rfl:     Allergies   Allergen Reactions    Cephalosporins Rash    Penicillins Rash       Family History   Problem Relation Age of Onset    Hypertension Father     Heart disease Father     Hyperlipidemia Father     Cancer Paternal Grandmother        Cancer-related family history includes Cancer in her paternal grandmother.    Social History     Tobacco Use    Smoking status: Never    Smokeless tobacco: Never   Vaping Use    Vaping Use: Never used   Substance Use Topics    Alcohol use: Yes     Comment: very infrequent     Drug use: Never       I have reviewed and confirmed the accuracy of the patient's history: Chief complaint, HPI, ROS, and Subjective as entered by the MA/LPN/RN. Bronwyn Burns MD 10/17/23        SUBJECTIVE:    Patient denies any new issues.  She still has good energy level.  She is down on prednisone to 10 mg daily        Arlene Brady reports a pain score of 0.  Given her pain assessment as noted, treatment options were discussed and the following options were decided upon as a follow-up plan to address the patient's pain:  Continue current management by her primary care .     Patient denies any new issues.  She is continuing to tolerate prednisone wean        REVIEW OF SYSTEMS:      Review of Systems   Musculoskeletal:  Positive for back pain.       Per subjective    OBJECTIVE:    Vitals:    10/17/23 1334   BP: 154/79   Pulse: 87  "  Resp: 20   Temp: 98 °F (36.7 °C)   TempSrc: Infrared   SpO2: 98%   Weight: 119 kg (263 lb)   Height: 152.4 cm (60\")   PainSc: 0-No pain         Body mass index is 51.36 kg/m².    ECOG  (1) Restricted in physically strenuous activity, ambulatory and able to do work of light nature    Physical Exam  Vitals reviewed.   Constitutional:       General: She is not in acute distress.     Appearance: Normal appearance. She is not ill-appearing, toxic-appearing or diaphoretic.   HENT:      Head: Normocephalic and atraumatic.   Eyes:      Extraocular Movements: Extraocular movements intact.   Cardiovascular:      Rate and Rhythm: Normal rate and regular rhythm.      Heart sounds: No murmur heard.  Pulmonary:      Effort: Pulmonary effort is normal. No respiratory distress.      Breath sounds: Normal breath sounds. No stridor. No wheezing.   Abdominal:      General: Bowel sounds are normal.      Palpations: Abdomen is soft.   Musculoskeletal:         General: Normal range of motion.      Cervical back: Normal range of motion.   Skin:     General: Skin is warm and dry.      Findings: No rash.   Neurological:      Mental Status: She is alert and oriented to person, place, and time.   Psychiatric:         Mood and Affect: Mood normal.         Behavior: Behavior normal.         I have reexamined the patient and the results are consistent with the previously documented exam. Bronwyn Burns MD      RECENT LABS    WBC   Date Value Ref Range Status   10/17/2023 8.57 3.40 - 10.80 10*3/mm3 Final     RBC   Date Value Ref Range Status   10/17/2023 3.09 (L) 3.77 - 5.28 10*6/mm3 Final   07/31/2023 2.80 (L) 3.77 - 5.28 x10E6/uL Final     Hemoglobin   Date Value Ref Range Status   10/17/2023 10.2 (L) 12.0 - 15.9 g/dL Final     Hematocrit   Date Value Ref Range Status   10/17/2023 30.8 (L) 34.0 - 46.6 % Final     MCV   Date Value Ref Range Status   10/17/2023 99.7 (H) 79.0 - 97.0 fL Final     MCH   Date Value Ref Range Status "   10/17/2023 33.0 26.6 - 33.0 pg Final     MCHC   Date Value Ref Range Status   10/17/2023 33.1 31.5 - 35.7 g/dL Final     RDW   Date Value Ref Range Status   10/17/2023 14.2 12.3 - 15.4 % Final     RDW-SD   Date Value Ref Range Status   10/17/2023 48.0 37.0 - 54.0 fl Final     MPV   Date Value Ref Range Status   10/17/2023 8.5 6.0 - 12.0 fL Final     Platelets   Date Value Ref Range Status   10/17/2023 284 140 - 450 10*3/mm3 Final     Neutrophil %   Date Value Ref Range Status   10/17/2023 70.4 42.7 - 76.0 % Final     Lymphocyte %   Date Value Ref Range Status   10/17/2023 19.6 19.6 - 45.3 % Final     Monocyte %   Date Value Ref Range Status   10/17/2023 7.9 5.0 - 12.0 % Final     Eosinophil %   Date Value Ref Range Status   10/17/2023 1.9 0.3 - 6.2 % Final     Basophil %   Date Value Ref Range Status   10/17/2023 0.2 0.0 - 1.5 % Final     Immature Grans %   Date Value Ref Range Status   10/09/2020 1.4 (H) 0.0 - 0.5 % Final     Neutrophils, Absolute   Date Value Ref Range Status   10/17/2023 6.03 1.70 - 7.00 10*3/mm3 Final     Lymphocytes, Absolute   Date Value Ref Range Status   10/17/2023 1.68 0.70 - 3.10 10*3/mm3 Final     Monocytes, Absolute   Date Value Ref Range Status   10/17/2023 0.68 0.10 - 0.90 10*3/mm3 Final     Eosinophils, Absolute   Date Value Ref Range Status   10/17/2023 0.16 0.00 - 0.40 10*3/mm3 Final     Basophils, Absolute   Date Value Ref Range Status   10/17/2023 0.02 0.00 - 0.20 10*3/mm3 Final     Immature Grans, Absolute   Date Value Ref Range Status   10/09/2020 0.09 (H) 0.00 - 0.05 10*3/mm3 Final     nRBC   Date Value Ref Range Status   08/25/2023 0.1 0.0 - 0.2 /100 WBC Final       Lab Results   Component Value Date    GLUCOSE 125 (H) 08/18/2023    BUN 34 (H) 08/18/2023    CREATININE 0.89 08/18/2023    EGFRIFNONA 88 07/26/2021    BCR 38.2 (H) 08/18/2023    K 3.7 08/18/2023    CO2 20.0 (L) 08/18/2023    CALCIUM 8.6 08/18/2023    PROTENTOTREF 6.9 08/11/2023    ALBUMIN 4.0 08/18/2023    LABIL2  1.1 08/11/2023    AST 16 08/18/2023    ALT 50 (H) 08/18/2023         Assessment & Plan     Anemia, unspecified type  - CBC & Differential  - Reticulocytes  - Haptoglobin  - Lactate Dehydrogenase    Iron deficiency anemia due to chronic blood loss  - CBC & Differential  - Reticulocytes  - Haptoglobin  - Lactate Dehydrogenase          ASSESSMENT and plans:    Iron deficiency anemia, unspecified iron deficiency anemia type  - CBC & Differential        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  Rule out lymphoproliferative disease.  Peripheral blood for flow cytometry shows increased kappa lambda ratio detected rare phenotypic aberrancy of the neutrophils as well as monocytes, possibility of a myeloid disorder was also entertained.  PNH screen was negative and G6PD was not deficient.  For now patient will continue on steroids.  But will begin steroid taper due to normalization of her hemoglobin down to 12.7 g per DL.  We will continue her steroid taper as her hemoglobin has improved to 13.0 g/dL today.  Hemoglobin today is down to 10.2 g per DL.  She will continue prednisone 10 mg daily  Status post bone marrow aspiration and biopsy which was essentially unremarkable  Folate deficiency: Continue folic acid 1 mg p.o. daily till off prednisone.  Refills given  History of seizures  History of rectal bleeding : Recent  colonoscopy by Dr Vinny Lagos .  She no longer has rectal bleeding and she has completed her course of iron.  Follow-up with GI encouraged.  She has an appointment coming up next week with GI.  She denies rectal bleeding at this time  Possible sensitivity reaction to Venofer: Patient's epigastric chest pain complaints seem to predate the infusion but she does also complain of itching on the bilateral forearms during the infusion.  Venofer was stopped and normal saline was ran along with 25 mg Benadryl IV given with resolution of the symptoms.           Plans      Continue prednisone 10 mg p.o. every morning  Continue PPI  Follow-up 2 weeks or earlier as needed  CBC reviewed  Refill folic acid  Follow up in 2 weeks  All questions answered    Patient verbalized understanding and is in agreement of the above plan.       Electronically signed by Bronwyn Burns MD, 10/17/23, 2:00 PM EDT.

## 2023-10-17 ENCOUNTER — OFFICE VISIT (OUTPATIENT)
Dept: ONCOLOGY | Facility: CLINIC | Age: 28
End: 2023-10-17
Payer: COMMERCIAL

## 2023-10-17 ENCOUNTER — LAB (OUTPATIENT)
Dept: LAB | Facility: HOSPITAL | Age: 28
End: 2023-10-17
Payer: COMMERCIAL

## 2023-10-17 VITALS
BODY MASS INDEX: 51.63 KG/M2 | HEIGHT: 60 IN | DIASTOLIC BLOOD PRESSURE: 79 MMHG | HEART RATE: 87 BPM | TEMPERATURE: 98 F | OXYGEN SATURATION: 98 % | RESPIRATION RATE: 20 BRPM | WEIGHT: 263 LBS | SYSTOLIC BLOOD PRESSURE: 154 MMHG

## 2023-10-17 DIAGNOSIS — D50.0 IRON DEFICIENCY ANEMIA DUE TO CHRONIC BLOOD LOSS: ICD-10-CM

## 2023-10-17 DIAGNOSIS — D64.9 ANEMIA, UNSPECIFIED TYPE: Primary | ICD-10-CM

## 2023-10-17 DIAGNOSIS — D50.0 IRON DEFICIENCY ANEMIA DUE TO CHRONIC BLOOD LOSS: Primary | ICD-10-CM

## 2023-10-17 DIAGNOSIS — D64.9 ANEMIA, UNSPECIFIED TYPE: ICD-10-CM

## 2023-10-17 LAB
BASOPHILS # BLD AUTO: 0.02 10*3/MM3 (ref 0–0.2)
BASOPHILS NFR BLD AUTO: 0.2 % (ref 0–1.5)
DEPRECATED RDW RBC AUTO: 48 FL (ref 37–54)
EOSINOPHIL # BLD AUTO: 0.16 10*3/MM3 (ref 0–0.4)
EOSINOPHIL NFR BLD AUTO: 1.9 % (ref 0.3–6.2)
ERYTHROCYTE [DISTWIDTH] IN BLOOD BY AUTOMATED COUNT: 14.2 % (ref 12.3–15.4)
HAPTOGLOB SERPL-MCNC: 59 MG/DL (ref 30–200)
HCT VFR BLD AUTO: 30.8 % (ref 34–46.6)
HGB BLD-MCNC: 10.2 G/DL (ref 12–15.9)
HOLD SPECIMEN: NORMAL
LDH SERPL-CCNC: 452 U/L (ref 135–214)
LYMPHOCYTES # BLD AUTO: 1.68 10*3/MM3 (ref 0.7–3.1)
LYMPHOCYTES NFR BLD AUTO: 19.6 % (ref 19.6–45.3)
MCH RBC QN AUTO: 33 PG (ref 26.6–33)
MCHC RBC AUTO-ENTMCNC: 33.1 G/DL (ref 31.5–35.7)
MCV RBC AUTO: 99.7 FL (ref 79–97)
MONOCYTES # BLD AUTO: 0.68 10*3/MM3 (ref 0.1–0.9)
MONOCYTES NFR BLD AUTO: 7.9 % (ref 5–12)
NEUTROPHILS NFR BLD AUTO: 6.03 10*3/MM3 (ref 1.7–7)
NEUTROPHILS NFR BLD AUTO: 70.4 % (ref 42.7–76)
PLATELET # BLD AUTO: 284 10*3/MM3 (ref 140–450)
PMV BLD AUTO: 8.5 FL (ref 6–12)
RBC # BLD AUTO: 3.09 10*6/MM3 (ref 3.77–5.28)
RETICS # AUTO: 0.35 10*6/MM3 (ref 0.02–0.13)
RETICS/RBC NFR AUTO: 11.22 % (ref 0.7–1.9)
WBC NRBC COR # BLD: 8.57 10*3/MM3 (ref 3.4–10.8)

## 2023-10-17 PROCEDURE — 83010 ASSAY OF HAPTOGLOBIN QUANT: CPT | Performed by: INTERNAL MEDICINE

## 2023-10-17 PROCEDURE — 36415 COLL VENOUS BLD VENIPUNCTURE: CPT

## 2023-10-17 PROCEDURE — 85045 AUTOMATED RETICULOCYTE COUNT: CPT | Performed by: INTERNAL MEDICINE

## 2023-10-17 PROCEDURE — 83615 LACTATE (LD) (LDH) ENZYME: CPT | Performed by: INTERNAL MEDICINE

## 2023-10-17 PROCEDURE — 85025 COMPLETE CBC W/AUTO DIFF WBC: CPT

## 2023-10-17 RX ORDER — NYSTATIN 100000 U/G
CREAM TOPICAL
COMMUNITY
Start: 2023-10-09

## 2023-10-17 RX ORDER — FOLIC ACID 1 MG/1
1 TABLET ORAL DAILY
Qty: 30 TABLET | Refills: 2 | Status: SHIPPED | OUTPATIENT
Start: 2023-10-17

## 2023-10-30 ENCOUNTER — TELEPHONE (OUTPATIENT)
Dept: ONCOLOGY | Facility: CLINIC | Age: 28
End: 2023-10-30

## 2023-10-30 NOTE — TELEPHONE ENCOUNTER
Called Pt to r/s appt from today w/Dr Burns. Unable to get through. Got a recording saying to try call again later.

## 2023-10-30 NOTE — TELEPHONE ENCOUNTER
Hub staff attempted to follow warm transfer process and was unsuccessful     Caller: Arlene Brday    Relationship to patient: Self    Best call back number: [phone]    Patient is needing: NEEDING TO RESCHEDULE APPOINTMENT TODAY LAB AND FOLLOW UP WITH DR GLEZ

## 2023-10-30 NOTE — TELEPHONE ENCOUNTER
Caller: Arlene Brady    Relationship to patient: Self    Best call back number: [phone]    Patient is needing: TO RETURN CALL REGARDING APPT. HUB TRIED TO WT AND ANEESH YANG WHO STATED TO PUT IN MESSAGE.

## 2023-10-31 NOTE — PROGRESS NOTES
Hematology/Oncology Outpatient Follow Up    PATIENT NAME:Arlene Brady  :1995  MRN: [id number]  PRIMARY CARE PHYSICIAN: Daniel Sam MD  REFERRING PHYSICIAN: No ref. provider found    Chief Complaint   Patient presents with    Follow-up     2 week follow up  Autoimmune hemolytic anemia          HISTORY OF PRESENT ILLNESS:     This is a 28 year old female has been referred secondary to anemia.  Patient has a longtime history of anemia.  She has rectal bleeding and had colonoscopy done a few days ago by Dr Vinny Lagos . She has internal and external hemorrhoids. She has a follow up with Dr Lagos.     Patient is amenorrheic for about 6 months.  She had history of menorrhagia. She is on hormone pills to regulate her cycles.   She has low energy, she was on oral iron supplements for a month. She has since ran out of her iron tablets.     Review of her CBCs indicate that she has had anemia with hemoglobin ranging between 8 and 12.3 g per DL.  Today her white count is 8, hemoglobin is 8, MCV is 101 and platelets are 352 with essentially unremarkable differentials.     She denies having blood in her urine     She is single, She is a CNA     Patient does not smoke and drinks occasionally     There is no family history of hematological problems.     Her mother had colon cancer,      2023: Methylmalonic acid level was normal at 343 patient had anemia work-up including a ferritin level which was 167, C-reactive protein was high at 1.96 BUN was 13, creatinine 0.9 LDH was 323 iron profile showed a elevated serum iron and iron saturation, haptoglobin was normal, reticulocyte count was elevated to 16 she has low folate at 3.1  2023: WBC 11.62, hemoglobin 8.0 g/dL, .9, platelets 353,000.  Flow cytometry showed an increased kappa lambda ratio, neutrophils with left shifted maturation and rare phenotypic aberrancy, monocytes with phenotypic aberrancy.  Bone marrow biopsy with molecular and  cytogenetic studies indicated to evaluate for myeloid neoplasm.  8/16/2023 CT of the neck, chest, abdomen and pelvis with contrast showed no evidence of definite pathologic lymphadenopathy concerning for lymphoproliferative disorder.  No acute findings present in the neck, chest, abdomen or pelvis.  8/25/2023: Patient had bone marrow aspiration and biopsy which basically showed normocellular bone marrow for age, decreased iron stores, negative for involvement by malignant lymphoma or metastatic malignancy.  Flow cytometry was unremarkable with no immunophenotypic abnormalities noted.  Cytogenetics showed a normal female karyotype      Past Medical History:   Diagnosis Date    ADHD (attention deficit hyperactivity disorder)     Arthritis     Asthma     exercise induced asthma     Depression     Seizures        Past Surgical History:   Procedure Laterality Date    ABDOMINAL SURGERY      choley    TONSILLECTOMY           Current Outpatient Medications:     acetaminophen-codeine (TYLENOL with CODEINE #3) 300-30 MG per tablet, Take 1 tablet by mouth Every 12 (Twelve) Hours., Disp: , Rfl:     baclofen (LIORESAL) 10 MG tablet, Take 1 tablet by mouth Every 12 (Twelve) Hours., Disp: , Rfl:     fexofenadine (ALLEGRA) 180 MG tablet, Take 1 tablet by mouth Daily., Disp: , Rfl:     folic acid (FOLVITE) 1 MG tablet, TAKE 1 TABLET BY MOUTH EVERY DAY, Disp: 90 tablet, Rfl: 0    folic acid (FOLVITE) 1 MG tablet, Take 1 tablet by mouth Daily., Disp: 30 tablet, Rfl: 2    Junel 1/20 1-20 MG-MCG per tablet, Take 1 tablet by mouth Every Evening., Disp: , Rfl:     nystatin (MYCOSTATIN) 781469 UNIT/GM cream, , Disp: , Rfl:     nystatin-triamcinolone (MYCOLOG) 861641-0.1 UNIT/GM-% ointment, APPLY 1 APPLICATION TOPICALLY TWICE DAILY FOR 21 DAYS, Disp: , Rfl:     OXcarbazepine (TRILEPTAL) 300 MG tablet, Take 1.5 tablets by mouth 2 (Two) Times a Day., Disp: , Rfl:     pantoprazole (PROTONIX) 40 MG EC tablet, TAKE 1 TABLET BY MOUTH EVERY DAY,  Disp: 90 tablet, Rfl: 3    predniSONE (DELTASONE) 20 MG tablet, Take 2 tablets by mouth 2 (Two) Times a Day. (Patient taking differently: Take 0.5 tablets by mouth 2 (Two) Times a Day. 10mg Daily), Disp: 120 tablet, Rfl: 1    sertraline (ZOLOFT) 50 MG tablet, Take 1 tablet by mouth Daily., Disp: 90 tablet, Rfl: 1    silver sulfadiazine (SILVADENE, SSD) 1 % cream, APPLY 1 APPLICATION TOPICALLY TWICE A DAY FOR 20 DAYS, Disp: , Rfl:     Allergies   Allergen Reactions    Cephalosporins Rash    Penicillins Rash       Family History   Problem Relation Age of Onset    Hypertension Father     Heart disease Father     Hyperlipidemia Father     Cancer Paternal Grandmother        Cancer-related family history includes Cancer in her paternal grandmother.    Social History     Tobacco Use    Smoking status: Never    Smokeless tobacco: Never   Vaping Use    Vaping Use: Never used   Substance Use Topics    Alcohol use: Yes     Comment: very infrequent     Drug use: Never       I have reviewed and confirmed the accuracy of the patient's history: Chief complaint, HPI, ROS, and Subjective as entered by the MA/LPN/RN. Cony Puri, APRN 11/03/23        SUBJECTIVE:  Arlene is here today for her routine follow-up.  She is compliant with her prednisone, folic acid, and PPI.  She notes she has been tired but she is also working back and forth between 2nd and 3rd shift causing her sleep to be disrupted.  She denies any s/s of bleeding.     Arlene Brady reports a pain score of 0.  Given her pain assessment as noted, treatment options were discussed and the following options were decided upon as a follow-up plan to address the patient's pain:  Continue current management by her primary care .           REVIEW OF SYSTEMS:      Review of Systems   Constitutional:  Positive for fatigue.   Musculoskeletal:  Positive for back pain.       Per subjective    OBJECTIVE:    Vitals:    11/03/23 0905   BP: 115/81   Pulse: 87   Resp: 16  "  Temp: 98.2 °F (36.8 °C)   SpO2: 96%   Weight: 121 kg (266 lb)   Height: 152.4 cm (60\")   PainSc: 0-No pain           Body mass index is 51.95 kg/m².    ECOG  (1) Restricted in physically strenuous activity, ambulatory and able to do work of light nature    Physical Exam  Vitals reviewed.   Constitutional:       General: She is not in acute distress.     Appearance: Normal appearance. She is not ill-appearing, toxic-appearing or diaphoretic.   HENT:      Head: Normocephalic and atraumatic.   Eyes:      Extraocular Movements: Extraocular movements intact.   Cardiovascular:      Rate and Rhythm: Normal rate and regular rhythm.      Heart sounds: No murmur heard.  Pulmonary:      Effort: Pulmonary effort is normal. No respiratory distress.      Breath sounds: Normal breath sounds. No stridor. No wheezing.   Abdominal:      General: Bowel sounds are normal.      Palpations: Abdomen is soft.   Musculoskeletal:         General: Normal range of motion.      Cervical back: Normal range of motion.   Skin:     General: Skin is warm and dry.      Findings: No rash.   Neurological:      Mental Status: She is alert and oriented to person, place, and time.   Psychiatric:         Mood and Affect: Mood normal.         Behavior: Behavior normal.         I have reexamined the patient and the results are consistent with the previously documented exam. Cony Puri, APRN      RECENT LABS    WBC   Date Value Ref Range Status   11/03/2023 9.74 3.40 - 10.80 10*3/mm3 Final     RBC   Date Value Ref Range Status   11/03/2023 3.14 (L) 3.77 - 5.28 10*6/mm3 Final   07/31/2023 2.80 (L) 3.77 - 5.28 x10E6/uL Final     Hemoglobin   Date Value Ref Range Status   11/03/2023 10.2 (L) 12.0 - 15.9 g/dL Final     Hematocrit   Date Value Ref Range Status   11/03/2023 32.7 (L) 34.0 - 46.6 % Final     MCV   Date Value Ref Range Status   11/03/2023 104.1 (H) 79.0 - 97.0 fL Final     MCH   Date Value Ref Range Status   11/03/2023 32.5 26.6 - 33.0 pg " Final     MCHC   Date Value Ref Range Status   11/03/2023 31.2 (L) 31.5 - 35.7 g/dL Final     RDW   Date Value Ref Range Status   11/03/2023 15.3 12.3 - 15.4 % Final     RDW-SD   Date Value Ref Range Status   11/03/2023 55.1 (H) 37.0 - 54.0 fl Final     MPV   Date Value Ref Range Status   11/03/2023 8.8 6.0 - 12.0 fL Final     Platelets   Date Value Ref Range Status   11/03/2023 330 140 - 450 10*3/mm3 Final     Neutrophil %   Date Value Ref Range Status   11/03/2023 70.2 42.7 - 76.0 % Final     Lymphocyte %   Date Value Ref Range Status   11/03/2023 20.5 19.6 - 45.3 % Final     Monocyte %   Date Value Ref Range Status   11/03/2023 7.5 5.0 - 12.0 % Final     Eosinophil %   Date Value Ref Range Status   11/03/2023 1.3 0.3 - 6.2 % Final     Basophil %   Date Value Ref Range Status   11/03/2023 0.5 0.0 - 1.5 % Final     Immature Grans %   Date Value Ref Range Status   10/09/2020 1.4 (H) 0.0 - 0.5 % Final     Neutrophils, Absolute   Date Value Ref Range Status   11/03/2023 6.83 1.70 - 7.00 10*3/mm3 Final     Lymphocytes, Absolute   Date Value Ref Range Status   11/03/2023 2.00 0.70 - 3.10 10*3/mm3 Final     Monocytes, Absolute   Date Value Ref Range Status   11/03/2023 0.73 0.10 - 0.90 10*3/mm3 Final     Eosinophils, Absolute   Date Value Ref Range Status   11/03/2023 0.13 0.00 - 0.40 10*3/mm3 Final     Basophils, Absolute   Date Value Ref Range Status   11/03/2023 0.05 0.00 - 0.20 10*3/mm3 Final     Immature Grans, Absolute   Date Value Ref Range Status   10/09/2020 0.09 (H) 0.00 - 0.05 10*3/mm3 Final     nRBC   Date Value Ref Range Status   08/25/2023 0.1 0.0 - 0.2 /100 WBC Final       Lab Results   Component Value Date    GLUCOSE 125 (H) 08/18/2023    BUN 34 (H) 08/18/2023    CREATININE 0.89 08/18/2023    EGFRIFNONA 88 07/26/2021    BCR 38.2 (H) 08/18/2023    K 3.7 08/18/2023    CO2 20.0 (L) 08/18/2023    CALCIUM 8.6 08/18/2023    PROTENTOTREF 6.9 08/11/2023    ALBUMIN 4.0 08/18/2023    LABIL2 1.1 08/11/2023    AST 16  08/18/2023    ALT 50 (H) 08/18/2023         Assessment & Plan     Anemia, unspecified type  - CBC & Differential    Autoimmune hemolytic anemia  - Reticulocytes  - Haptoglobin  - Lactate Dehydrogenase    ASSESSMENT and plans:    Iron deficiency anemia, unspecified iron deficiency anemia type  - CBC & Differential        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  Rule out lymphoproliferative disease.  Peripheral blood for flow cytometry shows increased kappa lambda ratio detected rare phenotypic aberrancy of the neutrophils as well as monocytes, possibility of a myeloid disorder was also entertained.  PNH screen was negative and G6PD was not deficient.  For now patient will continue on steroids.  But will begin steroid taper due to normalization of her hemoglobin down to 12.7 g per DL.  We will continue her steroid taper as her hemoglobin has improved to 13.0 g/dL today.  Hemoglobin continues to be low but stable at 10.2 g per DL today.  She will continue prednisone 10 mg daily.  Status post bone marrow aspiration and biopsy which was essentially unremarkable  Folate deficiency: Continue folic acid 1 mg p.o. daily till off prednisone.  Refills given  History of seizures  History of rectal bleeding : Recent  colonoscopy by Dr Vinny Lagos .  She no longer has rectal bleeding and she has completed her course of iron.  Follow-up with GI encouraged.  She has an appointment coming up next week with GI.  She denies rectal bleeding at this time.  Possible sensitivity reaction to Venofer: Patient's epigastric chest pain complaints seem to predate the infusion but she does also complain of itching on the bilateral forearms during the infusion.  Venofer was stopped and normal saline was ran along with 25 mg Benadryl IV given with resolution of the symptoms.           Plans     Continue prednisone 10 mg p.o. every morning  Continue folic acid  Continue PPI  Check LDH, Haptoglobin, and  Retic  Follow-up 2 weeks with Dr. Burns or earlier as needed  CBC reviewed with the patient  All questions answered    Patient verbalized understanding and is in agreement of the above plan.       Electronically signed by Bronwyn Burns MD, 10/17/23, 2:00 PM EDT.

## 2023-11-03 ENCOUNTER — OFFICE VISIT (OUTPATIENT)
Dept: ONCOLOGY | Facility: CLINIC | Age: 28
End: 2023-11-03
Payer: COMMERCIAL

## 2023-11-03 ENCOUNTER — TELEPHONE (OUTPATIENT)
Dept: ONCOLOGY | Facility: CLINIC | Age: 28
End: 2023-11-03
Payer: COMMERCIAL

## 2023-11-03 ENCOUNTER — LAB (OUTPATIENT)
Dept: LAB | Facility: HOSPITAL | Age: 28
End: 2023-11-03
Payer: COMMERCIAL

## 2023-11-03 VITALS
WEIGHT: 266 LBS | SYSTOLIC BLOOD PRESSURE: 115 MMHG | TEMPERATURE: 98.2 F | HEART RATE: 87 BPM | RESPIRATION RATE: 16 BRPM | HEIGHT: 60 IN | OXYGEN SATURATION: 96 % | BODY MASS INDEX: 52.22 KG/M2 | DIASTOLIC BLOOD PRESSURE: 81 MMHG

## 2023-11-03 DIAGNOSIS — D59.10 AUTOIMMUNE HEMOLYTIC ANEMIA: ICD-10-CM

## 2023-11-03 DIAGNOSIS — D64.9 ANEMIA, UNSPECIFIED TYPE: Primary | ICD-10-CM

## 2023-11-03 DIAGNOSIS — D50.0 IRON DEFICIENCY ANEMIA DUE TO CHRONIC BLOOD LOSS: Primary | ICD-10-CM

## 2023-11-03 LAB
BASOPHILS # BLD AUTO: 0.05 10*3/MM3 (ref 0–0.2)
BASOPHILS NFR BLD AUTO: 0.5 % (ref 0–1.5)
DEPRECATED RDW RBC AUTO: 55.1 FL (ref 37–54)
EOSINOPHIL # BLD AUTO: 0.13 10*3/MM3 (ref 0–0.4)
EOSINOPHIL NFR BLD AUTO: 1.3 % (ref 0.3–6.2)
ERYTHROCYTE [DISTWIDTH] IN BLOOD BY AUTOMATED COUNT: 15.3 % (ref 12.3–15.4)
HAPTOGLOB SERPL-MCNC: 126 MG/DL (ref 30–200)
HCT VFR BLD AUTO: 32.7 % (ref 34–46.6)
HGB BLD-MCNC: 10.2 G/DL (ref 12–15.9)
HOLD SPECIMEN: NORMAL
HOLD SPECIMEN: NORMAL
LDH SERPL-CCNC: 340 U/L (ref 135–214)
LYMPHOCYTES # BLD AUTO: 2 10*3/MM3 (ref 0.7–3.1)
LYMPHOCYTES NFR BLD AUTO: 20.5 % (ref 19.6–45.3)
MCH RBC QN AUTO: 32.5 PG (ref 26.6–33)
MCHC RBC AUTO-ENTMCNC: 31.2 G/DL (ref 31.5–35.7)
MCV RBC AUTO: 104.1 FL (ref 79–97)
MONOCYTES # BLD AUTO: 0.73 10*3/MM3 (ref 0.1–0.9)
MONOCYTES NFR BLD AUTO: 7.5 % (ref 5–12)
NEUTROPHILS NFR BLD AUTO: 6.83 10*3/MM3 (ref 1.7–7)
NEUTROPHILS NFR BLD AUTO: 70.2 % (ref 42.7–76)
PLATELET # BLD AUTO: 330 10*3/MM3 (ref 140–450)
PMV BLD AUTO: 8.8 FL (ref 6–12)
RBC # BLD AUTO: 3.14 10*6/MM3 (ref 3.77–5.28)
RETICS # AUTO: 0.36 10*6/MM3 (ref 0.02–0.13)
RETICS/RBC NFR AUTO: 11.37 % (ref 0.7–1.9)
WBC NRBC COR # BLD: 9.74 10*3/MM3 (ref 3.4–10.8)

## 2023-11-03 PROCEDURE — 36415 COLL VENOUS BLD VENIPUNCTURE: CPT

## 2023-11-03 PROCEDURE — 83615 LACTATE (LD) (LDH) ENZYME: CPT | Performed by: NURSE PRACTITIONER

## 2023-11-03 PROCEDURE — 85045 AUTOMATED RETICULOCYTE COUNT: CPT | Performed by: NURSE PRACTITIONER

## 2023-11-03 PROCEDURE — 85025 COMPLETE CBC W/AUTO DIFF WBC: CPT | Performed by: NURSE PRACTITIONER

## 2023-11-03 PROCEDURE — 83010 ASSAY OF HAPTOGLOBIN QUANT: CPT | Performed by: NURSE PRACTITIONER

## 2023-11-03 NOTE — TELEPHONE ENCOUNTER
----- Message from ELSY Ruiz sent at 11/3/2023  1:28 PM EDT -----  Please let the patient know that I discussed her hemoglobin today with Dr. Burns and she is in agreement with the plan I discussed with the patient to continue current doses of medication and f/u in 2 weeks.  Thus far labs I checked at the visit are either stable or improved.

## 2023-11-03 NOTE — TELEPHONE ENCOUNTER
Phoned patient and let her know that Cony stated she spoke to Dr. Burns about her hemoglobin today with Dr. Burns and she is in agreement with the plan I discussed with the patient to continue current doses of medication and f/u in 2 weeks.  Thus far labs I checked at the visit are either stable or improved.  Patient expressed understanding.

## 2023-11-09 ENCOUNTER — HOSPITAL ENCOUNTER (OUTPATIENT)
Dept: CARDIOLOGY | Facility: HOSPITAL | Age: 28
Discharge: HOME | End: 2023-11-09
Payer: COMMERCIAL

## 2023-11-09 ENCOUNTER — TRANSCRIBE ORDERS (OUTPATIENT)
Dept: ADMINISTRATIVE | Facility: HOSPITAL | Age: 28
End: 2023-11-09
Payer: COMMERCIAL

## 2023-11-09 DIAGNOSIS — M79.89 PAIN AND SWELLING OF LOWER LEG, RIGHT: Primary | ICD-10-CM

## 2023-11-09 DIAGNOSIS — M79.661 PAIN AND SWELLING OF LOWER LEG, RIGHT: Primary | ICD-10-CM

## 2023-11-09 DIAGNOSIS — M79.661 PAIN AND SWELLING OF LOWER LEG, RIGHT: ICD-10-CM

## 2023-11-09 DIAGNOSIS — M79.89 PAIN AND SWELLING OF LOWER LEG, RIGHT: ICD-10-CM

## 2023-11-09 LAB
BH CV LOWER VASCULAR LEFT COMMON FEMORAL AUGMENT: NORMAL
BH CV LOWER VASCULAR LEFT COMMON FEMORAL COMPETENT: NORMAL
BH CV LOWER VASCULAR LEFT COMMON FEMORAL COMPRESS: NORMAL
BH CV LOWER VASCULAR LEFT COMMON FEMORAL PHASIC: NORMAL
BH CV LOWER VASCULAR LEFT COMMON FEMORAL SPONT: NORMAL
BH CV LOWER VASCULAR RIGHT COMMON FEMORAL AUGMENT: NORMAL
BH CV LOWER VASCULAR RIGHT COMMON FEMORAL COMPETENT: NORMAL
BH CV LOWER VASCULAR RIGHT COMMON FEMORAL COMPRESS: NORMAL
BH CV LOWER VASCULAR RIGHT COMMON FEMORAL PHASIC: NORMAL
BH CV LOWER VASCULAR RIGHT COMMON FEMORAL SPONT: NORMAL
BH CV LOWER VASCULAR RIGHT DISTAL FEMORAL COMPRESS: NORMAL
BH CV LOWER VASCULAR RIGHT GASTRONEMIUS COMPRESS: NORMAL
BH CV LOWER VASCULAR RIGHT GREATER SAPH AK COMPRESS: NORMAL
BH CV LOWER VASCULAR RIGHT GREATER SAPH BK COMPRESS: NORMAL
BH CV LOWER VASCULAR RIGHT LESSER SAPH COMPRESS: NORMAL
BH CV LOWER VASCULAR RIGHT MID FEMORAL AUGMENT: NORMAL
BH CV LOWER VASCULAR RIGHT MID FEMORAL COMPETENT: NORMAL
BH CV LOWER VASCULAR RIGHT MID FEMORAL COMPRESS: NORMAL
BH CV LOWER VASCULAR RIGHT MID FEMORAL PHASIC: NORMAL
BH CV LOWER VASCULAR RIGHT MID FEMORAL SPONT: NORMAL
BH CV LOWER VASCULAR RIGHT PERONEAL COMPRESS: NORMAL
BH CV LOWER VASCULAR RIGHT POPLITEAL AUGMENT: NORMAL
BH CV LOWER VASCULAR RIGHT POPLITEAL COMPETENT: NORMAL
BH CV LOWER VASCULAR RIGHT POPLITEAL COMPRESS: NORMAL
BH CV LOWER VASCULAR RIGHT POPLITEAL PHASIC: NORMAL
BH CV LOWER VASCULAR RIGHT POPLITEAL SPONT: NORMAL
BH CV LOWER VASCULAR RIGHT POSTERIOR TIBIAL COMPRESS: NORMAL
BH CV LOWER VASCULAR RIGHT PROXIMAL FEMORAL COMPRESS: NORMAL
BH CV LOWER VASCULAR RIGHT SAPHENOFEMORAL JUNCTION COMPRESS: NORMAL
BH CV VAS PRELIMINARY FINDINGS SCRIPTING: 1

## 2023-11-09 PROCEDURE — 93971 EXTREMITY STUDY: CPT

## 2023-11-14 ENCOUNTER — OFFICE VISIT (OUTPATIENT)
Dept: PSYCHIATRY | Facility: CLINIC | Age: 28
End: 2023-11-14
Payer: COMMERCIAL

## 2023-11-14 DIAGNOSIS — F90.0 ADHD, PREDOMINANTLY INATTENTIVE TYPE: Chronic | ICD-10-CM

## 2023-11-14 DIAGNOSIS — F33.1 MAJOR DEPRESSIVE DISORDER, RECURRENT EPISODE, MODERATE: Primary | Chronic | ICD-10-CM

## 2023-11-14 DIAGNOSIS — F41.1 GAD (GENERALIZED ANXIETY DISORDER): Chronic | ICD-10-CM
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
## 2023-11-17 NOTE — PROGRESS NOTES
Hematology/Oncology Outpatient Follow Up    PATIENT NAME:Arlene Brady  :1995  MRN: [id number]  PRIMARY CARE PHYSICIAN: Daniel Sam MD  REFERRING PHYSICIAN: No ref. provider found    Chief Complaint   Patient presents with    Follow-up     Anemia, unspecified type          HISTORY OF PRESENT ILLNESS:     This is a 28 year old female has been referred secondary to anemia.  Patient has a longtime history of anemia.  She has rectal bleeding and had colonoscopy done a few days ago by Dr Vinny Lagos . She has internal and external hemorrhoids. She has a follow up with Dr Lagos.     Patient is amenorrheic for about 6 months.  She had history of menorrhagia. She is on hormone pills to regulate her cycles.   She has low energy, she was on oral iron supplements for a month. She has since ran out of her iron tablets.     Review of her CBCs indicate that she has had anemia with hemoglobin ranging between 8 and 12.3 g per DL.  Today her white count is 8, hemoglobin is 8, MCV is 101 and platelets are 352 with essentially unremarkable differentials.     She denies having blood in her urine     She is single, She is a CNA     Patient does not smoke and drinks occasionally     There is no family history of hematological problems.     Her mother had colon cancer,      2023: Methylmalonic acid level was normal at 343 patient had anemia work-up including a ferritin level which was 167, C-reactive protein was high at 1.96 BUN was 13, creatinine 0.9 LDH was 323 iron profile showed a elevated serum iron and iron saturation, haptoglobin was normal, reticulocyte count was elevated to 16 she has low folate at 3.1  2023: WBC 11.62, hemoglobin 8.0 g/dL, .9, platelets 353,000.  Flow cytometry showed an increased kappa lambda ratio, neutrophils with left shifted maturation and rare phenotypic aberrancy, monocytes with phenotypic aberrancy.  Bone marrow biopsy with molecular and cytogenetic studies  indicated to evaluate for myeloid neoplasm.  8/16/2023 CT of the neck, chest, abdomen and pelvis with contrast showed no evidence of definite pathologic lymphadenopathy concerning for lymphoproliferative disorder.  No acute findings present in the neck, chest, abdomen or pelvis.  8/25/2023: Patient had bone marrow aspiration and biopsy which basically showed normocellular bone marrow for age, decreased iron stores, negative for involvement by malignant lymphoma or metastatic malignancy.  Flow cytometry was unremarkable with no immunophenotypic abnormalities noted.  Cytogenetics showed a normal female karyotype      Past Medical History:   Diagnosis Date    ADHD (attention deficit hyperactivity disorder)     Arthritis     Asthma     exercise induced asthma     Depression     Seizures        Past Surgical History:   Procedure Laterality Date    ABDOMINAL SURGERY      choley    TONSILLECTOMY           Current Outpatient Medications:     acetaminophen-codeine (TYLENOL with CODEINE #3) 300-30 MG per tablet, Take 1 tablet by mouth Every 12 (Twelve) Hours., Disp: , Rfl:     baclofen (LIORESAL) 10 MG tablet, Take 1 tablet by mouth Every 12 (Twelve) Hours., Disp: , Rfl:     fexofenadine (ALLEGRA) 180 MG tablet, Take 1 tablet by mouth Daily., Disp: , Rfl:     folic acid (FOLVITE) 1 MG tablet, TAKE 1 TABLET BY MOUTH EVERY DAY, Disp: 90 tablet, Rfl: 0    folic acid (FOLVITE) 1 MG tablet, Take 1 tablet by mouth Daily., Disp: 30 tablet, Rfl: 2    Junel 1/20 1-20 MG-MCG per tablet, Take 1 tablet by mouth Every Evening., Disp: , Rfl:     nystatin (MYCOSTATIN) 192901 UNIT/GM cream, , Disp: , Rfl:     nystatin-triamcinolone (MYCOLOG) 265713-8.1 UNIT/GM-% ointment, APPLY 1 APPLICATION TOPICALLY TWICE DAILY FOR 21 DAYS, Disp: , Rfl:     OXcarbazepine (TRILEPTAL) 300 MG tablet, Take 1.5 tablets by mouth 2 (Two) Times a Day., Disp: , Rfl:     pantoprazole (PROTONIX) 40 MG EC tablet, TAKE 1 TABLET BY MOUTH EVERY DAY, Disp: 90 tablet, Rfl:  3    predniSONE (DELTASONE) 20 MG tablet, Take 2 tablets by mouth 2 (Two) Times a Day. (Patient taking differently: Take 0.5 tablets by mouth 2 (Two) Times a Day. 10mg Daily), Disp: 120 tablet, Rfl: 1    sertraline (ZOLOFT) 50 MG tablet, Take 1.5 tablets by mouth Daily., Disp: 135 tablet, Rfl: 1    silver sulfadiazine (SILVADENE, SSD) 1 % cream, APPLY 1 APPLICATION TOPICALLY TWICE A DAY FOR 20 DAYS, Disp: , Rfl:     Allergies   Allergen Reactions    Cephalosporins Rash    Penicillins Rash       Family History   Problem Relation Age of Onset    Hypertension Father     Heart disease Father     Hyperlipidemia Father     Cancer Paternal Grandmother        Cancer-related family history includes Cancer in her paternal grandmother.    Social History     Tobacco Use    Smoking status: Never    Smokeless tobacco: Never   Vaping Use    Vaping Use: Never used   Substance Use Topics    Alcohol use: Yes     Comment: very infrequent     Drug use: Never     I have reviewed and confirmed the accuracy of the patient's history: Chief complaint, HPI, ROS, and Subjective as entered by the MA/LPN/RN. Bronwyn Burns MD 11/20/23        SUBJECTIVE:  Arlene is here today for her routine follow-up.  She is compliant with her prednisone, folic acid, and PPI.  She notes she has been tired but she is also working back and forth between 2nd and 3rd shift causing her sleep to be disrupted.  She denies any s/s of bleeding.       Patient denies any new issues    Arlene Brady reports a pain score of 0.  Given her pain assessment as noted, treatment options were discussed and the following options were decided upon as a follow-up plan to address the patient's pain:  Continue current management by her primary care .           REVIEW OF SYSTEMS:      Review of Systems   Constitutional:  Positive for fatigue.   Musculoskeletal:  Positive for back pain.       Per subjective    OBJECTIVE:    Vitals:    11/20/23 1459   BP: 131/83  "  Pulse: 91   Resp: 18   Temp: 98 °F (36.7 °C)   TempSrc: Infrared   SpO2: 98%   Weight: 118 kg (260 lb)   Height: 152.4 cm (60\")   PainSc: 0-No pain             Body mass index is 50.78 kg/m².    ECOG  (1) Restricted in physically strenuous activity, ambulatory and able to do work of light nature    Physical Exam  Vitals reviewed.   Constitutional:       General: She is not in acute distress.     Appearance: Normal appearance. She is not ill-appearing, toxic-appearing or diaphoretic.   HENT:      Head: Normocephalic and atraumatic.   Eyes:      Extraocular Movements: Extraocular movements intact.   Cardiovascular:      Rate and Rhythm: Normal rate and regular rhythm.      Heart sounds: No murmur heard.  Pulmonary:      Effort: Pulmonary effort is normal. No respiratory distress.      Breath sounds: Normal breath sounds. No stridor. No wheezing.   Abdominal:      General: Bowel sounds are normal.      Palpations: Abdomen is soft.   Musculoskeletal:         General: Normal range of motion.      Cervical back: Normal range of motion.   Skin:     General: Skin is warm and dry.      Findings: No rash.   Neurological:      Mental Status: She is alert and oriented to person, place, and time.   Psychiatric:         Mood and Affect: Mood normal.         Behavior: Behavior normal.       I have reexamined the patient and the results are consistent with the previously documented exam. Bronwyn Burns MD        RECENT LABS    WBC   Date Value Ref Range Status   11/20/2023 10.41 3.40 - 10.80 10*3/mm3 Final     RBC   Date Value Ref Range Status   11/20/2023 3.40 (L) 3.77 - 5.28 10*6/mm3 Final   07/31/2023 2.80 (L) 3.77 - 5.28 x10E6/uL Final     Hemoglobin   Date Value Ref Range Status   11/20/2023 10.4 (L) 12.0 - 15.9 g/dL Final     Hematocrit   Date Value Ref Range Status   11/20/2023 32.9 (L) 34.0 - 46.6 % Final     MCV   Date Value Ref Range Status   11/20/2023 96.8 79.0 - 97.0 fL Final     MCH   Date Value Ref Range " Status   11/20/2023 30.6 26.6 - 33.0 pg Final     MCHC   Date Value Ref Range Status   11/20/2023 31.6 31.5 - 35.7 g/dL Final     RDW   Date Value Ref Range Status   11/20/2023 14.5 12.3 - 15.4 % Final     RDW-SD   Date Value Ref Range Status   11/20/2023 48.3 37.0 - 54.0 fl Final     MPV   Date Value Ref Range Status   11/20/2023 8.7 6.0 - 12.0 fL Final     Platelets   Date Value Ref Range Status   11/20/2023 371 140 - 450 10*3/mm3 Final     Neutrophil %   Date Value Ref Range Status   11/20/2023 78.7 (H) 42.7 - 76.0 % Final     Lymphocyte %   Date Value Ref Range Status   11/20/2023 13.9 (L) 19.6 - 45.3 % Final     Monocyte %   Date Value Ref Range Status   11/20/2023 5.5 5.0 - 12.0 % Final     Eosinophil %   Date Value Ref Range Status   11/20/2023 1.6 0.3 - 6.2 % Final     Basophil %   Date Value Ref Range Status   11/20/2023 0.3 0.0 - 1.5 % Final     Immature Grans %   Date Value Ref Range Status   10/09/2020 1.4 (H) 0.0 - 0.5 % Final     Neutrophils, Absolute   Date Value Ref Range Status   11/20/2023 8.19 (H) 1.70 - 7.00 10*3/mm3 Final     Lymphocytes, Absolute   Date Value Ref Range Status   11/20/2023 1.45 0.70 - 3.10 10*3/mm3 Final     Monocytes, Absolute   Date Value Ref Range Status   11/20/2023 0.57 0.10 - 0.90 10*3/mm3 Final     Eosinophils, Absolute   Date Value Ref Range Status   11/20/2023 0.17 0.00 - 0.40 10*3/mm3 Final     Basophils, Absolute   Date Value Ref Range Status   11/20/2023 0.03 0.00 - 0.20 10*3/mm3 Final     Immature Grans, Absolute   Date Value Ref Range Status   10/09/2020 0.09 (H) 0.00 - 0.05 10*3/mm3 Final     nRBC   Date Value Ref Range Status   08/25/2023 0.1 0.0 - 0.2 /100 WBC Final       Lab Results   Component Value Date    GLUCOSE 125 (H) 08/18/2023    BUN 34 (H) 08/18/2023    CREATININE 0.89 08/18/2023    EGFRIFNONA 88 07/26/2021    BCR 38.2 (H) 08/18/2023    K 3.7 08/18/2023    CO2 20.0 (L) 08/18/2023    CALCIUM 8.6 08/18/2023    PROTENTOTREF 6.9 08/11/2023    ALBUMIN 4.0  08/18/2023    LABIL2 1.1 08/11/2023    AST 16 08/18/2023    ALT 50 (H) 08/18/2023         Assessment & Plan     Anemia, unspecified type  - CBC & Differential  - CBC & Differential      ASSESSMENT and plans:    Iron deficiency anemia, unspecified iron deficiency anemia type  - CBC & Differential        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  Rule out lymphoproliferative disease.  Peripheral blood for flow cytometry shows increased kappa lambda ratio detected rare phenotypic aberrancy of the neutrophils as well as monocytes, possibility of a myeloid disorder was also entertained.  PNH screen was negative and G6PD was not deficient.  For now patient will continue on steroids.  But will begin steroid taper due to normalization of her hemoglobin down to 12.7 g per DL.  We will continue her steroid taper as her hemoglobin has improved to 13.0 g/dL today. She will continue prednisone 10 mg daily.  Hemoglobin remained stable therefore will not change her prednisone  Status post bone marrow aspiration and biopsy which was essentially unremarkable  Folate deficiency: Continue folic acid 1 mg p.o. daily while on prednisone  History of seizures  History of rectal bleeding : Recent  colonoscopy by Dr Vinny Lagos .  She no longer has rectal bleeding and she has completed her course of iron.  Follow-up with GI encouraged.  She has an appointment coming up next week with GI.  She denies rectal bleeding at this time.  Possible sensitivity reaction to Venofer: Patient's epigastric chest pain complaints seem to predate the infusion but she does also complain of itching on the bilateral forearms during the infusion.  Venofer was stopped and normal saline was ran along with 25 mg Benadryl IV given with resolution of the symptoms.           Plans     Continue prednisone 10 mg p.o. every morning  Continue folic acid  CBC in 2 weeks  Continue PPI  Check LDH, Haptoglobin, and Retic  Follow-up in 3  weeks  CBC reviewed  All questions answered    Patient verbalized understanding and is in agreement of the above plan.       Electronically signed by Bronwyn Burns MD, 11/20/23, 5:09 PM EST.

## 2023-11-20 ENCOUNTER — OFFICE VISIT (OUTPATIENT)
Dept: ONCOLOGY | Facility: CLINIC | Age: 28
End: 2023-11-20
Payer: COMMERCIAL

## 2023-11-20 ENCOUNTER — LAB (OUTPATIENT)
Dept: LAB | Facility: HOSPITAL | Age: 28
End: 2023-11-20
Payer: COMMERCIAL

## 2023-11-20 VITALS
BODY MASS INDEX: 51.04 KG/M2 | HEART RATE: 91 BPM | OXYGEN SATURATION: 98 % | WEIGHT: 260 LBS | HEIGHT: 60 IN | DIASTOLIC BLOOD PRESSURE: 83 MMHG | TEMPERATURE: 98 F | RESPIRATION RATE: 18 BRPM | SYSTOLIC BLOOD PRESSURE: 131 MMHG

## 2023-11-20 DIAGNOSIS — D64.9 ANEMIA, UNSPECIFIED TYPE: Primary | ICD-10-CM

## 2023-11-20 LAB
BASOPHILS # BLD AUTO: 0.03 10*3/MM3 (ref 0–0.2)
BASOPHILS NFR BLD AUTO: 0.3 % (ref 0–1.5)
DEPRECATED RDW RBC AUTO: 48.3 FL (ref 37–54)
EOSINOPHIL # BLD AUTO: 0.17 10*3/MM3 (ref 0–0.4)
EOSINOPHIL NFR BLD AUTO: 1.6 % (ref 0.3–6.2)
ERYTHROCYTE [DISTWIDTH] IN BLOOD BY AUTOMATED COUNT: 14.5 % (ref 12.3–15.4)
HCT VFR BLD AUTO: 32.9 % (ref 34–46.6)
HGB BLD-MCNC: 10.4 G/DL (ref 12–15.9)
HOLD SPECIMEN: NORMAL
HOLD SPECIMEN: NORMAL
LYMPHOCYTES # BLD AUTO: 1.45 10*3/MM3 (ref 0.7–3.1)
LYMPHOCYTES NFR BLD AUTO: 13.9 % (ref 19.6–45.3)
MCH RBC QN AUTO: 30.6 PG (ref 26.6–33)
MCHC RBC AUTO-ENTMCNC: 31.6 G/DL (ref 31.5–35.7)
MCV RBC AUTO: 96.8 FL (ref 79–97)
MONOCYTES # BLD AUTO: 0.57 10*3/MM3 (ref 0.1–0.9)
MONOCYTES NFR BLD AUTO: 5.5 % (ref 5–12)
NEUTROPHILS NFR BLD AUTO: 78.7 % (ref 42.7–76)
NEUTROPHILS NFR BLD AUTO: 8.19 10*3/MM3 (ref 1.7–7)
PLATELET # BLD AUTO: 371 10*3/MM3 (ref 140–450)
PMV BLD AUTO: 8.7 FL (ref 6–12)
RBC # BLD AUTO: 3.4 10*6/MM3 (ref 3.77–5.28)
WBC NRBC COR # BLD AUTO: 10.41 10*3/MM3 (ref 3.4–10.8)

## 2023-11-20 PROCEDURE — 85025 COMPLETE CBC W/AUTO DIFF WBC: CPT

## 2023-11-20 PROCEDURE — 36415 COLL VENOUS BLD VENIPUNCTURE: CPT

## 2023-12-04 ENCOUNTER — LAB (OUTPATIENT)
Dept: LAB | Facility: HOSPITAL | Age: 28
End: 2023-12-04
Payer: COMMERCIAL

## 2023-12-04 DIAGNOSIS — D64.9 ANEMIA, UNSPECIFIED TYPE: ICD-10-CM

## 2023-12-04 LAB
BASOPHILS # BLD AUTO: 0.04 10*3/MM3 (ref 0–0.2)
BASOPHILS NFR BLD AUTO: 0.5 % (ref 0–1.5)
DEPRECATED RDW RBC AUTO: 47.2 FL (ref 37–54)
EOSINOPHIL # BLD AUTO: 0.43 10*3/MM3 (ref 0–0.4)
EOSINOPHIL NFR BLD AUTO: 5.8 % (ref 0.3–6.2)
ERYTHROCYTE [DISTWIDTH] IN BLOOD BY AUTOMATED COUNT: 14.7 % (ref 12.3–15.4)
HCT VFR BLD AUTO: 33.5 % (ref 34–46.6)
HGB BLD-MCNC: 10.3 G/DL (ref 12–15.9)
LYMPHOCYTES # BLD AUTO: 1.97 10*3/MM3 (ref 0.7–3.1)
LYMPHOCYTES NFR BLD AUTO: 26.4 % (ref 19.6–45.3)
MCH RBC QN AUTO: 29.2 PG (ref 26.6–33)
MCHC RBC AUTO-ENTMCNC: 30.7 G/DL (ref 31.5–35.7)
MCV RBC AUTO: 94.9 FL (ref 79–97)
MONOCYTES # BLD AUTO: 0.64 10*3/MM3 (ref 0.1–0.9)
MONOCYTES NFR BLD AUTO: 8.6 % (ref 5–12)
NEUTROPHILS NFR BLD AUTO: 4.37 10*3/MM3 (ref 1.7–7)
NEUTROPHILS NFR BLD AUTO: 58.7 % (ref 42.7–76)
PLATELET # BLD AUTO: 338 10*3/MM3 (ref 140–450)
PMV BLD AUTO: 9 FL (ref 6–12)
RBC # BLD AUTO: 3.53 10*6/MM3 (ref 3.77–5.28)
WBC NRBC COR # BLD AUTO: 7.45 10*3/MM3 (ref 3.4–10.8)

## 2023-12-04 PROCEDURE — 85025 COMPLETE CBC W/AUTO DIFF WBC: CPT

## 2023-12-04 PROCEDURE — 36415 COLL VENOUS BLD VENIPUNCTURE: CPT

## 2023-12-20 NOTE — PROGRESS NOTES
Hematology/Oncology Outpatient Follow Up    PATIENT NAME:Arlene Brady  :1995  MRN: [id number]  PRIMARY CARE PHYSICIAN: Daniel Sam MD  REFERRING PHYSICIAN: No ref. provider found    Chief Complaint   Patient presents with    Follow-up     Anemia, unspecified type          HISTORY OF PRESENT ILLNESS:     This is a 28 year old female has been referred secondary to anemia.  Patient has a longtime history of anemia.  She has rectal bleeding and had colonoscopy done a few days ago by Dr Vinny Lagos . She has internal and external hemorrhoids. She has a follow up with Dr Lagos.     Patient is amenorrheic for about 6 months.  She had history of menorrhagia. She is on hormone pills to regulate her cycles.   She has low energy, she was on oral iron supplements for a month. She has since ran out of her iron tablets.     Review of her CBCs indicate that she has had anemia with hemoglobin ranging between 8 and 12.3 g per DL.  Today her white count is 8, hemoglobin is 8, MCV is 101 and platelets are 352 with essentially unremarkable differentials.     She denies having blood in her urine     She is single, She is a CNA     Patient does not smoke and drinks occasionally     There is no family history of hematological problems.     Her mother had colon cancer,      2023: Methylmalonic acid level was normal at 343 patient had anemia work-up including a ferritin level which was 167, C-reactive protein was high at 1.96 BUN was 13, creatinine 0.9 LDH was 323 iron profile showed a elevated serum iron and iron saturation, haptoglobin was normal, reticulocyte count was elevated to 16 she has low folate at 3.1  2023: WBC 11.62, hemoglobin 8.0 g/dL, .9, platelets 353,000.  Flow cytometry showed an increased kappa lambda ratio, neutrophils with left shifted maturation and rare phenotypic aberrancy, monocytes with phenotypic aberrancy.  Bone marrow biopsy with molecular and cytogenetic studies  indicated to evaluate for myeloid neoplasm.  8/16/2023 CT of the neck, chest, abdomen and pelvis with contrast showed no evidence of definite pathologic lymphadenopathy concerning for lymphoproliferative disorder.  No acute findings present in the neck, chest, abdomen or pelvis.  8/25/2023: Patient had bone marrow aspiration and biopsy which basically showed normocellular bone marrow for age, decreased iron stores, negative for involvement by malignant lymphoma or metastatic malignancy.  Flow cytometry was unremarkable with no immunophenotypic abnormalities noted.  Cytogenetics showed a normal female karyotype      Past Medical History:   Diagnosis Date    ADHD (attention deficit hyperactivity disorder)     Arthritis     Asthma     exercise induced asthma     Depression     Seizures        Past Surgical History:   Procedure Laterality Date    ABDOMINAL SURGERY      choley    TONSILLECTOMY           Current Outpatient Medications:     acetaminophen-codeine (TYLENOL with CODEINE #3) 300-30 MG per tablet, Take 1 tablet by mouth Every 12 (Twelve) Hours., Disp: , Rfl:     baclofen (LIORESAL) 10 MG tablet, Take 1 tablet by mouth Every 12 (Twelve) Hours., Disp: , Rfl:     fexofenadine (ALLEGRA) 180 MG tablet, Take 1 tablet by mouth Daily., Disp: , Rfl:     folic acid (FOLVITE) 1 MG tablet, TAKE 1 TABLET BY MOUTH EVERY DAY, Disp: 90 tablet, Rfl: 0    folic acid (FOLVITE) 1 MG tablet, Take 1 tablet by mouth Daily., Disp: 30 tablet, Rfl: 2    Junel 1/20 1-20 MG-MCG per tablet, Take 1 tablet by mouth Every Evening., Disp: , Rfl:     nystatin (MYCOSTATIN) 439424 UNIT/GM cream, , Disp: , Rfl:     nystatin-triamcinolone (MYCOLOG) 908516-7.1 UNIT/GM-% ointment, APPLY 1 APPLICATION TOPICALLY TWICE DAILY FOR 21 DAYS, Disp: , Rfl:     OXcarbazepine (TRILEPTAL) 300 MG tablet, Take 1.5 tablets by mouth 2 (Two) Times a Day., Disp: , Rfl:     pantoprazole (PROTONIX) 40 MG EC tablet, TAKE 1 TABLET BY MOUTH EVERY DAY, Disp: 90 tablet, Rfl:  "3    predniSONE (DELTASONE) 20 MG tablet, Take 2 tablets by mouth 2 (Two) Times a Day. (Patient taking differently: Take 0.5 tablets by mouth 2 (Two) Times a Day. 10mg Daily), Disp: 120 tablet, Rfl: 1    sertraline (ZOLOFT) 50 MG tablet, Take 1.5 tablets by mouth Daily., Disp: 135 tablet, Rfl: 1    silver sulfadiazine (SILVADENE, SSD) 1 % cream, APPLY 1 APPLICATION TOPICALLY TWICE A DAY FOR 20 DAYS, Disp: , Rfl:     Allergies   Allergen Reactions    Cephalosporins Rash    Penicillins Rash       Family History   Problem Relation Age of Onset    Hypertension Father     Heart disease Father     Hyperlipidemia Father     Cancer Paternal Grandmother        Cancer-related family history includes Cancer in her paternal grandmother.    Social History     Tobacco Use    Smoking status: Never    Smokeless tobacco: Never   Vaping Use    Vaping Use: Never used   Substance Use Topics    Alcohol use: Yes     Comment: very infrequent     Drug use: Never       I have reviewed and confirmed the accuracy of the patient's history: Chief complaint, HPI, ROS, and Subjective as entered by the MA/LPN/RN. Bronwyn Burns MD 12/21/23      SUBJECTIVE:      Patient denies any new issues.  She was diagnosed with COVID recently        Arlene Brady reports a pain score of 0.  Given her pain assessment as noted, treatment options were discussed and the following options were decided upon as a follow-up plan to address the patient's pain:  Continue current management by her primary care .           REVIEW OF SYSTEMS:      Review of Systems   Constitutional:  Positive for fatigue.   Musculoskeletal:  Positive for back pain.       Per subjective    OBJECTIVE:    Vitals:    12/21/23 1323   BP: 91/64   Pulse: 85   Resp: 20   Temp: 98 °F (36.7 °C)   TempSrc: Infrared   SpO2: 98%   Weight: 118 kg (260 lb)   Height: 152.4 cm (60\")   PainSc: 0-No pain               Body mass index is 50.78 kg/m².    ECOG  (1) Restricted in physically " strenuous activity, ambulatory and able to do work of light nature    Physical Exam  Vitals reviewed.   Constitutional:       General: She is not in acute distress.     Appearance: Normal appearance. She is not ill-appearing, toxic-appearing or diaphoretic.   HENT:      Head: Normocephalic and atraumatic.   Eyes:      Extraocular Movements: Extraocular movements intact.   Cardiovascular:      Rate and Rhythm: Normal rate and regular rhythm.      Heart sounds: No murmur heard.  Pulmonary:      Effort: Pulmonary effort is normal. No respiratory distress.      Breath sounds: Normal breath sounds. No stridor. No wheezing.   Abdominal:      General: Bowel sounds are normal.      Palpations: Abdomen is soft.   Musculoskeletal:         General: Normal range of motion.      Cervical back: Normal range of motion.   Skin:     General: Skin is warm and dry.      Findings: No rash.   Neurological:      Mental Status: She is alert and oriented to person, place, and time.   Psychiatric:         Mood and Affect: Mood normal.         Behavior: Behavior normal.       I have reexamined the patient and the results are consistent with the previously documented exam. Bronwyn Shelley Burns MD         RECENT LABS    WBC   Date Value Ref Range Status   12/21/2023 9.76 3.40 - 10.80 10*3/mm3 Final     RBC   Date Value Ref Range Status   12/21/2023 3.57 (L) 3.77 - 5.28 10*6/mm3 Final   07/31/2023 2.80 (L) 3.77 - 5.28 x10E6/uL Final     Hemoglobin   Date Value Ref Range Status   12/21/2023 10.5 (L) 12.0 - 15.9 g/dL Final     Hematocrit   Date Value Ref Range Status   12/21/2023 33.7 (L) 34.0 - 46.6 % Final     MCV   Date Value Ref Range Status   12/21/2023 94.4 79.0 - 97.0 fL Final     MCH   Date Value Ref Range Status   12/21/2023 29.4 26.6 - 33.0 pg Final     MCHC   Date Value Ref Range Status   12/21/2023 31.2 (L) 31.5 - 35.7 g/dL Final     RDW   Date Value Ref Range Status   12/21/2023 15.3 12.3 - 15.4 % Final     RDW-SD   Date Value Ref  Range Status   12/21/2023 49.4 37.0 - 54.0 fl Final     MPV   Date Value Ref Range Status   12/21/2023 9.1 6.0 - 12.0 fL Final     Platelets   Date Value Ref Range Status   12/21/2023 422 140 - 450 10*3/mm3 Final     Neutrophil %   Date Value Ref Range Status   12/21/2023 74.3 42.7 - 76.0 % Final     Lymphocyte %   Date Value Ref Range Status   12/21/2023 14.9 (L) 19.6 - 45.3 % Final     Monocyte %   Date Value Ref Range Status   12/21/2023 6.1 5.0 - 12.0 % Final     Eosinophil %   Date Value Ref Range Status   12/21/2023 4.3 0.3 - 6.2 % Final     Basophil %   Date Value Ref Range Status   12/21/2023 0.4 0.0 - 1.5 % Final     Immature Grans %   Date Value Ref Range Status   10/09/2020 1.4 (H) 0.0 - 0.5 % Final     Neutrophils, Absolute   Date Value Ref Range Status   12/21/2023 7.25 (H) 1.70 - 7.00 10*3/mm3 Final     Lymphocytes, Absolute   Date Value Ref Range Status   12/21/2023 1.45 0.70 - 3.10 10*3/mm3 Final     Monocytes, Absolute   Date Value Ref Range Status   12/21/2023 0.60 0.10 - 0.90 10*3/mm3 Final     Eosinophils, Absolute   Date Value Ref Range Status   12/21/2023 0.42 (H) 0.00 - 0.40 10*3/mm3 Final     Basophils, Absolute   Date Value Ref Range Status   12/21/2023 0.04 0.00 - 0.20 10*3/mm3 Final     Immature Grans, Absolute   Date Value Ref Range Status   10/09/2020 0.09 (H) 0.00 - 0.05 10*3/mm3 Final     nRBC   Date Value Ref Range Status   08/25/2023 0.1 0.0 - 0.2 /100 WBC Final       Lab Results   Component Value Date    GLUCOSE 125 (H) 08/18/2023    BUN 34 (H) 08/18/2023    CREATININE 0.89 08/18/2023    EGFRIFNONA 88 07/26/2021    BCR 38.2 (H) 08/18/2023    K 3.7 08/18/2023    CO2 20.0 (L) 08/18/2023    CALCIUM 8.6 08/18/2023    PROTENTOTREF 6.9 08/11/2023    ALBUMIN 4.0 08/18/2023    LABIL2 1.1 08/11/2023    AST 16 08/18/2023    ALT 50 (H) 08/18/2023         Assessment & Plan     Anemia, unspecified type  - CBC & Differential        ASSESSMENT and plans:            Autoimmune hemolytic anemia on  steroids hemoglobin has improved to 11.4 g per DL.  Rule out lymphoproliferative disease.  Peripheral blood for flow cytometry shows increased kappa lambda ratio detected rare phenotypic aberrancy of the neutrophils as well as monocytes, possibility of a myeloid disorder was also entertained.  PNH screen was negative and G6PD was not deficient.  For now patient will continue on steroids.  But will begin steroid taper due to normalization of her hemoglobin down to 12.7 g per DL.  We will continue her steroid taper as her hemoglobin has improved to 13.0 g/dL today. She will continue prednisone 10 mg daily.  Hemoglobin remains at 10 g per DL therefore no changes to her doses of prednisone.  Repeat CBC in 2 weeks and follow-up in 4 weeks  Status post bone marrow aspiration and biopsy which was essentially unremarkable  Folate deficiency: Continue folic acid 1 mg p.o. daily while on prednisone  History of seizures  History of rectal bleeding : Recent  colonoscopy by Dr Vinny Lagos .  She no longer has rectal bleeding and she has completed her course of iron.  Follow-up with GI encouraged.  She has an appointment coming up next week with GI.  She denies rectal bleeding at this time.  Possible sensitivity reaction to Venofer: Patient's epigastric chest pain complaints seem to predate the infusion but she does also complain of itching on the bilateral forearms during the infusion.  Venofer was stopped and normal saline was ran along with 25 mg Benadryl IV given with resolution of the symptoms.           Plans     Continue prednisone 10 mg p.o. every morning  Continue folic acid  CBC in 2 weeks  Continue PPI  Follow-up 4 weeks  All questions answered    Patient verbalized understanding and is in agreement of the above plan.

## 2023-12-21 ENCOUNTER — LAB (OUTPATIENT)
Dept: LAB | Facility: HOSPITAL | Age: 28
End: 2023-12-21
Payer: COMMERCIAL

## 2023-12-21 ENCOUNTER — OFFICE VISIT (OUTPATIENT)
Dept: ONCOLOGY | Facility: CLINIC | Age: 28
End: 2023-12-21
Payer: COMMERCIAL

## 2023-12-21 VITALS
RESPIRATION RATE: 20 BRPM | HEIGHT: 60 IN | BODY MASS INDEX: 51.04 KG/M2 | SYSTOLIC BLOOD PRESSURE: 91 MMHG | TEMPERATURE: 98 F | DIASTOLIC BLOOD PRESSURE: 64 MMHG | OXYGEN SATURATION: 98 % | WEIGHT: 260 LBS | HEART RATE: 85 BPM

## 2023-12-21 DIAGNOSIS — D64.9 ANEMIA, UNSPECIFIED TYPE: Primary | ICD-10-CM

## 2023-12-21 LAB
BASOPHILS # BLD AUTO: 0.04 10*3/MM3 (ref 0–0.2)
BASOPHILS NFR BLD AUTO: 0.4 % (ref 0–1.5)
DEPRECATED RDW RBC AUTO: 49.4 FL (ref 37–54)
EOSINOPHIL # BLD AUTO: 0.42 10*3/MM3 (ref 0–0.4)
EOSINOPHIL NFR BLD AUTO: 4.3 % (ref 0.3–6.2)
ERYTHROCYTE [DISTWIDTH] IN BLOOD BY AUTOMATED COUNT: 15.3 % (ref 12.3–15.4)
HCT VFR BLD AUTO: 33.7 % (ref 34–46.6)
HGB BLD-MCNC: 10.5 G/DL (ref 12–15.9)
HOLD SPECIMEN: NORMAL
HOLD SPECIMEN: NORMAL
LYMPHOCYTES # BLD AUTO: 1.45 10*3/MM3 (ref 0.7–3.1)
LYMPHOCYTES NFR BLD AUTO: 14.9 % (ref 19.6–45.3)
MCH RBC QN AUTO: 29.4 PG (ref 26.6–33)
MCHC RBC AUTO-ENTMCNC: 31.2 G/DL (ref 31.5–35.7)
MCV RBC AUTO: 94.4 FL (ref 79–97)
MONOCYTES # BLD AUTO: 0.6 10*3/MM3 (ref 0.1–0.9)
MONOCYTES NFR BLD AUTO: 6.1 % (ref 5–12)
NEUTROPHILS NFR BLD AUTO: 7.25 10*3/MM3 (ref 1.7–7)
NEUTROPHILS NFR BLD AUTO: 74.3 % (ref 42.7–76)
PLATELET # BLD AUTO: 422 10*3/MM3 (ref 140–450)
PMV BLD AUTO: 9.1 FL (ref 6–12)
RBC # BLD AUTO: 3.57 10*6/MM3 (ref 3.77–5.28)
WBC NRBC COR # BLD AUTO: 9.76 10*3/MM3 (ref 3.4–10.8)

## 2023-12-21 PROCEDURE — 85025 COMPLETE CBC W/AUTO DIFF WBC: CPT

## 2023-12-21 PROCEDURE — 36415 COLL VENOUS BLD VENIPUNCTURE: CPT

## 2023-12-23 DIAGNOSIS — F41.1 GAD (GENERALIZED ANXIETY DISORDER): Chronic | ICD-10-CM

## 2023-12-23 DIAGNOSIS — F33.1 MAJOR DEPRESSIVE DISORDER, RECURRENT EPISODE, MODERATE: Chronic | ICD-10-CM

## 2024-01-04 ENCOUNTER — LAB (OUTPATIENT)
Dept: LAB | Facility: HOSPITAL | Age: 29
End: 2024-01-04
Payer: COMMERCIAL

## 2024-01-04 DIAGNOSIS — D64.9 ANEMIA, UNSPECIFIED TYPE: ICD-10-CM

## 2024-01-04 LAB
BASOPHILS # BLD AUTO: 0.03 10*3/MM3 (ref 0–0.2)
BASOPHILS NFR BLD AUTO: 0.3 % (ref 0–1.5)
DEPRECATED RDW RBC AUTO: 47.2 FL (ref 37–54)
EOSINOPHIL # BLD AUTO: 0.14 10*3/MM3 (ref 0–0.4)
EOSINOPHIL NFR BLD AUTO: 1.2 % (ref 0.3–6.2)
ERYTHROCYTE [DISTWIDTH] IN BLOOD BY AUTOMATED COUNT: 14.8 % (ref 12.3–15.4)
HCT VFR BLD AUTO: 35.5 % (ref 34–46.6)
HGB BLD-MCNC: 11.1 G/DL (ref 12–15.9)
LYMPHOCYTES # BLD AUTO: 1.31 10*3/MM3 (ref 0.7–3.1)
LYMPHOCYTES NFR BLD AUTO: 11 % (ref 19.6–45.3)
MCH RBC QN AUTO: 28.5 PG (ref 26.6–33)
MCHC RBC AUTO-ENTMCNC: 31.3 G/DL (ref 31.5–35.7)
MCV RBC AUTO: 91.3 FL (ref 79–97)
MONOCYTES # BLD AUTO: 0.4 10*3/MM3 (ref 0.1–0.9)
MONOCYTES NFR BLD AUTO: 3.4 % (ref 5–12)
NEUTROPHILS NFR BLD AUTO: 10.02 10*3/MM3 (ref 1.7–7)
NEUTROPHILS NFR BLD AUTO: 84.1 % (ref 42.7–76)
PLATELET # BLD AUTO: 382 10*3/MM3 (ref 140–450)
PMV BLD AUTO: 9 FL (ref 6–12)
RBC # BLD AUTO: 3.89 10*6/MM3 (ref 3.77–5.28)
WBC NRBC COR # BLD AUTO: 11.9 10*3/MM3 (ref 3.4–10.8)

## 2024-01-04 PROCEDURE — 36415 COLL VENOUS BLD VENIPUNCTURE: CPT

## 2024-01-04 PROCEDURE — 85025 COMPLETE CBC W/AUTO DIFF WBC: CPT

## 2024-01-18 ENCOUNTER — LAB (OUTPATIENT)
Dept: LAB | Facility: HOSPITAL | Age: 29
End: 2024-01-18
Payer: COMMERCIAL

## 2024-01-18 ENCOUNTER — OFFICE VISIT (OUTPATIENT)
Dept: ONCOLOGY | Facility: CLINIC | Age: 29
End: 2024-01-18
Payer: COMMERCIAL

## 2024-01-18 VITALS
OXYGEN SATURATION: 99 % | HEIGHT: 60 IN | WEIGHT: 260 LBS | BODY MASS INDEX: 51.04 KG/M2 | DIASTOLIC BLOOD PRESSURE: 72 MMHG | HEART RATE: 78 BPM | TEMPERATURE: 98 F | SYSTOLIC BLOOD PRESSURE: 111 MMHG | RESPIRATION RATE: 18 BRPM

## 2024-01-18 DIAGNOSIS — D50.0 IRON DEFICIENCY ANEMIA DUE TO CHRONIC BLOOD LOSS: ICD-10-CM

## 2024-01-18 DIAGNOSIS — D64.9 ANEMIA, UNSPECIFIED TYPE: Primary | ICD-10-CM

## 2024-01-18 DIAGNOSIS — D59.10 AUTOIMMUNE HEMOLYTIC ANEMIA: ICD-10-CM

## 2024-01-18 LAB
BASOPHILS # BLD AUTO: 0.05 10*3/MM3 (ref 0–0.2)
BASOPHILS NFR BLD AUTO: 0.6 % (ref 0–1.5)
DEPRECATED RDW RBC AUTO: 47.9 FL (ref 37–54)
EOSINOPHIL # BLD AUTO: 0.29 10*3/MM3 (ref 0–0.4)
EOSINOPHIL NFR BLD AUTO: 3.2 % (ref 0.3–6.2)
ERYTHROCYTE [DISTWIDTH] IN BLOOD BY AUTOMATED COUNT: 14.9 % (ref 12.3–15.4)
HCT VFR BLD AUTO: 35.3 % (ref 34–46.6)
HGB BLD-MCNC: 11.1 G/DL (ref 12–15.9)
HOLD SPECIMEN: NORMAL
HOLD SPECIMEN: NORMAL
LYMPHOCYTES # BLD AUTO: 1.73 10*3/MM3 (ref 0.7–3.1)
LYMPHOCYTES NFR BLD AUTO: 19.2 % (ref 19.6–45.3)
MCH RBC QN AUTO: 29 PG (ref 26.6–33)
MCHC RBC AUTO-ENTMCNC: 31.4 G/DL (ref 31.5–35.7)
MCV RBC AUTO: 92.2 FL (ref 79–97)
MONOCYTES # BLD AUTO: 0.68 10*3/MM3 (ref 0.1–0.9)
MONOCYTES NFR BLD AUTO: 7.6 % (ref 5–12)
NEUTROPHILS NFR BLD AUTO: 6.24 10*3/MM3 (ref 1.7–7)
NEUTROPHILS NFR BLD AUTO: 69.4 % (ref 42.7–76)
PLATELET # BLD AUTO: 331 10*3/MM3 (ref 140–450)
PMV BLD AUTO: 9.5 FL (ref 6–12)
RBC # BLD AUTO: 3.83 10*6/MM3 (ref 3.77–5.28)
WBC NRBC COR # BLD AUTO: 8.99 10*3/MM3 (ref 3.4–10.8)

## 2024-01-18 PROCEDURE — 85025 COMPLETE CBC W/AUTO DIFF WBC: CPT

## 2024-01-18 PROCEDURE — 36415 COLL VENOUS BLD VENIPUNCTURE: CPT

## 2024-01-18 RX ORDER — PREDNISONE 5 MG/1
5 TABLET ORAL EVERY OTHER DAY
Qty: 30 TABLET | Refills: 0 | Status: SHIPPED | OUTPATIENT
Start: 2024-01-18

## 2024-02-01 ENCOUNTER — TELEPHONE (OUTPATIENT)
Dept: ONCOLOGY | Facility: CLINIC | Age: 29
End: 2024-02-01
Payer: COMMERCIAL

## 2024-02-01 NOTE — TELEPHONE ENCOUNTER
Caller: Arlene Brady    Relationship to patient: Self    Best call back number: [phone]    Chief complaint: R/S    Type of visit: LAB AND NUTRITION     Requested date: REQUESTING CALL BACK TO R/S DUE TO HER WORK SCHEDULE     If rescheduling, when is the original appointment: 2-2

## 2024-02-01 NOTE — TELEPHONE ENCOUNTER
Caller: Arlene Brady    Relationship: Self    Best call back number: [phone]     What is the best time to reach you: ASAP    Who are you requesting to speak with (clinical staff, provider,  specific staff member): JULIO/SCHEDULING    Do you know the name of the person who called: JULIO    What was the call regarding: PT IS RETURNING JULIO'S CALL TO GET RESCHEDULED.  HUB UNABLE TO WARM TRANSFER, PLEASE CALL PT BACK TO RESCHEDULE.

## 2024-02-05 ENCOUNTER — NUTRITION (OUTPATIENT)
Dept: ONCOLOGY | Facility: CLINIC | Age: 29
End: 2024-02-05
Payer: COMMERCIAL

## 2024-02-05 ENCOUNTER — TELEPHONE (OUTPATIENT)
Dept: ONCOLOGY | Facility: CLINIC | Age: 29
End: 2024-02-05

## 2024-02-05 ENCOUNTER — LAB (OUTPATIENT)
Dept: LAB | Facility: HOSPITAL | Age: 29
End: 2024-02-05
Payer: COMMERCIAL

## 2024-02-05 VITALS — BODY MASS INDEX: 50.97 KG/M2 | WEIGHT: 261 LBS

## 2024-02-05 DIAGNOSIS — D59.10 AUTOIMMUNE HEMOLYTIC ANEMIA: ICD-10-CM

## 2024-02-05 DIAGNOSIS — D64.9 ANEMIA, UNSPECIFIED TYPE: ICD-10-CM

## 2024-02-05 LAB
BASOPHILS # BLD AUTO: 0.03 10*3/MM3 (ref 0–0.2)
BASOPHILS NFR BLD AUTO: 0.3 % (ref 0–1.5)
DEPRECATED RDW RBC AUTO: 48.5 FL (ref 37–54)
EOSINOPHIL # BLD AUTO: 0.29 10*3/MM3 (ref 0–0.4)
EOSINOPHIL NFR BLD AUTO: 3 % (ref 0.3–6.2)
ERYTHROCYTE [DISTWIDTH] IN BLOOD BY AUTOMATED COUNT: 14.9 % (ref 12.3–15.4)
HCT VFR BLD AUTO: 36.5 % (ref 34–46.6)
HGB BLD-MCNC: 11.5 G/DL (ref 12–15.9)
LYMPHOCYTES # BLD AUTO: 1.74 10*3/MM3 (ref 0.7–3.1)
LYMPHOCYTES NFR BLD AUTO: 18.2 % (ref 19.6–45.3)
MCH RBC QN AUTO: 29.2 PG (ref 26.6–33)
MCHC RBC AUTO-ENTMCNC: 31.5 G/DL (ref 31.5–35.7)
MCV RBC AUTO: 92.6 FL (ref 79–97)
MONOCYTES # BLD AUTO: 0.69 10*3/MM3 (ref 0.1–0.9)
MONOCYTES NFR BLD AUTO: 7.2 % (ref 5–12)
NEUTROPHILS NFR BLD AUTO: 6.79 10*3/MM3 (ref 1.7–7)
NEUTROPHILS NFR BLD AUTO: 71.3 % (ref 42.7–76)
PLATELET # BLD AUTO: 367 10*3/MM3 (ref 140–450)
PMV BLD AUTO: 9.1 FL (ref 6–12)
RBC # BLD AUTO: 3.94 10*6/MM3 (ref 3.77–5.28)
WBC NRBC COR # BLD AUTO: 9.54 10*3/MM3 (ref 3.4–10.8)

## 2024-02-05 PROCEDURE — 36415 COLL VENOUS BLD VENIPUNCTURE: CPT

## 2024-02-05 PROCEDURE — 85025 COMPLETE CBC W/AUTO DIFF WBC: CPT

## 2024-02-05 NOTE — TELEPHONE ENCOUNTER
Caller: Arlene Brady    Relationship to patient: Self    Best call back number: [phone]    Chief complaint: RESCHEDULE     Type of visit: LAB AND NUTRITION APPOINTMENT     Requested date: TODAY, IF NOT TODAY CALL PATIENT TO SCHEDULE      If rescheduling, when is the original appointment: 2-12     Additional notes:ARLENE THOUGHT APPOINTMENT WAS TODAY        PLEASE ADVISE

## 2024-02-15 ENCOUNTER — TELEPHONE (OUTPATIENT)
Dept: ONCOLOGY | Facility: CLINIC | Age: 29
End: 2024-02-15
Payer: COMMERCIAL

## 2024-02-19 RX ORDER — PREDNISONE 5 MG/1
5 TABLET ORAL EVERY OTHER DAY
Qty: 30 TABLET | Refills: 0 | Status: SHIPPED | OUTPATIENT
Start: 2024-02-19

## 2024-02-20 ENCOUNTER — OFFICE VISIT (OUTPATIENT)
Dept: PSYCHIATRY | Facility: CLINIC | Age: 29
End: 2024-02-20
Payer: COMMERCIAL

## 2024-02-20 DIAGNOSIS — F41.1 GAD (GENERALIZED ANXIETY DISORDER): Chronic | ICD-10-CM

## 2024-02-20 DIAGNOSIS — F90.0 ADHD, PREDOMINANTLY INATTENTIVE TYPE: Chronic | ICD-10-CM

## 2024-02-20 DIAGNOSIS — F33.1 MAJOR DEPRESSIVE DISORDER, RECURRENT EPISODE, MODERATE: Primary | Chronic | ICD-10-CM

## 2024-02-20 PROCEDURE — 99214 OFFICE O/P EST MOD 30 MIN: CPT | Performed by: PSYCHIATRY & NEUROLOGY
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
## 2024-02-22 ENCOUNTER — OFFICE VISIT (OUTPATIENT)
Dept: ONCOLOGY | Facility: CLINIC | Age: 29
End: 2024-02-22
Payer: COMMERCIAL

## 2024-02-22 ENCOUNTER — LAB (OUTPATIENT)
Dept: LAB | Facility: HOSPITAL | Age: 29
End: 2024-02-22
Payer: COMMERCIAL

## 2024-02-22 VITALS
HEIGHT: 60 IN | WEIGHT: 264 LBS | BODY MASS INDEX: 51.83 KG/M2 | DIASTOLIC BLOOD PRESSURE: 91 MMHG | SYSTOLIC BLOOD PRESSURE: 147 MMHG | HEART RATE: 82 BPM | OXYGEN SATURATION: 98 % | TEMPERATURE: 98 F | RESPIRATION RATE: 20 BRPM

## 2024-02-22 DIAGNOSIS — D64.9 ANEMIA, UNSPECIFIED TYPE: Primary | ICD-10-CM

## 2024-02-22 PROBLEM — H53.002 AMBLYOPIA OF LEFT EYE: Status: ACTIVE | Noted: 2024-02-12

## 2024-02-22 LAB
BASOPHILS # BLD AUTO: 0.03 10*3/MM3 (ref 0–0.2)
BASOPHILS NFR BLD AUTO: 0.3 % (ref 0–1.5)
DEPRECATED RDW RBC AUTO: 48.7 FL (ref 37–54)
EOSINOPHIL # BLD AUTO: 0.17 10*3/MM3 (ref 0–0.4)
EOSINOPHIL NFR BLD AUTO: 1.9 % (ref 0.3–6.2)
ERYTHROCYTE [DISTWIDTH] IN BLOOD BY AUTOMATED COUNT: 15 % (ref 12.3–15.4)
HCT VFR BLD AUTO: 35.2 % (ref 34–46.6)
HGB BLD-MCNC: 11.1 G/DL (ref 12–15.9)
HOLD SPECIMEN: NORMAL
HOLD SPECIMEN: NORMAL
LYMPHOCYTES # BLD AUTO: 1.04 10*3/MM3 (ref 0.7–3.1)
LYMPHOCYTES NFR BLD AUTO: 11.7 % (ref 19.6–45.3)
MCH RBC QN AUTO: 29.4 PG (ref 26.6–33)
MCHC RBC AUTO-ENTMCNC: 31.5 G/DL (ref 31.5–35.7)
MCV RBC AUTO: 93.1 FL (ref 79–97)
MONOCYTES # BLD AUTO: 0.4 10*3/MM3 (ref 0.1–0.9)
MONOCYTES NFR BLD AUTO: 4.5 % (ref 5–12)
NEUTROPHILS NFR BLD AUTO: 7.26 10*3/MM3 (ref 1.7–7)
NEUTROPHILS NFR BLD AUTO: 81.6 % (ref 42.7–76)
PLATELET # BLD AUTO: 304 10*3/MM3 (ref 140–450)
PMV BLD AUTO: 9 FL (ref 6–12)
RBC # BLD AUTO: 3.78 10*6/MM3 (ref 3.77–5.28)
WBC NRBC COR # BLD AUTO: 8.9 10*3/MM3 (ref 3.4–10.8)

## 2024-02-22 PROCEDURE — 36415 COLL VENOUS BLD VENIPUNCTURE: CPT

## 2024-02-22 PROCEDURE — 85025 COMPLETE CBC W/AUTO DIFF WBC: CPT

## 2024-02-22 RX ORDER — MELOXICAM 15 MG/1
TABLET ORAL
COMMUNITY

## 2024-02-22 RX ORDER — NORETHINDRONE ACETATE AND ETHINYL ESTRADIOL 1MG-20(21)
KIT ORAL
COMMUNITY

## 2024-02-22 NOTE — PROGRESS NOTES
Hematology/Oncology Outpatient Follow Up    PATIENT NAME:Arlene Brady  :1995  MRN: [id number]  PRIMARY CARE PHYSICIAN: Daniel Sam MD  REFERRING PHYSICIAN: Daniel Sam MD    Chief Complaint   Patient presents with    Follow-up     Autoimmune hemolytic anemia          HISTORY OF PRESENT ILLNESS:     This is a 28 year old female has been referred secondary to anemia.  Patient has a longtime history of anemia.  She has rectal bleeding and had colonoscopy done a few days ago by Dr Vinny Lagos . She has internal and external hemorrhoids. She has a follow up with Dr Lagos.     Patient is amenorrheic for about 6 months.  She had history of menorrhagia. She is on hormone pills to regulate her cycles.   She has low energy, she was on oral iron supplements for a month. She has since ran out of her iron tablets.     Review of her CBCs indicate that she has had anemia with hemoglobin ranging between 8 and 12.3 g per DL.  Today her white count is 8, hemoglobin is 8, MCV is 101 and platelets are 352 with essentially unremarkable differentials.     She denies having blood in her urine     She is single, She is a CNA     Patient does not smoke and drinks occasionally     There is no family history of hematological problems.     Her mother had colon cancer,      2023: Methylmalonic acid level was normal at 343 patient had anemia work-up including a ferritin level which was 167, C-reactive protein was high at 1.96 BUN was 13, creatinine 0.9 LDH was 323 iron profile showed a elevated serum iron and iron saturation, haptoglobin was normal, reticulocyte count was elevated to 16 she has low folate at 3.1  2023: WBC 11.62, hemoglobin 8.0 g/dL, .9, platelets 353,000.  Flow cytometry showed an increased kappa lambda ratio, neutrophils with left shifted maturation and rare phenotypic aberrancy, monocytes with phenotypic aberrancy.  Bone marrow biopsy with molecular and cytogenetic studies  indicated to evaluate for myeloid neoplasm.  8/16/2023 CT of the neck, chest, abdomen and pelvis with contrast showed no evidence of definite pathologic lymphadenopathy concerning for lymphoproliferative disorder.  No acute findings present in the neck, chest, abdomen or pelvis.  8/25/2023: Patient had bone marrow aspiration and biopsy which basically showed normocellular bone marrow for age, decreased iron stores, negative for involvement by malignant lymphoma or metastatic malignancy.  Flow cytometry was unremarkable with no immunophenotypic abnormalities noted.  Cytogenetics showed a normal female karyotype      Past Medical History:   Diagnosis Date    ADHD (attention deficit hyperactivity disorder)     Arthritis     Asthma     exercise induced asthma     Depression     Seizures        Past Surgical History:   Procedure Laterality Date    ABDOMINAL SURGERY      choley    TONSILLECTOMY           Current Outpatient Medications:     acetaminophen-codeine (TYLENOL with CODEINE #3) 300-30 MG per tablet, Take 1 tablet by mouth Every 12 (Twelve) Hours., Disp: , Rfl:     baclofen (LIORESAL) 10 MG tablet, Take 1 tablet by mouth Every 12 (Twelve) Hours., Disp: , Rfl:     fexofenadine (ALLEGRA) 180 MG tablet, Take 1 tablet by mouth Daily., Disp: , Rfl:     folic acid (FOLVITE) 1 MG tablet, TAKE 1 TABLET BY MOUTH EVERY DAY, Disp: 90 tablet, Rfl: 0    folic acid (FOLVITE) 1 MG tablet, Take 1 tablet by mouth Daily., Disp: 30 tablet, Rfl: 2    Junel 1/20 1-20 MG-MCG per tablet, Take 1 tablet by mouth Every Evening., Disp: , Rfl:     meloxicam (MOBIC) 15 MG tablet, , Disp: , Rfl:     norethindrone-ethinyl estradiol FE (Junel FE 1/20) 1-20 MG-MCG per tablet, , Disp: , Rfl:     nystatin (MYCOSTATIN) 202934 UNIT/GM cream, , Disp: , Rfl:     nystatin-triamcinolone (MYCOLOG) 320814-4.1 UNIT/GM-% ointment, APPLY 1 APPLICATION TOPICALLY TWICE DAILY FOR 21 DAYS, Disp: , Rfl:     OXcarbazepine (TRILEPTAL) 300 MG tablet, Take 1.5 tablets  by mouth 2 (Two) Times a Day., Disp: , Rfl:     pantoprazole (PROTONIX) 40 MG EC tablet, TAKE 1 TABLET BY MOUTH EVERY DAY, Disp: 90 tablet, Rfl: 3    predniSONE (DELTASONE) 5 MG tablet, TAKE 1 TABLET BY MOUTH EVERY OTHER DAY, Disp: 30 tablet, Rfl: 0    sertraline (ZOLOFT) 50 MG tablet, TAKE 1 TABLET BY MOUTH EVERY DAY, Disp: 90 tablet, Rfl: 0    silver sulfadiazine (SILVADENE, SSD) 1 % cream, APPLY 1 APPLICATION TOPICALLY TWICE A DAY FOR 20 DAYS, Disp: , Rfl:     Allergies   Allergen Reactions    Cephalosporins Rash    Penicillins Rash       Family History   Problem Relation Age of Onset    Hypertension Father     Heart disease Father     Hyperlipidemia Father     Cancer Paternal Grandmother        Cancer-related family history includes Cancer in her paternal grandmother.    Social History     Tobacco Use    Smoking status: Never    Smokeless tobacco: Never   Vaping Use    Vaping Use: Never used   Substance Use Topics    Alcohol use: Yes     Comment: very infrequent     Drug use: Never         I have reviewed and confirmed the accuracy of the patient's history: Chief complaint, HPI, ROS, and Subjective as entered by the MA/LPN/RN. Bronwyn Burns MD 02/22/24      SUBJECTIVE:      Patient denies any new issues.  She was diagnosed with COVID recently    Hemoglobin is up to 11.1 g per DL.  She denies any new issues.  She is currently on prednisone 10 mg alternating with 5 mg every day            Arlene Brady reports a pain score of 0.  Given her pain assessment as noted, treatment options were discussed and the following options were decided upon as a follow-up plan to address the patient's pain:  Continue current management by her primary care .           REVIEW OF SYSTEMS:      Review of Systems   Constitutional:  Positive for fatigue.   Musculoskeletal:  Positive for back pain.       Per subjective    OBJECTIVE:    Vitals:    02/22/24 1510   BP: 147/91   Pulse: 82   Resp: 20   Temp: 98 °F (36.7 °C)  "  TempSrc: Infrared   SpO2: 98%   Weight: 120 kg (264 lb)   Height: 152.4 cm (60\")   PainSc: 0-No pain                   Body mass index is 51.56 kg/m².    ECOG  (1) Restricted in physically strenuous activity, ambulatory and able to do work of light nature    Physical Exam  Vitals reviewed.   Constitutional:       General: She is not in acute distress.     Appearance: Normal appearance. She is not ill-appearing, toxic-appearing or diaphoretic.   HENT:      Head: Normocephalic and atraumatic.   Eyes:      Extraocular Movements: Extraocular movements intact.   Cardiovascular:      Rate and Rhythm: Normal rate and regular rhythm.      Heart sounds: No murmur heard.  Pulmonary:      Effort: Pulmonary effort is normal. No respiratory distress.      Breath sounds: Normal breath sounds. No stridor. No wheezing.   Abdominal:      General: Bowel sounds are normal.      Palpations: Abdomen is soft.   Musculoskeletal:         General: Normal range of motion.      Cervical back: Normal range of motion.   Skin:     General: Skin is warm and dry.      Findings: No rash.   Neurological:      Mental Status: She is alert and oriented to person, place, and time.   Psychiatric:         Mood and Affect: Mood normal.         Behavior: Behavior normal.       I have reexamined the patient and the results are consistent with the previously documented exam. Bronwyn Burns MD         RECENT LABS    WBC   Date Value Ref Range Status   02/22/2024 8.90 3.40 - 10.80 10*3/mm3 Final     RBC   Date Value Ref Range Status   02/22/2024 3.78 3.77 - 5.28 10*6/mm3 Final   07/31/2023 2.80 (L) 3.77 - 5.28 x10E6/uL Final     Hemoglobin   Date Value Ref Range Status   02/22/2024 11.1 (L) 12.0 - 15.9 g/dL Final     Hematocrit   Date Value Ref Range Status   02/22/2024 35.2 34.0 - 46.6 % Final     MCV   Date Value Ref Range Status   02/22/2024 93.1 79.0 - 97.0 fL Final     MCH   Date Value Ref Range Status   02/22/2024 29.4 26.6 - 33.0 pg Final "     MCHC   Date Value Ref Range Status   02/22/2024 31.5 31.5 - 35.7 g/dL Final     RDW   Date Value Ref Range Status   02/22/2024 15.0 12.3 - 15.4 % Final     RDW-SD   Date Value Ref Range Status   02/22/2024 48.7 37.0 - 54.0 fl Final     MPV   Date Value Ref Range Status   02/22/2024 9.0 6.0 - 12.0 fL Final     Platelets   Date Value Ref Range Status   02/22/2024 304 140 - 450 10*3/mm3 Final     Neutrophil %   Date Value Ref Range Status   02/22/2024 81.6 (H) 42.7 - 76.0 % Final     Lymphocyte %   Date Value Ref Range Status   02/22/2024 11.7 (L) 19.6 - 45.3 % Final     Monocyte %   Date Value Ref Range Status   02/22/2024 4.5 (L) 5.0 - 12.0 % Final     Eosinophil %   Date Value Ref Range Status   02/22/2024 1.9 0.3 - 6.2 % Final     Basophil %   Date Value Ref Range Status   02/22/2024 0.3 0.0 - 1.5 % Final     Immature Grans %   Date Value Ref Range Status   10/09/2020 1.4 (H) 0.0 - 0.5 % Final     Neutrophils, Absolute   Date Value Ref Range Status   02/22/2024 7.26 (H) 1.70 - 7.00 10*3/mm3 Final     Lymphocytes, Absolute   Date Value Ref Range Status   02/22/2024 1.04 0.70 - 3.10 10*3/mm3 Final     Monocytes, Absolute   Date Value Ref Range Status   02/22/2024 0.40 0.10 - 0.90 10*3/mm3 Final     Eosinophils, Absolute   Date Value Ref Range Status   02/22/2024 0.17 0.00 - 0.40 10*3/mm3 Final     Basophils, Absolute   Date Value Ref Range Status   02/22/2024 0.03 0.00 - 0.20 10*3/mm3 Final     Immature Grans, Absolute   Date Value Ref Range Status   10/09/2020 0.09 (H) 0.00 - 0.05 10*3/mm3 Final     nRBC   Date Value Ref Range Status   08/25/2023 0.1 0.0 - 0.2 /100 WBC Final       Lab Results   Component Value Date    GLUCOSE 125 (H) 08/18/2023    BUN 34 (H) 08/18/2023    CREATININE 0.89 08/18/2023    EGFRIFNONA 88 07/26/2021    BCR 38.2 (H) 08/18/2023    K 3.7 08/18/2023    CO2 20.0 (L) 08/18/2023    CALCIUM 8.6 08/18/2023    PROTENTOTREF 6.9 08/11/2023    ALBUMIN 4.0 08/18/2023    LABIL2 1.1 08/11/2023    AST  16 08/18/2023    ALT 50 (H) 08/18/2023         Assessment & Plan     Anemia, unspecified type  - CBC & Differential  - CBC & Differential            ASSESSMENT and plans:            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  Rule out lymphoproliferative disease.  Peripheral blood for flow cytometry shows increased kappa lambda ratio detected rare phenotypic aberrancy of the neutrophils as well as monocytes, possibility of a myeloid disorder was also entertained.  PNH screen was negative and G6PD was not deficient.  For now patient will continue on steroids.  But will begin steroid taper due to normalization of her hemoglobin down to 12.7 g per DL.  We will continue her steroid taper as her hemoglobin has improved to 13.0 g/dL today.  Hemoglobin is up to 11.1 g per DL t.  Decrease prednisone to 5 mg p.o. daily.  Continue folate replacement.  Continue PPI  Status post bone marrow aspiration and biopsy which was essentially unremarkable  Folate deficiency: Continue folic acid  History of seizures  History of rectal bleeding : Recent  colonoscopy by Dr Vinny Lagos .  She no longer has rectal bleeding and she has completed her course of iron.  Follow-up with GI encouraged.  She has an appointment coming up next week with GI.  She denies rectal bleeding at this time.  Possible sensitivity reaction to Venofer: Patient's epigastric chest pain complaints seem to predate the infusion but she does also complain of itching on the bilateral forearms during the infusion.  Venofer was stopped and normal saline was ran along with 25 mg Benadryl IV given with resolution of the symptoms.           Plans     Prednisone  5 mg daily  Continue folic acid  CBC in 2 weeks  Continue PPI  Follow-up 4 weeks  All questions answered    Patient verbalized understanding and is in agreement of the above plan.       I spent 30 total minutes, face-to-face, caring for Arlene ponce. 90% of this time involved  counseling and/or coordination of care as documented within this note.

## 2024-02-23 ENCOUNTER — HOSPITAL ENCOUNTER (EMERGENCY)
Facility: HOSPITAL | Age: 29
Discharge: HOME | End: 2024-02-23
Attending: EMERGENCY MEDICINE
Payer: COMMERCIAL

## 2024-02-23 ENCOUNTER — APPOINTMENT (OUTPATIENT)
Dept: GENERAL RADIOLOGY | Facility: HOSPITAL | Age: 29
End: 2024-02-23
Payer: COMMERCIAL

## 2024-02-23 VITALS
HEIGHT: 59 IN | RESPIRATION RATE: 16 BRPM | TEMPERATURE: 98.2 F | DIASTOLIC BLOOD PRESSURE: 66 MMHG | HEART RATE: 73 BPM | SYSTOLIC BLOOD PRESSURE: 108 MMHG | BODY MASS INDEX: 52.85 KG/M2 | OXYGEN SATURATION: 99 % | WEIGHT: 262.15 LBS

## 2024-02-23 DIAGNOSIS — R07.9 CHEST PAIN, UNSPECIFIED TYPE: Primary | ICD-10-CM

## 2024-02-23 LAB
ANION GAP SERPL CALCULATED.3IONS-SCNC: 13 MMOL/L (ref 5–15)
BASOPHILS # BLD AUTO: 0.1 10*3/MM3 (ref 0–0.2)
BASOPHILS NFR BLD AUTO: 0.8 % (ref 0–1.5)
BUN SERPL-MCNC: 20 MG/DL (ref 6–20)
BUN/CREAT SERPL: 29 (ref 7–25)
CALCIUM SPEC-SCNC: 9 MG/DL (ref 8.6–10.5)
CHLORIDE SERPL-SCNC: 104 MMOL/L (ref 98–107)
CO2 SERPL-SCNC: 24 MMOL/L (ref 22–29)
CREAT SERPL-MCNC: 0.69 MG/DL (ref 0.57–1)
D DIMER PPP FEU-MCNC: 0.24 MG/L (FEU) (ref 0–0.5)
DEPRECATED RDW RBC AUTO: 50.8 FL (ref 37–54)
EGFRCR SERPLBLD CKD-EPI 2021: 122.2 ML/MIN/1.73
EOSINOPHIL # BLD AUTO: 0.2 10*3/MM3 (ref 0–0.4)
EOSINOPHIL NFR BLD AUTO: 2.4 % (ref 0.3–6.2)
ERYTHROCYTE [DISTWIDTH] IN BLOOD BY AUTOMATED COUNT: 16.1 % (ref 12.3–15.4)
GLUCOSE SERPL-MCNC: 103 MG/DL (ref 65–99)
HCG SERPL QL: NEGATIVE
HCT VFR BLD AUTO: 34.9 % (ref 34–46.6)
HGB BLD-MCNC: 11.5 G/DL (ref 12–15.9)
LYMPHOCYTES # BLD AUTO: 1.6 10*3/MM3 (ref 0.7–3.1)
LYMPHOCYTES NFR BLD AUTO: 20.2 % (ref 19.6–45.3)
MCH RBC QN AUTO: 29.5 PG (ref 26.6–33)
MCHC RBC AUTO-ENTMCNC: 32.9 G/DL (ref 31.5–35.7)
MCV RBC AUTO: 89.8 FL (ref 79–97)
MONOCYTES # BLD AUTO: 0.6 10*3/MM3 (ref 0.1–0.9)
MONOCYTES NFR BLD AUTO: 7.6 % (ref 5–12)
NEUTROPHILS NFR BLD AUTO: 5.3 10*3/MM3 (ref 1.7–7)
NEUTROPHILS NFR BLD AUTO: 69 % (ref 42.7–76)
NRBC BLD AUTO-RTO: 0.1 /100 WBC (ref 0–0.2)
PLATELET # BLD AUTO: 338 10*3/MM3 (ref 140–450)
PMV BLD AUTO: 7.7 FL (ref 6–12)
POTASSIUM SERPL-SCNC: 3.6 MMOL/L (ref 3.5–5.2)
RBC # BLD AUTO: 3.89 10*6/MM3 (ref 3.77–5.28)
SODIUM SERPL-SCNC: 141 MMOL/L (ref 136–145)
TROPONIN T SERPL HS-MCNC: 9 NG/L
WBC NRBC COR # BLD AUTO: 7.7 10*3/MM3 (ref 3.4–10.8)

## 2024-02-23 PROCEDURE — 71045 X-RAY EXAM CHEST 1 VIEW: CPT

## 2024-02-23 PROCEDURE — 84484 ASSAY OF TROPONIN QUANT: CPT | Performed by: EMERGENCY MEDICINE

## 2024-02-23 PROCEDURE — 99284 EMERGENCY DEPT VISIT MOD MDM: CPT

## 2024-02-23 PROCEDURE — 85025 COMPLETE CBC W/AUTO DIFF WBC: CPT | Performed by: EMERGENCY MEDICINE

## 2024-02-23 PROCEDURE — 85379 FIBRIN DEGRADATION QUANT: CPT | Performed by: EMERGENCY MEDICINE

## 2024-02-23 PROCEDURE — 84703 CHORIONIC GONADOTROPIN ASSAY: CPT | Performed by: EMERGENCY MEDICINE

## 2024-02-23 PROCEDURE — 80048 BASIC METABOLIC PNL TOTAL CA: CPT | Performed by: EMERGENCY MEDICINE

## 2024-02-23 PROCEDURE — 93005 ELECTROCARDIOGRAM TRACING: CPT | Performed by: EMERGENCY MEDICINE

## 2024-02-23 PROCEDURE — 93005 ELECTROCARDIOGRAM TRACING: CPT

## 2024-02-23 RX ORDER — SODIUM CHLORIDE 0.9 % (FLUSH) 0.9 %
10 SYRINGE (ML) INJECTION AS NEEDED
Status: DISCONTINUED | OUTPATIENT
Start: 2024-02-23 | End: 2024-02-23 | Stop reason: HOSPADM

## 2024-02-23 NOTE — ED PROVIDER NOTES
Subjective   History of Present Illness  28-year-old female states has intermittent chest tightness and pain over the past 2 weeks.  She saw her primary care today was advised to go to the emergency room for further evaluation.  She states she felt like it was may be her anxiety but thought it should get better by now.  She denies any relieving or exacerbating factors.  She states she has some variable intensity and variable duration episodes of discomfort that often occur at rest.  She reports no exertional dyspnea or chest pain and reports no diaphoresis or palpitation or syncope  Review of Systems    Past Medical History:   Diagnosis Date    ADHD (attention deficit hyperactivity disorder)     Arthritis     Asthma     exercise induced asthma     Depression     Seizures        Allergies   Allergen Reactions    Cephalosporins Rash    Penicillins Rash       Past Surgical History:   Procedure Laterality Date    ABDOMINAL SURGERY      choley    TONSILLECTOMY         Family History   Problem Relation Age of Onset    Hypertension Father     Heart disease Father     Hyperlipidemia Father     Cancer Paternal Grandmother        Social History     Socioeconomic History    Marital status: Single   Tobacco Use    Smoking status: Never    Smokeless tobacco: Never   Vaping Use    Vaping Use: Never used   Substance and Sexual Activity    Alcohol use: Yes     Comment: very infrequent     Drug use: Never    Sexual activity: Not Currently     Prior to Admission medications    Medication Sig Start Date End Date Taking? Authorizing Provider   acetaminophen-codeine (TYLENOL with CODEINE #3) 300-30 MG per tablet Take 1 tablet by mouth Every 12 (Twelve) Hours. 9/27/23   Scott Billingsley MD   baclofen (LIORESAL) 10 MG tablet Take 1 tablet by mouth Every 12 (Twelve) Hours. 9/27/23   Scott Billingsley MD   fexofenadine (ALLEGRA) 180 MG tablet Take 1 tablet by mouth Daily.    Scott Billingsley MD   folic acid (FOLVITE) 1 MG  "tablet TAKE 1 TABLET BY MOUTH EVERY DAY 10/9/23   Bronwyn Burns MD   folic acid (FOLVITE) 1 MG tablet Take 1 tablet by mouth Daily. 10/17/23   Bronwyn Burns MD   Junel 1/20 1-20 MG-MCG per tablet Take 1 tablet by mouth Every Evening. 5/9/22   Emergency, Nurse Epic, RN   meloxicam (MOBIC) 15 MG tablet     Scott Billingsley MD   norethindrone-ethinyl estradiol FE (Junel FE 1/20) 1-20 MG-MCG per tablet     Scott Billingsley MD   nystatin (MYCOSTATIN) 456397 UNIT/GM cream  10/9/23   Scott Billingsley MD   nystatin-triamcinolone (MYCOLOG) 019614-0.1 UNIT/GM-% ointment APPLY 1 APPLICATION TOPICALLY TWICE DAILY FOR 21 DAYS 7/11/23   Scott Billingsley MD   OXcarbazepine (TRILEPTAL) 300 MG tablet Take 1.5 tablets by mouth 2 (Two) Times a Day. 5/6/22   Emergency, Nurse Kya RN   pantoprazole (PROTONIX) 40 MG EC tablet TAKE 1 TABLET BY MOUTH EVERY DAY 10/9/23   Bronwyn Burns MD   predniSONE (DELTASONE) 5 MG tablet TAKE 1 TABLET BY MOUTH EVERY OTHER DAY 2/19/24   Bronwyn Burns MD   sertraline (ZOLOFT) 50 MG tablet TAKE 1 TABLET BY MOUTH EVERY DAY 12/26/23   Gemma Gonzalez MD   silver sulfadiazine (SILVADENE, SSD) 1 % cream APPLY 1 APPLICATION TOPICALLY TWICE A DAY FOR 20 DAYS 7/10/23   Scott Billingsley MD     /66   Pulse 73   Temp 98.2 °F (36.8 °C) (Oral)   Resp 16   Ht 149.9 cm (59\")   Wt 119 kg (262 lb 2.4 oz)   LMP  (LMP Unknown)   SpO2 99%   BMI 52.95 kg/m²         Objective   Physical Exam  General: Obese female, well-appearing, no acute distress, alert and appropriate  Eyes:  sclera nonicteric  HEENT: Mucous membranes moist, no mucosal swelling  Neck: Supple, no nuchal rigidity, no JVD, no thyromegaly  Respirations: Respirations nonlabored, equal breath sounds bilaterally, clear lungs  Heart regular rate and rhythm, no murmurs rubs or gallops,   Abdomen soft nontender nondistended, no hepatosplenomegaly,  Extremities no clubbing cyanosis or " edema, calves are symmetric and nontender  Neuro cranial nerves grossly intact, no focal limb deficits  Psych oriented, pleasant affect  Skin no rash, brisk cap refill  Procedures           ED Course      Results for orders placed or performed during the hospital encounter of 02/23/24   Basic Metabolic Panel    Specimen: Blood   Result Value Ref Range    Glucose 103 (H) 65 - 99 mg/dL    BUN 20 6 - 20 mg/dL    Creatinine 0.69 0.57 - 1.00 mg/dL    Sodium 141 136 - 145 mmol/L    Potassium 3.6 3.5 - 5.2 mmol/L    Chloride 104 98 - 107 mmol/L    CO2 24.0 22.0 - 29.0 mmol/L    Calcium 9.0 8.6 - 10.5 mg/dL    BUN/Creatinine Ratio 29.0 (H) 7.0 - 25.0    Anion Gap 13.0 5.0 - 15.0 mmol/L    eGFR 122.2 >60.0 mL/min/1.73   Single High Sensitivity Troponin T    Specimen: Blood   Result Value Ref Range    HS Troponin T 9 <14 ng/L   D-dimer, Quantitative    Specimen: Blood   Result Value Ref Range    D-Dimer, Quantitative 0.24 0.00 - 0.50 mg/L (FEU)   hCG, Serum, Qualitative    Specimen: Blood   Result Value Ref Range    HCG Qualitative Negative Negative   CBC Auto Differential    Specimen: Blood   Result Value Ref Range    WBC 7.70 3.40 - 10.80 10*3/mm3    RBC 3.89 3.77 - 5.28 10*6/mm3    Hemoglobin 11.5 (L) 12.0 - 15.9 g/dL    Hematocrit 34.9 34.0 - 46.6 %    MCV 89.8 79.0 - 97.0 fL    MCH 29.5 26.6 - 33.0 pg    MCHC 32.9 31.5 - 35.7 g/dL    RDW 16.1 (H) 12.3 - 15.4 %    RDW-SD 50.8 37.0 - 54.0 fl    MPV 7.7 6.0 - 12.0 fL    Platelets 338 140 - 450 10*3/mm3    Neutrophil % 69.0 42.7 - 76.0 %    Lymphocyte % 20.2 19.6 - 45.3 %    Monocyte % 7.6 5.0 - 12.0 %    Eosinophil % 2.4 0.3 - 6.2 %    Basophil % 0.8 0.0 - 1.5 %    Neutrophils, Absolute 5.30 1.70 - 7.00 10*3/mm3    Lymphocytes, Absolute 1.60 0.70 - 3.10 10*3/mm3    Monocytes, Absolute 0.60 0.10 - 0.90 10*3/mm3    Eosinophils, Absolute 0.20 0.00 - 0.40 10*3/mm3    Basophils, Absolute 0.10 0.00 - 0.20 10*3/mm3    nRBC 0.1 0.0 - 0.2 /100 WBC   ECG 12 Lead Chest Pain   Result  Value Ref Range    QT Interval 390 ms    QTC Interval 433 ms     XR Chest 1 View    Result Date: 2/23/2024  Impression: No acute cardiopulmonary disease. Electronically Signed: Nic Deras MD  2/23/2024 6:57 PM EST  Workstation ID: [id number]                                          Medical Decision Making  Patient presents with chest pain differential diagnosis including acute coronary syndrome, pneumonia, pneumothorax, pulmonary embolus, dissection      Patient was stable on the monitor and in no acute respiratory distress.  Symptoms or not suggestive of cardiac.  Her high-sensitivity troponin was normal EKG normal.  D-dimer screen was negative pulmonary embolus felt to be unlikely.  Benign etiology of her chest pain likely musculoskeletal or pleuritic in nature is suspected.  Patient was advised of findings and discharged in good condition she was given warning signs for return.    Problems Addressed:  Chest pain, unspecified type: complicated acute illness or injury    Amount and/or Complexity of Data Reviewed  Labs: ordered. Decision-making details documented in ED Course.     Details: CBC shows some mild anemia, no leukocytosis, high-sensitivity troponin normal hCG negative and D-dimer normal, Chem-7 also essentially normal.  Radiology: ordered and independent interpretation performed.     Details: My independent interpretation of chest x-ray image no pneumonia or congestive failure  ECG/medicine tests: ordered and independent interpretation performed.     Details: My EKG interpretation sinus rhythm rate of 74, no acute ST or T wave abnormality    Risk  Prescription drug management.        Final diagnoses:   Chest pain, unspecified type       ED Disposition  ED Disposition       ED Disposition   Discharge    Condition   Stable    Comment   --               Daniel Sam MD  [address] [facility] [place] [address] [phone]    Schedule an appointment as soon as possible for a visit in 3 days            Medication List      No changes were made to your prescriptions during this visit.            Wayne Quintanilla MD  02/23/24 2030

## 2024-02-24 LAB
QT INTERVAL: 390 MS
QTC INTERVAL: 433 MS

## 2024-03-07 ENCOUNTER — LAB (OUTPATIENT)
Dept: LAB | Facility: HOSPITAL | Age: 29
End: 2024-03-07
Payer: COMMERCIAL

## 2024-03-07 DIAGNOSIS — D64.9 ANEMIA, UNSPECIFIED TYPE: ICD-10-CM

## 2024-03-07 LAB
BASOPHILS # BLD AUTO: 0.04 10*3/MM3 (ref 0–0.2)
BASOPHILS NFR BLD AUTO: 0.4 % (ref 0–1.5)
DEPRECATED RDW RBC AUTO: 46.9 FL (ref 37–54)
EOSINOPHIL # BLD AUTO: 0.22 10*3/MM3 (ref 0–0.4)
EOSINOPHIL NFR BLD AUTO: 2.3 % (ref 0.3–6.2)
ERYTHROCYTE [DISTWIDTH] IN BLOOD BY AUTOMATED COUNT: 14.5 % (ref 12.3–15.4)
HCT VFR BLD AUTO: 34.8 % (ref 34–46.6)
HGB BLD-MCNC: 11.2 G/DL (ref 12–15.9)
LYMPHOCYTES # BLD AUTO: 2.02 10*3/MM3 (ref 0.7–3.1)
LYMPHOCYTES NFR BLD AUTO: 21.6 % (ref 19.6–45.3)
MCH RBC QN AUTO: 29.9 PG (ref 26.6–33)
MCHC RBC AUTO-ENTMCNC: 32.2 G/DL (ref 31.5–35.7)
MCV RBC AUTO: 93 FL (ref 79–97)
MONOCYTES # BLD AUTO: 0.81 10*3/MM3 (ref 0.1–0.9)
MONOCYTES NFR BLD AUTO: 8.6 % (ref 5–12)
NEUTROPHILS NFR BLD AUTO: 6.28 10*3/MM3 (ref 1.7–7)
NEUTROPHILS NFR BLD AUTO: 67.1 % (ref 42.7–76)
PLATELET # BLD AUTO: 307 10*3/MM3 (ref 140–450)
PMV BLD AUTO: 8.9 FL (ref 6–12)
RBC # BLD AUTO: 3.74 10*6/MM3 (ref 3.77–5.28)
WBC NRBC COR # BLD AUTO: 9.37 10*3/MM3 (ref 3.4–10.8)

## 2024-03-07 PROCEDURE — 36415 COLL VENOUS BLD VENIPUNCTURE: CPT

## 2024-03-07 PROCEDURE — 85025 COMPLETE CBC W/AUTO DIFF WBC: CPT

## 2024-03-21 ENCOUNTER — LAB (OUTPATIENT)
Dept: LAB | Facility: HOSPITAL | Age: 29
End: 2024-03-21
Payer: COMMERCIAL

## 2024-03-21 ENCOUNTER — OFFICE VISIT (OUTPATIENT)
Dept: ONCOLOGY | Facility: CLINIC | Age: 29
End: 2024-03-21
Payer: COMMERCIAL

## 2024-03-21 VITALS
HEART RATE: 92 BPM | RESPIRATION RATE: 20 BRPM | HEIGHT: 59 IN | WEIGHT: 263 LBS | OXYGEN SATURATION: 99 % | SYSTOLIC BLOOD PRESSURE: 133 MMHG | BODY MASS INDEX: 53.02 KG/M2 | DIASTOLIC BLOOD PRESSURE: 74 MMHG | TEMPERATURE: 98 F

## 2024-03-21 DIAGNOSIS — D64.9 ANEMIA, UNSPECIFIED TYPE: Primary | ICD-10-CM

## 2024-03-21 LAB
BASOPHILS # BLD AUTO: 0.03 10*3/MM3 (ref 0–0.2)
BASOPHILS NFR BLD AUTO: 0.4 % (ref 0–1.5)
DEPRECATED RDW RBC AUTO: 47 FL (ref 37–54)
EOSINOPHIL # BLD AUTO: 0.21 10*3/MM3 (ref 0–0.4)
EOSINOPHIL NFR BLD AUTO: 2.6 % (ref 0.3–6.2)
ERYTHROCYTE [DISTWIDTH] IN BLOOD BY AUTOMATED COUNT: 14.6 % (ref 12.3–15.4)
HAPTOGLOB SERPL-MCNC: 187 MG/DL (ref 30–200)
HCT VFR BLD AUTO: 37 % (ref 34–46.6)
HGB BLD-MCNC: 12.1 G/DL (ref 12–15.9)
HOLD SPECIMEN: NORMAL
HOLD SPECIMEN: NORMAL
LDH SERPL-CCNC: 286 U/L (ref 135–214)
LYMPHOCYTES # BLD AUTO: 1.19 10*3/MM3 (ref 0.7–3.1)
LYMPHOCYTES NFR BLD AUTO: 14.6 % (ref 19.6–45.3)
MCH RBC QN AUTO: 30.5 PG (ref 26.6–33)
MCHC RBC AUTO-ENTMCNC: 32.7 G/DL (ref 31.5–35.7)
MCV RBC AUTO: 93.2 FL (ref 79–97)
MONOCYTES # BLD AUTO: 0.45 10*3/MM3 (ref 0.1–0.9)
MONOCYTES NFR BLD AUTO: 5.5 % (ref 5–12)
NEUTROPHILS NFR BLD AUTO: 6.26 10*3/MM3 (ref 1.7–7)
NEUTROPHILS NFR BLD AUTO: 76.9 % (ref 42.7–76)
PLATELET # BLD AUTO: 362 10*3/MM3 (ref 140–450)
PMV BLD AUTO: 9.2 FL (ref 6–12)
RBC # BLD AUTO: 3.97 10*6/MM3 (ref 3.77–5.28)
RETICS # AUTO: 0.33 10*6/MM3 (ref 0.02–0.13)
RETICS/RBC NFR AUTO: 8.48 % (ref 0.7–1.9)
WBC NRBC COR # BLD AUTO: 8.14 10*3/MM3 (ref 3.4–10.8)

## 2024-03-21 PROCEDURE — 83615 LACTATE (LD) (LDH) ENZYME: CPT | Performed by: INTERNAL MEDICINE

## 2024-03-21 PROCEDURE — 36415 COLL VENOUS BLD VENIPUNCTURE: CPT

## 2024-03-21 PROCEDURE — 83010 ASSAY OF HAPTOGLOBIN QUANT: CPT | Performed by: INTERNAL MEDICINE

## 2024-03-21 PROCEDURE — 85025 COMPLETE CBC W/AUTO DIFF WBC: CPT

## 2024-03-21 PROCEDURE — 85045 AUTOMATED RETICULOCYTE COUNT: CPT | Performed by: INTERNAL MEDICINE

## 2024-03-21 RX ORDER — PREDNISONE 5 MG/1
5 TABLET ORAL EVERY OTHER DAY
Qty: 30 TABLET | Refills: 0 | Status: SHIPPED | OUTPATIENT
Start: 2024-03-21

## 2024-03-21 NOTE — PROGRESS NOTES
Hematology/Oncology Outpatient Follow Up    PATIENT NAME:Arlene Brady  :1995  MRN: [id number]  PRIMARY CARE PHYSICIAN: Daniel Sam MD  REFERRING PHYSICIAN: No ref. provider found    Chief Complaint   Patient presents with    Follow-up     Anemia, unspecified type          HISTORY OF PRESENT ILLNESS:     This is a 28 year old female has been referred secondary to anemia.  Patient has a longtime history of anemia.  She has rectal bleeding and had colonoscopy done a few days ago by Dr Vinny Lagos . She has internal and external hemorrhoids. She has a follow up with Dr Lagos.     Patient is amenorrheic for about 6 months.  She had history of menorrhagia. She is on hormone pills to regulate her cycles.   She has low energy, she was on oral iron supplements for a month. She has since ran out of her iron tablets.     Review of her CBCs indicate that she has had anemia with hemoglobin ranging between 8 and 12.3 g per DL.  Today her white count is 8, hemoglobin is 8, MCV is 101 and platelets are 352 with essentially unremarkable differentials.     She denies having blood in her urine     She is single, She is a CNA     Patient does not smoke and drinks occasionally     There is no family history of hematological problems.     Her mother had colon cancer,      2023: Methylmalonic acid level was normal at 343 patient had anemia work-up including a ferritin level which was 167, C-reactive protein was high at 1.96 BUN was 13, creatinine 0.9 LDH was 323 iron profile showed a elevated serum iron and iron saturation, haptoglobin was normal, reticulocyte count was elevated to 16 she has low folate at 3.1  2023: WBC 11.62, hemoglobin 8.0 g/dL, .9, platelets 353,000.  Flow cytometry showed an increased kappa lambda ratio, neutrophils with left shifted maturation and rare phenotypic aberrancy, monocytes with phenotypic aberrancy.  Bone marrow biopsy with molecular and cytogenetic studies  indicated to evaluate for myeloid neoplasm.  8/16/2023 CT of the neck, chest, abdomen and pelvis with contrast showed no evidence of definite pathologic lymphadenopathy concerning for lymphoproliferative disorder.  No acute findings present in the neck, chest, abdomen or pelvis.  8/25/2023: Patient had bone marrow aspiration and biopsy which basically showed normocellular bone marrow for age, decreased iron stores, negative for involvement by malignant lymphoma or metastatic malignancy.  Flow cytometry was unremarkable with no immunophenotypic abnormalities noted.  Cytogenetics showed a normal female karyotype      Past Medical History:   Diagnosis Date    ADHD (attention deficit hyperactivity disorder)     Arthritis     Asthma     exercise induced asthma     Depression     Seizures        Past Surgical History:   Procedure Laterality Date    ABDOMINAL SURGERY      choley    TONSILLECTOMY           Current Outpatient Medications:     acetaminophen-codeine (TYLENOL with CODEINE #3) 300-30 MG per tablet, Take 1 tablet by mouth Every 12 (Twelve) Hours., Disp: , Rfl:     baclofen (LIORESAL) 10 MG tablet, Take 1 tablet by mouth Every 12 (Twelve) Hours., Disp: , Rfl:     fexofenadine (ALLEGRA) 180 MG tablet, Take 1 tablet by mouth Daily., Disp: , Rfl:     folic acid (FOLVITE) 1 MG tablet, TAKE 1 TABLET BY MOUTH EVERY DAY, Disp: 90 tablet, Rfl: 0    folic acid (FOLVITE) 1 MG tablet, Take 1 tablet by mouth Daily., Disp: 30 tablet, Rfl: 2    Junel 1/20 1-20 MG-MCG per tablet, Take 1 tablet by mouth Every Evening., Disp: , Rfl:     meloxicam (MOBIC) 15 MG tablet, , Disp: , Rfl:     norethindrone-ethinyl estradiol FE (Junel FE 1/20) 1-20 MG-MCG per tablet, , Disp: , Rfl:     nystatin (MYCOSTATIN) 928583 UNIT/GM cream, , Disp: , Rfl:     nystatin-triamcinolone (MYCOLOG) 572237-9.1 UNIT/GM-% ointment, APPLY 1 APPLICATION TOPICALLY TWICE DAILY FOR 21 DAYS, Disp: , Rfl:     OXcarbazepine (TRILEPTAL) 300 MG tablet, Take 1.5 tablets  by mouth 2 (Two) Times a Day., Disp: , Rfl:     pantoprazole (PROTONIX) 40 MG EC tablet, TAKE 1 TABLET BY MOUTH EVERY DAY, Disp: 90 tablet, Rfl: 3    predniSONE (DELTASONE) 5 MG tablet, TAKE 1 TABLET BY MOUTH EVERY OTHER DAY, Disp: 30 tablet, Rfl: 0    sertraline (ZOLOFT) 50 MG tablet, TAKE 1 TABLET BY MOUTH EVERY DAY, Disp: 90 tablet, Rfl: 0    silver sulfadiazine (SILVADENE, SSD) 1 % cream, APPLY 1 APPLICATION TOPICALLY TWICE A DAY FOR 20 DAYS, Disp: , Rfl:     Allergies   Allergen Reactions    Cephalosporins Rash    Penicillins Rash       Family History   Problem Relation Age of Onset    Hypertension Father     Heart disease Father     Hyperlipidemia Father     Cancer Paternal Grandmother        Cancer-related family history includes Cancer in her paternal grandmother.    Social History     Tobacco Use    Smoking status: Never    Smokeless tobacco: Never   Vaping Use    Vaping status: Never Used   Substance Use Topics    Alcohol use: Yes     Comment: very infrequent     Drug use: Never       I have reviewed and confirmed the accuracy of the patient's history: Chief complaint, HPI, ROS, and Subjective as entered by the MA/LPN/RN. Bronwyn Burns MD 03/21/24        SUBJECTIVE:      Patient denies any new issues.  She was diagnosed with COVID recently    Hemoglobin is up to 11.1 g per DL.  She denies any new issues.  She is currently on prednisone 10 mg alternating with 5 mg every day      Patient denies any new issues today            Arlene Brady reports a pain score of 0.  Given her pain assessment as noted, treatment options were discussed and the following options were decided upon as a follow-up plan to address the patient's pain:  Continue current management by her primary care .           REVIEW OF SYSTEMS:      Review of Systems   Constitutional:  Positive for fatigue.   Musculoskeletal:  Positive for back pain.       Per subjective    OBJECTIVE:    Vitals:    03/21/24 1358   BP: 133/74  "  Pulse: 92   Resp: 20   Temp: 98 °F (36.7 °C)   TempSrc: Infrared   SpO2: 99%   Weight: 119 kg (263 lb)   Height: 149.9 cm (59\")   PainSc: 0-No pain                     Body mass index is 53.12 kg/m².    ECOG  (1) Restricted in physically strenuous activity, ambulatory and able to do work of light nature    Physical Exam  Vitals reviewed.   Constitutional:       General: She is not in acute distress.     Appearance: Normal appearance. She is not ill-appearing, toxic-appearing or diaphoretic.   HENT:      Head: Normocephalic and atraumatic.   Eyes:      Extraocular Movements: Extraocular movements intact.   Cardiovascular:      Rate and Rhythm: Normal rate and regular rhythm.      Heart sounds: No murmur heard.  Pulmonary:      Effort: Pulmonary effort is normal. No respiratory distress.      Breath sounds: Normal breath sounds. No stridor. No wheezing.   Abdominal:      General: Bowel sounds are normal.      Palpations: Abdomen is soft.   Musculoskeletal:         General: Normal range of motion.      Cervical back: Normal range of motion.   Skin:     General: Skin is warm and dry.      Findings: No rash.   Neurological:      Mental Status: She is alert and oriented to person, place, and time.   Psychiatric:         Mood and Affect: Mood normal.         Behavior: Behavior normal.       I have reexamined the patient and the results are consistent with the previously documented exam. Bronwyn Burns MD         RECENT LABS    WBC   Date Value Ref Range Status   03/21/2024 8.14 3.40 - 10.80 10*3/mm3 Final     RBC   Date Value Ref Range Status   03/21/2024 3.97 3.77 - 5.28 10*6/mm3 Final   07/31/2023 2.80 (L) 3.77 - 5.28 x10E6/uL Final     Hemoglobin   Date Value Ref Range Status   03/21/2024 12.1 12.0 - 15.9 g/dL Final     Hematocrit   Date Value Ref Range Status   03/21/2024 37.0 34.0 - 46.6 % Final     MCV   Date Value Ref Range Status   03/21/2024 93.2 79.0 - 97.0 fL Final     MCH   Date Value Ref Range Status "   03/21/2024 30.5 26.6 - 33.0 pg Final     MCHC   Date Value Ref Range Status   03/21/2024 32.7 31.5 - 35.7 g/dL Final     RDW   Date Value Ref Range Status   03/21/2024 14.6 12.3 - 15.4 % Final     RDW-SD   Date Value Ref Range Status   03/21/2024 47.0 37.0 - 54.0 fl Final     MPV   Date Value Ref Range Status   03/21/2024 9.2 6.0 - 12.0 fL Final     Platelets   Date Value Ref Range Status   03/21/2024 362 140 - 450 10*3/mm3 Final     Neutrophil %   Date Value Ref Range Status   03/21/2024 76.9 (H) 42.7 - 76.0 % Final     Lymphocyte %   Date Value Ref Range Status   03/21/2024 14.6 (L) 19.6 - 45.3 % Final     Monocyte %   Date Value Ref Range Status   03/21/2024 5.5 5.0 - 12.0 % Final     Eosinophil %   Date Value Ref Range Status   03/21/2024 2.6 0.3 - 6.2 % Final     Basophil %   Date Value Ref Range Status   03/21/2024 0.4 0.0 - 1.5 % Final     Immature Grans %   Date Value Ref Range Status   10/09/2020 1.4 (H) 0.0 - 0.5 % Final     Neutrophils, Absolute   Date Value Ref Range Status   03/21/2024 6.26 1.70 - 7.00 10*3/mm3 Final     Lymphocytes, Absolute   Date Value Ref Range Status   03/21/2024 1.19 0.70 - 3.10 10*3/mm3 Final     Monocytes, Absolute   Date Value Ref Range Status   03/21/2024 0.45 0.10 - 0.90 10*3/mm3 Final     Eosinophils, Absolute   Date Value Ref Range Status   03/21/2024 0.21 0.00 - 0.40 10*3/mm3 Final     Basophils, Absolute   Date Value Ref Range Status   03/21/2024 0.03 0.00 - 0.20 10*3/mm3 Final     Immature Grans, Absolute   Date Value Ref Range Status   10/09/2020 0.09 (H) 0.00 - 0.05 10*3/mm3 Final     nRBC   Date Value Ref Range Status   02/23/2024 0.1 0.0 - 0.2 /100 WBC Final       Lab Results   Component Value Date    GLUCOSE 103 (H) 02/23/2024    BUN 20 02/23/2024    CREATININE 0.69 02/23/2024    EGFRIFNONA 88 07/26/2021    BCR 29.0 (H) 02/23/2024    K 3.6 02/23/2024    CO2 24.0 02/23/2024    CALCIUM 9.0 02/23/2024    PROTENTOTREF 6.9 08/11/2023    ALBUMIN 4.0 08/18/2023    LABIL2  1.1 08/11/2023    AST 16 08/18/2023    ALT 50 (H) 08/18/2023         Assessment & Plan     Anemia, unspecified type  - CBC & Differential              ASSESSMENT and plans:            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  Rule out lymphoproliferative disease.  Peripheral blood for flow cytometry shows increased kappa lambda ratio detected rare phenotypic aberrancy of the neutrophils as well as monocytes, possibility of a myeloid disorder was also entertained.  PNH screen was negative and G6PD was not deficient.  For now patient will continue on steroids.  But will begin steroid taper due to normalization of her hemoglobin down to 12.7 g per DL.  We will continue her steroid taper as her hemoglobin has improved to 13.0 g/dL today.  Hemoglobin is up to 11.1 g per DL t.  Decrease prednisone to 5 mg p.o. daily.  Continue folate replacement.  Continue PPI  Status post bone marrow aspiration and biopsy which was essentially unremarkable  Folate deficiency: Continue folic acid  History of seizures  History of rectal bleeding : Recent  colonoscopy by Dr Vinny Lagos .  She no longer has rectal bleeding and she has completed her course of iron.  Follow-up with GI encouraged.  She has an appointment coming up next week with GI.  She denies rectal bleeding at this time.  Possible sensitivity reaction to Venofer: Patient's epigastric chest pain complaints seem to predate the infusion but she does also complain of itching on the bilateral forearms during the infusion.  Venofer was stopped and normal saline was ran along with 25 mg Benadryl IV given with resolution of the symptoms.           Plans     Change prednisone to 5 mg every other day  Continue folic acid CBC  Hemolysis labs toda  CBC every 2 weeks  Hemolysis labs today  Continue PPI  Follow up in 6 weeks  All questions answered    Patient verbalized understanding and is in agreement of the above plan.         I spent 30 total  minutes, face-to-face, caring for Arlene today. 90% of this time involved counseling and/or coordination of care as documented within this note.

## 2024-04-03 ENCOUNTER — TELEPHONE (OUTPATIENT)
Dept: ONCOLOGY | Facility: CLINIC | Age: 29
End: 2024-04-03
Payer: COMMERCIAL

## 2024-04-03 ENCOUNTER — LAB (OUTPATIENT)
Dept: LAB | Facility: HOSPITAL | Age: 29
End: 2024-04-03
Payer: COMMERCIAL

## 2024-04-03 DIAGNOSIS — D64.9 ANEMIA, UNSPECIFIED TYPE: ICD-10-CM

## 2024-04-03 LAB
BASOPHILS # BLD AUTO: 0.04 10*3/MM3 (ref 0–0.2)
BASOPHILS NFR BLD AUTO: 0.6 % (ref 0–1.5)
DEPRECATED RDW RBC AUTO: 48 FL (ref 37–54)
EOSINOPHIL # BLD AUTO: 0.26 10*3/MM3 (ref 0–0.4)
EOSINOPHIL NFR BLD AUTO: 3.6 % (ref 0.3–6.2)
ERYTHROCYTE [DISTWIDTH] IN BLOOD BY AUTOMATED COUNT: 14.9 % (ref 12.3–15.4)
HCT VFR BLD AUTO: 38.1 % (ref 34–46.6)
HGB BLD-MCNC: 12.4 G/DL (ref 12–15.9)
LYMPHOCYTES # BLD AUTO: 1.5 10*3/MM3 (ref 0.7–3.1)
LYMPHOCYTES NFR BLD AUTO: 20.6 % (ref 19.6–45.3)
MCH RBC QN AUTO: 29.9 PG (ref 26.6–33)
MCHC RBC AUTO-ENTMCNC: 32.5 G/DL (ref 31.5–35.7)
MCV RBC AUTO: 91.8 FL (ref 79–97)
MONOCYTES # BLD AUTO: 0.55 10*3/MM3 (ref 0.1–0.9)
MONOCYTES NFR BLD AUTO: 7.6 % (ref 5–12)
NEUTROPHILS NFR BLD AUTO: 4.92 10*3/MM3 (ref 1.7–7)
NEUTROPHILS NFR BLD AUTO: 67.6 % (ref 42.7–76)
PLATELET # BLD AUTO: 344 10*3/MM3 (ref 140–450)
PMV BLD AUTO: 9.4 FL (ref 6–12)
RBC # BLD AUTO: 4.15 10*6/MM3 (ref 3.77–5.28)
WBC NRBC COR # BLD AUTO: 7.27 10*3/MM3 (ref 3.4–10.8)

## 2024-04-03 PROCEDURE — 36415 COLL VENOUS BLD VENIPUNCTURE: CPT

## 2024-04-03 PROCEDURE — 85025 COMPLETE CBC W/AUTO DIFF WBC: CPT

## 2024-04-03 NOTE — TELEPHONE ENCOUNTER
Caller: Arlene Brady    Relationship to patient: Self    Best call back number: [phone]    Chief complaint: PT CALLED TO RESCHEDULE     Type of visit: LAB       If rescheduling, when is the original appointment: 4-3-24

## 2024-04-09 ENCOUNTER — TELEPHONE (OUTPATIENT)
Dept: ONCOLOGY | Facility: CLINIC | Age: 29
End: 2024-04-09

## 2024-04-15 ENCOUNTER — TELEPHONE (OUTPATIENT)
Dept: ONCOLOGY | Facility: CLINIC | Age: 29
End: 2024-04-15

## 2024-04-15 NOTE — TELEPHONE ENCOUNTER
Caller: Arlene Brady    Relationship to patient: Self    Best call back number: [phone]    Chief complaint: CANCEL - R/S    Type of visit: LAB     Requested date: 4/18/24     If rescheduling, when is the original appointment: 4/17/24     Additional notes:PT WANTS TO CONFIRM THAT SHE IS TO HAVE THESE LABS, IT LOOKS LIKE DR. GLEZ WANTED THEM DRAWN EVERY TWO WEEKS, BUT CONFIRMING.

## 2024-04-16 NOTE — TELEPHONE ENCOUNTER
Caller: Arlene Brady    Relationship: Self    Best call back number: [phone]    Who are you requesting to speak with (clinical staff, provider,  specific staff member): SCHEDULING    What was the call regarding: PT CALLING BACK TO SEE IF HER LAB CAN BE MOVED TO THURSDAY 4-18  PLEASE ADVISE   PT WILL KEEP HER APPT ON 4-17 IF THIS CANNOT BE MOVED

## 2024-04-18 ENCOUNTER — LAB (OUTPATIENT)
Dept: LAB | Facility: HOSPITAL | Age: 29
End: 2024-04-18
Payer: COMMERCIAL

## 2024-04-18 DIAGNOSIS — D64.9 ANEMIA, UNSPECIFIED TYPE: ICD-10-CM

## 2024-04-18 LAB
BASOPHILS # BLD AUTO: 0.03 10*3/MM3 (ref 0–0.2)
BASOPHILS NFR BLD AUTO: 0.4 % (ref 0–1.5)
DEPRECATED RDW RBC AUTO: 48.6 FL (ref 37–54)
EOSINOPHIL # BLD AUTO: 0.2 10*3/MM3 (ref 0–0.4)
EOSINOPHIL NFR BLD AUTO: 2.5 % (ref 0.3–6.2)
ERYTHROCYTE [DISTWIDTH] IN BLOOD BY AUTOMATED COUNT: 14.9 % (ref 12.3–15.4)
HCT VFR BLD AUTO: 36.7 % (ref 34–46.6)
HGB BLD-MCNC: 11.7 G/DL (ref 12–15.9)
LYMPHOCYTES # BLD AUTO: 1.66 10*3/MM3 (ref 0.7–3.1)
LYMPHOCYTES NFR BLD AUTO: 20.8 % (ref 19.6–45.3)
MCH RBC QN AUTO: 30.1 PG (ref 26.6–33)
MCHC RBC AUTO-ENTMCNC: 31.9 G/DL (ref 31.5–35.7)
MCV RBC AUTO: 94.3 FL (ref 79–97)
MONOCYTES # BLD AUTO: 0.49 10*3/MM3 (ref 0.1–0.9)
MONOCYTES NFR BLD AUTO: 6.1 % (ref 5–12)
NEUTROPHILS NFR BLD AUTO: 5.6 10*3/MM3 (ref 1.7–7)
NEUTROPHILS NFR BLD AUTO: 70.2 % (ref 42.7–76)
PLATELET # BLD AUTO: 356 10*3/MM3 (ref 140–450)
PMV BLD AUTO: 9.3 FL (ref 6–12)
RBC # BLD AUTO: 3.89 10*6/MM3 (ref 3.77–5.28)
WBC NRBC COR # BLD AUTO: 7.98 10*3/MM3 (ref 3.4–10.8)

## 2024-04-18 PROCEDURE — 85025 COMPLETE CBC W/AUTO DIFF WBC: CPT

## 2024-04-18 PROCEDURE — 36415 COLL VENOUS BLD VENIPUNCTURE: CPT

## 2024-04-19 ENCOUNTER — TELEPHONE (OUTPATIENT)
Dept: ONCOLOGY | Facility: CLINIC | Age: 29
End: 2024-04-19

## 2024-04-19 ENCOUNTER — TELEPHONE (OUTPATIENT)
Dept: ONCOLOGY | Facility: CLINIC | Age: 29
End: 2024-04-19
Payer: COMMERCIAL

## 2024-04-19 DIAGNOSIS — D64.9 ANEMIA, UNSPECIFIED TYPE: Primary | ICD-10-CM

## 2024-04-19 DIAGNOSIS — D59.10 AUTOIMMUNE HEMOLYTIC ANEMIA: ICD-10-CM

## 2024-04-19 NOTE — TELEPHONE ENCOUNTER
Hub staff attempted to follow warm transfer process and was unsuccessful     Caller: Arlene Brady    Relationship to patient: Self    Best call back number: [phone]    Patient is needing:RETURN CALL TO [facility]     SEE SIGNED ENCOUNTER FROM TODAY FOR DETAILS.

## 2024-04-23 ENCOUNTER — TELEPHONE (OUTPATIENT)
Dept: BARIATRICS/WEIGHT MGMT | Facility: CLINIC | Age: 29
End: 2024-04-23
Payer: COMMERCIAL

## 2024-04-25 ENCOUNTER — LAB (OUTPATIENT)
Dept: LAB | Facility: HOSPITAL | Age: 29
End: 2024-04-25
Payer: COMMERCIAL

## 2024-04-25 DIAGNOSIS — D59.10 AUTOIMMUNE HEMOLYTIC ANEMIA: ICD-10-CM

## 2024-04-25 DIAGNOSIS — D64.9 ANEMIA, UNSPECIFIED TYPE: ICD-10-CM

## 2024-04-25 LAB
BASOPHILS # BLD AUTO: 0.03 10*3/MM3 (ref 0–0.2)
BASOPHILS NFR BLD AUTO: 0.4 % (ref 0–1.5)
DEPRECATED RDW RBC AUTO: 49.5 FL (ref 37–54)
EOSINOPHIL # BLD AUTO: 0.28 10*3/MM3 (ref 0–0.4)
EOSINOPHIL NFR BLD AUTO: 3.6 % (ref 0.3–6.2)
ERYTHROCYTE [DISTWIDTH] IN BLOOD BY AUTOMATED COUNT: 15.2 % (ref 12.3–15.4)
HCT VFR BLD AUTO: 34.9 % (ref 34–46.6)
HGB BLD-MCNC: 11 G/DL (ref 12–15.9)
LYMPHOCYTES # BLD AUTO: 1.6 10*3/MM3 (ref 0.7–3.1)
LYMPHOCYTES NFR BLD AUTO: 20.8 % (ref 19.6–45.3)
MCH RBC QN AUTO: 29.9 PG (ref 26.6–33)
MCHC RBC AUTO-ENTMCNC: 31.5 G/DL (ref 31.5–35.7)
MCV RBC AUTO: 94.8 FL (ref 79–97)
MONOCYTES # BLD AUTO: 0.47 10*3/MM3 (ref 0.1–0.9)
MONOCYTES NFR BLD AUTO: 6.1 % (ref 5–12)
NEUTROPHILS NFR BLD AUTO: 5.31 10*3/MM3 (ref 1.7–7)
NEUTROPHILS NFR BLD AUTO: 69.1 % (ref 42.7–76)
PLATELET # BLD AUTO: 327 10*3/MM3 (ref 140–450)
PMV BLD AUTO: 9.4 FL (ref 6–12)
RBC # BLD AUTO: 3.68 10*6/MM3 (ref 3.77–5.28)
WBC NRBC COR # BLD AUTO: 7.69 10*3/MM3 (ref 3.4–10.8)

## 2024-04-25 PROCEDURE — 85025 COMPLETE CBC W/AUTO DIFF WBC: CPT

## 2024-04-25 PROCEDURE — 36415 COLL VENOUS BLD VENIPUNCTURE: CPT

## 2024-04-26 DIAGNOSIS — D64.9 ANEMIA, UNSPECIFIED TYPE: Primary | ICD-10-CM

## 2024-04-26 DIAGNOSIS — D59.10 AUTOIMMUNE HEMOLYTIC ANEMIA: ICD-10-CM

## 2024-04-30 PROCEDURE — 88342 IMHCHEM/IMCYTCHM 1ST ANTB: CPT | Performed by: INTERNAL MEDICINE

## 2024-04-30 PROCEDURE — 88305 TISSUE EXAM BY PATHOLOGIST: CPT | Performed by: INTERNAL MEDICINE

## 2024-05-01 ENCOUNTER — LAB REQUISITION (OUTPATIENT)
Dept: LAB | Facility: HOSPITAL | Age: 29
End: 2024-05-01
Payer: COMMERCIAL

## 2024-05-01 DIAGNOSIS — D64.89 OTHER SPECIFIED ANEMIAS: ICD-10-CM

## 2024-05-02 ENCOUNTER — LAB (OUTPATIENT)
Dept: LAB | Facility: HOSPITAL | Age: 29
End: 2024-05-02
Payer: COMMERCIAL

## 2024-05-02 ENCOUNTER — DOCUMENTATION (OUTPATIENT)
Dept: ONCOLOGY | Facility: CLINIC | Age: 29
End: 2024-05-02
Payer: COMMERCIAL

## 2024-05-02 DIAGNOSIS — D64.9 ANEMIA, UNSPECIFIED TYPE: ICD-10-CM

## 2024-05-02 DIAGNOSIS — D59.10 AUTOIMMUNE HEMOLYTIC ANEMIA: ICD-10-CM

## 2024-05-02 LAB
BASOPHILS # BLD AUTO: 0.03 10*3/MM3 (ref 0–0.2)
BASOPHILS NFR BLD AUTO: 0.3 % (ref 0–1.5)
DEPRECATED RDW RBC AUTO: 48.2 FL (ref 37–54)
EOSINOPHIL # BLD AUTO: 0.19 10*3/MM3 (ref 0–0.4)
EOSINOPHIL NFR BLD AUTO: 1.9 % (ref 0.3–6.2)
ERYTHROCYTE [DISTWIDTH] IN BLOOD BY AUTOMATED COUNT: 14.9 % (ref 12.3–15.4)
HCT VFR BLD AUTO: 33.7 % (ref 34–46.6)
HGB BLD-MCNC: 10.8 G/DL (ref 12–15.9)
LYMPHOCYTES # BLD AUTO: 1.15 10*3/MM3 (ref 0.7–3.1)
LYMPHOCYTES NFR BLD AUTO: 11.4 % (ref 19.6–45.3)
MCH RBC QN AUTO: 29.8 PG (ref 26.6–33)
MCHC RBC AUTO-ENTMCNC: 32 G/DL (ref 31.5–35.7)
MCV RBC AUTO: 93.1 FL (ref 79–97)
MONOCYTES # BLD AUTO: 0.35 10*3/MM3 (ref 0.1–0.9)
MONOCYTES NFR BLD AUTO: 3.5 % (ref 5–12)
NEUTROPHILS NFR BLD AUTO: 8.41 10*3/MM3 (ref 1.7–7)
NEUTROPHILS NFR BLD AUTO: 82.9 % (ref 42.7–76)
PLATELET # BLD AUTO: 308 10*3/MM3 (ref 140–450)
PMV BLD AUTO: 9 FL (ref 6–12)
RBC # BLD AUTO: 3.62 10*6/MM3 (ref 3.77–5.28)
WBC NRBC COR # BLD AUTO: 10.13 10*3/MM3 (ref 3.4–10.8)

## 2024-05-02 PROCEDURE — 85025 COMPLETE CBC W/AUTO DIFF WBC: CPT

## 2024-05-02 PROCEDURE — 36415 COLL VENOUS BLD VENIPUNCTURE: CPT

## 2024-05-02 NOTE — PROGRESS NOTES
Patient came to the office for labs and stated that she has some concerns that she wanted to address. Pt brought back to a consult room. She stated that she has been having chest pain and shortness of air when going up stairs. Pt stated that she is concerned that her symptoms are related to her low hgb. I told her that her hgb was 10.8 so I didn't think it would cause all of her symptoms. Blood pressure was 130/68, pulse was 86, O2 98%, she stated that her chest pain is a 6/10. I told her that I would discuss her symptoms with a provider, but the recommendation may be for her to go to the ER. Pt stated that she was in the ER recently with the same symptoms and she was told that it was due to her low hgb.    Discussed symptoms with PIA Vargas. She reviewed the chart and saw that the pt has had a history of the same symptoms in the past. She stated that the pt's hgb is not low enough to be causing these issues and that she believes there is likely another cause. She recommended that the pt keep her appt on Wednesday and have her labs rechecked at that time. She also advised that the pt go to the ER if symptoms worsen. I relayed all this to the pt and she verbalized understanding.

## 2024-05-03 LAB
LAB AP CASE REPORT: NORMAL
PATH REPORT.FINAL DX SPEC: NORMAL
PATH REPORT.GROSS SPEC: NORMAL

## 2024-05-07 ENCOUNTER — TELEPHONE (OUTPATIENT)
Dept: ONCOLOGY | Facility: CLINIC | Age: 29
End: 2024-05-07

## 2024-05-07 NOTE — TELEPHONE ENCOUNTER
Caller: Arlene Brady    Relationship to patient: Self    Best call back number: [phone]    Chief complaint: R/S    Type of visit: LAB AND F/U1    Requested date: 5-9 OR NEXT AVLIABLE    If rescheduling, when is the original appointment: 5-8-2024

## 2024-05-13 NOTE — PROGRESS NOTES
Hematology/Oncology Outpatient Follow Up    PATIENT NAME:Arlene Brady  :1995  MRN: [id number]  PRIMARY CARE PHYSICIAN: Daniel Sam MD  REFERRING PHYSICIAN: Daniel Sam MD    Chief Complaint   Patient presents with    Follow-up     Autoimmune hemolytic anemia          HISTORY OF PRESENT ILLNESS:       This is a 28 year old female has been referred secondary to anemia.  Patient has a longtime history of anemia.  She has rectal bleeding and had colonoscopy done a few days ago by Dr Vinny Lagos . She has internal and external hemorrhoids. She has a follow up with Dr Lagos.     Patient is amenorrheic for about 6 months.  She had history of menorrhagia. She is on hormone pills to regulate her cycles.   She has low energy, she was on oral iron supplements for a month. She has since ran out of her iron tablets.     Review of her CBCs indicate that she has had anemia with hemoglobin ranging between 8 and 12.3 g per DL.  Today her white count is 8, hemoglobin is 8, MCV is 101 and platelets are 352 with essentially unremarkable differentials.     She denies having blood in her urine     She is single, She is a CNA     Patient does not smoke and drinks occasionally     There is no family history of hematological problems.     Her mother had colon cancer,      2023: Methylmalonic acid level was normal at 343 patient had anemia work-up including a ferritin level which was 167, C-reactive protein was high at 1.96 BUN was 13, creatinine 0.9 LDH was 323 iron profile showed a elevated serum iron and iron saturation, haptoglobin was normal, reticulocyte count was elevated to 16 she has low folate at 3.1  2023: WBC 11.62, hemoglobin 8.0 g/dL, .9, platelets 353,000.  Flow cytometry showed an increased kappa lambda ratio, neutrophils with left shifted maturation and rare phenotypic aberrancy, monocytes with phenotypic aberrancy.  Bone marrow biopsy with molecular and cytogenetic studies  indicated to evaluate for myeloid neoplasm.  8/16/2023 CT of the neck, chest, abdomen and pelvis with contrast showed no evidence of definite pathologic lymphadenopathy concerning for lymphoproliferative disorder.  No acute findings present in the neck, chest, abdomen or pelvis.  8/25/2023: Patient had bone marrow aspiration and biopsy which basically showed normocellular bone marrow for age, decreased iron stores, negative for involvement by malignant lymphoma or metastatic malignancy.  Flow cytometry was unremarkable with no immunophenotypic abnormalities noted.  Cytogenetics showed a normal female karyotype      Past Medical History:   Diagnosis Date    ADHD (attention deficit hyperactivity disorder)     Arthritis     Asthma     exercise induced asthma     Depression     Seizures        Past Surgical History:   Procedure Laterality Date    ABDOMINAL SURGERY      choley    TONSILLECTOMY           Current Outpatient Medications:     acetaminophen-codeine (TYLENOL with CODEINE #3) 300-30 MG per tablet, Take 1 tablet by mouth Every 12 (Twelve) Hours., Disp: , Rfl:     baclofen (LIORESAL) 10 MG tablet, Take 1 tablet by mouth Every 12 (Twelve) Hours., Disp: , Rfl:     fexofenadine (ALLEGRA) 180 MG tablet, Take 1 tablet by mouth Daily., Disp: , Rfl:     folic acid (FOLVITE) 1 MG tablet, TAKE 1 TABLET BY MOUTH EVERY DAY, Disp: 90 tablet, Rfl: 0    folic acid (FOLVITE) 1 MG tablet, Take 1 tablet by mouth Daily., Disp: 30 tablet, Rfl: 2    Junel 1/20 1-20 MG-MCG per tablet, Take 1 tablet by mouth Every Evening., Disp: , Rfl:     meloxicam (MOBIC) 15 MG tablet, , Disp: , Rfl:     norethindrone-ethinyl estradiol FE (Junel FE 1/20) 1-20 MG-MCG per tablet, , Disp: , Rfl:     nystatin (MYCOSTATIN) 814668 UNIT/GM cream, , Disp: , Rfl:     nystatin-triamcinolone (MYCOLOG) 983923-5.1 UNIT/GM-% ointment, APPLY 1 APPLICATION TOPICALLY TWICE DAILY FOR 21 DAYS, Disp: , Rfl:     OXcarbazepine (TRILEPTAL) 300 MG tablet, Take 1.5 tablets  by mouth 2 (Two) Times a Day., Disp: , Rfl:     pantoprazole (PROTONIX) 40 MG EC tablet, TAKE 1 TABLET BY MOUTH EVERY DAY, Disp: 90 tablet, Rfl: 3    predniSONE (DELTASONE) 5 MG tablet, Take 1 tablet by mouth Every Other Day., Disp: 30 tablet, Rfl: 0    sertraline (ZOLOFT) 50 MG tablet, TAKE 1 TABLET BY MOUTH EVERY DAY, Disp: 90 tablet, Rfl: 0    silver sulfadiazine (SILVADENE, SSD) 1 % cream, APPLY 1 APPLICATION TOPICALLY TWICE A DAY FOR 20 DAYS, Disp: , Rfl:     Allergies   Allergen Reactions    Cephalosporins Rash    Penicillins Rash       Family History   Problem Relation Age of Onset    Hypertension Father     Heart disease Father     Hyperlipidemia Father     Cancer Paternal Grandmother        Cancer-related family history includes Cancer in her paternal grandmother.    Social History     Tobacco Use    Smoking status: Never    Smokeless tobacco: Never   Vaping Use    Vaping status: Never Used   Substance Use Topics    Alcohol use: Yes     Comment: very infrequent     Drug use: Never       I have reviewed and confirmed the accuracy of the patient's history: Chief complaint, HPI, ROS, and Subjective as entered by the MA/LPN/RN. Bronwynnara Burns MD 05/16/24          SUBJECTIVE:      Patient denies any new issues.  She was diagnosed with COVID recently    Hemoglobin is up to 11.1 g per DL.  She denies any new issues.  She is currently on prednisone 10 mg alternating with 5 mg every day      Patient is here today for follow-up.  She is on prednisone 5 mg every other day            Arlene Alexn Jose Antonio reports a pain score of 0.  Given her pain assessment as noted, treatment options were discussed and the following options were decided upon as a follow-up plan to address the patient's pain:  Continue current management by her primary care .           REVIEW OF SYSTEMS:      Review of Systems   Constitutional:  Positive for fatigue.   Musculoskeletal:  Positive for back pain.       Per  "subjective    OBJECTIVE:    Vitals:    05/16/24 1250   BP: 105/72   Pulse: 84   Resp: 20   Temp: 98 °F (36.7 °C)   TempSrc: Infrared   SpO2: 97%   Weight: 118 kg (260 lb)   Height: 149.9 cm (59\")   PainSc: 0-No pain                       Body mass index is 52.51 kg/m².    ECOG  (1) Restricted in physically strenuous activity, ambulatory and able to do work of light nature    Physical Exam  Vitals reviewed.   Constitutional:       General: She is not in acute distress.     Appearance: Normal appearance. She is not ill-appearing, toxic-appearing or diaphoretic.   HENT:      Head: Normocephalic and atraumatic.   Eyes:      Extraocular Movements: Extraocular movements intact.   Cardiovascular:      Rate and Rhythm: Normal rate and regular rhythm.      Heart sounds: No murmur heard.  Pulmonary:      Effort: Pulmonary effort is normal. No respiratory distress.      Breath sounds: Normal breath sounds. No stridor. No wheezing.   Abdominal:      General: Bowel sounds are normal.      Palpations: Abdomen is soft.   Musculoskeletal:         General: Normal range of motion.      Cervical back: Normal range of motion.   Skin:     General: Skin is warm and dry.      Findings: No rash.   Neurological:      Mental Status: She is alert and oriented to person, place, and time.   Psychiatric:         Mood and Affect: Mood normal.         Behavior: Behavior normal.       I have reexamined the patient and the results are consistent with the previously documented exam. Bronwyn Burns MD         RECENT LABS    WBC   Date Value Ref Range Status   05/16/2024 7.04 3.40 - 10.80 10*3/mm3 Final     RBC   Date Value Ref Range Status   05/16/2024 3.72 (L) 3.77 - 5.28 10*6/mm3 Final   07/31/2023 2.80 (L) 3.77 - 5.28 x10E6/uL Final     Hemoglobin   Date Value Ref Range Status   05/16/2024 10.9 (L) 12.0 - 15.9 g/dL Final     Hematocrit   Date Value Ref Range Status   05/16/2024 34.7 34.0 - 46.6 % Final     MCV   Date Value Ref Range Status "   05/16/2024 93.3 79.0 - 97.0 fL Final     MCH   Date Value Ref Range Status   05/16/2024 29.3 26.6 - 33.0 pg Final     MCHC   Date Value Ref Range Status   05/16/2024 31.4 (L) 31.5 - 35.7 g/dL Final     RDW   Date Value Ref Range Status   05/16/2024 15.0 12.3 - 15.4 % Final     RDW-SD   Date Value Ref Range Status   05/16/2024 49.1 37.0 - 54.0 fl Final     MPV   Date Value Ref Range Status   05/16/2024 9.4 6.0 - 12.0 fL Final     Platelets   Date Value Ref Range Status   05/16/2024 339 140 - 450 10*3/mm3 Final     Neutrophil %   Date Value Ref Range Status   05/16/2024 71.2 42.7 - 76.0 % Final     Lymphocyte %   Date Value Ref Range Status   05/16/2024 18.5 (L) 19.6 - 45.3 % Final     Monocyte %   Date Value Ref Range Status   05/16/2024 8.1 5.0 - 12.0 % Final     Eosinophil %   Date Value Ref Range Status   05/16/2024 1.8 0.3 - 6.2 % Final     Basophil %   Date Value Ref Range Status   05/16/2024 0.4 0.0 - 1.5 % Final     Immature Grans %   Date Value Ref Range Status   10/09/2020 1.4 (H) 0.0 - 0.5 % Final     Neutrophils, Absolute   Date Value Ref Range Status   05/16/2024 5.01 1.70 - 7.00 10*3/mm3 Final     Lymphocytes, Absolute   Date Value Ref Range Status   05/16/2024 1.30 0.70 - 3.10 10*3/mm3 Final     Monocytes, Absolute   Date Value Ref Range Status   05/16/2024 0.57 0.10 - 0.90 10*3/mm3 Final     Eosinophils, Absolute   Date Value Ref Range Status   05/16/2024 0.13 0.00 - 0.40 10*3/mm3 Final     Basophils, Absolute   Date Value Ref Range Status   05/16/2024 0.03 0.00 - 0.20 10*3/mm3 Final     Immature Grans, Absolute   Date Value Ref Range Status   10/09/2020 0.09 (H) 0.00 - 0.05 10*3/mm3 Final     nRBC   Date Value Ref Range Status   02/23/2024 0.1 0.0 - 0.2 /100 WBC Final       Lab Results   Component Value Date    GLUCOSE 103 (H) 02/23/2024    BUN 20 02/23/2024    CREATININE 0.69 02/23/2024    EGFRIFNONA 88 07/26/2021    BCR 29.0 (H) 02/23/2024    K 3.6 02/23/2024    CO2 24.0 02/23/2024    CALCIUM 9.0  02/23/2024    PROTENTOTREF 6.9 08/11/2023    ALBUMIN 4.0 08/18/2023    LABIL2 1.1 08/11/2023    AST 16 08/18/2023    ALT 50 (H) 08/18/2023         Assessment & Plan     Anemia, unspecified type    Autoimmune hemolytic anemia  - CBC & Differential                ASSESSMENT and plans:       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  Rule out lymphoproliferative disease.  Peripheral blood for flow cytometry shows increased kappa lambda ratio detected rare phenotypic aberrancy of the neutrophils as well as monocytes, possibility of a myeloid disorder was also entertained.  PNH screen was negative and G6PD was not deficient.  For now patient will continue on steroids.  But will begin steroid taper due to normalization of her hemoglobin down to 12.7 g per DL.  We will continue her steroid taper as her hemoglobin has improved to 13.0 g/dL today.  Hemoglobin is up to 11.1 g per DL t.  Decrease prednisone to 5 mg p.o. daily.  Continue folate replacement.  Continue PPI.  She is dependent on prednisone therefore would consider additional treatment with Rituxan to see if we will be able to completely resolve the hemolysis  Status post bone marrow aspiration and biopsy which was essentially unremarkable  Folate deficiency: Continue folic acid, ongoing hemolysis  History of seizures  History of rectal bleeding : Recent  colonoscopy by Dr Vinny Lagos .  She no longer has rectal bleeding and she has completed her course of iron.  Follow-up with GI encouraged.  She has an appointment coming up next week with GI.  She denies rectal bleeding at this time.  Possible sensitivity reaction to Venofer: Patient's epigastric chest pain complaints seem to predate the infusion but she does also complain of itching on the bilateral forearms during the infusion.  Venofer was stopped and normal saline was ran along with 25 mg Benadryl IV given with resolution of the symptoms.          Plans      Change prednisone to 5 mg every other day.  Continue the same  Hemolysis labs today  Viral hepatitis, [facility] screen iron studies with ferritin today  Patient was given information on Rituxan to review  Continue CBC every 2 weeks  Follow-up 4 weeks  Continue PPI  Discussed the benefits and side effects of Rituxan in detail with patient  All questions answered    Patient verbalized understanding and is in agreement of the above plan.         I spent 40 total minutes, face-to-face, caring for Arlene today. 90% of this time involved counseling and/or coordination of care as documented within this note.

## 2024-05-16 ENCOUNTER — OFFICE VISIT (OUTPATIENT)
Dept: ONCOLOGY | Facility: CLINIC | Age: 29
End: 2024-05-16
Payer: COMMERCIAL

## 2024-05-16 ENCOUNTER — LAB (OUTPATIENT)
Dept: LAB | Facility: HOSPITAL | Age: 29
End: 2024-05-16
Payer: COMMERCIAL

## 2024-05-16 VITALS
DIASTOLIC BLOOD PRESSURE: 72 MMHG | TEMPERATURE: 98 F | HEIGHT: 59 IN | RESPIRATION RATE: 20 BRPM | WEIGHT: 260 LBS | SYSTOLIC BLOOD PRESSURE: 105 MMHG | HEART RATE: 84 BPM | BODY MASS INDEX: 52.41 KG/M2 | OXYGEN SATURATION: 97 %

## 2024-05-16 DIAGNOSIS — D59.10 AUTOIMMUNE HEMOLYTIC ANEMIA: ICD-10-CM

## 2024-05-16 DIAGNOSIS — D64.9 ANEMIA, UNSPECIFIED TYPE: Primary | ICD-10-CM

## 2024-05-16 LAB
BASOPHILS # BLD AUTO: 0.03 10*3/MM3 (ref 0–0.2)
BASOPHILS NFR BLD AUTO: 0.4 % (ref 0–1.5)
DAT C3: NEGATIVE
DAT IGG-SP REAG RBC-IMP: POSITIVE
DAT POLY-SP REAG RBC QL: POSITIVE
DEPRECATED RDW RBC AUTO: 49.1 FL (ref 37–54)
EOSINOPHIL # BLD AUTO: 0.13 10*3/MM3 (ref 0–0.4)
EOSINOPHIL NFR BLD AUTO: 1.8 % (ref 0.3–6.2)
ERYTHROCYTE [DISTWIDTH] IN BLOOD BY AUTOMATED COUNT: 15 % (ref 12.3–15.4)
FERRITIN SERPL-MCNC: 301 NG/ML (ref 13–150)
HAV IGM SERPL QL IA: NORMAL
HBV CORE IGM SERPL QL IA: NORMAL
HBV SURFACE AG SERPL QL IA: NORMAL
HCT VFR BLD AUTO: 34.7 % (ref 34–46.6)
HCV AB SER QL: NORMAL
HGB BLD-MCNC: 10.9 G/DL (ref 12–15.9)
HOLD SPECIMEN: NORMAL
HOLD SPECIMEN: NORMAL
IRON 24H UR-MRATE: 76 MCG/DL (ref 37–145)
IRON SATN MFR SERPL: 19 % (ref 20–50)
LDH SERPL-CCNC: 278 U/L (ref 135–214)
LYMPHOCYTES # BLD AUTO: 1.3 10*3/MM3 (ref 0.7–3.1)
LYMPHOCYTES NFR BLD AUTO: 18.5 % (ref 19.6–45.3)
MCH RBC QN AUTO: 29.3 PG (ref 26.6–33)
MCHC RBC AUTO-ENTMCNC: 31.4 G/DL (ref 31.5–35.7)
MCV RBC AUTO: 93.3 FL (ref 79–97)
MONOCYTES # BLD AUTO: 0.57 10*3/MM3 (ref 0.1–0.9)
MONOCYTES NFR BLD AUTO: 8.1 % (ref 5–12)
NEUTROPHILS NFR BLD AUTO: 5.01 10*3/MM3 (ref 1.7–7)
NEUTROPHILS NFR BLD AUTO: 71.2 % (ref 42.7–76)
PLATELET # BLD AUTO: 339 10*3/MM3 (ref 140–450)
PMV BLD AUTO: 9.4 FL (ref 6–12)
RBC # BLD AUTO: 3.72 10*6/MM3 (ref 3.77–5.28)
RETICS # AUTO: 0.32 10*6/MM3 (ref 0.02–0.13)
RETICS/RBC NFR AUTO: 8.78 % (ref 0.7–1.9)
TIBC SERPL-MCNC: 396 MCG/DL (ref 298–536)
TRANSFERRIN SERPL-MCNC: 266 MG/DL (ref 200–360)
URATE SERPL-MCNC: 7.7 MG/DL (ref 2.4–5.7)
WBC NRBC COR # BLD AUTO: 7.04 10*3/MM3 (ref 3.4–10.8)

## 2024-05-16 PROCEDURE — 84550 ASSAY OF BLOOD/URIC ACID: CPT | Performed by: INTERNAL MEDICINE

## 2024-05-16 PROCEDURE — 83540 ASSAY OF IRON: CPT | Performed by: INTERNAL MEDICINE

## 2024-05-16 PROCEDURE — 84466 ASSAY OF TRANSFERRIN: CPT | Performed by: INTERNAL MEDICINE

## 2024-05-16 PROCEDURE — 83615 LACTATE (LD) (LDH) ENZYME: CPT | Performed by: INTERNAL MEDICINE

## 2024-05-16 PROCEDURE — 36415 COLL VENOUS BLD VENIPUNCTURE: CPT

## 2024-05-16 PROCEDURE — 83010 ASSAY OF HAPTOGLOBIN QUANT: CPT | Performed by: INTERNAL MEDICINE

## 2024-05-16 PROCEDURE — 85025 COMPLETE CBC W/AUTO DIFF WBC: CPT

## 2024-05-16 PROCEDURE — 86880 COOMBS TEST DIRECT: CPT | Performed by: INTERNAL MEDICINE

## 2024-05-16 PROCEDURE — 85045 AUTOMATED RETICULOCYTE COUNT: CPT | Performed by: INTERNAL MEDICINE

## 2024-05-16 PROCEDURE — 80074 ACUTE HEPATITIS PANEL: CPT | Performed by: INTERNAL MEDICINE

## 2024-05-16 PROCEDURE — 82728 ASSAY OF FERRITIN: CPT | Performed by: INTERNAL MEDICINE

## 2024-05-16 NOTE — PATIENT INSTRUCTIONS
Rituxan - Rituximab Injection  What is this medication?  RITUXIMAB (ri TUX i mab) treats leukemia and lymphoma. It works by blocking a protein that causes cancer cells to grow and multiply. This helps to slow or stop the spread of cancer cells. It may also be used to treat autoimmune conditions, such as arthritis. It works by slowing down an overactive immune system. It is a monoclonal antibody.    This medicine may be used for other purposes; ask your health care provider or pharmacist if you have questions.    COMMON BRAND NAME(S): RIABNI, Rituxan, RUXIENCE, truxima    What should I tell my care team before I take this medication?  They need to know if you have any of these conditions:    Chest pain  Heart disease  Immune system problems  Infection, such as chickenpox, cold sores, hepatitis B, herpes  Irregular heartbeat or rhythm  Kidney disease  Low blood counts, such as low white cells, platelets, red cells  Lung disease  Recent or upcoming vaccine  An unusual or allergic reaction to rituximab, other medications, foods, dyes, or preservatives  Pregnant or trying to get pregnant  Breast-feeding  How should I use this medication?  This medication is injected into a vein. It is given by a care team in a hospital or clinic setting.    A special MedGuide will be given to you before each treatment. Be sure to read this information carefully each time.    Talk to your care team about the use of this medication in children. While this medication may be prescribed for children as young as 6 months for selected conditions, precautions do apply.    Overdosage: If you think you have taken too much of this medicine contact a poison control center or emergency room at once.    NOTE: This medicine is only for you. Do not share this medicine with others.    What if I miss a dose?  Keep appointments for follow-up doses. It is important not to miss your dose. Call your care team if you are unable to keep an appointment.    What  may interact with this medication?  Do not take this medication with any of the following:    Live vaccines  This medication may also interact with the following:    Cisplatin  This list may not describe all possible interactions. Give your health care provider a list of all the medicines, herbs, non-prescription drugs, or dietary supplements you use. Also tell them if you smoke, drink alcohol, or use illegal drugs. Some items may interact with your medicine.    What should I watch for while using this medication?  Your condition will be monitored carefully while you are receiving this medication.    You may need blood work while taking this medication.    This medication can cause serious infusion reactions. To reduce the risk your care team may give you other medications to take before receiving this one. Be sure to follow the directions from your care team.    This medication may increase your risk of getting an infection. Call your care team for advice if you get a fever, chills, sore throat, or other symptoms of a cold or flu. Do not treat yourself. Try to avoid being around people who are sick.    Call your care team if you are around anyone with measles, chickenpox, or if you develop sores or blisters that do not heal properly.    Avoid taking medications that contain aspirin, acetaminophen, ibuprofen, naproxen, or ketoprofen unless instructed by your care team. These medications may hide a fever.    This medication may cause serious skin reactions. They can happen weeks to months after starting the medication. Contact your care team right away if you notice fevers or flu-like symptoms with a rash. The rash may be red or purple and then turn into blisters or peeling of the skin. You may also notice a red rash with swelling of the face, lips, or lymph nodes in your neck or under your arms.    In some patients, this medication may cause a serious brain infection that may cause death. If you have any problems  seeing, thinking, speaking, walking, or standing, tell your care team right away. If you cannot reach your care team, urgently seek another source of medical care.    Talk to your care team if you may be pregnant. Serious birth defects can occur if you take this medication during pregnancy and for 12 months after the last dose. You will need a negative pregnancy test before starting this medication. Contraception is recommended while taking this medication and for 12 months after the last dose. Your care team can help you find the option that works for you.    Do not breastfeed while taking this medication and for at least 6 months after the last dose.    What side effects may I notice from receiving this medication?  Side effects that you should report to your care team as soon as possible:    Allergic reactions or angioedema--skin rash, itching or hives, swelling of the face, eyes, lips, tongue, arms, or legs, trouble swallowing or breathing  Bowel blockage--stomach cramping, unable to have a bowel movement or pass gas, loss of appetite, vomiting  Dizziness, loss of balance or coordination, confusion or trouble speaking  Heart attack--pain or tightness in the chest, shoulders, arms, or jaw, nausea, shortness of breath, cold or clammy skin, feeling faint or lightheaded  Heart rhythm changes--fast or irregular heartbeat, dizziness, feeling faint or lightheaded, chest pain, trouble breathing  Infection--fever, chills, cough, sore throat, wounds that don't heal, pain or trouble when passing urine, general feeling of discomfort or being unwell  Infusion reactions--chest pain, shortness of breath or trouble breathing, feeling faint or lightheaded  Kidney injury--decrease in the amount of urine, swelling of the ankles, hands, or feet  Liver injury--right upper belly pain, loss of appetite, nausea, light-colored stool, dark yellow or brown urine, yellowing skin or eyes, unusual weakness or fatigue  Redness, blistering,  peeling, or loosening of the skin, including inside the mouth  Stomach pain that is severe, does not go away, or gets worse  Tumor lysis syndrome (TLS)--nausea, vomiting, diarrhea, decrease in the amount of urine, dark urine, unusual weakness or fatigue, confusion, muscle pain or cramps, fast or irregular heartbeat, joint pain  Side effects that usually do not require medical attention (report to your care team if they continue or are bothersome):    Headache  Joint pain  Nausea  Runny or stuffy nose  Unusual weakness or fatigue  This list may not describe all possible side effects. Call your doctor for medical advice about side effects. You may report side effects to FDA at 0-221-WBU-6738.    Where should I keep my medication?  This medication is given in a hospital or clinic. It will not be stored at home.    NOTE: This sheet is a summary. It may not cover all possible information. If you have questions about this medicine, talk to your doctor, pharmacist, or health care provider.    © 2024 [employer] (2023-05-11 00:00:00)    Additional Information From [facility] About Rituxan  Self-Care Tips:  Rituxan may cause temporary low blood pressure.  If you are taking medication to reduce your blood pressure, check with your doctor or nurse as to whether you should take it as usual or not before the infusion.  You may experience shortness of breath, feel flushed or dizzy during the infusion. You will most likely receive medication before the infusion, and you will be closely monitored during the infusion.   For flu-like symptoms, keep warm with blankets and drink plenty of liquids.  There are medications that can help reduce the discomfort caused by chills.  Drink 2 to 3 quarts of fluid for the first 48 hours after each infusion, unless you were told to restrict your fluid intake.   Rituxan infrequently causes nausea. But if you should experience nausea, take anti-nausea medications as prescribed by your  doctor, and eat small, frequent meals. Sucking on lozenges and chewing gum may also help.  You may experience drowsiness or dizziness; avoid driving or engaging in tasks that require alertness until your response to the drug is known.  In general, drinking alcoholic beverages should be avoided.  You should discuss this with your doctor.  Maintain good nutrition.  If you experience symptoms or side effects, be sure to discuss them with your health care team.  They can prescribe medications and/or offer other suggestions that are effective in managing such problems.    When to contact your doctor or health care provider:  Contact your health care provider immediately, day or night, if you should experience any of the following symptoms:    Fever of 100.4° F (38° C) or chills (possible signs of infection)  Shortness of breath, chest pain or discomfort; swelling of your lips or throat  Confusion  The following symptoms require medical attention, but are not emergency situations.  Contact your health care provider within 24 hours of noticing any of the following:    Develop a rash or sore joints  Nausea (interferes with ability to eat and unrelieved with prescribed medications)  Vomiting (vomiting more than 4-5 times in a 24-hour period)  Other signs of infection, sore throat, cough, redness or inflammation, or pain on urination  Always inform your health care provider if you experience any unusual symptoms.

## 2024-05-17 LAB — HAPTOGLOB SERPL-MCNC: 187 MG/DL (ref 30–200)

## 2024-05-20 LAB — PNH RESULT: NORMAL

## 2024-05-24 ENCOUNTER — TELEPHONE (OUTPATIENT)
Dept: ONCOLOGY | Facility: CLINIC | Age: 29
End: 2024-05-24
Payer: COMMERCIAL

## 2024-05-24 ENCOUNTER — TELEPHONE (OUTPATIENT)
Dept: ONCOLOGY | Facility: CLINIC | Age: 29
End: 2024-05-24

## 2024-05-24 RX ORDER — FOLIC ACID 1 MG/1
1000 TABLET ORAL DAILY
Qty: 90 TABLET | Refills: 0 | Status: SHIPPED | OUTPATIENT
Start: 2024-05-24

## 2024-05-24 NOTE — TELEPHONE ENCOUNTER
Caller: Arlene Brady    Relationship: Self    Best call back number: [phone]    What is the best time to reach you: ANYTIME    Who are you requesting to speak with (clinical staff, provider,  specific staff member): SCHEDULING        What was the call regarding: PT CALLING TO REQUEST F/U APPT WITH DR GLEZ.    PLEASE CALL TO ADVISE.

## 2024-05-30 ENCOUNTER — LAB (OUTPATIENT)
Dept: LAB | Facility: HOSPITAL | Age: 29
End: 2024-05-30
Payer: COMMERCIAL

## 2024-05-30 DIAGNOSIS — D59.10 AUTOIMMUNE HEMOLYTIC ANEMIA: ICD-10-CM

## 2024-05-30 DIAGNOSIS — D64.9 ANEMIA, UNSPECIFIED TYPE: ICD-10-CM

## 2024-05-30 LAB
BASOPHILS # BLD AUTO: 0.04 10*3/MM3 (ref 0–0.2)
BASOPHILS NFR BLD AUTO: 0.5 % (ref 0–1.5)
DEPRECATED RDW RBC AUTO: 50.3 FL (ref 37–54)
EOSINOPHIL # BLD AUTO: 0.31 10*3/MM3 (ref 0–0.4)
EOSINOPHIL NFR BLD AUTO: 4.2 % (ref 0.3–6.2)
ERYTHROCYTE [DISTWIDTH] IN BLOOD BY AUTOMATED COUNT: 15.2 % (ref 12.3–15.4)
HCT VFR BLD AUTO: 34.3 % (ref 34–46.6)
HGB BLD-MCNC: 10.6 G/DL (ref 12–15.9)
LYMPHOCYTES # BLD AUTO: 1.32 10*3/MM3 (ref 0.7–3.1)
LYMPHOCYTES NFR BLD AUTO: 17.9 % (ref 19.6–45.3)
MCH RBC QN AUTO: 29.5 PG (ref 26.6–33)
MCHC RBC AUTO-ENTMCNC: 30.9 G/DL (ref 31.5–35.7)
MCV RBC AUTO: 95.5 FL (ref 79–97)
MONOCYTES # BLD AUTO: 0.5 10*3/MM3 (ref 0.1–0.9)
MONOCYTES NFR BLD AUTO: 6.8 % (ref 5–12)
NEUTROPHILS NFR BLD AUTO: 5.19 10*3/MM3 (ref 1.7–7)
NEUTROPHILS NFR BLD AUTO: 70.6 % (ref 42.7–76)
PLATELET # BLD AUTO: 323 10*3/MM3 (ref 140–450)
PMV BLD AUTO: 9.4 FL (ref 6–12)
RBC # BLD AUTO: 3.59 10*6/MM3 (ref 3.77–5.28)
WBC NRBC COR # BLD AUTO: 7.36 10*3/MM3 (ref 3.4–10.8)

## 2024-05-30 PROCEDURE — 85025 COMPLETE CBC W/AUTO DIFF WBC: CPT

## 2024-05-30 PROCEDURE — 36415 COLL VENOUS BLD VENIPUNCTURE: CPT

## 2024-06-05 NOTE — PROGRESS NOTES
Hematology/Oncology Outpatient Follow Up    PATIENT NAME:Arlene Brady  :1995  MRN: [id number]  PRIMARY CARE PHYSICIAN: Daniel Sam MD  REFERRING PHYSICIAN: Daniel Sam MD    Chief Complaint   Patient presents with    Follow-up     Autoimmune hemolytic anemia          HISTORY OF PRESENT ILLNESS:       This is a 28 year old female has been referred secondary to anemia.  Patient has a longtime history of anemia.  She has rectal bleeding and had colonoscopy done a few days ago by Dr Vinny Lagos . She has internal and external hemorrhoids. She has a follow up with Dr Lagos.     Patient is amenorrheic for about 6 months.  She had history of menorrhagia. She is on hormone pills to regulate her cycles.   She has low energy, she was on oral iron supplements for a month. She has since ran out of her iron tablets.     Review of her CBCs indicate that she has had anemia with hemoglobin ranging between 8 and 12.3 g per DL.  Today her white count is 8, hemoglobin is 8, MCV is 101 and platelets are 352 with essentially unremarkable differentials.     She denies having blood in her urine     She is single, She is a CNA     Patient does not smoke and drinks occasionally     There is no family history of hematological problems.     Her mother had colon cancer,      2023: Methylmalonic acid level was normal at 343 patient had anemia work-up including a ferritin level which was 167, C-reactive protein was high at 1.96 BUN was 13, creatinine 0.9 LDH was 323 iron profile showed a elevated serum iron and iron saturation, haptoglobin was normal, reticulocyte count was elevated to 16 she has low folate at 3.1  2023: WBC 11.62, hemoglobin 8.0 g/dL, .9, platelets 353,000.  Flow cytometry showed an increased kappa lambda ratio, neutrophils with left shifted maturation and rare phenotypic aberrancy, monocytes with phenotypic aberrancy.  Bone marrow biopsy with molecular and cytogenetic studies  indicated to evaluate for myeloid neoplasm.  8/16/2023 CT of the neck, chest, abdomen and pelvis with contrast showed no evidence of definite pathologic lymphadenopathy concerning for lymphoproliferative disorder.  No acute findings present in the neck, chest, abdomen or pelvis.  8/25/2023: Patient had bone marrow aspiration and biopsy which basically showed normocellular bone marrow for age, decreased iron stores, negative for involvement by malignant lymphoma or metastatic malignancy.  Flow cytometry was unremarkable with no immunophenotypic abnormalities noted.  Cytogenetics showed a normal female karyotype      Past Medical History:   Diagnosis Date    ADHD (attention deficit hyperactivity disorder)     Arthritis     Asthma     exercise induced asthma     Depression     Seizures        Past Surgical History:   Procedure Laterality Date    ABDOMINAL SURGERY      choley    TONSILLECTOMY           Current Outpatient Medications:     acetaminophen-codeine (TYLENOL with CODEINE #3) 300-30 MG per tablet, Take 1 tablet by mouth Every 12 (Twelve) Hours., Disp: , Rfl:     baclofen (LIORESAL) 10 MG tablet, Take 1 tablet by mouth Every 12 (Twelve) Hours., Disp: , Rfl:     fexofenadine (ALLEGRA) 180 MG tablet, Take 1 tablet by mouth Daily., Disp: , Rfl:     folic acid (FOLVITE) 1 MG tablet, TAKE 1 TABLET BY MOUTH EVERY DAY, Disp: 90 tablet, Rfl: 0    folic acid (FOLVITE) 1 MG tablet, TAKE 1 TABLET BY MOUTH EVERY DAY, Disp: 90 tablet, Rfl: 0    Junel 1/20 1-20 MG-MCG per tablet, Take 1 tablet by mouth Every Evening., Disp: , Rfl:     meloxicam (MOBIC) 15 MG tablet, , Disp: , Rfl:     norethindrone-ethinyl estradiol FE (Junel FE 1/20) 1-20 MG-MCG per tablet, , Disp: , Rfl:     nystatin (MYCOSTATIN) 386881 UNIT/GM cream, , Disp: , Rfl:     nystatin-triamcinolone (MYCOLOG) 736096-1.1 UNIT/GM-% ointment, APPLY 1 APPLICATION TOPICALLY TWICE DAILY FOR 21 DAYS, Disp: , Rfl:     OXcarbazepine (TRILEPTAL) 300 MG tablet, Take 1.5  tablets by mouth 2 (Two) Times a Day., Disp: , Rfl:     pantoprazole (PROTONIX) 40 MG EC tablet, TAKE 1 TABLET BY MOUTH EVERY DAY, Disp: 90 tablet, Rfl: 3    predniSONE (DELTASONE) 5 MG tablet, Take 1 tablet by mouth Every Other Day., Disp: 30 tablet, Rfl: 0    sertraline (ZOLOFT) 50 MG tablet, TAKE 1 TABLET BY MOUTH EVERY DAY, Disp: 90 tablet, Rfl: 0    silver sulfadiazine (SILVADENE, SSD) 1 % cream, APPLY 1 APPLICATION TOPICALLY TWICE A DAY FOR 20 DAYS, Disp: , Rfl:     Allergies   Allergen Reactions    Cephalosporins Rash    Penicillins Rash       Family History   Problem Relation Age of Onset    Hypertension Father     Heart disease Father     Hyperlipidemia Father     Cancer Paternal Grandmother        Cancer-related family history includes Cancer in her paternal grandmother.    Social History     Tobacco Use    Smoking status: Never    Smokeless tobacco: Never   Vaping Use    Vaping status: Never Used   Substance Use Topics    Alcohol use: Yes     Comment: very infrequent     Drug use: Never       I have reviewed and confirmed the accuracy of the patient's history: Chief complaint, HPI, ROS, and Subjective as entered by the MA/LPN/RN. Bronwyn Shelley Burns MD 06/06/24 6/6/24        SUBJECTIVE:      Patient denies any new issues.  She was diagnosed with COVID recently    Hemoglobin is up to 11.1 g per DL.  She denies any new issues.  She is currently on prednisone 10 mg alternating with 5 mg every day      Patient is here today for follow-up.  She is on prednisone 5 mg every other day    Patient denies any new issues today        Arlene Brady reports a pain score of 0.  Given her pain assessment as noted, treatment options were discussed and the following options were decided upon as a follow-up plan to address the patient's pain:  Continue current management by her primary care .           REVIEW OF SYSTEMS:      Review of Systems   Constitutional:  Positive for fatigue.   Musculoskeletal:  Positive  "for back pain.       Per subjective    OBJECTIVE:    Vitals:    06/06/24 1342   BP: 162/81   Pulse: 87   Resp: 20   Temp: 98 °F (36.7 °C)   TempSrc: Infrared   SpO2: 97%   Weight: 119 kg (262 lb)   Height: 149.9 cm (59\")   PainSc: 0-No pain           Body mass index is 52.92 kg/m².    ECOG  (1) Restricted in physically strenuous activity, ambulatory and able to do work of light nature    Physical Exam  Vitals reviewed.   Constitutional:       General: She is not in acute distress.     Appearance: Normal appearance. She is not ill-appearing, toxic-appearing or diaphoretic.   HENT:      Head: Normocephalic and atraumatic.   Eyes:      Extraocular Movements: Extraocular movements intact.   Cardiovascular:      Rate and Rhythm: Normal rate and regular rhythm.      Heart sounds: No murmur heard.  Pulmonary:      Effort: Pulmonary effort is normal. No respiratory distress.      Breath sounds: Normal breath sounds. No stridor. No wheezing.   Abdominal:      General: Bowel sounds are normal.      Palpations: Abdomen is soft.   Musculoskeletal:         General: Normal range of motion.      Cervical back: Normal range of motion.   Skin:     General: Skin is warm and dry.      Findings: No rash.   Neurological:      Mental Status: She is alert and oriented to person, place, and time.   Psychiatric:         Mood and Affect: Mood normal.         Behavior: Behavior normal.     I have reexamined the patient and the results are consistent with the previously documented exam. Bronwyn Burns MD           RECENT LABS    WBC   Date Value Ref Range Status   06/06/2024 7.59 3.40 - 10.80 10*3/mm3 Final     RBC   Date Value Ref Range Status   06/06/2024 3.92 3.77 - 5.28 10*6/mm3 Final   07/31/2023 2.80 (L) 3.77 - 5.28 x10E6/uL Final     Hemoglobin   Date Value Ref Range Status   06/06/2024 11.6 (L) 12.0 - 15.9 g/dL Final     Hematocrit   Date Value Ref Range Status   06/06/2024 36.5 34.0 - 46.6 % Final     MCV   Date Value Ref " Range Status   06/06/2024 93.1 79.0 - 97.0 fL Final     MCH   Date Value Ref Range Status   06/06/2024 29.6 26.6 - 33.0 pg Final     MCHC   Date Value Ref Range Status   06/06/2024 31.8 31.5 - 35.7 g/dL Final     RDW   Date Value Ref Range Status   06/06/2024 15.1 12.3 - 15.4 % Final     RDW-SD   Date Value Ref Range Status   06/06/2024 49.2 37.0 - 54.0 fl Final     MPV   Date Value Ref Range Status   06/06/2024 9.1 6.0 - 12.0 fL Final     Platelets   Date Value Ref Range Status   06/06/2024 346 140 - 450 10*3/mm3 Final     Neutrophil %   Date Value Ref Range Status   06/06/2024 68.5 42.7 - 76.0 % Final     Lymphocyte %   Date Value Ref Range Status   06/06/2024 19.5 (L) 19.6 - 45.3 % Final     Monocyte %   Date Value Ref Range Status   06/06/2024 7.9 5.0 - 12.0 % Final     Eosinophil %   Date Value Ref Range Status   06/06/2024 3.4 0.3 - 6.2 % Final     Basophil %   Date Value Ref Range Status   06/06/2024 0.7 0.0 - 1.5 % Final     Immature Grans %   Date Value Ref Range Status   10/09/2020 1.4 (H) 0.0 - 0.5 % Final     Neutrophils, Absolute   Date Value Ref Range Status   06/06/2024 5.20 1.70 - 7.00 10*3/mm3 Final     Lymphocytes, Absolute   Date Value Ref Range Status   06/06/2024 1.48 0.70 - 3.10 10*3/mm3 Final     Monocytes, Absolute   Date Value Ref Range Status   06/06/2024 0.60 0.10 - 0.90 10*3/mm3 Final     Eosinophils, Absolute   Date Value Ref Range Status   06/06/2024 0.26 0.00 - 0.40 10*3/mm3 Final     Basophils, Absolute   Date Value Ref Range Status   06/06/2024 0.05 0.00 - 0.20 10*3/mm3 Final     Immature Grans, Absolute   Date Value Ref Range Status   10/09/2020 0.09 (H) 0.00 - 0.05 10*3/mm3 Final     nRBC   Date Value Ref Range Status   02/23/2024 0.1 0.0 - 0.2 /100 WBC Final       Lab Results   Component Value Date    GLUCOSE 103 (H) 02/23/2024    BUN 20 02/23/2024    CREATININE 0.69 02/23/2024    EGFRIFNONA 88 07/26/2021    BCR 29.0 (H) 02/23/2024    K 3.6 02/23/2024    CO2 24.0 02/23/2024     CALCIUM 9.0 02/23/2024    PROTENTOTREF 6.9 08/11/2023    ALBUMIN 4.0 08/18/2023    LABIL2 1.1 08/11/2023    AST 16 08/18/2023    ALT 50 (H) 08/18/2023         Assessment & Plan     Autoimmune hemolytic anemia  - CBC & Differential                  ASSESSMENT and plans:       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  Rule out lymphoproliferative disease.  Peripheral blood for flow cytometry shows increased kappa lambda ratio detected rare phenotypic aberrancy of the neutrophils as well as monocytes, possibility of a myeloid disorder was also entertained.  PNH screen was negative and G6PD was not deficient.  For now patient will continue on steroids.  But will begin steroid taper due to normalization of her hemoglobin down to 12.7 g per DL.  We will continue her steroid taper as her hemoglobin has improved to 13.0 g/dL today.  Hemoglobin is up to 11.1 g per DL t.  Decrease prednisone to 5 mg p.o. daily.  Continue folate replacement.  Continue PPI.  She is dependent on prednisone therefore would consider additional treatment with Rituxan to see if we will be able to completely resolve the hemolysis.  Today her hemoglobin is up to 11.6 g per DL, patient would like to observe a little bit longer which I think it is reasonable  Status post bone marrow aspiration and biopsy which was essentially unremarkable  Folate deficiency: Continue folic acid, ongoing hemolysis  History of seizures  History of rectal bleeding : Recent  colonoscopy by Dr Vinny Lagos .  She no longer has rectal bleeding and she has completed her course of iron.  Follow-up with GI encouraged.  She has an appointment coming up next week with GI.  She denies rectal bleeding at this time.  Possible sensitivity reaction to Venofer: Patient's epigastric chest pain complaints seem to predate the infusion but she does also complain of itching on the bilateral forearms during the infusion.  Venofer was stopped  and normal saline was ran along with 25 mg Benadryl IV given with resolution of the symptoms.          Plans     Continue prednisone to 5 mg every other day.    CBC reviewed  Hemolysis labs reviewed.  She has a normal haptoglobin but still elevated reticulocyte count and Susi positivity.  Viral hepatitis panel, [facility] screen iron studies with ferritin were within normal limits  Patient was given information on Rituxan to review understands we may have to use it in the future  Continue CBC every 2 weeks  Follow-up 4 weeks  Continue PPI  Discussed the benefits and side effects of Rituxan in detail with patient  All questions answered    Patient verbalized understanding and is in agreement of the above plan.        I spent 30 total minutes, face-to-face, caring for Arlene today. 90% of this time involved counseling and/or coordination of care as documented within this note.

## 2024-06-06 ENCOUNTER — LAB (OUTPATIENT)
Dept: LAB | Facility: HOSPITAL | Age: 29
End: 2024-06-06
Payer: COMMERCIAL

## 2024-06-06 ENCOUNTER — OFFICE VISIT (OUTPATIENT)
Dept: ONCOLOGY | Facility: CLINIC | Age: 29
End: 2024-06-06
Payer: COMMERCIAL

## 2024-06-06 VITALS
HEIGHT: 59 IN | HEART RATE: 87 BPM | BODY MASS INDEX: 52.82 KG/M2 | DIASTOLIC BLOOD PRESSURE: 81 MMHG | TEMPERATURE: 98 F | OXYGEN SATURATION: 97 % | RESPIRATION RATE: 20 BRPM | WEIGHT: 262 LBS | SYSTOLIC BLOOD PRESSURE: 162 MMHG

## 2024-06-06 DIAGNOSIS — D50.0 IRON DEFICIENCY ANEMIA DUE TO CHRONIC BLOOD LOSS: Primary | ICD-10-CM

## 2024-06-06 DIAGNOSIS — D59.10 AUTOIMMUNE HEMOLYTIC ANEMIA: ICD-10-CM

## 2024-06-06 DIAGNOSIS — D59.10 AUTOIMMUNE HEMOLYTIC ANEMIA: Primary | ICD-10-CM

## 2024-06-06 DIAGNOSIS — D64.9 ANEMIA, UNSPECIFIED TYPE: ICD-10-CM

## 2024-06-06 LAB
BASOPHILS # BLD AUTO: 0.05 10*3/MM3 (ref 0–0.2)
BASOPHILS NFR BLD AUTO: 0.7 % (ref 0–1.5)
DEPRECATED RDW RBC AUTO: 49.2 FL (ref 37–54)
EOSINOPHIL # BLD AUTO: 0.26 10*3/MM3 (ref 0–0.4)
EOSINOPHIL NFR BLD AUTO: 3.4 % (ref 0.3–6.2)
ERYTHROCYTE [DISTWIDTH] IN BLOOD BY AUTOMATED COUNT: 15.1 % (ref 12.3–15.4)
HCT VFR BLD AUTO: 36.5 % (ref 34–46.6)
HGB BLD-MCNC: 11.6 G/DL (ref 12–15.9)
HOLD SPECIMEN: NORMAL
HOLD SPECIMEN: NORMAL
LYMPHOCYTES # BLD AUTO: 1.48 10*3/MM3 (ref 0.7–3.1)
LYMPHOCYTES NFR BLD AUTO: 19.5 % (ref 19.6–45.3)
MCH RBC QN AUTO: 29.6 PG (ref 26.6–33)
MCHC RBC AUTO-ENTMCNC: 31.8 G/DL (ref 31.5–35.7)
MCV RBC AUTO: 93.1 FL (ref 79–97)
MONOCYTES # BLD AUTO: 0.6 10*3/MM3 (ref 0.1–0.9)
MONOCYTES NFR BLD AUTO: 7.9 % (ref 5–12)
NEUTROPHILS NFR BLD AUTO: 5.2 10*3/MM3 (ref 1.7–7)
NEUTROPHILS NFR BLD AUTO: 68.5 % (ref 42.7–76)
PLATELET # BLD AUTO: 346 10*3/MM3 (ref 140–450)
PMV BLD AUTO: 9.1 FL (ref 6–12)
RBC # BLD AUTO: 3.92 10*6/MM3 (ref 3.77–5.28)
WBC NRBC COR # BLD AUTO: 7.59 10*3/MM3 (ref 3.4–10.8)

## 2024-06-06 PROCEDURE — 85025 COMPLETE CBC W/AUTO DIFF WBC: CPT

## 2024-06-06 PROCEDURE — 36415 COLL VENOUS BLD VENIPUNCTURE: CPT

## 2024-06-06 NOTE — PATIENT INSTRUCTIONS
Rituxan - Rituximab Injection  What is this medication?  RITUXIMAB (ri TUX i mab) treats leukemia and lymphoma. It works by blocking a protein that causes cancer cells to grow and multiply. This helps to slow or stop the spread of cancer cells. It may also be used to treat autoimmune conditions, such as arthritis. It works by slowing down an overactive immune system. It is a monoclonal antibody.    This medicine may be used for other purposes; ask your health care provider or pharmacist if you have questions.    COMMON BRAND NAME(S): RIABNI, Rituxan, RUXIENCE, truxima    What should I tell my care team before I take this medication?  They need to know if you have any of these conditions:    Chest pain  Heart disease  Immune system problems  Infection, such as chickenpox, cold sores, hepatitis B, herpes  Irregular heartbeat or rhythm  Kidney disease  Low blood counts, such as low white cells, platelets, red cells  Lung disease  Recent or upcoming vaccine  An unusual or allergic reaction to rituximab, other medications, foods, dyes, or preservatives  Pregnant or trying to get pregnant  Breast-feeding  How should I use this medication?  This medication is injected into a vein. It is given by a care team in a hospital or clinic setting.    A special MedGuide will be given to you before each treatment. Be sure to read this information carefully each time.    Talk to your care team about the use of this medication in children. While this medication may be prescribed for children as young as 6 months for selected conditions, precautions do apply.    Overdosage: If you think you have taken too much of this medicine contact a poison control center or emergency room at once.    NOTE: This medicine is only for you. Do not share this medicine with others.    What if I miss a dose?  Keep appointments for follow-up doses. It is important not to miss your dose. Call your care team if you are unable to keep an appointment.    What  may interact with this medication?  Do not take this medication with any of the following:    Live vaccines  This medication may also interact with the following:    Cisplatin  This list may not describe all possible interactions. Give your health care provider a list of all the medicines, herbs, non-prescription drugs, or dietary supplements you use. Also tell them if you smoke, drink alcohol, or use illegal drugs. Some items may interact with your medicine.    What should I watch for while using this medication?  Your condition will be monitored carefully while you are receiving this medication.    You may need blood work while taking this medication.    This medication can cause serious infusion reactions. To reduce the risk your care team may give you other medications to take before receiving this one. Be sure to follow the directions from your care team.    This medication may increase your risk of getting an infection. Call your care team for advice if you get a fever, chills, sore throat, or other symptoms of a cold or flu. Do not treat yourself. Try to avoid being around people who are sick.    Call your care team if you are around anyone with measles, chickenpox, or if you develop sores or blisters that do not heal properly.    Avoid taking medications that contain aspirin, acetaminophen, ibuprofen, naproxen, or ketoprofen unless instructed by your care team. These medications may hide a fever.    This medication may cause serious skin reactions. They can happen weeks to months after starting the medication. Contact your care team right away if you notice fevers or flu-like symptoms with a rash. The rash may be red or purple and then turn into blisters or peeling of the skin. You may also notice a red rash with swelling of the face, lips, or lymph nodes in your neck or under your arms.    In some patients, this medication may cause a serious brain infection that may cause death. If you have any problems  seeing, thinking, speaking, walking, or standing, tell your care team right away. If you cannot reach your care team, urgently seek another source of medical care.    Talk to your care team if you may be pregnant. Serious birth defects can occur if you take this medication during pregnancy and for 12 months after the last dose. You will need a negative pregnancy test before starting this medication. Contraception is recommended while taking this medication and for 12 months after the last dose. Your care team can help you find the option that works for you.    Do not breastfeed while taking this medication and for at least 6 months after the last dose.    What side effects may I notice from receiving this medication?  Side effects that you should report to your care team as soon as possible:    Allergic reactions or angioedema--skin rash, itching or hives, swelling of the face, eyes, lips, tongue, arms, or legs, trouble swallowing or breathing  Bowel blockage--stomach cramping, unable to have a bowel movement or pass gas, loss of appetite, vomiting  Dizziness, loss of balance or coordination, confusion or trouble speaking  Heart attack--pain or tightness in the chest, shoulders, arms, or jaw, nausea, shortness of breath, cold or clammy skin, feeling faint or lightheaded  Heart rhythm changes--fast or irregular heartbeat, dizziness, feeling faint or lightheaded, chest pain, trouble breathing  Infection--fever, chills, cough, sore throat, wounds that don't heal, pain or trouble when passing urine, general feeling of discomfort or being unwell  Infusion reactions--chest pain, shortness of breath or trouble breathing, feeling faint or lightheaded  Kidney injury--decrease in the amount of urine, swelling of the ankles, hands, or feet  Liver injury--right upper belly pain, loss of appetite, nausea, light-colored stool, dark yellow or brown urine, yellowing skin or eyes, unusual weakness or fatigue  Redness, blistering,  peeling, or loosening of the skin, including inside the mouth  Stomach pain that is severe, does not go away, or gets worse  Tumor lysis syndrome (TLS)--nausea, vomiting, diarrhea, decrease in the amount of urine, dark urine, unusual weakness or fatigue, confusion, muscle pain or cramps, fast or irregular heartbeat, joint pain  Side effects that usually do not require medical attention (report to your care team if they continue or are bothersome):    Headache  Joint pain  Nausea  Runny or stuffy nose  Unusual weakness or fatigue  This list may not describe all possible side effects. Call your doctor for medical advice about side effects. You may report side effects to FDA at 1-456-IUG-5046.    Where should I keep my medication?  This medication is given in a hospital or clinic. It will not be stored at home.    NOTE: This sheet is a summary. It may not cover all possible information. If you have questions about this medicine, talk to your doctor, pharmacist, or health care provider.    © 2024 [employer] (2023-05-11 00:00:00)    Additional Information From [employer] About Rituxan  Self-Care Tips:  Rituxan may cause temporary low blood pressure.  If you are taking medication to reduce your blood pressure, check with your doctor or nurse as to whether you should take it as usual or not before the infusion.  You may experience shortness of breath, feel flushed or dizzy during the infusion. You will most likely receive medication before the infusion, and you will be closely monitored during the infusion.   For flu-like symptoms, keep warm with blankets and drink plenty of liquids.  There are medications that can help reduce the discomfort caused by chills.  Drink 2 to 3 quarts of fluid for the first 48 hours after each infusion, unless you were told to restrict your fluid intake.   Rituxan infrequently causes nausea. But if you should experience nausea, take anti-nausea medications as prescribed by your  doctor, and eat small, frequent meals. Sucking on lozenges and chewing gum may also help.  You may experience drowsiness or dizziness; avoid driving or engaging in tasks that require alertness until your response to the drug is known.  In general, drinking alcoholic beverages should be avoided.  You should discuss this with your doctor.  Maintain good nutrition.  If you experience symptoms or side effects, be sure to discuss them with your health care team.  They can prescribe medications and/or offer other suggestions that are effective in managing such problems.    When to contact your doctor or health care provider:  Contact your health care provider immediately, day or night, if you should experience any of the following symptoms:    Fever of 100.4° F (38° C) or chills (possible signs of infection)  Shortness of breath, chest pain or discomfort; swelling of your lips or throat  Confusion  The following symptoms require medical attention, but are not emergency situations.  Contact your health care provider within 24 hours of noticing any of the following:    Develop a rash or sore joints  Nausea (interferes with ability to eat and unrelieved with prescribed medications)  Vomiting (vomiting more than 4-5 times in a 24-hour period)  Other signs of infection, sore throat, cough, redness or inflammation, or pain on urination  Always inform your health care provider if you experience any unusual symptoms.   Elvin

## 2024-06-13 ENCOUNTER — OFFICE VISIT (OUTPATIENT)
Dept: BARIATRICS/WEIGHT MGMT | Facility: CLINIC | Age: 29
End: 2024-06-13
Payer: COMMERCIAL

## 2024-06-13 VITALS
DIASTOLIC BLOOD PRESSURE: 76 MMHG | WEIGHT: 261.8 LBS | SYSTOLIC BLOOD PRESSURE: 117 MMHG | HEIGHT: 60 IN | HEART RATE: 86 BPM | RESPIRATION RATE: 18 BRPM | BODY MASS INDEX: 51.4 KG/M2 | OXYGEN SATURATION: 97 %

## 2024-06-13 DIAGNOSIS — E66.9 SUPER OBESE: ICD-10-CM

## 2024-06-13 DIAGNOSIS — E66.01 CLASS 3 OBESITY: Primary | ICD-10-CM

## 2024-06-13 DIAGNOSIS — Z79.899 MEDICATION MANAGEMENT: ICD-10-CM

## 2024-06-13 RX ORDER — BENZONATATE 100 MG/1
CAPSULE ORAL
COMMUNITY
Start: 2024-06-11

## 2024-06-13 RX ORDER — AZITHROMYCIN 250 MG/1
TABLET, FILM COATED ORAL
COMMUNITY
Start: 2024-06-11

## 2024-06-13 NOTE — PROGRESS NOTES
MGK BAR SURG [facility] GROUP BARIATRIC SURGERY  [address] [place] [address]  [address] [place] [address]  Dept: [phone]  6/13/2024      Arlene Brady.  [id number]  [id number]  1995  female      Chief Complaint of weight gain; unable to maintain weight loss    History of Present Illness:   Arlene is a 28 y.o. female who presents today for evaluation, education and consultation regarding medical weight management.      Diet History:See dietician/RN/MA documentation for complete history of weight and diet.       Past weight loss attempts: weight loss pills from [employer]- but did not try, high protein, protein shakes        Past Medical History:   Diagnosis Date    ADHD (attention deficit hyperactivity disorder)     Anxiety     Arthritis     Asthma     exercise induced asthma     Depression     Epilepsy     History of blood transfusion     Seizures    Stress induced seizures- last one 2 years ago     Past Surgical History:   Procedure Laterality Date    ABDOMINAL SURGERY      choley    TONSILLECTOMY     Bone marrow biopsy   Currently followed by DR. Burns for low hgb     Allergies   Allergen Reactions    Cephalosporins Rash    Penicillins Rash       Current Outpatient Medications:     acetaminophen-codeine (TYLENOL with CODEINE #3) 300-30 MG per tablet, Take 1 tablet by mouth Every 12 (Twelve) Hours., Disp: , Rfl:     azithromycin (ZITHROMAX) 250 MG tablet, , Disp: , Rfl:     baclofen (LIORESAL) 10 MG tablet, Take 1 tablet by mouth Every 12 (Twelve) Hours., Disp: , Rfl:     benzonatate (TESSALON) 100 MG capsule, , Disp: , Rfl:     fexofenadine (ALLEGRA) 180 MG tablet, Take 1 tablet by mouth Daily., Disp: , Rfl:     folic acid (FOLVITE) 1 MG tablet, TAKE 1 TABLET BY MOUTH EVERY DAY, Disp: 90 tablet, Rfl: 0    folic acid (FOLVITE) 1 MG tablet, TAKE 1 TABLET BY MOUTH EVERY DAY, Disp: 90 tablet, Rfl: 0    Junel 1/20 1-20 MG-MCG per tablet, Take 1  tablet by mouth Every Evening., Disp: , Rfl:     meloxicam (MOBIC) 15 MG tablet, , Disp: , Rfl:     norethindrone-ethinyl estradiol FE (Junel FE 1/20) 1-20 MG-MCG per tablet, , Disp: , Rfl:     nystatin (MYCOSTATIN) 466711 UNIT/GM cream, , Disp: , Rfl:     nystatin-triamcinolone (MYCOLOG) 525845-6.1 UNIT/GM-% ointment, APPLY 1 APPLICATION TOPICALLY TWICE DAILY FOR 21 DAYS, Disp: , Rfl:     OXcarbazepine (TRILEPTAL) 300 MG tablet, Take 1.5 tablets by mouth 2 (Two) Times a Day., Disp: , Rfl:     pantoprazole (PROTONIX) 40 MG EC tablet, TAKE 1 TABLET BY MOUTH EVERY DAY, Disp: 90 tablet, Rfl: 3    predniSONE (DELTASONE) 5 MG tablet, Take 1 tablet by mouth Every Other Day., Disp: 30 tablet, Rfl: 0    silver sulfadiazine (SILVADENE, SSD) 1 % cream, APPLY 1 APPLICATION TOPICALLY TWICE A DAY FOR 20 DAYS, Disp: , Rfl:     Social History     Socioeconomic History    Marital status: Single   Tobacco Use    Smoking status: Never    Smokeless tobacco: Never   Vaping Use    Vaping status: Never Used   Substance and Sexual Activity    Alcohol use: Yes     Comment: very infrequent     Drug use: Never    Sexual activity: Not Currently       Family History   Problem Relation Age of Onset    Hypertension Father     Heart disease Father     Hyperlipidemia Father     Cancer Paternal Grandmother      Colon cancer mother   Grandfather - maternal - pancreatic cancer  Breast and lung cancer-grandmother - maternal   Prostate, Lung and breast cancer on father's side     Review of Systems:  Review of Systems   Respiratory:          Asthma    Gastrointestinal:         Hemorroids   Musculoskeletal:  Positive for back pain and myalgias.   Neurological:  Positive for seizures.   Hematological:         Prior blood transfusion, anemia,    Psychiatric/Behavioral:          Depression and anxiety       Physical Exam:  Vital Signs:  Weight: 119 kg (261 lb 12.8 oz)   Body mass index is 51.99 kg/m².      Heart Rate: 86   BP: 117/76     Physical  Exam  Constitutional:       Appearance: Normal appearance. She is obese.   Cardiovascular:      Rate and Rhythm: Normal rate.   Pulmonary:      Effort: Pulmonary effort is normal.      Breath sounds: Normal breath sounds.   Abdominal:      General: Abdomen is flat.      Palpations: Abdomen is soft.   Skin:     General: Skin is warm and dry.   Neurological:      General: No focal deficit present.      Mental Status: She is alert and oriented to person, place, and time.   Psychiatric:         Mood and Affect: Mood normal.         Behavior: Behavior normal.         Thought Content: Thought content normal.         Judgment: Judgment normal.            Assessment:         Arlene Brady is a 28 y.o. year old female with medically complicated severe obesity. Weight: 119 kg (261 lb 12.8 oz), Body mass index is 51.99 kg/m². and weight related problems.    Recent labs were discussed with the pt today. Pt gets labs every 2 weeks to assess hgb .       The patient was advised to start a high protein, low fat and low carbohydrate diet. The patient was given individualized information  along with general group information and handouts.     The patient was encouraged to start routine exercise including but not limited to 150 minutes per week.     The consultation plan was reviewed with the patient.      I think she is a good candidate for medical weight management. If appropriate, weight loss medications will be discussed at next visit. Pt may also be interested in bariatric surgery. She states she is going to bring her mother to her next appointment so she can be with her to discuss different weight loss medication/ options. Pt was encouraged to watch the seminar video in the mean time.         Plan:    Patient will have evaluations and follow up with bariatric dietician before undergoing a multidisciplinary review of their candidacy.  We also discussed other program requirements.      Total time spent with pt 60 minutes  of which 45 minutes were spent on education.     Ely Stauffer, APRN  6/13/2024

## 2024-06-13 NOTE — PROGRESS NOTES
"Nutrition Services    Patient Name: Arlene Brady  YOB: 1995  MRN: [id number]  Date of Service: 06/13/24      ICD-10-CM ICD-9-CM   1. Super obese  E66.9 278.00        NUTRITION ASSESSMENT - BARIATRIC SURGERY      Reason for Visit MWM new patient, appt 1     H&P      Past Medical History:   Diagnosis Date    ADHD (attention deficit hyperactivity disorder)     Anxiety     Arthritis     Asthma     exercise induced asthma     Depression     Epilepsy     History of blood transfusion     Seizures        Past Surgical History:   Procedure Laterality Date    ABDOMINAL SURGERY      choley    TONSILLECTOMY          Previous Goals          Encounter Information        Visit Narrative     Patient works odd schedule - 2P-10A. Patient states it is difficult to plan meals d/t schedule. Patient also states she is a picky eater. She does like some fruits and veggies. Patient has drank protein shake in past. Encouraged patient to add in protein shakes while at work to avoid skipping meals. Patient to add in protein at snacks as well such as cheese or peanut butter.     Diet Recall:   Breakfast: coffee  Lunch: skips usually  Dinner: eats at work sometimes or sandwich from home, fast food  Snacks: cereal, chips  Beverages: coffee, Dr. Pepper    Exercise: no current exercise. Patient is active at work    Supplements: fiber gummy, folic acid    Self Monitoring:          Anthropometrics        Current Height, Weight Height: 151.1 cm (59.5\")  Weight: 119 kg (261 lb 12.8 oz) (06/13/24 1323)            Wt Readings from Last 30 Encounters:   06/13/24 1323 119 kg (261 lb 12.8 oz)   06/06/24 1342 119 kg (262 lb)   05/16/24 1250 118 kg (260 lb)   03/21/24 1358 119 kg (263 lb)   02/23/24 1631 119 kg (262 lb 2.4 oz)   02/22/24 1510 120 kg (264 lb)   02/05/24 1114 118 kg (261 lb)   01/18/24 1418 118 kg (260 lb)   12/21/23 1323 118 kg (260 lb)   11/20/23 1459 118 kg (260 lb)   11/03/23 0905 121 kg (266 lb)   10/17/23 1334 119 " kg (263 lb)   10/02/23 1519 118 kg (260 lb)   09/19/23 1025 114 kg (251 lb 3.2 oz)   09/14/23 1147 113 kg (250 lb)   09/06/23 1008 113 kg (249 lb)   08/29/23 1427 111 kg (244 lb)   08/18/23 0820 107 kg (235 lb 8 oz)   08/18/23 0816 107 kg (235 lb 14.3 oz)   08/11/23 0814 109 kg (239 lb 4.8 oz)   08/11/23 0817 109 kg (240 lb 4.8 oz)   08/04/23 0821 109 kg (241 lb)   08/04/23 0933 109 kg (241 lb)   07/31/23 1042 108 kg (237 lb)   07/08/23 0420 108 kg (238 lb 8.6 oz)   07/06/23 2349 107 kg (235 lb 0.2 oz)   07/06/23 1513 106 kg (234 lb 9.1 oz)   06/26/23 0903 108 kg (237 lb)   08/12/22 1934 107 kg (235 lb)   07/17/22 2049 107 kg (235 lb 14.3 oz)   05/17/22 0732 103 kg (227 lb)   07/26/21 1650 103 kg (227 lb)      BMI kg/m2 Body mass index is 51.99 kg/m².       Nutrition Diagnosis         Nutrition Dx Statement Overweight/obesity RT multifactorial biochemical, behavioral and environmental contributors to disease AEB BMI 51.99 kg/m^2         Nutrition Intervention         Nutrition Intervention Nutrition education related to diet modification and physical activity        Monitor/Evaluation        New Goals Patient to add in protein shake daily  Patient to plan meals with protein, carb and color  Patient to aim for 16 oz of water each day (flavored)   Add protein in with snacks such as peanut butter or cheese       Total time spent with pt 15 minutes of which 15 minutes were spent on education.       Electronically signed by:  Gilbert Prince RD  06/13/24 13:48 EDT

## 2024-06-13 NOTE — PATIENT INSTRUCTIONS
Goals:  Patient to add in protein shake daily  Patient to plan meals with protein, carb and color  Patient to aim for 16 oz of water each day (flavored)   Add protein in with snacks such as peanut butter or cheese

## 2024-06-17 NOTE — TELEPHONE ENCOUNTER
Rx Refill Note  Requested Prescriptions     Pending Prescriptions Disp Refills    sertraline (ZOLOFT) 50 MG tablet [Pharmacy Med Name: SERTRALINE HCL 50 MG TABLET] 90 tablet      Sig: TAKE 1 TABLET BY MOUTH EVERY DAY      Last office visit with prescribing clinician: 2/20/2024   Last telemedicine visit with prescribing clinician: Visit date not found   Next office visit with prescribing clinician: LVM FOR PT TO MAKE APPT  Office Visit with Gemma Gonzalez MD (02/20/2024)                       Would you like a call back once the refill request has been completed: [] Yes [] No    If the office needs to give you a call back, can they leave a voicemail: [] Yes [] No    Eula Garcia MA  06/17/24, 11:04 EDT

## 2024-06-18 ENCOUNTER — PATIENT ROUNDING (BHMG ONLY) (OUTPATIENT)
Dept: BARIATRICS/WEIGHT MGMT | Facility: CLINIC | Age: 29
End: 2024-06-18
Payer: COMMERCIAL

## 2024-06-20 ENCOUNTER — LAB (OUTPATIENT)
Dept: LAB | Facility: HOSPITAL | Age: 29
End: 2024-06-20
Payer: COMMERCIAL

## 2024-06-20 DIAGNOSIS — D64.9 ANEMIA, UNSPECIFIED TYPE: ICD-10-CM

## 2024-06-20 DIAGNOSIS — D59.10 AUTOIMMUNE HEMOLYTIC ANEMIA: ICD-10-CM

## 2024-06-20 LAB
BASOPHILS # BLD AUTO: 0.03 10*3/MM3 (ref 0–0.2)
BASOPHILS NFR BLD AUTO: 0.3 % (ref 0–1.5)
DEPRECATED RDW RBC AUTO: 47.2 FL (ref 37–54)
EOSINOPHIL # BLD AUTO: 0.26 10*3/MM3 (ref 0–0.4)
EOSINOPHIL NFR BLD AUTO: 3 % (ref 0.3–6.2)
ERYTHROCYTE [DISTWIDTH] IN BLOOD BY AUTOMATED COUNT: 14.5 % (ref 12.3–15.4)
HCT VFR BLD AUTO: 38.3 % (ref 34–46.6)
HGB BLD-MCNC: 12.1 G/DL (ref 12–15.9)
LYMPHOCYTES # BLD AUTO: 1.67 10*3/MM3 (ref 0.7–3.1)
LYMPHOCYTES NFR BLD AUTO: 19.4 % (ref 19.6–45.3)
MCH RBC QN AUTO: 29.2 PG (ref 26.6–33)
MCHC RBC AUTO-ENTMCNC: 31.6 G/DL (ref 31.5–35.7)
MCV RBC AUTO: 92.5 FL (ref 79–97)
MONOCYTES # BLD AUTO: 0.49 10*3/MM3 (ref 0.1–0.9)
MONOCYTES NFR BLD AUTO: 5.7 % (ref 5–12)
NEUTROPHILS NFR BLD AUTO: 6.15 10*3/MM3 (ref 1.7–7)
NEUTROPHILS NFR BLD AUTO: 71.6 % (ref 42.7–76)
PLATELET # BLD AUTO: 333 10*3/MM3 (ref 140–450)
PMV BLD AUTO: 9.2 FL (ref 6–12)
RBC # BLD AUTO: 4.14 10*6/MM3 (ref 3.77–5.28)
WBC NRBC COR # BLD AUTO: 8.6 10*3/MM3 (ref 3.4–10.8)

## 2024-06-20 PROCEDURE — 36415 COLL VENOUS BLD VENIPUNCTURE: CPT

## 2024-06-20 PROCEDURE — 85025 COMPLETE CBC W/AUTO DIFF WBC: CPT

## 2024-06-21 RX ORDER — PREDNISONE 5 MG/1
5 TABLET ORAL EVERY OTHER DAY
Qty: 30 TABLET | Refills: 0 | Status: SHIPPED | OUTPATIENT
Start: 2024-06-21

## 2024-06-25 RX ORDER — PREDNISONE 5 MG/1
5 TABLET ORAL EVERY OTHER DAY
Qty: 30 TABLET | Refills: 0 | Status: SHIPPED | OUTPATIENT
Start: 2024-06-25

## 2024-07-10 NOTE — PROGRESS NOTES
Hematology/Oncology Outpatient Follow Up    PATIENT NAME:Arlene Brady  :1995  MRN: [id number]  PRIMARY CARE PHYSICIAN: Daniel Sam MD  REFERRING PHYSICIAN: No ref. provider found    Chief Complaint   Patient presents with    Follow-up     Autoimmune hemolytic anemia              HISTORY OF PRESENT ILLNESS:       This is a 28 year old female has been referred secondary to anemia.  Patient has a longtime history of anemia.  She has rectal bleeding and had colonoscopy done a few days ago by Dr Vinny Lagos . She has internal and external hemorrhoids. She has a follow up with Dr Lagos.     Patient is amenorrheic for about 6 months.  She had history of menorrhagia. She is on hormone pills to regulate her cycles.   She has low energy, she was on oral iron supplements for a month. She has since ran out of her iron tablets.     Review of her CBCs indicate that she has had anemia with hemoglobin ranging between 8 and 12.3 g per DL.  Today her white count is 8, hemoglobin is 8, MCV is 101 and platelets are 352 with essentially unremarkable differentials.     She denies having blood in her urine     She is single, She is a CNA     Patient does not smoke and drinks occasionally     There is no family history of hematological problems.     Her mother had colon cancer,      2023: Methylmalonic acid level was normal at 343 patient had anemia work-up including a ferritin level which was 167, C-reactive protein was high at 1.96 BUN was 13, creatinine 0.9 LDH was 323 iron profile showed a elevated serum iron and iron saturation, haptoglobin was normal, reticulocyte count was elevated to 16 she has low folate at 3.1  2023: WBC 11.62, hemoglobin 8.0 g/dL, .9, platelets 353,000.  Flow cytometry showed an increased kappa lambda ratio, neutrophils with left shifted maturation and rare phenotypic aberrancy, monocytes with phenotypic aberrancy.  Bone marrow biopsy with molecular and cytogenetic studies  indicated to evaluate for myeloid neoplasm.  8/16/2023 CT of the neck, chest, abdomen and pelvis with contrast showed no evidence of definite pathologic lymphadenopathy concerning for lymphoproliferative disorder.  No acute findings present in the neck, chest, abdomen or pelvis.  8/25/2023: Patient had bone marrow aspiration and biopsy which basically showed normocellular bone marrow for age, decreased iron stores, negative for involvement by malignant lymphoma or metastatic malignancy.  Flow cytometry was unremarkable with no immunophenotypic abnormalities noted.  Cytogenetics showed a normal female karyotype      Past Medical History:   Diagnosis Date    ADHD (attention deficit hyperactivity disorder)     Anxiety     Arthritis     Asthma     exercise induced asthma     Depression     Epilepsy     History of blood transfusion     Seizures        Past Surgical History:   Procedure Laterality Date    ABDOMINAL SURGERY      choley    TONSILLECTOMY           Current Outpatient Medications:     acetaminophen-codeine (TYLENOL with CODEINE #3) 300-30 MG per tablet, Take 1 tablet by mouth Every 12 (Twelve) Hours., Disp: , Rfl:     azithromycin (ZITHROMAX) 250 MG tablet, , Disp: , Rfl:     baclofen (LIORESAL) 10 MG tablet, Take 1 tablet by mouth Every 12 (Twelve) Hours., Disp: , Rfl:     benzonatate (TESSALON) 100 MG capsule, , Disp: , Rfl:     fexofenadine (ALLEGRA) 180 MG tablet, Take 1 tablet by mouth Daily., Disp: , Rfl:     folic acid (FOLVITE) 1 MG tablet, TAKE 1 TABLET BY MOUTH EVERY DAY, Disp: 90 tablet, Rfl: 0    folic acid (FOLVITE) 1 MG tablet, TAKE 1 TABLET BY MOUTH EVERY DAY, Disp: 90 tablet, Rfl: 0    Junel 1/20 1-20 MG-MCG per tablet, Take 1 tablet by mouth Every Evening., Disp: , Rfl:     meloxicam (MOBIC) 15 MG tablet, , Disp: , Rfl:     norethindrone-ethinyl estradiol FE (Junel FE 1/20) 1-20 MG-MCG per tablet, , Disp: , Rfl:     nystatin (MYCOSTATIN) 093131 UNIT/GM cream, , Disp: , Rfl:      nystatin-triamcinolone (MYCOLOG) 035132-1.1 UNIT/GM-% ointment, APPLY 1 APPLICATION TOPICALLY TWICE DAILY FOR 21 DAYS, Disp: , Rfl:     OXcarbazepine (TRILEPTAL) 300 MG tablet, Take 1.5 tablets by mouth 2 (Two) Times a Day., Disp: , Rfl:     pantoprazole (PROTONIX) 40 MG EC tablet, TAKE 1 TABLET BY MOUTH EVERY DAY, Disp: 90 tablet, Rfl: 3    predniSONE (DELTASONE) 5 MG tablet, Take 1 tablet by mouth Every Other Day., Disp: 30 tablet, Rfl: 0    sertraline (ZOLOFT) 50 MG tablet, TAKE 1 TABLET BY MOUTH EVERY DAY, Disp: 90 tablet, Rfl: 0    silver sulfadiazine (SILVADENE, SSD) 1 % cream, APPLY 1 APPLICATION TOPICALLY TWICE A DAY FOR 20 DAYS, Disp: , Rfl:     Allergies   Allergen Reactions    Cephalosporins Rash    Penicillins Rash       Family History   Problem Relation Age of Onset    Hypertension Father     Heart disease Father     Hyperlipidemia Father     Cancer Paternal Grandmother        Cancer-related family history includes Cancer in her paternal grandmother.    Social History     Tobacco Use    Smoking status: Never    Smokeless tobacco: Never   Vaping Use    Vaping status: Never Used   Substance Use Topics    Alcohol use: Yes     Comment: very infrequent     Drug use: Never     I have reviewed and confirmed the accuracy of the patient's history: Chief complaint, HPI, ROS, and Subjective as entered by the MA/LPN/RN. Bronwyn Burns MD 07/11/24      SUBJECTIVE:      Patient denies any new issues.  She was diagnosed with COVID recently    Patient is currently on prednisone 5 mg every other day      She is here today for follow-up        Arlene Brady reports a pain score of 0.  Given her pain assessment as noted, treatment options were discussed and the following options were decided upon as a follow-up plan to address the patient's pain:  Continue current management by her primary care .           REVIEW OF SYSTEMS:      Review of Systems   Constitutional:  Positive for fatigue.   Musculoskeletal:  " Positive for back pain.       Per subjective    OBJECTIVE:    Vitals:    07/11/24 1607   BP: 110/69   Pulse: 86   Resp: 18   Temp: 98.3 °F (36.8 °C)   TempSrc: Oral   SpO2: 100%   Weight: 121 kg (266 lb)   Height: 149.9 cm (59\")   PainSc: 0-No pain             Body mass index is 53.73 kg/m².    ECOG  (1) Restricted in physically strenuous activity, ambulatory and able to do work of light nature    Physical Exam  Vitals reviewed.   Constitutional:       General: She is not in acute distress.     Appearance: Normal appearance. She is not ill-appearing, toxic-appearing or diaphoretic.   HENT:      Head: Normocephalic and atraumatic.   Eyes:      Extraocular Movements: Extraocular movements intact.   Cardiovascular:      Rate and Rhythm: Normal rate and regular rhythm.      Heart sounds: No murmur heard.  Pulmonary:      Effort: Pulmonary effort is normal. No respiratory distress.      Breath sounds: Normal breath sounds. No stridor. No wheezing.   Abdominal:      General: Bowel sounds are normal.      Palpations: Abdomen is soft.   Musculoskeletal:         General: Normal range of motion.      Cervical back: Normal range of motion.   Skin:     General: Skin is warm and dry.      Findings: No rash.   Neurological:      Mental Status: She is alert and oriented to person, place, and time.   Psychiatric:         Mood and Affect: Mood normal.         Behavior: Behavior normal.       I have reexamined the patient and the results are consistent with the previously documented exam. Bronwyn Burns MD         RECENT LABS    WBC   Date Value Ref Range Status   07/11/2024 8.05 3.40 - 10.80 10*3/mm3 Final     RBC   Date Value Ref Range Status   07/11/2024 3.76 (L) 3.77 - 5.28 10*6/mm3 Final   07/31/2023 2.80 (L) 3.77 - 5.28 x10E6/uL Final     Hemoglobin   Date Value Ref Range Status   07/11/2024 11.2 (L) 12.0 - 15.9 g/dL Final     Hematocrit   Date Value Ref Range Status   07/11/2024 34.7 34.0 - 46.6 % Final     MCV   Date " Value Ref Range Status   07/11/2024 92.3 79.0 - 97.0 fL Final     MCH   Date Value Ref Range Status   07/11/2024 29.8 26.6 - 33.0 pg Final     MCHC   Date Value Ref Range Status   07/11/2024 32.3 31.5 - 35.7 g/dL Final     RDW   Date Value Ref Range Status   07/11/2024 14.8 12.3 - 15.4 % Final     RDW-SD   Date Value Ref Range Status   07/11/2024 47.6 37.0 - 54.0 fl Final     MPV   Date Value Ref Range Status   07/11/2024 9.4 6.0 - 12.0 fL Final     Platelets   Date Value Ref Range Status   07/11/2024 354 140 - 450 10*3/mm3 Final     Neutrophil %   Date Value Ref Range Status   07/11/2024 67.6 42.7 - 76.0 % Final     Lymphocyte %   Date Value Ref Range Status   07/11/2024 21.1 19.6 - 45.3 % Final     Monocyte %   Date Value Ref Range Status   07/11/2024 8.1 5.0 - 12.0 % Final     Eosinophil %   Date Value Ref Range Status   07/11/2024 2.7 0.3 - 6.2 % Final     Basophil %   Date Value Ref Range Status   07/11/2024 0.5 0.0 - 1.5 % Final     Immature Grans %   Date Value Ref Range Status   10/09/2020 1.4 (H) 0.0 - 0.5 % Final     Neutrophils, Absolute   Date Value Ref Range Status   07/11/2024 5.44 1.70 - 7.00 10*3/mm3 Final     Lymphocytes, Absolute   Date Value Ref Range Status   07/11/2024 1.70 0.70 - 3.10 10*3/mm3 Final     Monocytes, Absolute   Date Value Ref Range Status   07/11/2024 0.65 0.10 - 0.90 10*3/mm3 Final     Eosinophils, Absolute   Date Value Ref Range Status   07/11/2024 0.22 0.00 - 0.40 10*3/mm3 Final     Basophils, Absolute   Date Value Ref Range Status   07/11/2024 0.04 0.00 - 0.20 10*3/mm3 Final     Immature Grans, Absolute   Date Value Ref Range Status   10/09/2020 0.09 (H) 0.00 - 0.05 10*3/mm3 Final     nRBC   Date Value Ref Range Status   02/23/2024 0.1 0.0 - 0.2 /100 WBC Final       Lab Results   Component Value Date    GLUCOSE 103 (H) 02/23/2024    BUN 20 02/23/2024    CREATININE 0.69 02/23/2024    EGFRIFNONA 88 07/26/2021    BCR 29.0 (H) 02/23/2024    K 3.6 02/23/2024    CO2 24.0 02/23/2024     CALCIUM 9.0 02/23/2024    PROTENTOTREF 6.9 08/11/2023    ALBUMIN 4.0 08/18/2023    LABIL2 1.1 08/11/2023    AST 16 08/18/2023    ALT 50 (H) 08/18/2023         Assessment & Plan     Autoimmune hemolytic anemia  - CBC & Differential    Anemia, unspecified type  - CBC & Differential                    ASSESSMENT and plans:       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  Rule out lymphoproliferative disease.  Peripheral blood for flow cytometry shows increased kappa lambda ratio detected rare phenotypic aberrancy of the neutrophils as well as monocytes, possibility of a myeloid disorder was also entertained.  PNH screen was negative and G6PD was not deficient.  For now patient will continue on steroids.  But will begin steroid taper due to normalization of her hemoglobin down to 12.7 g per DL.  We will continue her steroid taper as her hemoglobin has improved to 13.0 g/dL today.  Hemoglobin is up to 11.1 g per DL t.  Decrease prednisone to 5 mg p.o. daily.  Continue folate replacement.  Continue PPI.  She is dependent on prednisone therefore would consider additional treatment with Rituxan to see if we will be able to completely resolve the hemolysis.  Today her hemoglobin is up to 11.6 g per DL, patient would like to observe a little bit longer which I think it is reasonable.  Continue 5 mg of prednisone every other day  Status post bone marrow aspiration and biopsy which was essentially unremarkable  Folate deficiency: Continue folic acid, ongoing hemolysis  History of seizures  History of rectal bleeding : Recent  colonoscopy by Dr Vinny Lagos .  She no longer has rectal bleeding and she has completed her course of iron.  Follow-up with GI encouraged.  She has an appointment coming up next week with GI.  She denies rectal bleeding at this time.  Possible sensitivity reaction to Venofer: Patient's epigastric chest pain complaints seem to predate the infusion but  she does also complain of itching on the bilateral forearms during the infusion.  Venofer was stopped and normal saline was ran along with 25 mg Benadryl IV given with resolution of the symptoms.          Plans     Continue prednisone to 5 mg every other day.    CBC every 2 weeks  Hemolysis labs reviewed.  She has a normal haptoglobin but still elevated reticulocyte count and Susi positivity.  Viral hepatitis panel, [facility] screen iron studies with ferritin were within normal limits  Patient was given information on Rituxan to review understands we may have to use it in the future  CBC reviewed  Follow-up 4 weeks  Continue PPI  Discussed the benefits and side effects of Rituxan in detail with patient  All questions answered    Patient verbalized understanding and is in agreement of the above plan.

## 2024-07-11 ENCOUNTER — OFFICE VISIT (OUTPATIENT)
Dept: ONCOLOGY | Facility: CLINIC | Age: 29
End: 2024-07-11
Payer: COMMERCIAL

## 2024-07-11 ENCOUNTER — LAB (OUTPATIENT)
Dept: LAB | Facility: HOSPITAL | Age: 29
End: 2024-07-11
Payer: COMMERCIAL

## 2024-07-11 VITALS
DIASTOLIC BLOOD PRESSURE: 69 MMHG | HEART RATE: 86 BPM | HEIGHT: 59 IN | BODY MASS INDEX: 53.63 KG/M2 | OXYGEN SATURATION: 100 % | RESPIRATION RATE: 18 BRPM | SYSTOLIC BLOOD PRESSURE: 110 MMHG | TEMPERATURE: 98.3 F | WEIGHT: 266 LBS

## 2024-07-11 DIAGNOSIS — D64.9 ANEMIA, UNSPECIFIED TYPE: ICD-10-CM

## 2024-07-11 DIAGNOSIS — D64.9 ANEMIA, UNSPECIFIED TYPE: Primary | ICD-10-CM

## 2024-07-11 DIAGNOSIS — D59.10 AUTOIMMUNE HEMOLYTIC ANEMIA: Primary | ICD-10-CM

## 2024-07-11 DIAGNOSIS — D59.10 AUTOIMMUNE HEMOLYTIC ANEMIA: ICD-10-CM

## 2024-07-11 LAB
BASOPHILS # BLD AUTO: 0.04 10*3/MM3 (ref 0–0.2)
BASOPHILS NFR BLD AUTO: 0.5 % (ref 0–1.5)
DEPRECATED RDW RBC AUTO: 47.6 FL (ref 37–54)
EOSINOPHIL # BLD AUTO: 0.22 10*3/MM3 (ref 0–0.4)
EOSINOPHIL NFR BLD AUTO: 2.7 % (ref 0.3–6.2)
ERYTHROCYTE [DISTWIDTH] IN BLOOD BY AUTOMATED COUNT: 14.8 % (ref 12.3–15.4)
HCT VFR BLD AUTO: 34.7 % (ref 34–46.6)
HGB BLD-MCNC: 11.2 G/DL (ref 12–15.9)
HOLD SPECIMEN: NORMAL
HOLD SPECIMEN: NORMAL
LYMPHOCYTES # BLD AUTO: 1.7 10*3/MM3 (ref 0.7–3.1)
LYMPHOCYTES NFR BLD AUTO: 21.1 % (ref 19.6–45.3)
MCH RBC QN AUTO: 29.8 PG (ref 26.6–33)
MCHC RBC AUTO-ENTMCNC: 32.3 G/DL (ref 31.5–35.7)
MCV RBC AUTO: 92.3 FL (ref 79–97)
MONOCYTES # BLD AUTO: 0.65 10*3/MM3 (ref 0.1–0.9)
MONOCYTES NFR BLD AUTO: 8.1 % (ref 5–12)
NEUTROPHILS NFR BLD AUTO: 5.44 10*3/MM3 (ref 1.7–7)
NEUTROPHILS NFR BLD AUTO: 67.6 % (ref 42.7–76)
PLATELET # BLD AUTO: 354 10*3/MM3 (ref 140–450)
PMV BLD AUTO: 9.4 FL (ref 6–12)
RBC # BLD AUTO: 3.76 10*6/MM3 (ref 3.77–5.28)
WBC NRBC COR # BLD AUTO: 8.05 10*3/MM3 (ref 3.4–10.8)

## 2024-07-11 PROCEDURE — 85025 COMPLETE CBC W/AUTO DIFF WBC: CPT

## 2024-07-11 PROCEDURE — 36415 COLL VENOUS BLD VENIPUNCTURE: CPT

## 2024-07-25 ENCOUNTER — LAB (OUTPATIENT)
Dept: LAB | Facility: HOSPITAL | Age: 29
End: 2024-07-25
Payer: COMMERCIAL

## 2024-07-25 DIAGNOSIS — D64.9 ANEMIA, UNSPECIFIED TYPE: ICD-10-CM

## 2024-07-25 DIAGNOSIS — D50.0 IRON DEFICIENCY ANEMIA DUE TO CHRONIC BLOOD LOSS: ICD-10-CM

## 2024-07-25 DIAGNOSIS — D59.10 AUTOIMMUNE HEMOLYTIC ANEMIA: ICD-10-CM

## 2024-07-25 LAB
BASOPHILS # BLD AUTO: 0.04 10*3/MM3 (ref 0–0.2)
BASOPHILS NFR BLD AUTO: 0.6 % (ref 0–1.5)
DEPRECATED RDW RBC AUTO: 46.4 FL (ref 37–54)
EOSINOPHIL # BLD AUTO: 0.19 10*3/MM3 (ref 0–0.4)
EOSINOPHIL NFR BLD AUTO: 2.8 % (ref 0.3–6.2)
ERYTHROCYTE [DISTWIDTH] IN BLOOD BY AUTOMATED COUNT: 14.6 % (ref 12.3–15.4)
HCT VFR BLD AUTO: 36.7 % (ref 34–46.6)
HGB BLD-MCNC: 11.9 G/DL (ref 12–15.9)
LYMPHOCYTES # BLD AUTO: 1.44 10*3/MM3 (ref 0.7–3.1)
LYMPHOCYTES NFR BLD AUTO: 21.2 % (ref 19.6–45.3)
MCH RBC QN AUTO: 29.7 PG (ref 26.6–33)
MCHC RBC AUTO-ENTMCNC: 32.4 G/DL (ref 31.5–35.7)
MCV RBC AUTO: 91.5 FL (ref 79–97)
MONOCYTES # BLD AUTO: 0.49 10*3/MM3 (ref 0.1–0.9)
MONOCYTES NFR BLD AUTO: 7.2 % (ref 5–12)
NEUTROPHILS NFR BLD AUTO: 4.64 10*3/MM3 (ref 1.7–7)
NEUTROPHILS NFR BLD AUTO: 68.2 % (ref 42.7–76)
PLATELET # BLD AUTO: 305 10*3/MM3 (ref 140–450)
PMV BLD AUTO: 9.3 FL (ref 6–12)
RBC # BLD AUTO: 4.01 10*6/MM3 (ref 3.77–5.28)
WBC NRBC COR # BLD AUTO: 6.8 10*3/MM3 (ref 3.4–10.8)

## 2024-07-25 PROCEDURE — 85025 COMPLETE CBC W/AUTO DIFF WBC: CPT

## 2024-07-25 PROCEDURE — 36415 COLL VENOUS BLD VENIPUNCTURE: CPT

## 2024-08-01 ENCOUNTER — TELEPHONE (OUTPATIENT)
Dept: BARIATRICS/WEIGHT MGMT | Facility: CLINIC | Age: 29
End: 2024-08-01
Payer: COMMERCIAL

## 2024-08-01 NOTE — TELEPHONE ENCOUNTER
PT LEFT VM REQUESTING TO CANCEL AFTERNOON APPT    CALLED PT BACK TO RESCHEDULE///LEFT VM/ CALL WENT STRAIGHT TO VM

## 2024-08-14 ENCOUNTER — TELEPHONE (OUTPATIENT)
Dept: ONCOLOGY | Facility: CLINIC | Age: 29
End: 2024-08-14

## 2024-08-14 NOTE — TELEPHONE ENCOUNTER
Caller: Arlene Brady    Relationship to patient: Self    Best call back number:   Telephone Information:   Mobile [phone]         Chief complaint:     Type of visit: LAB & F/U 1     Requested date: CALL TO R/S    If rescheduling, when is the original appointment: 8/15/2024    Additional notes

## 2024-08-20 NOTE — PROGRESS NOTES
Hematology/Oncology Outpatient Follow Up    PATIENT NAME:Arlene Brady  :1995  MRN: [id number]  PRIMARY CARE PHYSICIAN: Daniel Sam MD  REFERRING PHYSICIAN: No ref. provider found    Chief Complaint   Patient presents with    Follow-up     Anemia, unspecified type          HISTORY OF PRESENT ILLNESS:       This is a 28 year old female has been referred secondary to anemia.  Patient has a longtime history of anemia.  She has rectal bleeding and had colonoscopy done a few days ago by Dr Vinny Lagos . She has internal and external hemorrhoids. She has a follow up with Dr Lagos.     Patient is amenorrheic for about 6 months.  She had history of menorrhagia. She is on hormone pills to regulate her cycles.   She has low energy, she was on oral iron supplements for a month. She has since ran out of her iron tablets.     Review of her CBCs indicate that she has had anemia with hemoglobin ranging between 8 and 12.3 g per DL.  Today her white count is 8, hemoglobin is 8, MCV is 101 and platelets are 352 with essentially unremarkable differentials.     She denies having blood in her urine     She is single, She is a CNA     Patient does not smoke and drinks occasionally     There is no family history of hematological problems.     Her mother had colon cancer,      2023: Methylmalonic acid level was normal at 343 patient had anemia work-up including a ferritin level which was 167, C-reactive protein was high at 1.96 BUN was 13, creatinine 0.9 LDH was 323 iron profile showed a elevated serum iron and iron saturation, haptoglobin was normal, reticulocyte count was elevated to 16 she has low folate at 3.1  2023: WBC 11.62, hemoglobin 8.0 g/dL, .9, platelets 353,000.  Flow cytometry showed an increased kappa lambda ratio, neutrophils with left shifted maturation and rare phenotypic aberrancy, monocytes with phenotypic aberrancy.  Bone marrow biopsy with molecular and cytogenetic studies  indicated to evaluate for myeloid neoplasm.  8/16/2023 CT of the neck, chest, abdomen and pelvis with contrast showed no evidence of definite pathologic lymphadenopathy concerning for lymphoproliferative disorder.  No acute findings present in the neck, chest, abdomen or pelvis.  8/25/2023: Patient had bone marrow aspiration and biopsy which basically showed normocellular bone marrow for age, decreased iron stores, negative for involvement by malignant lymphoma or metastatic malignancy.  Flow cytometry was unremarkable with no immunophenotypic abnormalities noted.  Cytogenetics showed a normal female karyotype      Past Medical History:   Diagnosis Date    ADHD (attention deficit hyperactivity disorder)     Anxiety     Arthritis     Asthma     exercise induced asthma     Depression     Epilepsy     History of blood transfusion     Seizures        Past Surgical History:   Procedure Laterality Date    ABDOMINAL SURGERY      choley    TONSILLECTOMY           Current Outpatient Medications:     albuterol sulfate  (90 Base) MCG/ACT inhaler, inhale 2 puffs by mouth every 4 hours, Disp: , Rfl:     fexofenadine (ALLEGRA) 180 MG tablet, Take 1 tablet by mouth Daily., Disp: , Rfl:     Flonase Allergy Relief 50 MCG/ACT nasal spray, = 2 spray(s), Nostril-Both, Daily, # 16 gm, 0 Refill(s), Pharmacy: [facility]/pharmacy #[id number], 2 spray(s) Nostril-Both Daily, 149, cm, 08/13/24 14:31:00 EDT, Height, 118.8, kg, 08/13/24 14:35:00 EDT, Weight Dosing, Disp: , Rfl:     folic acid (FOLVITE) 1 MG tablet, TAKE 1 TABLET BY MOUTH EVERY DAY, Disp: 90 tablet, Rfl: 0    folic acid (FOLVITE) 1 MG tablet, TAKE 1 TABLET BY MOUTH EVERY DAY, Disp: 90 tablet, Rfl: 0    Junel 1/20 1-20 MG-MCG per tablet, Take 1 tablet by mouth Every Evening., Disp: , Rfl:     norethindrone-ethinyl estradiol FE (Junel FE 1/20) 1-20 MG-MCG per tablet, , Disp: , Rfl:     nystatin (MYCOSTATIN) 228833 UNIT/GM cream, , Disp: , Rfl:     nystatin-triamcinolone (MYCOLOG)  239135-1.1 UNIT/GM-% ointment, APPLY 1 APPLICATION TOPICALLY TWICE DAILY FOR 21 DAYS, Disp: , Rfl:     OXcarbazepine (TRILEPTAL) 300 MG tablet, Take 1.5 tablets by mouth 2 (Two) Times a Day., Disp: , Rfl:     pantoprazole (PROTONIX) 40 MG EC tablet, TAKE 1 TABLET BY MOUTH EVERY DAY, Disp: 90 tablet, Rfl: 3    predniSONE (DELTASONE) 5 MG tablet, Take 1 tablet by mouth Every Other Day., Disp: 30 tablet, Rfl: 0    acetaminophen-codeine (TYLENOL with CODEINE #3) 300-30 MG per tablet, Take 1 tablet by mouth Every 12 (Twelve) Hours. (Patient not taking: Reported on 8/22/2024), Disp: , Rfl:     azithromycin (ZITHROMAX) 250 MG tablet, , Disp: , Rfl:     baclofen (LIORESAL) 10 MG tablet, Take 1 tablet by mouth Every 12 (Twelve) Hours. (Patient not taking: Reported on 8/22/2024), Disp: , Rfl:     benzonatate (TESSALON) 100 MG capsule, , Disp: , Rfl:     doxycycline (VIBRAMYCIN) 100 MG injection, 100 mg. (Patient not taking: Reported on 8/22/2024), Disp: , Rfl:     meloxicam (MOBIC) 15 MG tablet, , Disp: , Rfl:     sertraline (ZOLOFT) 50 MG tablet, TAKE 1 TABLET BY MOUTH EVERY DAY (Patient not taking: Reported on 8/22/2024), Disp: 90 tablet, Rfl: 0    silver sulfadiazine (SILVADENE, SSD) 1 % cream, APPLY 1 APPLICATION TOPICALLY TWICE A DAY FOR 20 DAYS (Patient not taking: Reported on 8/22/2024), Disp: , Rfl:     Allergies   Allergen Reactions    Cephalosporins Rash    Penicillins Rash       Family History   Problem Relation Age of Onset    Hypertension Father     Heart disease Father     Hyperlipidemia Father     Cancer Paternal Grandmother        Cancer-related family history includes Cancer in her paternal grandmother.    Social History     Tobacco Use    Smoking status: Never    Smokeless tobacco: Never   Vaping Use    Vaping status: Never Used   Substance Use Topics    Alcohol use: Yes     Comment: very infrequent     Drug use: Never     I have reviewed and confirmed the accuracy of the patient's history: Chief complaint,  "HPI, ROS, and Subjective as entered by the MA/LPN/RN.       SUBJECTIVE:  Arlene is here today for her routine follow-up.  She reports that she is doing well.  She does report that she went without her prednisone for 2 weeks.  She states that it was available at the pharmacy she just was not able to go to pick it up.  She has been again taking it at the 5 mg dose every other day for the last 10 days.  Does not have any issues tolerating the prednisone, no gastrointestinal symptoms or muscle weakness, no sleep issue.  Denies any signs or symptoms of bleeding.        Arlene Brady reports a pain score of 0.  Given her pain assessment as noted, treatment options were discussed and the following options were decided upon as a follow-up plan to address the patient's pain:  Continue current management by her primary care .           REVIEW OF SYSTEMS:      Review of Systems   Constitutional:  Positive for fatigue.   Musculoskeletal:  Positive for back pain.       Per subjective    OBJECTIVE:    Vitals:    08/22/24 1504   BP: 161/85   Pulse: 73   Temp: 97.7 °F (36.5 °C)   SpO2: 99%   Weight: 118 kg (260 lb)   Height: 149.9 cm (59.02\")   PainSc: 0-No pain           Body mass index is 52.49 kg/m².    ECOG  (1) Restricted in physically strenuous activity, ambulatory and able to do work of light nature    Physical Exam  Vitals reviewed.   Constitutional:       General: She is not in acute distress.     Appearance: Normal appearance. She is not ill-appearing, toxic-appearing or diaphoretic.   HENT:      Head: Normocephalic and atraumatic.   Eyes:      Extraocular Movements: Extraocular movements intact.   Cardiovascular:      Rate and Rhythm: Normal rate and regular rhythm.      Heart sounds: No murmur heard.  Pulmonary:      Effort: Pulmonary effort is normal. No respiratory distress.      Breath sounds: Normal breath sounds. No stridor. No wheezing.   Abdominal:      General: Bowel sounds are normal.      Palpations: " Abdomen is soft.   Musculoskeletal:         General: Normal range of motion.      Cervical back: Normal range of motion.   Skin:     General: Skin is warm and dry.      Findings: No rash.   Neurological:      Mental Status: She is alert and oriented to person, place, and time.   Psychiatric:         Mood and Affect: Mood normal.         Behavior: Behavior normal.       I have reexamined the patient and the results are consistent with the previously documented exam.          RECENT LABS    WBC   Date Value Ref Range Status   08/22/2024 9.02 3.40 - 10.80 10*3/mm3 Final     RBC   Date Value Ref Range Status   08/22/2024 3.95 3.77 - 5.28 10*6/mm3 Final   07/31/2023 2.80 (L) 3.77 - 5.28 x10E6/uL Final     Hemoglobin   Date Value Ref Range Status   08/22/2024 11.3 (L) 12.0 - 15.9 g/dL Final     Hematocrit   Date Value Ref Range Status   08/22/2024 35.7 34.0 - 46.6 % Final     MCV   Date Value Ref Range Status   08/22/2024 90.4 79.0 - 97.0 fL Final     MCH   Date Value Ref Range Status   08/22/2024 28.6 26.6 - 33.0 pg Final     MCHC   Date Value Ref Range Status   08/22/2024 31.7 31.5 - 35.7 g/dL Final     RDW   Date Value Ref Range Status   08/22/2024 14.4 12.3 - 15.4 % Final     RDW-SD   Date Value Ref Range Status   08/22/2024 44.7 37.0 - 54.0 fl Final     MPV   Date Value Ref Range Status   08/22/2024 9.6 6.0 - 12.0 fL Final     Platelets   Date Value Ref Range Status   08/22/2024 392 140 - 450 10*3/mm3 Final     Neutrophil %   Date Value Ref Range Status   08/22/2024 71.5 42.7 - 76.0 % Final     Lymphocyte %   Date Value Ref Range Status   08/22/2024 19.1 (L) 19.6 - 45.3 % Final     Monocyte %   Date Value Ref Range Status   08/22/2024 6.4 5.0 - 12.0 % Final     Eosinophil %   Date Value Ref Range Status   08/22/2024 2.3 0.3 - 6.2 % Final     Basophil %   Date Value Ref Range Status   08/22/2024 0.7 0.0 - 1.5 % Final     Immature Grans %   Date Value Ref Range Status   10/09/2020 1.4 (H) 0.0 - 0.5 % Final      Neutrophils, Absolute   Date Value Ref Range Status   08/22/2024 6.45 1.70 - 7.00 10*3/mm3 Final     Lymphocytes, Absolute   Date Value Ref Range Status   08/22/2024 1.72 0.70 - 3.10 10*3/mm3 Final     Monocytes, Absolute   Date Value Ref Range Status   08/22/2024 0.58 0.10 - 0.90 10*3/mm3 Final     Eosinophils, Absolute   Date Value Ref Range Status   08/22/2024 0.21 0.00 - 0.40 10*3/mm3 Final     Basophils, Absolute   Date Value Ref Range Status   08/22/2024 0.06 0.00 - 0.20 10*3/mm3 Final     Immature Grans, Absolute   Date Value Ref Range Status   10/09/2020 0.09 (H) 0.00 - 0.05 10*3/mm3 Final     nRBC   Date Value Ref Range Status   02/23/2024 0.1 0.0 - 0.2 /100 WBC Final       Lab Results   Component Value Date    GLUCOSE 103 (H) 08/22/2024    BUN 17 08/22/2024    CREATININE 0.81 08/22/2024    EGFRIFNONA 88 07/26/2021    BCR 21.0 08/22/2024    K 3.7 08/22/2024    CO2 25.0 08/22/2024    CALCIUM 9.2 08/22/2024    PROTENTOTREF 6.9 08/11/2023    ALBUMIN 4.2 08/22/2024    LABIL2 1.1 08/11/2023    AST 31 08/22/2024    ALT 46 (H) 08/22/2024         Assessment & Plan     Anemia, unspecified type  - CBC & Differential  - Comprehensive Metabolic Panel  - CBC & Differential        ASSESSMENT and plans:       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  Rule out lymphoproliferative disease.  Peripheral blood for flow cytometry shows increased kappa lambda ratio detected rare phenotypic aberrancy of the neutrophils as well as monocytes, possibility of a myeloid disorder was also entertained.  PNH screen was negative and G6PD was not deficient.  For now patient will continue on steroids.  But will begin steroid taper due to normalization of her hemoglobin down to 12.7 g per DL.  We will continue her steroid taper as her hemoglobin has improved to 13.0 g/dL today.  Hemoglobin is up to 11.1 g per DL t.  Decrease prednisone to 5 mg p.o. daily.  Continue folate replacement.   Continue PPI.  She is dependent on prednisone therefore would consider additional treatment with Rituxan to see if we will be able to completely resolve the hemolysis.  Today her hemoglobin is up to 11.6 g per DL, patient would like to observe a little bit longer which I think it is reasonable.  Continue 5 mg of prednisone every other day.  Hemoglobin was 11.3 at today's visit although patient did have a 2-week gap in utilizing her prednisone.  Has now been taking consistently again for the last 10 days.  Continue at current dosing until seen by Dr. Burns at the next visit to further discuss continued prednisone versus rituximab.  Status post bone marrow aspiration and biopsy which was essentially unremarkable  Folate deficiency: Continue folic acid, ongoing hemolysis  History of seizures  History of rectal bleeding : Recent  colonoscopy by Dr Vinny Lagos .  She no longer has rectal bleeding and she has completed her course of iron.  Follow-up with GI encouraged.  She has an appointment coming up next week with GI.  She denies rectal bleeding at this time.  Possible sensitivity reaction to Venofer: Patient's epigastric chest pain complaints seem to predate the infusion but she does also complain of itching on the bilateral forearms during the infusion.  Venofer was stopped and normal saline was ran along with 25 mg Benadryl IV given with resolution of the symptoms.          Plans     Continue prednisone to 5 mg every other day.  Reviewed with patient need to continue to take consistently.  Continued observation for response.  CBC every 2 weeks  Hemolysis labs reviewed.  She has a normal haptoglobin but still elevated reticulocyte count and Susi positivity.  Viral hepatitis panel, [facility] screen iron studies with ferritin were within normal limits  Patient was given information on Rituxan to review understands we may have to use it in the future  CBC reviewed  Follow-up in 3 weeks with Dr. Burns as previously  scheduled  Continue PPI  Discussed the benefits and side effects of Rituxan in detail with patient  All questions answered    Patient verbalized understanding and is in agreement of the above plan.

## 2024-08-22 ENCOUNTER — OFFICE VISIT (OUTPATIENT)
Dept: ONCOLOGY | Facility: CLINIC | Age: 29
End: 2024-08-22
Payer: COMMERCIAL

## 2024-08-22 ENCOUNTER — LAB (OUTPATIENT)
Dept: LAB | Facility: HOSPITAL | Age: 29
End: 2024-08-22
Payer: COMMERCIAL

## 2024-08-22 VITALS
OXYGEN SATURATION: 99 % | WEIGHT: 260 LBS | DIASTOLIC BLOOD PRESSURE: 85 MMHG | HEIGHT: 59 IN | TEMPERATURE: 97.7 F | SYSTOLIC BLOOD PRESSURE: 161 MMHG | HEART RATE: 73 BPM | BODY MASS INDEX: 52.41 KG/M2

## 2024-08-22 DIAGNOSIS — D50.0 IRON DEFICIENCY ANEMIA DUE TO CHRONIC BLOOD LOSS: Primary | ICD-10-CM

## 2024-08-22 DIAGNOSIS — D64.9 ANEMIA, UNSPECIFIED TYPE: ICD-10-CM

## 2024-08-22 DIAGNOSIS — D64.9 ANEMIA, UNSPECIFIED TYPE: Primary | ICD-10-CM

## 2024-08-22 DIAGNOSIS — D59.10 AUTOIMMUNE HEMOLYTIC ANEMIA: ICD-10-CM

## 2024-08-22 LAB
ALBUMIN SERPL-MCNC: 4.2 G/DL (ref 3.5–5.2)
ALBUMIN/GLOB SERPL: 1.3 G/DL
ALP SERPL-CCNC: 78 U/L (ref 39–117)
ALT SERPL W P-5'-P-CCNC: 46 U/L (ref 1–33)
ANION GAP SERPL CALCULATED.3IONS-SCNC: 12 MMOL/L (ref 5–15)
AST SERPL-CCNC: 31 U/L (ref 1–32)
BASOPHILS # BLD AUTO: 0.06 10*3/MM3 (ref 0–0.2)
BASOPHILS NFR BLD AUTO: 0.7 % (ref 0–1.5)
BILIRUB SERPL-MCNC: 0.4 MG/DL (ref 0–1.2)
BUN SERPL-MCNC: 17 MG/DL (ref 6–20)
BUN/CREAT SERPL: 21 (ref 7–25)
CALCIUM SPEC-SCNC: 9.2 MG/DL (ref 8.6–10.5)
CHLORIDE SERPL-SCNC: 103 MMOL/L (ref 98–107)
CO2 SERPL-SCNC: 25 MMOL/L (ref 22–29)
CREAT SERPL-MCNC: 0.81 MG/DL (ref 0.57–1)
DEPRECATED RDW RBC AUTO: 44.7 FL (ref 37–54)
EGFRCR SERPLBLD CKD-EPI 2021: 102.2 ML/MIN/1.73
EOSINOPHIL # BLD AUTO: 0.21 10*3/MM3 (ref 0–0.4)
EOSINOPHIL NFR BLD AUTO: 2.3 % (ref 0.3–6.2)
ERYTHROCYTE [DISTWIDTH] IN BLOOD BY AUTOMATED COUNT: 14.4 % (ref 12.3–15.4)
GLOBULIN UR ELPH-MCNC: 3.2 GM/DL
GLUCOSE SERPL-MCNC: 103 MG/DL (ref 65–99)
HCT VFR BLD AUTO: 35.7 % (ref 34–46.6)
HGB BLD-MCNC: 11.3 G/DL (ref 12–15.9)
HOLD SPECIMEN: NORMAL
LYMPHOCYTES # BLD AUTO: 1.72 10*3/MM3 (ref 0.7–3.1)
LYMPHOCYTES NFR BLD AUTO: 19.1 % (ref 19.6–45.3)
MCH RBC QN AUTO: 28.6 PG (ref 26.6–33)
MCHC RBC AUTO-ENTMCNC: 31.7 G/DL (ref 31.5–35.7)
MCV RBC AUTO: 90.4 FL (ref 79–97)
MONOCYTES # BLD AUTO: 0.58 10*3/MM3 (ref 0.1–0.9)
MONOCYTES NFR BLD AUTO: 6.4 % (ref 5–12)
NEUTROPHILS NFR BLD AUTO: 6.45 10*3/MM3 (ref 1.7–7)
NEUTROPHILS NFR BLD AUTO: 71.5 % (ref 42.7–76)
PLATELET # BLD AUTO: 392 10*3/MM3 (ref 140–450)
PMV BLD AUTO: 9.6 FL (ref 6–12)
POTASSIUM SERPL-SCNC: 3.7 MMOL/L (ref 3.5–5.2)
PROT SERPL-MCNC: 7.4 G/DL (ref 6–8.5)
RBC # BLD AUTO: 3.95 10*6/MM3 (ref 3.77–5.28)
SODIUM SERPL-SCNC: 140 MMOL/L (ref 136–145)
WBC NRBC COR # BLD AUTO: 9.02 10*3/MM3 (ref 3.4–10.8)

## 2024-08-22 PROCEDURE — 36415 COLL VENOUS BLD VENIPUNCTURE: CPT

## 2024-08-22 PROCEDURE — 85025 COMPLETE CBC W/AUTO DIFF WBC: CPT

## 2024-08-22 PROCEDURE — 80053 COMPREHEN METABOLIC PANEL: CPT | Performed by: NURSE PRACTITIONER

## 2024-08-22 RX ORDER — ALBUTEROL SULFATE 90 UG/1
AEROSOL, METERED RESPIRATORY (INHALATION)
COMMUNITY
Start: 2024-08-16

## 2024-08-22 RX ORDER — FLUTICASONE PROPIONATE 50 UG/1
SPRAY, METERED NASAL
COMMUNITY
Start: 2024-08-13

## 2024-08-22 RX ORDER — DOXYCYCLINE 100 MG/10ML
100 INJECTION, POWDER, LYOPHILIZED, FOR SOLUTION INTRAVENOUS
COMMUNITY
Start: 2024-08-16

## 2024-09-05 ENCOUNTER — LAB (OUTPATIENT)
Dept: LAB | Facility: HOSPITAL | Age: 29
End: 2024-09-05
Payer: COMMERCIAL

## 2024-09-05 ENCOUNTER — TELEPHONE (OUTPATIENT)
Dept: ONCOLOGY | Facility: CLINIC | Age: 29
End: 2024-09-05
Payer: COMMERCIAL

## 2024-09-05 ENCOUNTER — APPOINTMENT (OUTPATIENT)
Dept: LAB | Facility: HOSPITAL | Age: 29
End: 2024-09-05
Payer: COMMERCIAL

## 2024-09-05 DIAGNOSIS — D59.10 AUTOIMMUNE HEMOLYTIC ANEMIA: ICD-10-CM

## 2024-09-05 LAB
BASOPHILS # BLD AUTO: 0.03 10*3/MM3 (ref 0–0.2)
BASOPHILS NFR BLD AUTO: 0.4 % (ref 0–1.5)
DEPRECATED RDW RBC AUTO: 47.5 FL (ref 37–54)
EOSINOPHIL # BLD AUTO: 0.08 10*3/MM3 (ref 0–0.4)
EOSINOPHIL NFR BLD AUTO: 1 % (ref 0.3–6.2)
ERYTHROCYTE [DISTWIDTH] IN BLOOD BY AUTOMATED COUNT: 14.9 % (ref 12.3–15.4)
HCT VFR BLD AUTO: 34.8 % (ref 34–46.6)
HGB BLD-MCNC: 11.1 G/DL (ref 12–15.9)
LYMPHOCYTES # BLD AUTO: 1.3 10*3/MM3 (ref 0.7–3.1)
LYMPHOCYTES NFR BLD AUTO: 15.6 % (ref 19.6–45.3)
MCH RBC QN AUTO: 28.6 PG (ref 26.6–33)
MCHC RBC AUTO-ENTMCNC: 31.9 G/DL (ref 31.5–35.7)
MCV RBC AUTO: 89.7 FL (ref 79–97)
MONOCYTES # BLD AUTO: 0.54 10*3/MM3 (ref 0.1–0.9)
MONOCYTES NFR BLD AUTO: 6.5 % (ref 5–12)
NEUTROPHILS NFR BLD AUTO: 6.39 10*3/MM3 (ref 1.7–7)
NEUTROPHILS NFR BLD AUTO: 76.5 % (ref 42.7–76)
PLATELET # BLD AUTO: 312 10*3/MM3 (ref 140–450)
PMV BLD AUTO: 9.9 FL (ref 6–12)
RBC # BLD AUTO: 3.88 10*6/MM3 (ref 3.77–5.28)
WBC NRBC COR # BLD AUTO: 8.34 10*3/MM3 (ref 3.4–10.8)

## 2024-09-05 PROCEDURE — 36415 COLL VENOUS BLD VENIPUNCTURE: CPT

## 2024-09-05 PROCEDURE — 85025 COMPLETE CBC W/AUTO DIFF WBC: CPT

## 2024-09-05 NOTE — TELEPHONE ENCOUNTER
Pt came in for CBC today and asked for a set of vitals to be checked because she isn't feeling well. /81, HR 92, O2 100, Temp 99.5. Pt reported chest pain rated 3/10. Chest pain is a common complaint for her. Advised by SAMUEL Donnelly if it is not new or different from what she normally experiences she can f/u with PCP but otherwise needs to go to ED for CP. I let the pt know and she v/u.

## 2024-09-10 RX ORDER — FOLIC ACID 1 MG/1
1000 TABLET ORAL DAILY
Qty: 90 TABLET | Refills: 0 | Status: SHIPPED | OUTPATIENT
Start: 2024-09-10

## 2024-09-12 ENCOUNTER — OFFICE VISIT (OUTPATIENT)
Dept: ONCOLOGY | Facility: CLINIC | Age: 29
End: 2024-09-12
Payer: COMMERCIAL

## 2024-09-12 ENCOUNTER — LAB (OUTPATIENT)
Dept: LAB | Facility: HOSPITAL | Age: 29
End: 2024-09-12
Payer: COMMERCIAL

## 2024-09-12 VITALS
DIASTOLIC BLOOD PRESSURE: 82 MMHG | HEIGHT: 59 IN | TEMPERATURE: 98.2 F | WEIGHT: 259 LBS | BODY MASS INDEX: 52.21 KG/M2 | SYSTOLIC BLOOD PRESSURE: 125 MMHG | OXYGEN SATURATION: 98 % | HEART RATE: 82 BPM | RESPIRATION RATE: 20 BRPM

## 2024-09-12 DIAGNOSIS — D64.9 ANEMIA, UNSPECIFIED TYPE: Primary | ICD-10-CM

## 2024-09-12 DIAGNOSIS — D64.9 ANEMIA, UNSPECIFIED TYPE: ICD-10-CM

## 2024-09-12 DIAGNOSIS — D59.10 AUTOIMMUNE HEMOLYTIC ANEMIA: ICD-10-CM

## 2024-09-12 DIAGNOSIS — D50.0 IRON DEFICIENCY ANEMIA DUE TO CHRONIC BLOOD LOSS: Primary | ICD-10-CM

## 2024-09-12 LAB
BASOPHILS # BLD AUTO: 0.05 10*3/MM3 (ref 0–0.2)
BASOPHILS NFR BLD AUTO: 0.7 % (ref 0–1.5)
DEPRECATED RDW RBC AUTO: 46.6 FL (ref 37–54)
EOSINOPHIL # BLD AUTO: 0.29 10*3/MM3 (ref 0–0.4)
EOSINOPHIL NFR BLD AUTO: 4.1 % (ref 0.3–6.2)
ERYTHROCYTE [DISTWIDTH] IN BLOOD BY AUTOMATED COUNT: 14.7 % (ref 12.3–15.4)
HAPTOGLOB SERPL-MCNC: 223 MG/DL (ref 30–200)
HCT VFR BLD AUTO: 36.3 % (ref 34–46.6)
HGB BLD-MCNC: 11.4 G/DL (ref 12–15.9)
HOLD SPECIMEN: NORMAL
HOLD SPECIMEN: NORMAL
LDH SERPL-CCNC: 264 U/L (ref 135–214)
LYMPHOCYTES # BLD AUTO: 1.33 10*3/MM3 (ref 0.7–3.1)
LYMPHOCYTES NFR BLD AUTO: 18.7 % (ref 19.6–45.3)
MCH RBC QN AUTO: 28.2 PG (ref 26.6–33)
MCHC RBC AUTO-ENTMCNC: 31.4 G/DL (ref 31.5–35.7)
MCV RBC AUTO: 89.9 FL (ref 79–97)
MONOCYTES # BLD AUTO: 0.59 10*3/MM3 (ref 0.1–0.9)
MONOCYTES NFR BLD AUTO: 8.3 % (ref 5–12)
NEUTROPHILS NFR BLD AUTO: 4.87 10*3/MM3 (ref 1.7–7)
NEUTROPHILS NFR BLD AUTO: 68.2 % (ref 42.7–76)
PLATELET # BLD AUTO: 373 10*3/MM3 (ref 140–450)
PMV BLD AUTO: 8.9 FL (ref 6–12)
RBC # BLD AUTO: 4.04 10*6/MM3 (ref 3.77–5.28)
RETICS # AUTO: 0.3 10*6/MM3 (ref 0.02–0.13)
RETICS/RBC NFR AUTO: 7.38 % (ref 0.7–1.9)
WBC NRBC COR # BLD AUTO: 7.13 10*3/MM3 (ref 3.4–10.8)

## 2024-09-12 PROCEDURE — 83010 ASSAY OF HAPTOGLOBIN QUANT: CPT | Performed by: INTERNAL MEDICINE

## 2024-09-12 PROCEDURE — 85025 COMPLETE CBC W/AUTO DIFF WBC: CPT

## 2024-09-12 PROCEDURE — 85045 AUTOMATED RETICULOCYTE COUNT: CPT | Performed by: INTERNAL MEDICINE

## 2024-09-12 PROCEDURE — 99214 OFFICE O/P EST MOD 30 MIN: CPT | Performed by: INTERNAL MEDICINE

## 2024-09-12 PROCEDURE — 83615 LACTATE (LD) (LDH) ENZYME: CPT | Performed by: INTERNAL MEDICINE

## 2024-09-12 PROCEDURE — 36415 COLL VENOUS BLD VENIPUNCTURE: CPT

## 2024-09-12 NOTE — PROGRESS NOTES
Hematology/Oncology Outpatient Follow Up    PATIENT NAME:Arlene Brady  :1995  MRN: [id number]  PRIMARY CARE PHYSICIAN: Daniel Sam MD  REFERRING PHYSICIAN: No ref. provider found    Chief Complaint   Patient presents with    Follow-up     Autoimmune hemolytic anemia              HISTORY OF PRESENT ILLNESS:       This is a 28 year old female has been referred secondary to anemia.  Patient has a longtime history of anemia.  She has rectal bleeding and had colonoscopy done a few days ago by Dr Vinny Lagos . She has internal and external hemorrhoids. She has a follow up with Dr Lagos.     Patient is amenorrheic for about 6 months.  She had history of menorrhagia. She is on hormone pills to regulate her cycles.   She has low energy, she was on oral iron supplements for a month. She has since ran out of her iron tablets.     Review of her CBCs indicate that she has had anemia with hemoglobin ranging between 8 and 12.3 g per DL.  Today her white count is 8, hemoglobin is 8, MCV is 101 and platelets are 352 with essentially unremarkable differentials.     She denies having blood in her urine     She is single, She is a CNA     Patient does not smoke and drinks occasionally     There is no family history of hematological problems.     Her mother had colon cancer,      2023: Methylmalonic acid level was normal at 343 patient had anemia work-up including a ferritin level which was 167, C-reactive protein was high at 1.96 BUN was 13, creatinine 0.9 LDH was 323 iron profile showed a elevated serum iron and iron saturation, haptoglobin was normal, reticulocyte count was elevated to 16 she has low folate at 3.1  2023: WBC 11.62, hemoglobin 8.0 g/dL, .9, platelets 353,000.  Flow cytometry showed an increased kappa lambda ratio, neutrophils with left shifted maturation and rare phenotypic aberrancy, monocytes with phenotypic aberrancy.  Bone marrow biopsy with molecular and cytogenetic studies  indicated to evaluate for myeloid neoplasm.  8/16/2023 CT of the neck, chest, abdomen and pelvis with contrast showed no evidence of definite pathologic lymphadenopathy concerning for lymphoproliferative disorder.  No acute findings present in the neck, chest, abdomen or pelvis.  8/25/2023: Patient had bone marrow aspiration and biopsy which basically showed normocellular bone marrow for age, decreased iron stores, negative for involvement by malignant lymphoma or metastatic malignancy.  Flow cytometry was unremarkable with no immunophenotypic abnormalities noted.  Cytogenetics showed a normal female karyotype      Past Medical History:   Diagnosis Date    ADHD (attention deficit hyperactivity disorder)     Anxiety     Arthritis     Asthma     exercise induced asthma     Depression     Epilepsy     History of blood transfusion     Seizures        Past Surgical History:   Procedure Laterality Date    ABDOMINAL SURGERY      choley    TONSILLECTOMY           Current Outpatient Medications:     albuterol sulfate  (90 Base) MCG/ACT inhaler, inhale 2 puffs by mouth every 4 hours, Disp: , Rfl:     fexofenadine (ALLEGRA) 180 MG tablet, Take 1 tablet by mouth Daily., Disp: , Rfl:     Flonase Allergy Relief 50 MCG/ACT nasal spray, = 2 spray(s), Nostril-Both, Daily, # 16 gm, 0 Refill(s), Pharmacy: [facility]/pharmacy #[id number], 2 spray(s) Nostril-Both Daily, 149, cm, 08/13/24 14:31:00 EDT, Height, 118.8, kg, 08/13/24 14:35:00 EDT, Weight Dosing, Disp: , Rfl:     folic acid (FOLVITE) 1 MG tablet, TAKE 1 TABLET BY MOUTH EVERY DAY, Disp: 90 tablet, Rfl: 0    folic acid (FOLVITE) 1 MG tablet, TAKE 1 TABLET BY MOUTH EVERY DAY, Disp: 90 tablet, Rfl: 0    Junel 1/20 1-20 MG-MCG per tablet, Take 1 tablet by mouth Every Evening., Disp: , Rfl:     norethindrone-ethinyl estradiol FE (Junel FE 1/20) 1-20 MG-MCG per tablet, , Disp: , Rfl:     nystatin (MYCOSTATIN) 771645 UNIT/GM cream, , Disp: , Rfl:     nystatin-triamcinolone (MYCOLOG)  "350799-3.1 UNIT/GM-% ointment, APPLY 1 APPLICATION TOPICALLY TWICE DAILY FOR 21 DAYS, Disp: , Rfl:     OXcarbazepine (TRILEPTAL) 300 MG tablet, Take 1.5 tablets by mouth 2 (Two) Times a Day., Disp: , Rfl:     pantoprazole (PROTONIX) 40 MG EC tablet, TAKE 1 TABLET BY MOUTH EVERY DAY, Disp: 90 tablet, Rfl: 3    predniSONE (DELTASONE) 5 MG tablet, Take 1 tablet by mouth Every Other Day., Disp: 30 tablet, Rfl: 0    Allergies   Allergen Reactions    Cephalosporins Rash    Penicillins Rash       Family History   Problem Relation Age of Onset    Hypertension Father     Heart disease Father     Hyperlipidemia Father     Cancer Paternal Grandmother        Cancer-related family history includes Cancer in her paternal grandmother.    Social History     Tobacco Use    Smoking status: Never    Smokeless tobacco: Never   Vaping Use    Vaping status: Never Used   Substance Use Topics    Alcohol use: Yes     Comment: very infrequent     Drug use: Never     I have reviewed and confirmed the accuracy of the patient's history: Chief complaint, HPI, ROS, and Subjective as entered by the MA/LPN/RN. Bronwyn Burns MD 09/12/24      SUBJECTIVE:    Patient denies any new issues.  She remains on prednisone every other day.      Arlene Brady reports a pain score of 0.  Given her pain assessment as noted, treatment options were discussed and the following options were decided upon as a follow-up plan to address the patient's pain:  Continue current management by her primary care .           REVIEW OF SYSTEMS:      Review of Systems   Constitutional:  Positive for fatigue.   Musculoskeletal:  Positive for back pain.       Per subjective    OBJECTIVE:    Vitals:    09/12/24 1226   BP: 125/82   Pulse: 82   Resp: 20   Temp: 98.2 °F (36.8 °C)   TempSrc: Oral   SpO2: 98%   Weight: 117 kg (259 lb)   Height: 149.9 cm (59\")   PainSc: 0-No pain             Body mass index is 52.31 kg/m².    ECOG  (1) Restricted in physically strenuous " activity, ambulatory and able to do work of light nature    Physical Exam  Vitals reviewed.   Constitutional:       General: She is not in acute distress.     Appearance: Normal appearance. She is not ill-appearing, toxic-appearing or diaphoretic.   HENT:      Head: Normocephalic and atraumatic.   Eyes:      Extraocular Movements: Extraocular movements intact.   Cardiovascular:      Rate and Rhythm: Normal rate and regular rhythm.      Heart sounds: No murmur heard.  Pulmonary:      Effort: Pulmonary effort is normal. No respiratory distress.      Breath sounds: Normal breath sounds. No stridor. No wheezing.   Abdominal:      General: Bowel sounds are normal.      Palpations: Abdomen is soft.   Musculoskeletal:         General: Normal range of motion.      Cervical back: Normal range of motion.   Skin:     General: Skin is warm and dry.      Findings: No rash.   Neurological:      Mental Status: She is alert and oriented to person, place, and time.   Psychiatric:         Mood and Affect: Mood normal.         Behavior: Behavior normal.       I have reexamined the patient and the results are consistent with the previously documented exam. Bronwynnara Burns MD            RECENT LABS    WBC   Date Value Ref Range Status   09/12/2024 7.13 3.40 - 10.80 10*3/mm3 Final     RBC   Date Value Ref Range Status   09/12/2024 4.04 3.77 - 5.28 10*6/mm3 Final   07/31/2023 2.80 (L) 3.77 - 5.28 x10E6/uL Final     Hemoglobin   Date Value Ref Range Status   09/12/2024 11.4 (L) 12.0 - 15.9 g/dL Final     Hematocrit   Date Value Ref Range Status   09/12/2024 36.3 34.0 - 46.6 % Final     MCV   Date Value Ref Range Status   09/12/2024 89.9 79.0 - 97.0 fL Final     MCH   Date Value Ref Range Status   09/12/2024 28.2 26.6 - 33.0 pg Final     MCHC   Date Value Ref Range Status   09/12/2024 31.4 (L) 31.5 - 35.7 g/dL Final     RDW   Date Value Ref Range Status   09/12/2024 14.7 12.3 - 15.4 % Final     RDW-SD   Date Value Ref Range Status    09/12/2024 46.6 37.0 - 54.0 fl Final     MPV   Date Value Ref Range Status   09/12/2024 8.9 6.0 - 12.0 fL Final     Platelets   Date Value Ref Range Status   09/12/2024 373 140 - 450 10*3/mm3 Final     Neutrophil %   Date Value Ref Range Status   09/12/2024 68.2 42.7 - 76.0 % Final     Lymphocyte %   Date Value Ref Range Status   09/12/2024 18.7 (L) 19.6 - 45.3 % Final     Monocyte %   Date Value Ref Range Status   09/12/2024 8.3 5.0 - 12.0 % Final     Eosinophil %   Date Value Ref Range Status   09/12/2024 4.1 0.3 - 6.2 % Final     Basophil %   Date Value Ref Range Status   09/12/2024 0.7 0.0 - 1.5 % Final     Immature Grans %   Date Value Ref Range Status   10/09/2020 1.4 (H) 0.0 - 0.5 % Final     Neutrophils, Absolute   Date Value Ref Range Status   09/12/2024 4.87 1.70 - 7.00 10*3/mm3 Final     Lymphocytes, Absolute   Date Value Ref Range Status   09/12/2024 1.33 0.70 - 3.10 10*3/mm3 Final     Monocytes, Absolute   Date Value Ref Range Status   09/12/2024 0.59 0.10 - 0.90 10*3/mm3 Final     Eosinophils, Absolute   Date Value Ref Range Status   09/12/2024 0.29 0.00 - 0.40 10*3/mm3 Final     Basophils, Absolute   Date Value Ref Range Status   09/12/2024 0.05 0.00 - 0.20 10*3/mm3 Final     Immature Grans, Absolute   Date Value Ref Range Status   10/09/2020 0.09 (H) 0.00 - 0.05 10*3/mm3 Final     nRBC   Date Value Ref Range Status   02/23/2024 0.1 0.0 - 0.2 /100 WBC Final       Lab Results   Component Value Date    GLUCOSE 103 (H) 08/22/2024    BUN 17 08/22/2024    CREATININE 0.81 08/22/2024    EGFRIFNONA 88 07/26/2021    BCR 21.0 08/22/2024    K 3.7 08/22/2024    CO2 25.0 08/22/2024    CALCIUM 9.2 08/22/2024    PROTENTOTREF 6.9 08/11/2023    ALBUMIN 4.2 08/22/2024    LABIL2 1.1 08/11/2023    AST 31 08/22/2024    ALT 46 (H) 08/22/2024         Assessment & Plan             ASSESSMENT and plans:       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  Rule out  lymphoproliferative disease.  Peripheral blood for flow cytometry shows increased kappa lambda ratio detected rare phenotypic aberrancy of the neutrophils as well as monocytes, possibility of a myeloid disorder was also entertained.  PNH screen was negative and G6PD was not deficient.  For now patient will continue on steroids.  But will begin steroid taper due to normalization of her hemoglobin down to 12.7 g per DL.  We will continue her steroid taper as her hemoglobin has improved to 13.0 g/dL today.  Hemoglobin is up to 11.1 g per DL t.  Decrease prednisone to 5 mg p.o. daily.  Continue folate replacement.  Continue PPI.  She is dependent on prednisone therefore would consider additional treatment with Rituxan to see if we will be able to completely resolve the hemolysis.  Today her hemoglobin is up to 11.6 g per DL, patient would like to observe a little bit longer which I think it is reasonable.  Continue 5 mg of prednisone every other day.  Hemoglobin was 11.3 at today's visit although patient did have a 2-week gap in utilizing her prednisone.  Has now been taking consistently again for the last 10 days.  Continue at current dosing until seen by Dr. Burns at the next visit to further discuss continued prednisone versus rituximab.  Status post bone marrow aspiration and biopsy which was essentially unremarkable  Folate deficiency: Sinew folic acid replacement  History of seizures  History of rectal bleeding : Recent  colonoscopy by Dr Vinny Lagos .  She no longer has rectal bleeding and she has completed her course of iron.  Follow-up with GI encouraged.  She has an appointment coming up next week with GI.  She denies rectal bleeding at this time.  Possible sensitivity reaction to Venofer: Patient's epigastric chest pain complaints seem to predate the infusion but she does also complain of itching on the bilateral forearms during the infusion.  Venofer was stopped and normal saline was ran along with 25 mg  Benadryl IV given with resolution of the symptoms.          Plans     Continue prednisone to 5 mg every other day.  Reviewed with patient need to continue to take consistently.  Continued observation for response.  Change CBCs to every 3 weeks  Hemolysis labs today  Viral hepatitis panel, [facility] screen iron studies with ferritin were within normal limits  Patient was given information on Rituxan to review understands we may have to use it in the future  CBC reviewed  Labs in 3 weeks  Follow-up 6 weeks  Continue PPI  Discussed the benefits and side effects of Rituxan in detail with patient  All questions answered    Patient verbalized understanding and is in agreement of the above plan.          I spent 30 total minutes, face-to-face, caring for Arlene today. 90% of this time involved counseling and/or coordination of care as documented within this note.

## 2024-10-03 ENCOUNTER — TELEPHONE (OUTPATIENT)
Dept: ONCOLOGY | Facility: CLINIC | Age: 29
End: 2024-10-03

## 2024-10-03 ENCOUNTER — LAB (OUTPATIENT)
Dept: LAB | Facility: HOSPITAL | Age: 29
End: 2024-10-03
Payer: COMMERCIAL

## 2024-10-03 DIAGNOSIS — D64.9 ANEMIA, UNSPECIFIED TYPE: ICD-10-CM

## 2024-10-03 DIAGNOSIS — D59.10 AUTOIMMUNE HEMOLYTIC ANEMIA: ICD-10-CM

## 2024-10-03 LAB
BASOPHILS # BLD AUTO: 0.04 10*3/MM3 (ref 0–0.2)
BASOPHILS NFR BLD AUTO: 0.6 % (ref 0–1.5)
DAT C3: NEGATIVE
DAT IGG-SP REAG RBC-IMP: POSITIVE
DAT POLY-SP REAG RBC QL: POSITIVE
DEPRECATED RDW RBC AUTO: 46.7 FL (ref 37–54)
EOSINOPHIL # BLD AUTO: 0.19 10*3/MM3 (ref 0–0.4)
EOSINOPHIL NFR BLD AUTO: 2.6 % (ref 0.3–6.2)
ERYTHROCYTE [DISTWIDTH] IN BLOOD BY AUTOMATED COUNT: 14.6 % (ref 12.3–15.4)
HCT VFR BLD AUTO: 34.7 % (ref 34–46.6)
HGB BLD-MCNC: 11 G/DL (ref 12–15.9)
LYMPHOCYTES # BLD AUTO: 1.51 10*3/MM3 (ref 0.7–3.1)
LYMPHOCYTES NFR BLD AUTO: 21.1 % (ref 19.6–45.3)
MCH RBC QN AUTO: 28.4 PG (ref 26.6–33)
MCHC RBC AUTO-ENTMCNC: 31.7 G/DL (ref 31.5–35.7)
MCV RBC AUTO: 89.7 FL (ref 79–97)
MONOCYTES # BLD AUTO: 0.48 10*3/MM3 (ref 0.1–0.9)
MONOCYTES NFR BLD AUTO: 6.7 % (ref 5–12)
NEUTROPHILS NFR BLD AUTO: 4.95 10*3/MM3 (ref 1.7–7)
NEUTROPHILS NFR BLD AUTO: 69 % (ref 42.7–76)
PLATELET # BLD AUTO: 325 10*3/MM3 (ref 140–450)
PMV BLD AUTO: 9.3 FL (ref 6–12)
RBC # BLD AUTO: 3.87 10*6/MM3 (ref 3.77–5.28)
WBC NRBC COR # BLD AUTO: 7.17 10*3/MM3 (ref 3.4–10.8)

## 2024-10-03 PROCEDURE — 86880 COOMBS TEST DIRECT: CPT | Performed by: INTERNAL MEDICINE

## 2024-10-03 PROCEDURE — 36415 COLL VENOUS BLD VENIPUNCTURE: CPT

## 2024-10-03 PROCEDURE — 85025 COMPLETE CBC W/AUTO DIFF WBC: CPT

## 2024-10-03 NOTE — TELEPHONE ENCOUNTER
Hub staff attempted to follow warm transfer process and was unsuccessful     Caller: Arlene Brady    Relationship to patient: Self    Best call back number: [phone]    Patient is needing: CHANGE LAB APPT TO 1  FOR TODAY 10/3/2024.    CALL TO R/S

## 2024-10-10 ENCOUNTER — OFFICE VISIT (OUTPATIENT)
Dept: BARIATRICS/WEIGHT MGMT | Facility: CLINIC | Age: 29
End: 2024-10-10
Payer: COMMERCIAL

## 2024-10-10 VITALS
BODY MASS INDEX: 51.2 KG/M2 | OXYGEN SATURATION: 98 % | WEIGHT: 254 LBS | HEIGHT: 59 IN | RESPIRATION RATE: 18 BRPM | HEART RATE: 83 BPM | SYSTOLIC BLOOD PRESSURE: 120 MMHG | DIASTOLIC BLOOD PRESSURE: 86 MMHG

## 2024-10-10 DIAGNOSIS — Z79.899 MEDICATION MANAGEMENT: ICD-10-CM

## 2024-10-10 DIAGNOSIS — E66.9 SUPER OBESE: Primary | ICD-10-CM

## 2024-10-10 PROBLEM — R56.9 SEIZURES: Status: ACTIVE | Noted: 2024-10-10

## 2024-10-10 NOTE — PROGRESS NOTES
[facility] BARIATRIC SURGERY  [address] [place] [address]  [address] [place] [address]  Dept: [phone]  10/10/2024      Arlene Brady.  [id number]  [id number]  1995  female      Chief Complaint   Patient presents with    Weight Loss     MWM 2       The patient is here for month 2 of medical weight management. She had a loss of 6 lbs. The patient states that she is following the recommendations given by our office and dietician including a high lean protein, low carb and low fat diet. We recommended adequate fruits and vegetable intake along with limited portion sizes. Patient is working on eliminating fast foods, fried foods, sweets and soda. Arlene Brady has been increasing her daily water intake. She has been exercising: walking some.  Wt Readings from Last 10 Encounters:   10/10/24 115 kg (254 lb)   09/12/24 117 kg (259 lb)   08/22/24 118 kg (260 lb)   07/11/24 121 kg (266 lb)   06/13/24 119 kg (261 lb 12.8 oz)   06/06/24 119 kg (262 lb)   05/16/24 118 kg (260 lb)   03/21/24 119 kg (263 lb)   02/23/24 119 kg (262 lb 2.4 oz)   02/22/24 120 kg (264 lb)     Patient states they have made positive changes including weight loss, smaller portions,   The patient admits to be struggling with deciding if she wants to proceed with bariatric surgery or start weight loss medications     Review of Systems   Constitutional:  Positive for activity change, appetite change and fatigue.   Respiratory: Negative.     Cardiovascular: Negative.    Gastrointestinal: Negative.    Musculoskeletal:  Positive for myalgias.     Vitals:    10/10/24 1328   BP: 120/86   Pulse: 83   Resp: 18   SpO2: 98%       Body mass index is 51.3 kg/m².    The following portions of the patient's history were reviewed and updated as appropriate: active problem list, medication list, allergies, social history, notes from last encounter  Past Medical History:    Diagnosis Date    ADHD (attention deficit hyperactivity disorder)     Anxiety     Arthritis     Asthma     exercise induced asthma     Depression     Epilepsy     History of blood transfusion     Seizures      Past Surgical History:   Procedure Laterality Date    ABDOMINAL SURGERY      choley    TONSILLECTOMY          Physical Exam  Constitutional:       Appearance: Normal appearance. She is obese.   Pulmonary:      Effort: Pulmonary effort is normal.   Abdominal:      General: Abdomen is flat.   Skin:     General: Skin is warm and dry.   Neurological:      General: No focal deficit present.      Mental Status: She is alert and oriented to person, place, and time.   Psychiatric:         Mood and Affect: Mood normal.         Behavior: Behavior normal.         Thought Content: Thought content normal.         Judgment: Judgment normal.         Discussion/Plan:  BMI 51.30, Class 3 obesity, medication management: none     Obesity/Morbid Obesity: Currently the patient's weight is decreased. Treatment plan includes prescribed diet, prescribed exercise regimen and behavior modification.     I had a long discussion with the pt about different weight loss medications as well as bariatric surgery. Pt has a hx of seizures and thus would not be a good candidate for phentermine. She is unsure is she has weight loss medication benefit with insurance or not. I did also speak with the pt about glp-1 medications including compounded semaglutide and brand wegovy. PT is concerned about possible constipation. Pt would like to speak with her parents about medications vs surgery options. She would like to hold off on weight loss medications at this time and follow up in 1 month with DR. Jarvis to discuss surgery options further. I did discuss surgery options some with the pt and with her currently being on prednisone for the undetermined amount of time, pt may benefit from gastric sleeve over gastric bypass. She does have some GERD but  managed with medication. She would need hematology clearance with DR. Burns before having bariatric surgery if she chooses to proceed with this option.     I reviewed the appropriate dietary choices with the patient and encouraged the necessary changes. Recommended at least 70 grams of protein per day, around 35 grams of fats and less than 100 grams of carbohydrates. Reviewed calorie intake if patient wanted to calorie count and/or had BMR. Instructed patient to drink half of body weight in ounces per day and exercise a minimum of 150 minutes per week including both cardio and strength training. Discussed the option of keeping a food journal which will help patient become more aware of the nutritional value of foods .     The patient was given written materials from our office for diet plans and medications.   I answered all of the patients questions regarding dietary changes, exercise, and medications.   The patient will follow up in 1 month. The total time spent face to face was 45 minutes with 30 minutes spent counseling.    ELSY Cosme  [facility]

## 2024-10-10 NOTE — PROGRESS NOTES
"Nutrition Services    Patient Name: Arlene Brady  YOB: 1995  MRN: [id number]  Date of Service: 10/10/24      ICD-10-CM ICD-9-CM   1. Super obese  E66.9 278.00   2. Body mass index (BMI) of 50-59.9 in adult  Z68.43 V85.43          NUTRITION ASSESSMENT - BARIATRIC SURGERY      Reason for Visit MWM appt 2, follow up     H&P      Past Medical History:   Diagnosis Date    ADHD (attention deficit hyperactivity disorder)     Anxiety     Arthritis     Asthma     exercise induced asthma     Depression     Epilepsy     History of blood transfusion     Seizures        Past Surgical History:   Procedure Laterality Date    ABDOMINAL SURGERY      choley    TONSILLECTOMY          Previous Goals   Patient to add in protein shake daily - working on  Patient to plan meals with protein, carb and color - working on  Patient to aim for 16 oz of water each day (flavored) - working on  Add protein in with snacks such as peanut butter or cheese - working on       Encounter Information        Visit Narrative     Patient reports she feels like she is having some food intolerances. Encouraged patient to keep food diary with symptoms to see if she can pinpoint triggers. Patient has been eating 1 meal/day and trying to increase water intake.     Diet Recall:   Breakfast: skips  Lunch: skips  Dinner: brings from home sometimes   Snacks: pretzels  Beverages: trying to increase water    Exercise: PT for knee currently     Supplements:    Self Monitoring:          Anthropometrics        Current Height, Weight Height: 149.9 cm (59\")  Weight: 115 kg (254 lb) (10/10/24 1328)       Trending Weight Hx  7lb weight loss since initial appt    Wt Readings from Last 30 Encounters:   10/10/24 1328 115 kg (254 lb)   09/12/24 1226 117 kg (259 lb)   08/22/24 1504 118 kg (260 lb)   07/11/24 1607 121 kg (266 lb)   06/13/24 1323 119 kg (261 lb 12.8 oz)   06/06/24 1342 119 kg (262 lb)   05/16/24 1250 118 kg (260 lb)   03/21/24 1358 119 kg " (263 lb)   02/23/24 1631 119 kg (262 lb 2.4 oz)   02/22/24 1510 120 kg (264 lb)   02/05/24 1114 118 kg (261 lb)   01/18/24 1418 118 kg (260 lb)   12/21/23 1323 118 kg (260 lb)   11/20/23 1459 118 kg (260 lb)   11/03/23 0905 121 kg (266 lb)   10/17/23 1334 119 kg (263 lb)   10/02/23 1519 118 kg (260 lb)   09/19/23 1025 114 kg (251 lb 3.2 oz)   09/14/23 1147 113 kg (250 lb)   09/06/23 1008 113 kg (249 lb)   08/29/23 1427 111 kg (244 lb)   08/18/23 0820 107 kg (235 lb 8 oz)   08/18/23 0816 107 kg (235 lb 14.3 oz)   08/11/23 0814 109 kg (239 lb 4.8 oz)   08/11/23 0817 109 kg (240 lb 4.8 oz)   08/04/23 0821 109 kg (241 lb)   08/04/23 0933 109 kg (241 lb)   07/31/23 1042 108 kg (237 lb)   07/08/23 0420 108 kg (238 lb 8.6 oz)   07/06/23 2349 107 kg (235 lb 0.2 oz)   07/06/23 1513 106 kg (234 lb 9.1 oz)   06/26/23 0903 108 kg (237 lb)      BMI kg/m2 Body mass index is 51.3 kg/m².       Nutrition Diagnosis         Nutrition Dx Statement Overweight/obesity RT multifactorial biochemical, behavioral and environmental contributors to disease AEB BMI 51.3 kg/m^2         Nutrition Intervention         Nutrition Intervention Nutrition education related to diet modification and physical activity        Monitor/Evaluation        New Goals Patient to plan meals with protein, color and carb  Patient to continue increasing water intake  Patient to continue PT exercises        Total time spent with pt 15 minutes of which 15 minutes were spent on education.       Electronically signed by:  Gilbert Prince RD  10/10/24 15:13 EDT

## 2024-10-24 ENCOUNTER — TELEPHONE (OUTPATIENT)
Dept: ONCOLOGY | Facility: CLINIC | Age: 29
End: 2024-10-24

## 2024-10-25 NOTE — TELEPHONE ENCOUNTER
Hub staff attempted to follow warm transfer process and was unsuccessful     Caller: Arlene Brady    Relationship to patient: Self    Best call back number: [phone]    Patient is needing: PT RETURNING JEIMY'S CALL TO RS

## 2024-11-03 DIAGNOSIS — D64.9 ANEMIA, UNSPECIFIED TYPE: ICD-10-CM

## 2024-11-04 RX ORDER — PANTOPRAZOLE SODIUM 40 MG/1
40 TABLET, DELAYED RELEASE ORAL DAILY
Qty: 90 TABLET | Refills: 1 | Status: SHIPPED | OUTPATIENT
Start: 2024-11-04

## 2024-11-14 ENCOUNTER — TELEPHONE (OUTPATIENT)
Dept: ONCOLOGY | Facility: CLINIC | Age: 29
End: 2024-11-14
Payer: COMMERCIAL

## 2024-11-14 NOTE — TELEPHONE ENCOUNTER
Hub staff attempted to follow warm transfer process and was unsuccessful     Caller: Arlene Brady    Relationship to patient: Self    Best call back number: [phone]     Patient is needing: PT NEVER HEARD BACK FROM HER CALL ON 10/25/24. (SEE 10/24 TE)    PT NEEDS SOMEONE TO CALL HER BACK TO GO OVER HER SCHEDULE AVAILABLY BECAUSE HER SCHEDULE CHANGES.     LAB AND FOLLOW UP R/S FROM 10/24/24.    PT HAD A MRI ON HER KNEE AS WELL AND THE MRI WAS BEING SENT TO DR GLEZ TO REVIEW AS WELL.

## 2024-11-15 ENCOUNTER — TELEPHONE (OUTPATIENT)
Dept: ONCOLOGY | Facility: CLINIC | Age: 29
End: 2024-11-15

## 2024-11-15 NOTE — TELEPHONE ENCOUNTER
Caller: Arlene Brady    Relationship: Self    Best call back number: [phone]     What is the best time to reach you: ASAP    Who are you requesting to speak with (clinical staff, provider,  specific staff member): JEIMY        What was the call regarding: PT IS RETURNING CALL FROM [facility] TO SCHEDULE, HUB UNABLE TO WARM TRANSFER, PLEASE CALL PT BACK WHEN AVAILABLE.

## 2024-11-21 ENCOUNTER — OFFICE VISIT (OUTPATIENT)
Dept: ORTHOPEDIC SURGERY | Facility: CLINIC | Age: 29
End: 2024-11-21
Payer: COMMERCIAL

## 2024-11-21 VITALS — BODY MASS INDEX: 52.17 KG/M2 | WEIGHT: 258.8 LBS | HEART RATE: 78 BPM | HEIGHT: 59 IN

## 2024-11-21 DIAGNOSIS — M25.561 ACUTE PAIN OF RIGHT KNEE: Primary | ICD-10-CM

## 2024-11-21 DIAGNOSIS — M17.11 PRIMARY LOCALIZED OSTEOARTHRITIS OF RIGHT KNEE: ICD-10-CM

## 2024-11-21 PROCEDURE — 99203 OFFICE O/P NEW LOW 30 MIN: CPT | Performed by: ORTHOPAEDIC SURGERY

## 2024-11-21 NOTE — PROGRESS NOTES
"     Patient ID: Arlene Brady is a 29 y.o. female.    Chief Complaint:    Chief Complaint   Patient presents with    Right Knee - Pain, Initial Evaluation     Pain 7-8       HPI:  This is a 29-year-old female who banged her knee several weeks ago resulting in acute right pain to the knee.  She has diffuse pain mainly over the lateral aspect.  She has had treatment with rehab and injections of steroid which have not helped  Past Medical History:   Diagnosis Date    ADHD (attention deficit hyperactivity disorder)     Anxiety     Arthritis     Asthma     exercise induced asthma     Depression     Epilepsy     History of blood transfusion     Seizures        Past Surgical History:   Procedure Laterality Date    ABDOMINAL SURGERY      choley    TONSILLECTOMY         Family History   Problem Relation Age of Onset    Hypertension Father     Heart disease Father     Hyperlipidemia Father     Cancer Paternal Grandmother           Social History     Occupational History    Not on file   Tobacco Use    Smoking status: Never    Smokeless tobacco: Never   Vaping Use    Vaping status: Never Used   Substance and Sexual Activity    Alcohol use: Yes     Comment: very infrequent     Drug use: Never    Sexual activity: Not Currently      Review of Systems   Cardiovascular:  Negative for chest pain.   Musculoskeletal:  Positive for arthralgias.       Objective:    Pulse 78   Ht 149.9 cm (59\")   Wt 117 kg (258 lb 12.8 oz)   BMI 52.27 kg/m²     Physical Examination:  Right knee no atrophy no effusion mild lateral joint line tenderness knee range of motion 0 to 125 degrees no varus valgus laxity with negative Destiny's  Sensory and motor exam are intact in all distributions. Dorsalis pedis and posterior tibialis pulses are palpable and capillary refill is less than two seconds to all digits.    Imaging:  Prior x-ray and MRI demonstrate marrow changes on the MRI unchanged from a year ago small undersurface tear of the medial " meniscus    Assessment:  Patellofemoral pain    Plan:  Options discussed I recommend conservative treatment including low impact exercise and weight loss also discussed viscosupplementation she has failed steroids and oral medication and therapy    Disclaimer: Part of this note may be an electronic transcription/translation of spoken language to printed text using the Dragon Dictation System

## 2024-11-22 ENCOUNTER — PATIENT ROUNDING (BHMG ONLY) (OUTPATIENT)
Dept: ORTHOPEDIC SURGERY | Facility: CLINIC | Age: 29
End: 2024-11-22
Payer: COMMERCIAL

## 2024-11-25 ENCOUNTER — TELEPHONE (OUTPATIENT)
Dept: ONCOLOGY | Facility: CLINIC | Age: 29
End: 2024-11-25
Payer: COMMERCIAL

## 2024-12-06 ENCOUNTER — LAB (OUTPATIENT)
Dept: LAB | Facility: HOSPITAL | Age: 29
End: 2024-12-06
Payer: COMMERCIAL

## 2024-12-06 DIAGNOSIS — D59.10 AUTOIMMUNE HEMOLYTIC ANEMIA: ICD-10-CM

## 2024-12-06 DIAGNOSIS — D64.9 ANEMIA, UNSPECIFIED TYPE: ICD-10-CM

## 2024-12-06 LAB
BASOPHILS # BLD AUTO: 0.02 10*3/MM3 (ref 0–0.2)
BASOPHILS NFR BLD AUTO: 0.3 % (ref 0–1.5)
DEPRECATED RDW RBC AUTO: 47.8 FL (ref 37–54)
EOSINOPHIL # BLD AUTO: 0.15 10*3/MM3 (ref 0–0.4)
EOSINOPHIL NFR BLD AUTO: 2.2 % (ref 0.3–6.2)
ERYTHROCYTE [DISTWIDTH] IN BLOOD BY AUTOMATED COUNT: 15 % (ref 12.3–15.4)
HCT VFR BLD AUTO: 32.6 % (ref 34–46.6)
HGB BLD-MCNC: 10.2 G/DL (ref 12–15.9)
LYMPHOCYTES # BLD AUTO: 1.68 10*3/MM3 (ref 0.7–3.1)
LYMPHOCYTES NFR BLD AUTO: 24.5 % (ref 19.6–45.3)
MCH RBC QN AUTO: 28.9 PG (ref 26.6–33)
MCHC RBC AUTO-ENTMCNC: 31.3 G/DL (ref 31.5–35.7)
MCV RBC AUTO: 92.4 FL (ref 79–97)
MONOCYTES # BLD AUTO: 0.43 10*3/MM3 (ref 0.1–0.9)
MONOCYTES NFR BLD AUTO: 6.3 % (ref 5–12)
NEUTROPHILS NFR BLD AUTO: 4.58 10*3/MM3 (ref 1.7–7)
NEUTROPHILS NFR BLD AUTO: 66.7 % (ref 42.7–76)
PLATELET # BLD AUTO: 330 10*3/MM3 (ref 140–450)
PMV BLD AUTO: 8.9 FL (ref 6–12)
RBC # BLD AUTO: 3.53 10*6/MM3 (ref 3.77–5.28)
WBC NRBC COR # BLD AUTO: 6.86 10*3/MM3 (ref 3.4–10.8)

## 2024-12-06 PROCEDURE — 85025 COMPLETE CBC W/AUTO DIFF WBC: CPT

## 2024-12-06 PROCEDURE — 36415 COLL VENOUS BLD VENIPUNCTURE: CPT

## 2024-12-09 DIAGNOSIS — D64.9 ANEMIA, UNSPECIFIED TYPE: Primary | ICD-10-CM

## 2024-12-09 RX ORDER — FOLIC ACID 1 MG/1
1000 TABLET ORAL DAILY
Qty: 90 TABLET | Refills: 0 | OUTPATIENT
Start: 2024-12-09

## 2024-12-12 ENCOUNTER — OFFICE VISIT (OUTPATIENT)
Dept: ORTHOPEDIC SURGERY | Facility: CLINIC | Age: 29
End: 2024-12-12
Payer: COMMERCIAL

## 2024-12-12 VITALS — WEIGHT: 258 LBS | HEART RATE: 84 BPM | HEIGHT: 59 IN | BODY MASS INDEX: 52.01 KG/M2

## 2024-12-12 DIAGNOSIS — M17.11 PRIMARY LOCALIZED OSTEOARTHRITIS OF RIGHT KNEE: ICD-10-CM

## 2024-12-12 NOTE — PROGRESS NOTES
"     Patient ID: Arlene Brady is a 29 y.o. female.  Right knee pain here for Visco    Review of Systems:        Objective:    Pulse 84   Ht 149.9 cm (59\")   Wt 117 kg (258 lb)   BMI 52.11 kg/m²     Physical Examination:     Right knee trace effusion no redness     Imaging:       Assessment:    right knee degenerative joint disease    Plan:         Large Joint Arthrocentesis: R knee  Date/Time: 12/12/2024 3:00 PM  Consent given by: patient  Timeout: Immediately prior to procedure a time out was called to verify the correct patient, procedure, equipment, support staff and site/side marked as required   Supporting Documentation  Indications: pain   Procedure Details  Location: knee - R knee  Preparation: Patient was prepped and draped in the usual sterile fashion  Needle size: 22 G  Medications administered: 88 mg Hyaluronan 88 MG/4ML  Patient tolerance: patient tolerated the procedure well with no immediate complications            Disclaimer: Part of this note may be an electronic transcription/translation of spoken language to printed text using the Dragon Dictation System  "

## 2024-12-23 RX ORDER — FOLIC ACID 1 MG/1
1000 TABLET ORAL DAILY
Qty: 90 TABLET | Refills: 0 | Status: SHIPPED | OUTPATIENT
Start: 2024-12-23

## 2024-12-23 RX ORDER — PREDNISONE 5 MG/1
5 TABLET ORAL EVERY OTHER DAY
Qty: 15 TABLET | Refills: 1 | Status: SHIPPED | OUTPATIENT
Start: 2024-12-23

## 2025-01-03 ENCOUNTER — PATIENT ROUNDING (BHMG ONLY) (OUTPATIENT)
Dept: URGENT CARE | Facility: CLINIC | Age: 30
End: 2025-01-03
Payer: COMMERCIAL

## 2025-01-03 NOTE — ED NOTES
Thank you for letting us care for you in your recent visit to our urgent care center. We would love to hear about your experience with us. Was this the first time you have visited our location?    We’re always looking for ways to make our patients’ experiences even better. Do you have any recommendations on ways we may improve?     I appreciate you taking the time to respond. Please be on the lookout for a survey about your recent visit from [employer] via text or email. We would greatly appreciate if you could fill that out and turn it back in. We want your voice to be heard and we value your feedback.   Thank you for choosing [facility] for your healthcare needs.     [facility] Practice Manager

## 2025-01-20 ENCOUNTER — LAB (OUTPATIENT)
Dept: LAB | Facility: HOSPITAL | Age: 30
End: 2025-01-20
Payer: COMMERCIAL

## 2025-01-20 ENCOUNTER — OFFICE VISIT (OUTPATIENT)
Dept: ONCOLOGY | Facility: CLINIC | Age: 30
End: 2025-01-20
Payer: COMMERCIAL

## 2025-01-20 VITALS
WEIGHT: 254 LBS | HEIGHT: 59 IN | HEART RATE: 81 BPM | OXYGEN SATURATION: 98 % | BODY MASS INDEX: 51.2 KG/M2 | RESPIRATION RATE: 20 BRPM | DIASTOLIC BLOOD PRESSURE: 50 MMHG | TEMPERATURE: 98 F | SYSTOLIC BLOOD PRESSURE: 101 MMHG

## 2025-01-20 DIAGNOSIS — D64.9 ANEMIA, UNSPECIFIED TYPE: ICD-10-CM

## 2025-01-20 DIAGNOSIS — D59.10 AUTOIMMUNE HEMOLYTIC ANEMIA: ICD-10-CM

## 2025-01-20 DIAGNOSIS — D64.9 ANEMIA, UNSPECIFIED TYPE: Primary | ICD-10-CM

## 2025-01-20 DIAGNOSIS — D50.0 IRON DEFICIENCY ANEMIA DUE TO CHRONIC BLOOD LOSS: Primary | ICD-10-CM

## 2025-01-20 LAB
BASOPHILS # BLD AUTO: 0.05 10*3/MM3 (ref 0–0.2)
BASOPHILS NFR BLD AUTO: 0.7 % (ref 0–1.5)
DEPRECATED RDW RBC AUTO: 46.7 FL (ref 37–54)
EOSINOPHIL # BLD AUTO: 0.22 10*3/MM3 (ref 0–0.4)
EOSINOPHIL NFR BLD AUTO: 3.1 % (ref 0.3–6.2)
ERYTHROCYTE [DISTWIDTH] IN BLOOD BY AUTOMATED COUNT: 14.7 % (ref 12.3–15.4)
HCT VFR BLD AUTO: 33.9 % (ref 34–46.6)
HGB BLD-MCNC: 10.9 G/DL (ref 12–15.9)
HOLD SPECIMEN: NORMAL
HOLD SPECIMEN: NORMAL
LDH SERPL-CCNC: 278 U/L (ref 135–214)
LYMPHOCYTES # BLD AUTO: 1.6 10*3/MM3 (ref 0.7–3.1)
LYMPHOCYTES NFR BLD AUTO: 22.2 % (ref 19.6–45.3)
MCH RBC QN AUTO: 29.4 PG (ref 26.6–33)
MCHC RBC AUTO-ENTMCNC: 32.2 G/DL (ref 31.5–35.7)
MCV RBC AUTO: 91.4 FL (ref 79–97)
MONOCYTES # BLD AUTO: 0.48 10*3/MM3 (ref 0.1–0.9)
MONOCYTES NFR BLD AUTO: 6.7 % (ref 5–12)
NEUTROPHILS NFR BLD AUTO: 4.85 10*3/MM3 (ref 1.7–7)
NEUTROPHILS NFR BLD AUTO: 67.3 % (ref 42.7–76)
PLATELET # BLD AUTO: 316 10*3/MM3 (ref 140–450)
PMV BLD AUTO: 9.5 FL (ref 6–12)
RBC # BLD AUTO: 3.71 10*6/MM3 (ref 3.77–5.28)
RETICS # AUTO: 0.28 10*6/MM3 (ref 0.02–0.13)
RETICS/RBC NFR AUTO: 7.36 % (ref 0.7–1.9)
WBC NRBC COR # BLD AUTO: 7.2 10*3/MM3 (ref 3.4–10.8)

## 2025-01-20 PROCEDURE — 36415 COLL VENOUS BLD VENIPUNCTURE: CPT

## 2025-01-20 PROCEDURE — 99214 OFFICE O/P EST MOD 30 MIN: CPT | Performed by: INTERNAL MEDICINE

## 2025-01-20 PROCEDURE — 83615 LACTATE (LD) (LDH) ENZYME: CPT | Performed by: INTERNAL MEDICINE

## 2025-01-20 PROCEDURE — 83010 ASSAY OF HAPTOGLOBIN QUANT: CPT | Performed by: INTERNAL MEDICINE

## 2025-01-20 PROCEDURE — 85025 COMPLETE CBC W/AUTO DIFF WBC: CPT

## 2025-01-20 PROCEDURE — 85045 AUTOMATED RETICULOCYTE COUNT: CPT | Performed by: INTERNAL MEDICINE

## 2025-01-20 NOTE — PROGRESS NOTES
Hematology/Oncology Outpatient Follow Up    PATIENT NAME:Arlene Brady  :1995  MRN: [id number]  PRIMARY CARE PHYSICIAN: Daniel Sam MD  REFERRING PHYSICIAN: Daniel Sam MD    Chief Complaint   Patient presents with    Follow-up     Autoimmune hemolytic anemia              HISTORY OF PRESENT ILLNESS:       This is a 28 year old female has been referred secondary to anemia.  Patient has a longtime history of anemia.  She has rectal bleeding and had colonoscopy done a few days ago by Dr Vinny Lagos . She has internal and external hemorrhoids. She has a follow up with Dr Lagos.     Patient is amenorrheic for about 6 months.  She had history of menorrhagia. She is on hormone pills to regulate her cycles.   She has low energy, she was on oral iron supplements for a month. She has since ran out of her iron tablets.     Review of her CBCs indicate that she has had anemia with hemoglobin ranging between 8 and 12.3 g per DL.  Today her white count is 8, hemoglobin is 8, MCV is 101 and platelets are 352 with essentially unremarkable differentials.     She denies having blood in her urine     She is single, She is a CNA     Patient does not smoke and drinks occasionally     There is no family history of hematological problems.     Her mother had colon cancer,      2023: Methylmalonic acid level was normal at 343 patient had anemia work-up including a ferritin level which was 167, C-reactive protein was high at 1.96 BUN was 13, creatinine 0.9 LDH was 323 iron profile showed a elevated serum iron and iron saturation, haptoglobin was normal, reticulocyte count was elevated to 16 she has low folate at 3.1  2023: WBC 11.62, hemoglobin 8.0 g/dL, .9, platelets 353,000.  Flow cytometry showed an increased kappa lambda ratio, neutrophils with left shifted maturation and rare phenotypic aberrancy, monocytes with phenotypic aberrancy.  Bone marrow biopsy with molecular and cytogenetic studies  indicated to evaluate for myeloid neoplasm.  8/16/2023 CT of the neck, chest, abdomen and pelvis with contrast showed no evidence of definite pathologic lymphadenopathy concerning for lymphoproliferative disorder.  No acute findings present in the neck, chest, abdomen or pelvis.  8/25/2023: Patient had bone marrow aspiration and biopsy which basically showed normocellular bone marrow for age, decreased iron stores, negative for involvement by malignant lymphoma or metastatic malignancy.  Flow cytometry was unremarkable with no immunophenotypic abnormalities noted.  Cytogenetics showed a normal female karyotype      Past Medical History:   Diagnosis Date    ADHD (attention deficit hyperactivity disorder)     Anxiety     Arthritis     Asthma     exercise induced asthma     Depression     Epilepsy     History of blood transfusion     Seizures        Past Surgical History:   Procedure Laterality Date    ABDOMINAL SURGERY      choley    TONSILLECTOMY           Current Outpatient Medications:     albuterol sulfate  (90 Base) MCG/ACT inhaler, inhale 2 puffs by mouth every 4 hours, Disp: , Rfl:     fexofenadine (ALLEGRA) 180 MG tablet, Take 1 tablet by mouth Daily., Disp: , Rfl:     Flonase Allergy Relief 50 MCG/ACT nasal spray, = 2 spray(s), Nostril-Both, Daily, # 16 gm, 0 Refill(s), Pharmacy: [facility]/pharmacy #[id number], 2 spray(s) Nostril-Both Daily, 149, cm, 08/13/24 14:31:00 EDT, Height, 118.8, kg, 08/13/24 14:35:00 EDT, Weight Dosing, Disp: , Rfl:     folic acid (FOLVITE) 1 MG tablet, Take 1 tablet by mouth Daily., Disp: 90 tablet, Rfl: 0    Junel 1/20 1-20 MG-MCG per tablet, Take 1 tablet by mouth Every Evening., Disp: , Rfl:     nystatin-triamcinolone (MYCOLOG) 606839-8.1 UNIT/GM-% ointment, APPLY 1 APPLICATION TOPICALLY TWICE DAILY FOR 21 DAYS, Disp: , Rfl:     OXcarbazepine (TRILEPTAL) 300 MG tablet, Take 1.5 tablets by mouth 2 (Two) Times a Day., Disp: , Rfl:     pantoprazole (PROTONIX) 40 MG EC tablet, TAKE 1  "TABLET BY MOUTH EVERY DAY, Disp: 90 tablet, Rfl: 1    predniSONE (DELTASONE) 5 MG tablet, TAKE 1 TABLET BY MOUTH EVERY OTHER DAY, Disp: 15 tablet, Rfl: 1    Allergies   Allergen Reactions    Cephalosporins Rash    Penicillins Rash       Family History   Problem Relation Age of Onset    Hypertension Father     Heart disease Father     Hyperlipidemia Father     Cancer Paternal Grandmother        Cancer-related family history includes Cancer in her paternal grandmother.    Social History     Tobacco Use    Smoking status: Never     Passive exposure: Never    Smokeless tobacco: Never   Vaping Use    Vaping status: Never Used   Substance Use Topics    Alcohol use: Yes     Comment: very infrequent     Drug use: Never       I have reviewed and confirmed the accuracy of the patient's history: Chief complaint, HPI, ROS, and Subjective as entered by the MA/LPN/RN. Bronwyn Burns MD 01/20/25      SUBJECTIVE:    Patient denies any new issues.  She remains on prednisone every other day.    Patient denies any new issues      Arlene Brady reports a pain score of 0.  Given her pain assessment as noted, treatment options were discussed and the following options were decided upon as a follow-up plan to address the patient's pain:  Continue current management by her primary care .           REVIEW OF SYSTEMS:      Review of Systems   Constitutional:  Positive for fatigue.   Musculoskeletal:  Positive for back pain.       Per subjective    OBJECTIVE:    Vitals:    01/20/25 1538   BP: 101/50   Pulse: 81   Resp: 20   Temp: 98 °F (36.7 °C)   TempSrc: Infrared   SpO2: 98%   Weight: 115 kg (254 lb)   Height: 149.9 cm (59\")   PainSc: 0-No pain               Body mass index is 51.3 kg/m².    ECOG  (1) Restricted in physically strenuous activity, ambulatory and able to do work of light nature    Physical Exam  Vitals reviewed.   Constitutional:       General: She is not in acute distress.     Appearance: Normal appearance. She " is not ill-appearing, toxic-appearing or diaphoretic.   HENT:      Head: Normocephalic and atraumatic.   Eyes:      Extraocular Movements: Extraocular movements intact.   Cardiovascular:      Rate and Rhythm: Normal rate and regular rhythm.      Heart sounds: No murmur heard.  Pulmonary:      Effort: Pulmonary effort is normal. No respiratory distress.      Breath sounds: Normal breath sounds. No stridor. No wheezing.   Abdominal:      General: Bowel sounds are normal.      Palpations: Abdomen is soft.   Musculoskeletal:         General: Normal range of motion.      Cervical back: Normal range of motion.   Skin:     General: Skin is warm and dry.      Findings: No rash.   Neurological:      Mental Status: She is alert and oriented to person, place, and time.   Psychiatric:         Mood and Affect: Mood normal.         Behavior: Behavior normal.     I have reexamined the patient and the results are consistent with the previously documented exam. Bronwyn Burns MD         RECENT LABS    WBC   Date Value Ref Range Status   01/20/2025 7.20 3.40 - 10.80 10*3/mm3 Final     RBC   Date Value Ref Range Status   01/20/2025 3.71 (L) 3.77 - 5.28 10*6/mm3 Final   07/31/2023 2.80 (L) 3.77 - 5.28 x10E6/uL Final     Hemoglobin   Date Value Ref Range Status   01/20/2025 10.9 (L) 12.0 - 15.9 g/dL Final     Hematocrit   Date Value Ref Range Status   01/20/2025 33.9 (L) 34.0 - 46.6 % Final     MCV   Date Value Ref Range Status   01/20/2025 91.4 79.0 - 97.0 fL Final     MCH   Date Value Ref Range Status   01/20/2025 29.4 26.6 - 33.0 pg Final     MCHC   Date Value Ref Range Status   01/20/2025 32.2 31.5 - 35.7 g/dL Final     RDW   Date Value Ref Range Status   01/20/2025 14.7 12.3 - 15.4 % Final     RDW-SD   Date Value Ref Range Status   01/20/2025 46.7 37.0 - 54.0 fl Final     MPV   Date Value Ref Range Status   01/20/2025 9.5 6.0 - 12.0 fL Final     Platelets   Date Value Ref Range Status   01/20/2025 316 140 - 450 10*3/mm3  Final     Neutrophil %   Date Value Ref Range Status   01/20/2025 67.3 42.7 - 76.0 % Final     Lymphocyte %   Date Value Ref Range Status   01/20/2025 22.2 19.6 - 45.3 % Final     Monocyte %   Date Value Ref Range Status   01/20/2025 6.7 5.0 - 12.0 % Final     Eosinophil %   Date Value Ref Range Status   01/20/2025 3.1 0.3 - 6.2 % Final     Basophil %   Date Value Ref Range Status   01/20/2025 0.7 0.0 - 1.5 % Final     Immature Grans %   Date Value Ref Range Status   10/09/2020 1.4 (H) 0.0 - 0.5 % Final     Neutrophils, Absolute   Date Value Ref Range Status   01/20/2025 4.85 1.70 - 7.00 10*3/mm3 Final     Lymphocytes, Absolute   Date Value Ref Range Status   01/20/2025 1.60 0.70 - 3.10 10*3/mm3 Final     Monocytes, Absolute   Date Value Ref Range Status   01/20/2025 0.48 0.10 - 0.90 10*3/mm3 Final     Eosinophils, Absolute   Date Value Ref Range Status   01/20/2025 0.22 0.00 - 0.40 10*3/mm3 Final     Basophils, Absolute   Date Value Ref Range Status   01/20/2025 0.05 0.00 - 0.20 10*3/mm3 Final     Immature Grans, Absolute   Date Value Ref Range Status   10/09/2020 0.09 (H) 0.00 - 0.05 10*3/mm3 Final     nRBC   Date Value Ref Range Status   02/23/2024 0.1 0.0 - 0.2 /100 WBC Final       Lab Results   Component Value Date    GLUCOSE 103 (H) 08/22/2024    BUN 17 08/22/2024    CREATININE 0.81 08/22/2024    EGFRIFNONA 88 07/26/2021    BCR 21.0 08/22/2024    K 3.7 08/22/2024    CO2 25.0 08/22/2024    CALCIUM 9.2 08/22/2024    PROTENTOTREF 6.9 08/11/2023    ALBUMIN 4.2 08/22/2024    LABIL2 1.1 08/11/2023    AST 31 08/22/2024    ALT 46 (H) 08/22/2024         Assessment & Plan             ASSESSMENT and plans:       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  Rule out lymphoproliferative disease.  Peripheral blood for flow cytometry shows increased kappa lambda ratio detected rare phenotypic aberrancy of the neutrophils as well as monocytes, possibility of a myeloid disorder  was also entertained.  PNH screen was negative and G6PD was not deficient.  For now patient will continue on steroids.  But will begin steroid taper due to normalization of her hemoglobin down to 12.7 g per DL.  We will continue her steroid taper as her hemoglobin has improved to 13.0 g/dL today.  Hemoglobin is up to 11.1 g per DL t.  Decrease prednisone to 5 mg p.o. daily.  Continue folate replacement.  Continue PPI.  She is dependent on prednisone therefore would consider additional treatment with Rituxan to see if we will be able to completely resolve the hemolysis.  Currently on prednisone every other day.  She will reconsider Rituxan in the future  Status post bone marrow aspiration and biopsy which was essentially unremarkable  Folate deficiency: Continue folic acid  History of seizures  History of rectal bleeding : Recent  colonoscopy by Dr Vinny Lagos .  She no longer has rectal bleeding and she has completed her course of iron.  Follow-up with GI encouraged.  She has an appointment coming up next week with GI.  She denies rectal bleeding at this time.  Possible sensitivity reaction to Venofer: Patient's epigastric chest pain complaints seem to predate the infusion but she does also complain of itching on the bilateral forearms during the infusion.  Venofer was stopped and normal saline was ran along with 25 mg Benadryl IV given with resolution of the symptoms.          Plans     Continue prednisone to 5 mg every other day.  Reviewed with patient need to continue to take consistently.  Continued observation for response.  Continue current treatment  Hemolysis labs today  Viral hepatitis panel, [facility] screen iron studies with ferritin were within normal limits  Patient was given information on Rituxan to review understands we may have to use it in the future  CBC reviewed  Follow-up in 8 weeks labs in 4 weeks  Continue PPI  Discussed the benefits and side effects of Rituxan in detail with patient  All questions  answered    Patient verbalized understanding and is in agreement of the above plan.      Electronically signed by Bronwyn Burns MD, 01/20/25, 5:41 PM EST.

## 2025-01-21 DIAGNOSIS — D50.0 IRON DEFICIENCY ANEMIA DUE TO CHRONIC BLOOD LOSS: ICD-10-CM

## 2025-01-21 DIAGNOSIS — D59.10 AUTOIMMUNE HEMOLYTIC ANEMIA: Primary | ICD-10-CM

## 2025-01-21 LAB — HAPTOGLOB SERPL-MCNC: 172 MG/DL (ref 30–200)

## 2025-02-03 ENCOUNTER — TELEPHONE (OUTPATIENT)
Dept: ORTHOPEDIC SURGERY | Facility: CLINIC | Age: 30
End: 2025-02-03
Payer: COMMERCIAL

## 2025-02-03 NOTE — TELEPHONE ENCOUNTER
I spoke to patient and offered her a steroid injection and declined that.  She will call back in April to have Josey work on authorization process for Gel injection.

## 2025-02-03 NOTE — TELEPHONE ENCOUNTER
Caller: PARKER   Relationship to Patient: SELF  Phone Number: [phone]  Reason for Call: WIOULD LIKE ANOTHER RIGHT KNEE MONOVISC INJECTION

## 2025-02-17 ENCOUNTER — LAB (OUTPATIENT)
Dept: LAB | Facility: HOSPITAL | Age: 30
End: 2025-02-17
Payer: COMMERCIAL

## 2025-02-17 DIAGNOSIS — D64.9 ANEMIA, UNSPECIFIED TYPE: ICD-10-CM

## 2025-02-17 DIAGNOSIS — D50.0 IRON DEFICIENCY ANEMIA DUE TO CHRONIC BLOOD LOSS: ICD-10-CM

## 2025-02-17 DIAGNOSIS — D59.10 AUTOIMMUNE HEMOLYTIC ANEMIA: ICD-10-CM

## 2025-02-17 LAB
BASOPHILS # BLD AUTO: 0.04 10*3/MM3 (ref 0–0.2)
BASOPHILS NFR BLD AUTO: 0.5 % (ref 0–1.5)
DAT C3: NEGATIVE
DAT IGG-SP REAG RBC-IMP: POSITIVE
DAT POLY-SP REAG RBC QL: POSITIVE
DEPRECATED RDW RBC AUTO: 48.6 FL (ref 37–54)
EOSINOPHIL # BLD AUTO: 0.42 10*3/MM3 (ref 0–0.4)
EOSINOPHIL NFR BLD AUTO: 5.1 % (ref 0.3–6.2)
ERYTHROCYTE [DISTWIDTH] IN BLOOD BY AUTOMATED COUNT: 14.7 % (ref 12.3–15.4)
HCT VFR BLD AUTO: 32 % (ref 34–46.6)
HGB BLD-MCNC: 10 G/DL (ref 12–15.9)
LYMPHOCYTES # BLD AUTO: 1.37 10*3/MM3 (ref 0.7–3.1)
LYMPHOCYTES NFR BLD AUTO: 16.7 % (ref 19.6–45.3)
MCH RBC QN AUTO: 30.1 PG (ref 26.6–33)
MCHC RBC AUTO-ENTMCNC: 31.3 G/DL (ref 31.5–35.7)
MCV RBC AUTO: 96.4 FL (ref 79–97)
MONOCYTES # BLD AUTO: 0.34 10*3/MM3 (ref 0.1–0.9)
MONOCYTES NFR BLD AUTO: 4.1 % (ref 5–12)
NEUTROPHILS NFR BLD AUTO: 6.03 10*3/MM3 (ref 1.7–7)
NEUTROPHILS NFR BLD AUTO: 73.6 % (ref 42.7–76)
PLATELET # BLD AUTO: 331 10*3/MM3 (ref 140–450)
PMV BLD AUTO: 9.3 FL (ref 6–12)
RBC # BLD AUTO: 3.32 10*6/MM3 (ref 3.77–5.28)
WBC NRBC COR # BLD AUTO: 8.2 10*3/MM3 (ref 3.4–10.8)

## 2025-02-17 PROCEDURE — 36415 COLL VENOUS BLD VENIPUNCTURE: CPT

## 2025-02-17 PROCEDURE — 86880 COOMBS TEST DIRECT: CPT | Performed by: INTERNAL MEDICINE

## 2025-02-17 PROCEDURE — 85025 COMPLETE CBC W/AUTO DIFF WBC: CPT

## 2025-02-24 RX ORDER — PREDNISONE 5 MG/1
5 TABLET ORAL EVERY OTHER DAY
Qty: 15 TABLET | Refills: 1 | Status: SHIPPED | OUTPATIENT
Start: 2025-02-24

## 2025-03-18 ENCOUNTER — OFFICE VISIT (OUTPATIENT)
Dept: ONCOLOGY | Facility: CLINIC | Age: 30
End: 2025-03-18
Payer: COMMERCIAL

## 2025-03-18 ENCOUNTER — LAB (OUTPATIENT)
Dept: LAB | Facility: HOSPITAL | Age: 30
End: 2025-03-18
Payer: COMMERCIAL

## 2025-03-18 VITALS
BODY MASS INDEX: 51.49 KG/M2 | SYSTOLIC BLOOD PRESSURE: 122 MMHG | DIASTOLIC BLOOD PRESSURE: 71 MMHG | OXYGEN SATURATION: 98 % | HEIGHT: 59 IN | WEIGHT: 255.4 LBS | HEART RATE: 86 BPM | TEMPERATURE: 99 F

## 2025-03-18 DIAGNOSIS — D59.10 AUTOIMMUNE HEMOLYTIC ANEMIA: Primary | ICD-10-CM

## 2025-03-18 DIAGNOSIS — D50.0 IRON DEFICIENCY ANEMIA DUE TO CHRONIC BLOOD LOSS: ICD-10-CM

## 2025-03-18 DIAGNOSIS — D64.9 ANEMIA, UNSPECIFIED TYPE: ICD-10-CM

## 2025-03-18 LAB
BASOPHILS # BLD AUTO: 0.03 10*3/MM3 (ref 0–0.2)
BASOPHILS NFR BLD AUTO: 0.4 % (ref 0–1.5)
DAT C3: NEGATIVE
DAT IGG-SP REAG RBC-IMP: POSITIVE
DAT POLY-SP REAG RBC QL: POSITIVE
DEPRECATED RDW RBC AUTO: 53.9 FL (ref 37–54)
EOSINOPHIL # BLD AUTO: 0.12 10*3/MM3 (ref 0–0.4)
EOSINOPHIL NFR BLD AUTO: 1.8 % (ref 0.3–6.2)
ERYTHROCYTE [DISTWIDTH] IN BLOOD BY AUTOMATED COUNT: 15.6 % (ref 12.3–15.4)
FERRITIN SERPL-MCNC: 298 NG/ML (ref 13–150)
FOLATE SERPL-MCNC: >20 NG/ML (ref 4.78–24.2)
HAPTOGLOB SERPL-MCNC: 168 MG/DL (ref 30–200)
HCT VFR BLD AUTO: 29 % (ref 34–46.6)
HGB BLD-MCNC: 8.8 G/DL (ref 12–15.9)
HOLD SPECIMEN: NORMAL
HOLD SPECIMEN: NORMAL
IRON 24H UR-MRATE: 48 MCG/DL (ref 37–145)
IRON SATN MFR SERPL: 12 % (ref 20–50)
LDH SERPL-CCNC: 255 U/L (ref 135–214)
LYMPHOCYTES # BLD AUTO: 1.18 10*3/MM3 (ref 0.7–3.1)
LYMPHOCYTES NFR BLD AUTO: 17.6 % (ref 19.6–45.3)
MCH RBC QN AUTO: 30.4 PG (ref 26.6–33)
MCHC RBC AUTO-ENTMCNC: 30.3 G/DL (ref 31.5–35.7)
MCV RBC AUTO: 100.3 FL (ref 79–97)
MONOCYTES # BLD AUTO: 0.38 10*3/MM3 (ref 0.1–0.9)
MONOCYTES NFR BLD AUTO: 5.7 % (ref 5–12)
NEUTROPHILS NFR BLD AUTO: 4.99 10*3/MM3 (ref 1.7–7)
NEUTROPHILS NFR BLD AUTO: 74.5 % (ref 42.7–76)
PLATELET # BLD AUTO: 301 10*3/MM3 (ref 140–450)
PMV BLD AUTO: 8.9 FL (ref 6–12)
RBC # BLD AUTO: 2.89 10*6/MM3 (ref 3.77–5.28)
RETICS # AUTO: 0.38 10*6/MM3 (ref 0.02–0.13)
RETICS/RBC NFR AUTO: 13.22 % (ref 0.7–1.9)
TIBC SERPL-MCNC: 392 MCG/DL (ref 298–536)
TRANSFERRIN SERPL-MCNC: 263 MG/DL (ref 200–360)
WBC NRBC COR # BLD AUTO: 6.7 10*3/MM3 (ref 3.4–10.8)

## 2025-03-18 PROCEDURE — 36415 COLL VENOUS BLD VENIPUNCTURE: CPT

## 2025-03-18 PROCEDURE — 83615 LACTATE (LD) (LDH) ENZYME: CPT | Performed by: INTERNAL MEDICINE

## 2025-03-18 PROCEDURE — 85045 AUTOMATED RETICULOCYTE COUNT: CPT | Performed by: INTERNAL MEDICINE

## 2025-03-18 PROCEDURE — 84466 ASSAY OF TRANSFERRIN: CPT | Performed by: INTERNAL MEDICINE

## 2025-03-18 PROCEDURE — 82728 ASSAY OF FERRITIN: CPT | Performed by: INTERNAL MEDICINE

## 2025-03-18 PROCEDURE — 86880 COOMBS TEST DIRECT: CPT | Performed by: INTERNAL MEDICINE

## 2025-03-18 PROCEDURE — 82746 ASSAY OF FOLIC ACID SERUM: CPT | Performed by: INTERNAL MEDICINE

## 2025-03-18 PROCEDURE — 85025 COMPLETE CBC W/AUTO DIFF WBC: CPT

## 2025-03-18 PROCEDURE — 99214 OFFICE O/P EST MOD 30 MIN: CPT | Performed by: INTERNAL MEDICINE

## 2025-03-18 PROCEDURE — 83010 ASSAY OF HAPTOGLOBIN QUANT: CPT | Performed by: INTERNAL MEDICINE

## 2025-03-18 PROCEDURE — 83540 ASSAY OF IRON: CPT | Performed by: INTERNAL MEDICINE

## 2025-03-18 RX ORDER — SILVER SULFADIAZINE 10 MG/G
CREAM TOPICAL
COMMUNITY
Start: 2025-02-22

## 2025-03-18 NOTE — PROGRESS NOTES
Hematology/Oncology Outpatient Follow Up    PATIENT NAME:Arlene Brady  :1995  MRN: [id number]  PRIMARY CARE PHYSICIAN: Daniel Sam MD  REFERRING PHYSICIAN: Daniel Sam MD    Chief Complaint   Patient presents with    Follow-up     Anemia, unspecified type          HISTORY OF PRESENT ILLNESS:       This is a 28 year old female has been referred secondary to anemia.  Patient has a longtime history of anemia.  She has rectal bleeding and had colonoscopy done a few days ago by Dr Vinny Lagos . She has internal and external hemorrhoids. She has a follow up with Dr Lagos.     Patient is amenorrheic for about 6 months.  She had history of menorrhagia. She is on hormone pills to regulate her cycles.   She has low energy, she was on oral iron supplements for a month. She has since ran out of her iron tablets.     Review of her CBCs indicate that she has had anemia with hemoglobin ranging between 8 and 12.3 g per DL.  Today her white count is 8, hemoglobin is 8, MCV is 101 and platelets are 352 with essentially unremarkable differentials.     She denies having blood in her urine     She is single, She is a CNA     Patient does not smoke and drinks occasionally     There is no family history of hematological problems.     Her mother had colon cancer,      2023: Methylmalonic acid level was normal at 343 patient had anemia work-up including a ferritin level which was 167, C-reactive protein was high at 1.96 BUN was 13, creatinine 0.9 LDH was 323 iron profile showed a elevated serum iron and iron saturation, haptoglobin was normal, reticulocyte count was elevated to 16 she has low folate at 3.1  2023: WBC 11.62, hemoglobin 8.0 g/dL, .9, platelets 353,000.  Flow cytometry showed an increased kappa lambda ratio, neutrophils with left shifted maturation and rare phenotypic aberrancy, monocytes with phenotypic aberrancy.  Bone marrow biopsy with molecular and cytogenetic studies  indicated to evaluate for myeloid neoplasm.  8/16/2023 CT of the neck, chest, abdomen and pelvis with contrast showed no evidence of definite pathologic lymphadenopathy concerning for lymphoproliferative disorder.  No acute findings present in the neck, chest, abdomen or pelvis.  8/25/2023: Patient had bone marrow aspiration and biopsy which basically showed normocellular bone marrow for age, decreased iron stores, negative for involvement by malignant lymphoma or metastatic malignancy.  Flow cytometry was unremarkable with no immunophenotypic abnormalities noted.  Cytogenetics showed a normal female karyotype      Past Medical History:   Diagnosis Date    ADHD (attention deficit hyperactivity disorder)     Anxiety     Arthritis     Asthma     exercise induced asthma     Depression     Epilepsy     History of blood transfusion     Seizures        Past Surgical History:   Procedure Laterality Date    ABDOMINAL SURGERY      choley    TONSILLECTOMY           Current Outpatient Medications:     albuterol sulfate  (90 Base) MCG/ACT inhaler, inhale 2 puffs by mouth every 4 hours, Disp: , Rfl:     fexofenadine (ALLEGRA) 180 MG tablet, Take 1 tablet by mouth Daily., Disp: , Rfl:     Flonase Allergy Relief 50 MCG/ACT nasal spray, = 2 spray(s), Nostril-Both, Daily, # 16 gm, 0 Refill(s), Pharmacy: [facility] #[id number], 2 spray(s) Nostril-Both Daily, 149, cm, 08/13/24 14:31:00 EDT, Height, 118.8, kg, 08/13/24 14:35:00 EDT, Weight Dosing, Disp: , Rfl:     folic acid (FOLVITE) 1 MG tablet, Take 1 tablet by mouth Daily., Disp: 90 tablet, Rfl: 0    Junel 1/20 1-20 MG-MCG per tablet, Take 1 tablet by mouth Every Evening., Disp: , Rfl:     nystatin-triamcinolone (MYCOLOG) 593534-4.1 UNIT/GM-% ointment, APPLY 1 APPLICATION TOPICALLY TWICE DAILY FOR 21 DAYS, Disp: , Rfl:     OXcarbazepine (TRILEPTAL) 300 MG tablet, Take 1.5 tablets by mouth 2 (Two) Times a Day., Disp: , Rfl:     pantoprazole (PROTONIX) 40 MG EC tablet, TAKE 1  "TABLET BY MOUTH EVERY DAY, Disp: 90 tablet, Rfl: 1    predniSONE (DELTASONE) 5 MG tablet, TAKE 1 TABLET BY MOUTH EVERY OTHER DAY, Disp: 15 tablet, Rfl: 1    silver sulfadiazine (SILVADENE, SSD) 1 % cream, APPLY 1 APPLICATION TOPCIALLY TWICE A DAY FOR 30 DAYS, Disp: , Rfl:     Allergies   Allergen Reactions    Cephalosporins Rash    Penicillins Rash       Family History   Problem Relation Age of Onset    Hypertension Father     Heart disease Father     Hyperlipidemia Father     Cancer Paternal Grandmother        Cancer-related family history includes Cancer in her paternal grandmother.    Social History     Tobacco Use    Smoking status: Never     Passive exposure: Never    Smokeless tobacco: Never   Vaping Use    Vaping status: Never Used   Substance Use Topics    Alcohol use: Yes     Comment: very infrequent     Drug use: Never       I have reviewed and confirmed the accuracy of the patient's history: Chief complaint, HPI, ROS, and Subjective as entered by the MA/LPN/RN. Bronwyn Burns MD 03/18/25      SUBJECTIVE:    Patient denies any new issues.  She remains on prednisone every other day.    Patient denies any new issues today      Arlene Brady reports a pain score of 0.  Given her pain assessment as noted, treatment options were discussed and the following options were decided upon as a follow-up plan to address the patient's pain:  Continue current management by her primary care .           REVIEW OF SYSTEMS:      Review of Systems   Constitutional:  Positive for fatigue.   Musculoskeletal:  Positive for back pain.       Per subjective    OBJECTIVE:    Vitals:    03/18/25 1525   BP: 122/71   Pulse: 86   Temp: 99 °F (37.2 °C)   TempSrc: Oral   SpO2: 98%   Weight: 116 kg (255 lb 6.4 oz)   Height: 149.9 cm (59.02\")   PainSc: 0-No pain                 Body mass index is 51.56 kg/m².    ECOG  (1) Restricted in physically strenuous activity, ambulatory and able to do work of light nature    Physical " Exam  Vitals reviewed.   Constitutional:       General: She is not in acute distress.     Appearance: Normal appearance. She is not ill-appearing, toxic-appearing or diaphoretic.   HENT:      Head: Normocephalic and atraumatic.   Eyes:      Extraocular Movements: Extraocular movements intact.   Cardiovascular:      Rate and Rhythm: Normal rate and regular rhythm.      Heart sounds: No murmur heard.  Pulmonary:      Effort: Pulmonary effort is normal. No respiratory distress.      Breath sounds: Normal breath sounds. No stridor. No wheezing.   Abdominal:      General: Bowel sounds are normal.      Palpations: Abdomen is soft.   Musculoskeletal:         General: Normal range of motion.      Cervical back: Normal range of motion.   Skin:     General: Skin is warm and dry.      Findings: No rash.   Neurological:      Mental Status: She is alert and oriented to person, place, and time.   Psychiatric:         Mood and Affect: Mood normal.         Behavior: Behavior normal.        I have reexamined the patient and the results are consistent with the previously documented exam. Bronwyn Burns MD    RECENT LABS    WBC   Date Value Ref Range Status   03/18/2025 6.70 3.40 - 10.80 10*3/mm3 Final     RBC   Date Value Ref Range Status   03/18/2025 2.89 (L) 3.77 - 5.28 10*6/mm3 Final   07/31/2023 2.80 (L) 3.77 - 5.28 x10E6/uL Final     Hemoglobin   Date Value Ref Range Status   03/18/2025 8.8 (L) 12.0 - 15.9 g/dL Final     Hematocrit   Date Value Ref Range Status   03/18/2025 29.0 (L) 34.0 - 46.6 % Final     MCV   Date Value Ref Range Status   03/18/2025 100.3 (H) 79.0 - 97.0 fL Final     MCH   Date Value Ref Range Status   03/18/2025 30.4 26.6 - 33.0 pg Final     MCHC   Date Value Ref Range Status   03/18/2025 30.3 (L) 31.5 - 35.7 g/dL Final     RDW   Date Value Ref Range Status   03/18/2025 15.6 (H) 12.3 - 15.4 % Final     RDW-SD   Date Value Ref Range Status   03/18/2025 53.9 37.0 - 54.0 fl Final     MPV   Date Value  Ref Range Status   03/18/2025 8.9 6.0 - 12.0 fL Final     Platelets   Date Value Ref Range Status   03/18/2025 301 140 - 450 10*3/mm3 Final     Neutrophil %   Date Value Ref Range Status   03/18/2025 74.5 42.7 - 76.0 % Final     Lymphocyte %   Date Value Ref Range Status   03/18/2025 17.6 (L) 19.6 - 45.3 % Final     Monocyte %   Date Value Ref Range Status   03/18/2025 5.7 5.0 - 12.0 % Final     Eosinophil %   Date Value Ref Range Status   03/18/2025 1.8 0.3 - 6.2 % Final     Basophil %   Date Value Ref Range Status   03/18/2025 0.4 0.0 - 1.5 % Final     Immature Grans %   Date Value Ref Range Status   10/09/2020 1.4 (H) 0.0 - 0.5 % Final     Neutrophils, Absolute   Date Value Ref Range Status   03/18/2025 4.99 1.70 - 7.00 10*3/mm3 Final     Lymphocytes, Absolute   Date Value Ref Range Status   03/18/2025 1.18 0.70 - 3.10 10*3/mm3 Final     Monocytes, Absolute   Date Value Ref Range Status   03/18/2025 0.38 0.10 - 0.90 10*3/mm3 Final     Eosinophils, Absolute   Date Value Ref Range Status   03/18/2025 0.12 0.00 - 0.40 10*3/mm3 Final     Basophils, Absolute   Date Value Ref Range Status   03/18/2025 0.03 0.00 - 0.20 10*3/mm3 Final     Immature Grans, Absolute   Date Value Ref Range Status   10/09/2020 0.09 (H) 0.00 - 0.05 10*3/mm3 Final     nRBC   Date Value Ref Range Status   02/23/2024 0.1 0.0 - 0.2 /100 WBC Final       Lab Results   Component Value Date    GLUCOSE 103 (H) 08/22/2024    BUN 17 08/22/2024    CREATININE 0.81 08/22/2024    EGFRIFNONA 88 07/26/2021    BCR 21.0 08/22/2024    K 3.7 08/22/2024    CO2 25.0 08/22/2024    CALCIUM 9.2 08/22/2024    ALBUMIN 4.2 08/22/2024    AST 31 08/22/2024    ALT 46 (H) 08/22/2024         Assessment & Plan             ASSESSMENT and plans:       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  Rule out lymphoproliferative disease.  Peripheral blood for flow cytometry shows increased kappa lambda ratio detected rare phenotypic  aberrancy of the neutrophils as well as monocytes, possibility of a myeloid disorder was also entertained.  PNH screen was negative and G6PD was not deficient.  For now patient will continue on steroids.  But will begin steroid taper due to normalization of her hemoglobin down to 12.7 g per DL.  We will continue her steroid taper as her hemoglobin has improved to 13.0 g/dL today.  Hemoglobin is up to 11.1 g per DL t.  Decrease prednisone to 5 mg p.o. daily.  Continue folate replacement.  Continue PPI.  She is dependent on prednisone therefore would consider additional treatment with Rituxan to see if we will be able to completely resolve the hemolysis.  Currently on prednisone every other day.  She will reconsider Rituxan in the future    Worsening anemia today with hemoglobin 8.8 g per DL.  She is currently on prednisone 5 mg every other day.  Additional workup including hemolysis labs, nutritional labs today  Status post bone marrow aspiration and biopsy which was essentially unremarkable  Folate deficiency: Continue folic acid  History of seizures  History of rectal bleeding : Recent  colonoscopy by Dr Vinny Lagos .  She no longer has rectal bleeding and she has completed her course of iron.  Follow-up with GI encouraged.  She has an appointment coming up next week with GI.  She denies rectal bleeding at this time.  Possible sensitivity reaction to Venofer: Patient's epigastric chest pain complaints seem to predate the infusion but she does also complain of itching on the bilateral forearms during the infusion.  Venofer was stopped and normal saline was ran along with 25 mg Benadryl IV given with resolution of the symptoms.          Plans     retic, LDH, haptoglobin level, Fe studies with ferritn today, folate, nessa test today, schedule weekly cbcs for now, fu in 4 weeks with me and in 2 weeks with Ibeth BAKER  Continue prednisone to 5 mg every other day.  Reviewed with patient need to continue to take  consistently.  Continued observation for response.  Continue current treatment  Viral hepatitis panel, [facility] screen iron studies with ferritin were within normal limits  Patient was given information on Rituxan to review understands we may have to use it in the future  CBC reviewed  Continue PPI  Discussed the benefits and side effects of Rituxan in detail with patient  All questions answered    Patient verbalized understanding and is in agreement of the above plan.      Electronically signed by Bronwyn Burns MD, 03/18/25, 5:51 PM EDT.

## 2025-03-19 ENCOUNTER — RESULTS FOLLOW-UP (OUTPATIENT)
Dept: LAB | Facility: HOSPITAL | Age: 30
End: 2025-03-19
Payer: COMMERCIAL

## 2025-03-19 DIAGNOSIS — D59.10 AUTOIMMUNE HEMOLYTIC ANEMIA: Primary | ICD-10-CM

## 2025-03-27 ENCOUNTER — LAB (OUTPATIENT)
Dept: LAB | Facility: HOSPITAL | Age: 30
End: 2025-03-27
Payer: COMMERCIAL

## 2025-03-27 DIAGNOSIS — D59.10 AUTOIMMUNE HEMOLYTIC ANEMIA: ICD-10-CM

## 2025-03-27 LAB
BASOPHILS # BLD AUTO: 0.04 10*3/MM3 (ref 0–0.2)
BASOPHILS NFR BLD AUTO: 0.5 % (ref 0–1.5)
DEPRECATED RDW RBC AUTO: 50.6 FL (ref 37–54)
EOSINOPHIL # BLD AUTO: 0.25 10*3/MM3 (ref 0–0.4)
EOSINOPHIL NFR BLD AUTO: 3.1 % (ref 0.3–6.2)
ERYTHROCYTE [DISTWIDTH] IN BLOOD BY AUTOMATED COUNT: 14.7 % (ref 12.3–15.4)
HAV IGM SERPL QL IA: NORMAL
HBV CORE IGM SERPL QL IA: NORMAL
HBV SURFACE AG SERPL QL IA: NORMAL
HCT VFR BLD AUTO: 30.1 % (ref 34–46.6)
HCV AB SER QL: NORMAL
HGB BLD-MCNC: 9.1 G/DL (ref 12–15.9)
LYMPHOCYTES # BLD AUTO: 1.48 10*3/MM3 (ref 0.7–3.1)
LYMPHOCYTES NFR BLD AUTO: 18.5 % (ref 19.6–45.3)
MCH RBC QN AUTO: 30.7 PG (ref 26.6–33)
MCHC RBC AUTO-ENTMCNC: 30.2 G/DL (ref 31.5–35.7)
MCV RBC AUTO: 101.7 FL (ref 79–97)
MONOCYTES # BLD AUTO: 0.54 10*3/MM3 (ref 0.1–0.9)
MONOCYTES NFR BLD AUTO: 6.7 % (ref 5–12)
NEUTROPHILS NFR BLD AUTO: 5.7 10*3/MM3 (ref 1.7–7)
NEUTROPHILS NFR BLD AUTO: 71.2 % (ref 42.7–76)
PLATELET # BLD AUTO: 336 10*3/MM3 (ref 140–450)
PMV BLD AUTO: 9 FL (ref 6–12)
RBC # BLD AUTO: 2.96 10*6/MM3 (ref 3.77–5.28)
WBC NRBC COR # BLD AUTO: 8.01 10*3/MM3 (ref 3.4–10.8)

## 2025-03-27 PROCEDURE — 36415 COLL VENOUS BLD VENIPUNCTURE: CPT

## 2025-03-27 PROCEDURE — 85025 COMPLETE CBC W/AUTO DIFF WBC: CPT

## 2025-03-27 PROCEDURE — 80074 ACUTE HEPATITIS PANEL: CPT | Performed by: INTERNAL MEDICINE

## 2025-03-27 RX ORDER — PREDNISONE 10 MG/1
30 TABLET ORAL 2 TIMES DAILY
Qty: 180 TABLET | Refills: 0 | Status: SHIPPED | OUTPATIENT
Start: 2025-03-27

## 2025-03-31 ENCOUNTER — LAB (OUTPATIENT)
Dept: LAB | Facility: HOSPITAL | Age: 30
End: 2025-03-31
Payer: COMMERCIAL

## 2025-03-31 ENCOUNTER — OFFICE VISIT (OUTPATIENT)
Dept: ONCOLOGY | Facility: CLINIC | Age: 30
End: 2025-03-31
Payer: COMMERCIAL

## 2025-03-31 VITALS
HEIGHT: 59 IN | BODY MASS INDEX: 51.81 KG/M2 | DIASTOLIC BLOOD PRESSURE: 73 MMHG | TEMPERATURE: 97.8 F | WEIGHT: 257 LBS | OXYGEN SATURATION: 100 % | SYSTOLIC BLOOD PRESSURE: 135 MMHG | RESPIRATION RATE: 20 BRPM | HEART RATE: 78 BPM

## 2025-03-31 DIAGNOSIS — D50.0 IRON DEFICIENCY ANEMIA DUE TO CHRONIC BLOOD LOSS: ICD-10-CM

## 2025-03-31 DIAGNOSIS — D59.10 AUTOIMMUNE HEMOLYTIC ANEMIA: Primary | ICD-10-CM

## 2025-03-31 LAB
BASOPHILS # BLD AUTO: 0.05 10*3/MM3 (ref 0–0.2)
BASOPHILS NFR BLD AUTO: 0.6 % (ref 0–1.5)
DEPRECATED RDW RBC AUTO: 53.6 FL (ref 37–54)
EOSINOPHIL # BLD AUTO: 0.25 10*3/MM3 (ref 0–0.4)
EOSINOPHIL NFR BLD AUTO: 2.9 % (ref 0.3–6.2)
ERYTHROCYTE [DISTWIDTH] IN BLOOD BY AUTOMATED COUNT: 15.1 % (ref 12.3–15.4)
HCT VFR BLD AUTO: 29.1 % (ref 34–46.6)
HGB BLD-MCNC: 8.8 G/DL (ref 12–15.9)
HOLD SPECIMEN: NORMAL
HOLD SPECIMEN: NORMAL
LYMPHOCYTES # BLD AUTO: 1.41 10*3/MM3 (ref 0.7–3.1)
LYMPHOCYTES NFR BLD AUTO: 16.4 % (ref 19.6–45.3)
MCH RBC QN AUTO: 31.1 PG (ref 26.6–33)
MCHC RBC AUTO-ENTMCNC: 30.2 G/DL (ref 31.5–35.7)
MCV RBC AUTO: 102.8 FL (ref 79–97)
MONOCYTES # BLD AUTO: 0.53 10*3/MM3 (ref 0.1–0.9)
MONOCYTES NFR BLD AUTO: 6.2 % (ref 5–12)
NEUTROPHILS NFR BLD AUTO: 6.37 10*3/MM3 (ref 1.7–7)
NEUTROPHILS NFR BLD AUTO: 73.9 % (ref 42.7–76)
PLATELET # BLD AUTO: 336 10*3/MM3 (ref 140–450)
PMV BLD AUTO: 9 FL (ref 6–12)
RBC # BLD AUTO: 2.83 10*6/MM3 (ref 3.77–5.28)
WBC NRBC COR # BLD AUTO: 8.61 10*3/MM3 (ref 3.4–10.8)

## 2025-03-31 PROCEDURE — 85025 COMPLETE CBC W/AUTO DIFF WBC: CPT

## 2025-03-31 PROCEDURE — 36415 COLL VENOUS BLD VENIPUNCTURE: CPT

## 2025-03-31 PROCEDURE — 99215 OFFICE O/P EST HI 40 MIN: CPT | Performed by: INTERNAL MEDICINE

## 2025-03-31 RX ORDER — FERROUS SULFATE 325(65) MG
325 TABLET ORAL
Qty: 30 TABLET | Refills: 3 | Status: SHIPPED | OUTPATIENT
Start: 2025-03-31

## 2025-03-31 RX ORDER — FOLIC ACID 1 MG/1
1000 TABLET ORAL DAILY
Qty: 90 TABLET | Refills: 0 | Status: SHIPPED | OUTPATIENT
Start: 2025-03-31

## 2025-03-31 RX ORDER — FOLIC ACID 1 MG/1
1 TABLET ORAL DAILY
Qty: 30 TABLET | Refills: 2 | Status: SHIPPED | OUTPATIENT
Start: 2025-03-31

## 2025-03-31 NOTE — PROGRESS NOTES
Hematology/Oncology Outpatient Follow Up    PATIENT NAME:Arlene Brady  :1995  MRN: [id number]  PRIMARY CARE PHYSICIAN: Daniel Sam MD  REFERRING PHYSICIAN: Daniel Sam MD    Chief Complaint   Patient presents with    Follow-up     Autoimmune hemolytic anemia              HISTORY OF PRESENT ILLNESS:       This is a 28 year old female has been referred secondary to anemia.  Patient has a longtime history of anemia.  She has rectal bleeding and had colonoscopy done a few days ago by Dr Vinny Lagos . She has internal and external hemorrhoids. She has a follow up with Dr Lagos.     Patient is amenorrheic for about 6 months.  She had history of menorrhagia. She is on hormone pills to regulate her cycles.   She has low energy, she was on oral iron supplements for a month. She has since ran out of her iron tablets.     Review of her CBCs indicate that she has had anemia with hemoglobin ranging between 8 and 12.3 g per DL.  Today her white count is 8, hemoglobin is 8, MCV is 101 and platelets are 352 with essentially unremarkable differentials.     She denies having blood in her urine     She is single, She is a CNA     Patient does not smoke and drinks occasionally     There is no family history of hematological problems.     Her mother had colon cancer,      2023: Methylmalonic acid level was normal at 343 patient had anemia work-up including a ferritin level which was 167, C-reactive protein was high at 1.96 BUN was 13, creatinine 0.9 LDH was 323 iron profile showed a elevated serum iron and iron saturation, haptoglobin was normal, reticulocyte count was elevated to 16 she has low folate at 3.1  2023: WBC 11.62, hemoglobin 8.0 g/dL, .9, platelets 353,000.  Flow cytometry showed an increased kappa lambda ratio, neutrophils with left shifted maturation and rare phenotypic aberrancy, monocytes with phenotypic aberrancy.  Bone marrow biopsy with molecular and cytogenetic studies  indicated to evaluate for myeloid neoplasm.  8/16/2023 CT of the neck, chest, abdomen and pelvis with contrast showed no evidence of definite pathologic lymphadenopathy concerning for lymphoproliferative disorder.  No acute findings present in the neck, chest, abdomen or pelvis.  8/25/2023: Patient had bone marrow aspiration and biopsy which basically showed normocellular bone marrow for age, decreased iron stores, negative for involvement by malignant lymphoma or metastatic malignancy.  Flow cytometry was unremarkable with no immunophenotypic abnormalities noted.  Cytogenetics showed a normal female karyotype      Past Medical History:   Diagnosis Date    ADHD (attention deficit hyperactivity disorder)     Anxiety     Arthritis     Asthma     exercise induced asthma     Depression     Epilepsy     History of blood transfusion     Seizures        Past Surgical History:   Procedure Laterality Date    ABDOMINAL SURGERY      choley    TONSILLECTOMY           Current Outpatient Medications:     albuterol sulfate  (90 Base) MCG/ACT inhaler, inhale 2 puffs by mouth every 4 hours, Disp: , Rfl:     ferrous sulfate 325 (65 FE) MG tablet, Take 1 tablet by mouth Daily With Breakfast., Disp: 30 tablet, Rfl: 3    fexofenadine (ALLEGRA) 180 MG tablet, Take 1 tablet by mouth Daily., Disp: , Rfl:     Flonase Allergy Relief 50 MCG/ACT nasal spray, = 2 spray(s), Nostril-Both, Daily, # 16 gm, 0 Refill(s), Pharmacy: [facility]/pharmacy #[id number], 2 spray(s) Nostril-Both Daily, 149, cm, 08/13/24 14:31:00 EDT, Height, 118.8, kg, 08/13/24 14:35:00 EDT, Weight Dosing, Disp: , Rfl:     folic acid (FOLVITE) 1 MG tablet, TAKE 1 TABLET BY MOUTH EVERY DAY, Disp: 90 tablet, Rfl: 0    folic acid (FOLVITE) 1 MG tablet, Take 1 tablet by mouth Daily., Disp: 30 tablet, Rfl: 2    Junel 1/20 1-20 MG-MCG per tablet, Take 1 tablet by mouth Every Evening., Disp: , Rfl:     nystatin-triamcinolone (MYCOLOG) 589153-8.1 UNIT/GM-% ointment, APPLY 1 APPLICATION  TOPICALLY TWICE DAILY FOR 21 DAYS, Disp: , Rfl:     OXcarbazepine (TRILEPTAL) 300 MG tablet, Take 1.5 tablets by mouth 2 (Two) Times a Day., Disp: , Rfl:     pantoprazole (PROTONIX) 40 MG EC tablet, TAKE 1 TABLET BY MOUTH EVERY DAY, Disp: 90 tablet, Rfl: 1    predniSONE (DELTASONE) 10 MG tablet, Take 3 tablets by mouth 2 (Two) Times a Day. Take with food., Disp: 180 tablet, Rfl: 0    silver sulfadiazine (SILVADENE, SSD) 1 % cream, APPLY 1 APPLICATION TOPCIALLY TWICE A DAY FOR 30 DAYS, Disp: , Rfl:     Allergies   Allergen Reactions    Venofer [Iron Sucrose] Unknown - Low Severity    Cephalosporins Rash    Penicillins Rash       Family History   Problem Relation Age of Onset    Hypertension Father     Heart disease Father     Hyperlipidemia Father     Cancer Paternal Grandmother        Cancer-related family history includes Cancer in her paternal grandmother.    Social History     Tobacco Use    Smoking status: Never     Passive exposure: Never    Smokeless tobacco: Never   Vaping Use    Vaping status: Never Used   Substance Use Topics    Alcohol use: Yes     Comment: very infrequent     Drug use: Never       I have reviewed and confirmed the accuracy of the patient's history: Chief complaint, HPI, ROS, and Subjective as entered by the MA/LPN/RN. Bronwynamber Burns MD 03/31/25  3/31/25    SUBJECTIVE:    Patient denies any new issues.  She remains on prednisone every other day.    Patient denies any new issues      Arlene Brady reports a pain score of 0.  Given her pain assessment as noted, treatment options were discussed and the following options were decided upon as a follow-up plan to address the patient's pain:  Continue current management by her primary care .           REVIEW OF SYSTEMS:      Review of Systems   Constitutional:  Positive for fatigue. Negative for chills and fever.   HENT:  Negative for congestion, drooling, ear discharge, rhinorrhea, sinus pressure and tinnitus.    Eyes:  Negative  "for photophobia, pain and discharge.   Respiratory:  Negative for apnea, choking and stridor.    Cardiovascular:  Negative for palpitations.   Gastrointestinal:  Negative for abdominal distention, abdominal pain and anal bleeding.   Endocrine: Negative for polydipsia and polyphagia.   Genitourinary:  Negative for decreased urine volume, flank pain and genital sores.   Musculoskeletal:  Positive for back pain. Negative for gait problem, neck pain and neck stiffness.   Skin:  Negative for color change, rash and wound.   Neurological:  Negative for tremors, seizures, syncope, facial asymmetry and speech difficulty.   Hematological:  Negative for adenopathy.   Psychiatric/Behavioral:  Negative for agitation, confusion, hallucinations and self-injury. The patient is not hyperactive.        Per subjective    OBJECTIVE:    Vitals:    03/31/25 1212   BP: 135/73   Pulse: 78   Resp: 20   Temp: 97.8 °F (36.6 °C)   TempSrc: Infrared   SpO2: 100%   Weight: 117 kg (257 lb)   Height: 149.9 cm (59\")   PainSc: 0-No pain                   Body mass index is 51.91 kg/m².    ECOG  (1) Restricted in physically strenuous activity, ambulatory and able to do work of light nature    Physical Exam  Vitals reviewed.   Constitutional:       General: She is not in acute distress.     Appearance: Normal appearance. She is not ill-appearing, toxic-appearing or diaphoretic.   HENT:      Head: Normocephalic and atraumatic.   Eyes:      Extraocular Movements: Extraocular movements intact.   Cardiovascular:      Rate and Rhythm: Normal rate and regular rhythm.      Heart sounds: No murmur heard.  Pulmonary:      Effort: Pulmonary effort is normal. No respiratory distress.      Breath sounds: Normal breath sounds. No stridor. No wheezing.   Abdominal:      General: Bowel sounds are normal.      Palpations: Abdomen is soft.   Musculoskeletal:         General: Normal range of motion.      Cervical back: Normal range of motion.   Skin:     General: Skin " is warm and dry.      Findings: No rash.   Neurological:      Mental Status: She is alert and oriented to person, place, and time.   Psychiatric:         Mood and Affect: Mood normal.         Behavior: Behavior normal.        I have reexamined the patient and the results are consistent with the previously documented exam. Bronwyn Burns MD        RECENT LABS    WBC   Date Value Ref Range Status   03/31/2025 8.61 3.40 - 10.80 10*3/mm3 Final     RBC   Date Value Ref Range Status   03/31/2025 2.83 (L) 3.77 - 5.28 10*6/mm3 Final   07/31/2023 2.80 (L) 3.77 - 5.28 x10E6/uL Final     Hemoglobin   Date Value Ref Range Status   03/31/2025 8.8 (L) 12.0 - 15.9 g/dL Final     Hematocrit   Date Value Ref Range Status   03/31/2025 29.1 (L) 34.0 - 46.6 % Final     MCV   Date Value Ref Range Status   03/31/2025 102.8 (H) 79.0 - 97.0 fL Final     MCH   Date Value Ref Range Status   03/31/2025 31.1 26.6 - 33.0 pg Final     MCHC   Date Value Ref Range Status   03/31/2025 30.2 (L) 31.5 - 35.7 g/dL Final     RDW   Date Value Ref Range Status   03/31/2025 15.1 12.3 - 15.4 % Final     RDW-SD   Date Value Ref Range Status   03/31/2025 53.6 37.0 - 54.0 fl Final     MPV   Date Value Ref Range Status   03/31/2025 9.0 6.0 - 12.0 fL Final     Platelets   Date Value Ref Range Status   03/31/2025 336 140 - 450 10*3/mm3 Final     Neutrophil %   Date Value Ref Range Status   03/31/2025 73.9 42.7 - 76.0 % Final     Lymphocyte %   Date Value Ref Range Status   03/31/2025 16.4 (L) 19.6 - 45.3 % Final     Monocyte %   Date Value Ref Range Status   03/31/2025 6.2 5.0 - 12.0 % Final     Eosinophil %   Date Value Ref Range Status   03/31/2025 2.9 0.3 - 6.2 % Final     Basophil %   Date Value Ref Range Status   03/31/2025 0.6 0.0 - 1.5 % Final     Immature Grans %   Date Value Ref Range Status   10/09/2020 1.4 (H) 0.0 - 0.5 % Final     Neutrophils, Absolute   Date Value Ref Range Status   03/31/2025 6.37 1.70 - 7.00 10*3/mm3 Final     Lymphocytes,  Absolute   Date Value Ref Range Status   03/31/2025 1.41 0.70 - 3.10 10*3/mm3 Final     Monocytes, Absolute   Date Value Ref Range Status   03/31/2025 0.53 0.10 - 0.90 10*3/mm3 Final     Eosinophils, Absolute   Date Value Ref Range Status   03/31/2025 0.25 0.00 - 0.40 10*3/mm3 Final     Basophils, Absolute   Date Value Ref Range Status   03/31/2025 0.05 0.00 - 0.20 10*3/mm3 Final     Immature Grans, Absolute   Date Value Ref Range Status   10/09/2020 0.09 (H) 0.00 - 0.05 10*3/mm3 Final     nRBC   Date Value Ref Range Status   02/23/2024 0.1 0.0 - 0.2 /100 WBC Final       Lab Results   Component Value Date    GLUCOSE 103 (H) 08/22/2024    BUN 17 08/22/2024    CREATININE 0.81 08/22/2024    EGFRIFNONA 88 07/26/2021    BCR 21.0 08/22/2024    K 3.7 08/22/2024    CO2 25.0 08/22/2024    CALCIUM 9.2 08/22/2024    ALBUMIN 4.2 08/22/2024    AST 31 08/22/2024    ALT 46 (H) 08/22/2024     Assessment & Plan       ASSESSMENT and plans:       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  Rule out lymphoproliferative disease.  Peripheral blood for flow cytometry shows increased kappa lambda ratio detected rare phenotypic aberrancy of the neutrophils as well as monocytes, possibility of a myeloid disorder was also entertained.  PNH screen was negative and G6PD was not deficient.  For now patient will continue on steroids.  But will begin steroid taper due to normalization of her hemoglobin down to 12.7 g per DL.  We will continue her steroid taper as her hemoglobin has improved to 13.0 g/dL today.  Hemoglobin is up to 11.1 g per DL t.  Decrease prednisone to 5 mg p.o. daily.  Continue folate replacement.  Continue PPI.  She is dependent on prednisone therefore would consider additional treatment with Rituxan to see if we will be able to completely resolve the hemolysis.  Currently on prednisone every other day.  She will reconsider Rituxan in the future  Unfortunately patient has  relapsed with worsening Anemia: I have recommended Rituxan    Recurrent iron deficiency: She will begin oral  iron  Status post bone marrow aspiration and biopsy which was essentially unremarkable  Folate deficiency: Continue folic acid  History of seizures  History of rectal bleeding : Recent  colonoscopy by Dr Vinny Lagos .  She no longer has rectal bleeding and she has completed her course of iron.  Follow-up with GI encouraged.  She has an appointment coming up next week with GI.  She denies rectal bleeding at this time.  Possible sensitivity reaction to Venofer: Patient's epigastric chest pain complaints seem to predate the infusion but she does also complain of itching on the bilateral forearms during the infusion.  Venofer was stopped and normal saline was ran along with 25 mg Benadryl IV given with resolution of the symptoms.          Plans     Add venofer to her medication allergy , Iron sulphate 325mg po daily #30 refills , folic acid 1mg po daily #30 refill, continue protonix. Rituxan 375 mg per square meter of body surface area intravenously (375 mg/m2) weekly for four weeks for hemolytic anemia. she has apt with me already on April 17. weekly cbcs. Discontinue IV iron for now.   Reviewed The benefits the side effects of Rituxan in detail.  Answered all her questions to her satisfaction.  Her mother was also present during this evaluation  Continue prednisone at  increased doses. Gave weaning instructions on a weekly basis  Continue folic caid  Viral hepatitis panel, [facility] screen iron studies with ferritin were within normal limits  Patient was given information on Rituxan to review understands we may have to use it in the future  CBC reviewed  Continue PPI  Discussed the benefits and side effects of Rituxan in detail with patient  All questions answered    Patient verbalized understanding and is in agreement of the above plan.      .      I spent 40 total minutes, face-to-face, caring for Arlene today.  90% of this time involved counseling and/or coordination of care as documented within this note.    Electronically signed by Bronwyn Burns MD, 03/31/25, 5:12 PM EDT.

## 2025-04-01 PROBLEM — D58.9 HEMOLYTIC ANEMIA: Status: ACTIVE | Noted: 2025-04-01

## 2025-04-04 NOTE — PROGRESS NOTES
TREATMENT  PREPARATION    Arlene Brady  [id number]  1995    Chief Complaint: Treatment preparation and needs assessment    History of present illness:  Arlene Brady is a 29 y.o. year old female who is here today for treatment preparation and needs assessment.  The patient has been diagnosed with recurrent autoimmune hemolytic anemia and is scheduled to begin treatment with: Rituxan    Oncology History:    Oncology/Hematology History    No history exists.       The current medication list and allergy list were reviewed and reconciled.     Past Medical History, Past Surgical History, Social History, Family History have been reviewed and are without significant changes except as mentioned.    Physical Exam:    Vitals:    04/07/25 0900   BP: 105/69   Pulse: 81   Temp: 98.2 °F (36.8 °C)   SpO2: 98%     Vitals:    04/07/25 0900   PainSc: 0-No pain        ECOG score: 1             Physical Exam  Vitals reviewed.   Constitutional:       Appearance: Normal appearance. She is obese.   HENT:      Head: Normocephalic and atraumatic.      Right Ear: External ear normal.      Left Ear: External ear normal.      Nose: Nose normal.      Mouth/Throat:      Mouth: Mucous membranes are moist.   Eyes:      Conjunctiva/sclera: Conjunctivae normal.   Cardiovascular:      Rate and Rhythm: Normal rate and regular rhythm.   Pulmonary:      Effort: Pulmonary effort is normal.   Abdominal:      Palpations: Abdomen is soft.   Musculoskeletal:         General: Normal range of motion.      Cervical back: Normal range of motion and neck supple.   Skin:     General: Skin is warm and dry.   Neurological:      General: No focal deficit present.      Mental Status: She is alert and oriented to person, place, and time.   Psychiatric:         Mood and Affect: Mood normal.         Behavior: Behavior normal.         Thought Content: Thought content normal.         Judgment: Judgment normal.           NEEDS  ASSESSMENTS    Genetics  The patient's new diagnosis and family history have been reviewed for genetic counseling needs. The patient will not be referred..     Psychosocial and Barriers to care  The patient has completed a PHQ-9 Depression Screening and the Distress Thermometer (DT) today.  PHQ-9 results show PHQ-2 Total Score: PHQ-2 Total Score: 0         The patient scored their distress today as Distress Level: 5 on a scale of 0-10 with 0 being no distress and 10 being extreme distress. Problems marked by the patient as being an issue for them within the last week include   .      Results were reviewed along with psychosocial resources offered by our cancer center.  Our Supportive Oncology team will be flagged for a score of 4 or above, and a same day call will be made for a score of 9 or 10.  A mental health referral is offered at that time. Patients who score less than 4 have been educated on our support services and can be referred to our  upon request.  The patient will not be referred to our .       Nutrition  The patient has completed the malnutrition screening today. They scored     with a score of 0-1 meaning not at risk in a score of 2 or greater meaning at risk.  Patients with a score of 3 or higher will be referred to our oncology dietitian for support. Patients beginning at risk treatment regimens or who have dietary concerns will also be referred to our oncology dietitian. The patient will not be referred.    Functional Assessment  Persons who are age 70 or greater will be screened for qualification of a comprehensive geriatric assessment by our survivorship nurse practitioner.  Older adults with cancer face unique challenges. These may include an increased risk of drug reactions, financial burdens, and caregiver stress. The patient scored   . Patients scoring 14 or lower will referred for an older adult functional assessment with the survivorship advanced practice  "registered nurse to ensure all needed support is provided as patients plan for their treatments. NOT APPLICABLE    Intravenous Access Assessment  The patient and I discussed planned intravenous chemo/biotherapy as well as other IV treatments that are often needed throughout the course of treatment. These may include, but are not limited to blood transfusions, antibiotics, and IV hydration. Discussed that depending on selected treatment and vein assessment, patient may require venous access device (VAD) which could include but not limited to a Mediport or PICC line. Risks and benefits of VADs reviewed. The patient will be treated via PIV.    Reproductive/Sexual Activity   People should avoid becoming pregnant and should not get a partner pregnant while undergoing chemo/biotherapy.  People of childbearing age should use effective contraception during active therapy. The best recommendation for all people is to use a barrier method for a minimum of 1 week after the last infusion of chemo/biotherapy to prevent your partner being exposed to byproducts from treatment medications in bodily fluids. Effective contraception should be discussed with your oncology team to make sure it is safe to take based on your diagnosis. Possible options include oral contraceptives, barrier methods. Chemo/biotherapy can change your ability to reproduce children in the future.  There are options for fertility preservation. NOT APPLICABLE    Advanced Care Planning  Advance Care Planning   The patient and I discussed advanced care planning, \"Conversations that Matter\".   This service is offered for development of advance directives with a certified ACP facilitator.  The patient does not have an up-to-date advanced directive. This document is not on file with our office. The patient is not interested in an appointment with one of our facilitators to create or update their advanced directives.               Smoking cessation  Tobacco Use: Low " Risk  (4/7/2025)    Patient History     Smoking Tobacco Use: Never     Smokeless Tobacco Use: Never     Passive Exposure: Never       Patient and I discussed their tobacco use history. Referral will not be made for smoking cessation.      Palliative Care  When appropriate, the patient and I discussed the availability palliative care services and when appropriate Hospice care. Palliative care is not the same as Hospice care which was explained to the patient.NOT APPLICABLE.    Survivorship   When appropriate, we discussed that we will refer the patient to survivorship clinic to discuss next steps following completion of planned treatment.  Reviewed this visit will include assessment of your physical, psychological, functional, and spiritual needs as a survivor and the need at attend this visit when scheduled.    TREATMENT EDUCATION    Today I met with the patient to discuss the chemo/biotherapy regimen recommended for treatment of Autoimmune hemolytic anemia  .   Mother is also present today.The patient was given explanation of treatment premed side effects including office policy that prohibits patients to drive if sedating medications are administered, MD explanation given regarding benefits, side effects, toxicities and goals of treatment.  The patient received a Chemotherapy/Biotherapy Plan Summary including diagnosis and explanation of specific treatment plan.    SIDE EFFECTS:  Common side effects were discussed with the patient and/or significant other.  Discussion included where applicable hair loss/discoloration, anemia/fatigue, infection/chills/fever, appetite, bleeding risk/precautions, constipation, diarrhea, mouth sores, taste alteration, loss of appetite, nausea/vomiting, peripheral neuropathy, skin/nail changes, rash, muscle aches/weakness, photosensitivity, weight gain/loss, hearing loss, dizziness, menopausal symptoms, menstrual irregularity, sterility, high blood pressure, heart damage, liver damage,  lung damage, kidney damage, DVT/PE risk, fluid retention, pleural/pericardial effusion, somnolence, electrolyte/LFT imbalance, vein exercises and/or the possible need for vascular access/port placement.  The patient was advised that although uncommon, leakage of an infused medication from the vein or venous access device may lead to skin breakdown and/or other tissue damage.  The patient was advised that he/she may have pain, bleeding, and/or bruising from the insertion of a needle in their vein or venous access device (port).  The patient was further advised that, in spite of proper technique, infection with redness and irritation may rarely occur at the site where the needle was inserted.  The patient was advised that if complications occur, additional medical treatment is available.  Finally, where applicable we have reviewed rare but potential immune mediated side effects including shortness of breath, cough, chest pain (pneumonitis), abdominal pain, diarrhea (colitis), thyroiditis (hypothyroid or hyperthyroid), hepatitis and liver dysfunction, nephritis and renal dysfunction.    Discussion also included side effects specific to drugs in the treatment plan, specifically:    Treatment Plans       Name Type Plan Dates Plan Provider         Active    OP HEMOLYTIC ANEMIA RiTUXimab (Weekly X 4) ONCOLOGY TREATMENT 4/5/2025 - Present Bronwyn Burns MD                      Questions answered and additional information discussed on topics including:  Anemia, Thrombocytopenia, Neutropenia, Constipation, Diarrhea, Nausea & vomiting, and Intimate activity Low blood pressure, flu-like symptoms,shortness of air, chest pain.      Assessment and Plan:    Diagnoses and all orders for this visit:    1. Autoimmune hemolytic anemia (Primary)    Other orders  -     CBC & Differential; Standing  -     CBC & Differential      Orders Placed This Encounter   Procedures    CBC Auto Differential     Standing Status:   Standing      Number of Occurrences:   1     Release to patient:   Routine Release [[id number]]    CBC & Differential     Standing Status:   Standing     Number of Occurrences:   1     Manual Differential:   No     Release to patient:   Routine Release [[id number]]         The patient and I have reviewed their diagnosis and scheduled treatment plan. Needs assessment was completed where applicable including genetics, psychosocial needs, barriers to care, VAD evaluation, advanced care planning, survivorship, and palliative care services where indicated. Referrals have been ordered as appropriate based upon evaluation today and patient desires.   Chemo/biotherapy teaching was completed today and consent obtained. See separate documentation for further details.  Adequate time was given to answer questions.  Patient made aware of their care team members and contact information if they have questions or problems throughout the treatment course.  Discussion held and written information provided describing frequency of office visits and ongoing monitoring throughout the treatment plan.     Reviewed with patient any prescribed medication sent to pharmacy.  Education provided regarding proper storage, safe handling, and proper disposal of unused medication.  Proper handling of body fluids and waste discussed and written information provided.  If appropriate, patient had pretreatment labs drawn today.    Learning assessment completed at initial patient encounter. See separate flowsheet. Chemo/biotherapy education comprehension assessed at today's visit.    I spent 60 minutes caring for Arlene on this date of service. This time includes time spent by me in the following activities: preparing for the visit, reviewing tests, obtaining and/or reviewing a separately obtained history, performing a medically appropriate examination and/or evaluation, counseling and educating the patient/family/caregiver, documenting information in the medical  record, independently interpreting results and communicating that information with the patient/family/caregiver, and care coordination.     Ibeth Espinal, APRN   04/07/25

## 2025-04-07 ENCOUNTER — LAB (OUTPATIENT)
Dept: LAB | Facility: HOSPITAL | Age: 30
End: 2025-04-07
Payer: COMMERCIAL

## 2025-04-07 ENCOUNTER — OFFICE VISIT (OUTPATIENT)
Dept: ONCOLOGY | Facility: CLINIC | Age: 30
End: 2025-04-07
Payer: COMMERCIAL

## 2025-04-07 ENCOUNTER — TELEPHONE (OUTPATIENT)
Dept: ONCOLOGY | Facility: CLINIC | Age: 30
End: 2025-04-07

## 2025-04-07 VITALS
OXYGEN SATURATION: 98 % | HEART RATE: 81 BPM | DIASTOLIC BLOOD PRESSURE: 69 MMHG | TEMPERATURE: 98.2 F | BODY MASS INDEX: 52.41 KG/M2 | WEIGHT: 260 LBS | SYSTOLIC BLOOD PRESSURE: 105 MMHG | HEIGHT: 59 IN

## 2025-04-07 DIAGNOSIS — D59.10 AUTOIMMUNE HEMOLYTIC ANEMIA: Primary | ICD-10-CM

## 2025-04-07 LAB
BASOPHILS # BLD AUTO: 0.02 10*3/MM3 (ref 0–0.2)
BASOPHILS NFR BLD AUTO: 0.1 % (ref 0–1.5)
DEPRECATED RDW RBC AUTO: 52.9 FL (ref 37–54)
EOSINOPHIL # BLD AUTO: 0 10*3/MM3 (ref 0–0.4)
EOSINOPHIL NFR BLD AUTO: 0 % (ref 0.3–6.2)
ERYTHROCYTE [DISTWIDTH] IN BLOOD BY AUTOMATED COUNT: 15.2 % (ref 12.3–15.4)
HCT VFR BLD AUTO: 35 % (ref 34–46.6)
HGB BLD-MCNC: 10.8 G/DL (ref 12–15.9)
LYMPHOCYTES # BLD AUTO: 1.34 10*3/MM3 (ref 0.7–3.1)
LYMPHOCYTES NFR BLD AUTO: 7.9 % (ref 19.6–45.3)
MCH RBC QN AUTO: 31 PG (ref 26.6–33)
MCHC RBC AUTO-ENTMCNC: 30.9 G/DL (ref 31.5–35.7)
MCV RBC AUTO: 100.6 FL (ref 79–97)
MONOCYTES # BLD AUTO: 0.67 10*3/MM3 (ref 0.1–0.9)
MONOCYTES NFR BLD AUTO: 4 % (ref 5–12)
NEUTROPHILS NFR BLD AUTO: 14.91 10*3/MM3 (ref 1.7–7)
NEUTROPHILS NFR BLD AUTO: 88 % (ref 42.7–76)
PLATELET # BLD AUTO: 361 10*3/MM3 (ref 140–450)
PMV BLD AUTO: 9.3 FL (ref 6–12)
RBC # BLD AUTO: 3.48 10*6/MM3 (ref 3.77–5.28)
WBC NRBC COR # BLD AUTO: 16.94 10*3/MM3 (ref 3.4–10.8)

## 2025-04-07 PROCEDURE — 85025 COMPLETE CBC W/AUTO DIFF WBC: CPT | Performed by: NURSE PRACTITIONER

## 2025-04-07 NOTE — TELEPHONE ENCOUNTER
Caller: Arlene Brady    Relationship: Self    Best call back number: [phone]    What is the best time to reach you: ANYTIME    Who are you requesting to speak with (clinical staff, provider,  specific staff member): CLINICAL    What was the call regarding: PT SCHEDULED FOR A LAB ON 4-10, STATES SHE HAD THEM DRAWN TODAY 4-7, WANTING TO KNOW IF SHE NEEDS TO KEEP THE APPT ON 4-10 OR CAN IT BE CANCELLED.

## 2025-04-08 ENCOUNTER — TELEPHONE (OUTPATIENT)
Dept: ONCOLOGY | Facility: CLINIC | Age: 30
End: 2025-04-08
Payer: COMMERCIAL

## 2025-04-08 NOTE — TELEPHONE ENCOUNTER
Reached out to let pt know she didn't have to come for her lab appt on 4/10 that we would cancel the appt.

## 2025-04-09 ENCOUNTER — TELEPHONE (OUTPATIENT)
Dept: ONCOLOGY | Facility: CLINIC | Age: 30
End: 2025-04-09
Payer: COMMERCIAL

## 2025-04-09 NOTE — TELEPHONE ENCOUNTER
Distress Screening Follow-up    Diagnosis:     Location of Distress Screening: Medical Oncology    Distress Level: 5 (4/7/2025  9:00 AM)    Physical Concerns:       Practical Problems:       Emotional Concerns:       Family Concerns:       Spiritual Concerns:        Interventions:        Comments:  OSW reach pt by phone.  Pt very pleasant.  She lives with her parents and works PT at an assisted living facility.  She states she has anxiety.  She does not see anyone for medication or therapy/counseling.  She is interested in discussing possible counseling.  Pt has no immediate needs.  She is agreeable to meeting with OSW during her infusion appt on 4/14.

## 2025-04-10 ENCOUNTER — TELEPHONE (OUTPATIENT)
Dept: ONCOLOGY | Facility: CLINIC | Age: 30
End: 2025-04-10
Payer: COMMERCIAL

## 2025-04-10 NOTE — TELEPHONE ENCOUNTER
Caller: Arlene Brady    Relationship: Self    Best call back number: [phone]     What is the best time to reach you: ASAP    Who are you requesting to speak with (clinical staff, provider,  specific staff member):       What was the call regarding: PATIENT IS SCHEDULED FOR IRON INFUSIONS BUT SHE HAS NOT HEARD BACK TO VERIFY IF THESE HAVE BEEN APPROVED AND WILL STILL BE DONE.  PLEASE CALL PT BACK TO ADVISE/VERIFY IF THEY HAVE BEEN APPROVED.

## 2025-04-11 NOTE — TELEPHONE ENCOUNTER
Spoke with Pt. She will be coming in until told different. S/w Jonelle who let me know we ([facility]) are working on the Auth as we speak. If there are any changes we will call the Pt asap

## 2025-04-14 ENCOUNTER — HOSPITAL ENCOUNTER (OUTPATIENT)
Dept: ONCOLOGY | Facility: HOSPITAL | Age: 30
Discharge: HOME | End: 2025-04-14
Payer: COMMERCIAL

## 2025-04-14 ENCOUNTER — RESULTS FOLLOW-UP (OUTPATIENT)
Dept: ONCOLOGY | Facility: HOSPITAL | Age: 30
End: 2025-04-14
Payer: COMMERCIAL

## 2025-04-14 ENCOUNTER — DOCUMENTATION (OUTPATIENT)
Dept: ONCOLOGY | Facility: CLINIC | Age: 30
End: 2025-04-14
Payer: COMMERCIAL

## 2025-04-14 VITALS
BODY MASS INDEX: 52.9 KG/M2 | OXYGEN SATURATION: 96 % | HEART RATE: 82 BPM | TEMPERATURE: 98.5 F | WEIGHT: 262.4 LBS | DIASTOLIC BLOOD PRESSURE: 79 MMHG | HEIGHT: 59 IN | SYSTOLIC BLOOD PRESSURE: 115 MMHG | RESPIRATION RATE: 14 BRPM

## 2025-04-14 DIAGNOSIS — D59.30 HEMOLYTIC-UREMIC SYNDROME, UNSPECIFIED SUBTYPE: Primary | ICD-10-CM

## 2025-04-14 LAB
ALBUMIN SERPL-MCNC: 4 G/DL (ref 3.5–5.2)
ALBUMIN/GLOB SERPL: 1.8 G/DL
ALP SERPL-CCNC: 49 U/L (ref 39–117)
ALT SERPL W P-5'-P-CCNC: 79 U/L (ref 1–33)
ANION GAP SERPL CALCULATED.3IONS-SCNC: 13.4 MMOL/L (ref 5–15)
AST SERPL-CCNC: 45 U/L (ref 1–32)
B-HCG UR QL: NEGATIVE
BASOPHILS # BLD AUTO: 0.02 10*3/MM3 (ref 0–0.2)
BASOPHILS NFR BLD AUTO: 0.2 % (ref 0–1.5)
BILIRUB SERPL-MCNC: 0.5 MG/DL (ref 0–1.2)
BUN SERPL-MCNC: 25 MG/DL (ref 6–20)
BUN/CREAT SERPL: 30.5 (ref 7–25)
CALCIUM SPEC-SCNC: 8.4 MG/DL (ref 8.6–10.5)
CHLORIDE SERPL-SCNC: 104 MMOL/L (ref 98–107)
CO2 SERPL-SCNC: 22.6 MMOL/L (ref 22–29)
CREAT SERPL-MCNC: 0.82 MG/DL (ref 0.57–1)
DEPRECATED RDW RBC AUTO: 52.4 FL (ref 37–54)
EGFRCR SERPLBLD CKD-EPI 2021: 100.1 ML/MIN/1.73
EOSINOPHIL # BLD AUTO: 0.04 10*3/MM3 (ref 0–0.4)
EOSINOPHIL NFR BLD AUTO: 0.3 % (ref 0.3–6.2)
ERYTHROCYTE [DISTWIDTH] IN BLOOD BY AUTOMATED COUNT: 14.7 % (ref 12.3–15.4)
GLOBULIN UR ELPH-MCNC: 2.2 GM/DL
GLUCOSE SERPL-MCNC: 184 MG/DL (ref 65–99)
HCT VFR BLD AUTO: 38 % (ref 34–46.6)
HGB BLD-MCNC: 11.7 G/DL (ref 12–15.9)
LYMPHOCYTES # BLD AUTO: 0.65 10*3/MM3 (ref 0.7–3.1)
LYMPHOCYTES NFR BLD AUTO: 5.6 % (ref 19.6–45.3)
MCH RBC QN AUTO: 31 PG (ref 26.6–33)
MCHC RBC AUTO-ENTMCNC: 30.8 G/DL (ref 31.5–35.7)
MCV RBC AUTO: 100.8 FL (ref 79–97)
MONOCYTES # BLD AUTO: 0.47 10*3/MM3 (ref 0.1–0.9)
MONOCYTES NFR BLD AUTO: 4.1 % (ref 5–12)
NEUTROPHILS NFR BLD AUTO: 10.36 10*3/MM3 (ref 1.7–7)
NEUTROPHILS NFR BLD AUTO: 89.8 % (ref 42.7–76)
PLATELET # BLD AUTO: 309 10*3/MM3 (ref 140–450)
PMV BLD AUTO: 9.1 FL (ref 6–12)
POTASSIUM SERPL-SCNC: 4.2 MMOL/L (ref 3.5–5.2)
PROT SERPL-MCNC: 6.2 G/DL (ref 6–8.5)
RBC # BLD AUTO: 3.77 10*6/MM3 (ref 3.77–5.28)
SODIUM SERPL-SCNC: 140 MMOL/L (ref 136–145)
WBC NRBC COR # BLD AUTO: 11.54 10*3/MM3 (ref 3.4–10.8)

## 2025-04-14 PROCEDURE — 25010000002 RITUXIMAB-PVVR 500 MG/50ML SOLUTION 50 ML VIAL: Performed by: INTERNAL MEDICINE

## 2025-04-14 PROCEDURE — 25810000003 SODIUM CHLORIDE 0.9 % SOLUTION 250 ML FLEX CONT: Performed by: INTERNAL MEDICINE

## 2025-04-14 PROCEDURE — 81025 URINE PREGNANCY TEST: CPT | Performed by: INTERNAL MEDICINE

## 2025-04-14 PROCEDURE — 96413 CHEMO IV INFUSION 1 HR: CPT

## 2025-04-14 PROCEDURE — 25010000002 RITUXIMAB-PVVR 100 MG/10ML SOLUTION 10 ML VIAL: Performed by: INTERNAL MEDICINE

## 2025-04-14 PROCEDURE — 85025 COMPLETE CBC W/AUTO DIFF WBC: CPT | Performed by: INTERNAL MEDICINE

## 2025-04-14 PROCEDURE — 25810000003 SODIUM CHLORIDE 0.9 % SOLUTION: Performed by: INTERNAL MEDICINE

## 2025-04-14 PROCEDURE — 96415 CHEMO IV INFUSION ADDL HR: CPT

## 2025-04-14 PROCEDURE — 96375 TX/PRO/DX INJ NEW DRUG ADDON: CPT

## 2025-04-14 PROCEDURE — 25010000002 DIPHENHYDRAMINE PER 50 MG: Performed by: INTERNAL MEDICINE

## 2025-04-14 PROCEDURE — 80053 COMPREHEN METABOLIC PANEL: CPT | Performed by: INTERNAL MEDICINE

## 2025-04-14 RX ORDER — DIPHENHYDRAMINE HYDROCHLORIDE 50 MG/ML
50 INJECTION, SOLUTION INTRAMUSCULAR; INTRAVENOUS AS NEEDED
Status: CANCELLED | OUTPATIENT
Start: 2025-04-14

## 2025-04-14 RX ORDER — ACETAMINOPHEN 325 MG/1
650 TABLET ORAL ONCE
OUTPATIENT
Start: 2025-04-28

## 2025-04-14 RX ORDER — HYDROCORTISONE SODIUM SUCCINATE 100 MG/2ML
100 INJECTION INTRAMUSCULAR; INTRAVENOUS AS NEEDED
Status: CANCELLED | OUTPATIENT
Start: 2025-04-21

## 2025-04-14 RX ORDER — DIPHENHYDRAMINE HYDROCHLORIDE 50 MG/ML
50 INJECTION, SOLUTION INTRAMUSCULAR; INTRAVENOUS AS NEEDED
OUTPATIENT
Start: 2025-04-28

## 2025-04-14 RX ORDER — MEPERIDINE HYDROCHLORIDE 25 MG/ML
25 INJECTION INTRAMUSCULAR; INTRAVENOUS; SUBCUTANEOUS
Status: CANCELLED | OUTPATIENT
Start: 2025-04-21

## 2025-04-14 RX ORDER — SODIUM CHLORIDE 9 MG/ML
20 INJECTION, SOLUTION INTRAVENOUS ONCE
Status: CANCELLED | OUTPATIENT
Start: 2025-04-14

## 2025-04-14 RX ORDER — FAMOTIDINE 10 MG/ML
20 INJECTION, SOLUTION INTRAVENOUS AS NEEDED
OUTPATIENT
Start: 2025-05-05

## 2025-04-14 RX ORDER — MEPERIDINE HYDROCHLORIDE 25 MG/ML
25 INJECTION INTRAMUSCULAR; INTRAVENOUS; SUBCUTANEOUS
OUTPATIENT
Start: 2025-04-28

## 2025-04-14 RX ORDER — SODIUM CHLORIDE 9 MG/ML
20 INJECTION, SOLUTION INTRAVENOUS ONCE
Status: CANCELLED | OUTPATIENT
Start: 2025-04-21

## 2025-04-14 RX ORDER — ACETAMINOPHEN 325 MG/1
650 TABLET ORAL ONCE
Status: CANCELLED | OUTPATIENT
Start: 2025-04-21

## 2025-04-14 RX ORDER — MEPERIDINE HYDROCHLORIDE 25 MG/ML
25 INJECTION INTRAMUSCULAR; INTRAVENOUS; SUBCUTANEOUS
Status: CANCELLED | OUTPATIENT
Start: 2025-04-14

## 2025-04-14 RX ORDER — HYDROCORTISONE SODIUM SUCCINATE 100 MG/2ML
100 INJECTION INTRAMUSCULAR; INTRAVENOUS AS NEEDED
OUTPATIENT
Start: 2025-05-05

## 2025-04-14 RX ORDER — FAMOTIDINE 10 MG/ML
20 INJECTION, SOLUTION INTRAVENOUS AS NEEDED
Status: CANCELLED | OUTPATIENT
Start: 2025-04-21

## 2025-04-14 RX ORDER — DIPHENHYDRAMINE HYDROCHLORIDE 50 MG/ML
50 INJECTION, SOLUTION INTRAMUSCULAR; INTRAVENOUS AS NEEDED
Status: CANCELLED | OUTPATIENT
Start: 2025-04-21

## 2025-04-14 RX ORDER — ACETAMINOPHEN 325 MG/1
650 TABLET ORAL ONCE
OUTPATIENT
Start: 2025-05-05

## 2025-04-14 RX ORDER — SODIUM CHLORIDE 9 MG/ML
20 INJECTION, SOLUTION INTRAVENOUS ONCE
OUTPATIENT
Start: 2025-04-28

## 2025-04-14 RX ORDER — ONDANSETRON 8 MG/1
8 TABLET, FILM COATED ORAL 3 TIMES DAILY PRN
Qty: 30 TABLET | Refills: 3 | Status: SHIPPED | OUTPATIENT
Start: 2025-04-14

## 2025-04-14 RX ORDER — HYDROCORTISONE SODIUM SUCCINATE 100 MG/2ML
100 INJECTION INTRAMUSCULAR; INTRAVENOUS AS NEEDED
Status: CANCELLED | OUTPATIENT
Start: 2025-04-14

## 2025-04-14 RX ORDER — FAMOTIDINE 10 MG/ML
20 INJECTION, SOLUTION INTRAVENOUS AS NEEDED
Status: CANCELLED | OUTPATIENT
Start: 2025-04-14

## 2025-04-14 RX ORDER — SODIUM CHLORIDE 9 MG/ML
20 INJECTION, SOLUTION INTRAVENOUS ONCE
Status: COMPLETED | OUTPATIENT
Start: 2025-04-14 | End: 2025-04-14

## 2025-04-14 RX ORDER — FAMOTIDINE 10 MG/ML
20 INJECTION, SOLUTION INTRAVENOUS AS NEEDED
OUTPATIENT
Start: 2025-04-28

## 2025-04-14 RX ORDER — DIPHENHYDRAMINE HYDROCHLORIDE 50 MG/ML
50 INJECTION, SOLUTION INTRAMUSCULAR; INTRAVENOUS ONCE
Status: COMPLETED | OUTPATIENT
Start: 2025-04-14 | End: 2025-04-14

## 2025-04-14 RX ORDER — DIPHENHYDRAMINE HYDROCHLORIDE 50 MG/ML
50 INJECTION, SOLUTION INTRAMUSCULAR; INTRAVENOUS AS NEEDED
OUTPATIENT
Start: 2025-05-05

## 2025-04-14 RX ORDER — SODIUM CHLORIDE 9 MG/ML
20 INJECTION, SOLUTION INTRAVENOUS ONCE
OUTPATIENT
Start: 2025-05-05

## 2025-04-14 RX ORDER — HYDROCORTISONE SODIUM SUCCINATE 100 MG/2ML
100 INJECTION INTRAMUSCULAR; INTRAVENOUS AS NEEDED
OUTPATIENT
Start: 2025-04-28

## 2025-04-14 RX ORDER — ACETAMINOPHEN 325 MG/1
650 TABLET ORAL ONCE
Status: CANCELLED | OUTPATIENT
Start: 2025-04-14

## 2025-04-14 RX ORDER — MEPERIDINE HYDROCHLORIDE 25 MG/ML
25 INJECTION INTRAMUSCULAR; INTRAVENOUS; SUBCUTANEOUS
OUTPATIENT
Start: 2025-05-05

## 2025-04-14 RX ORDER — ACETAMINOPHEN 325 MG/1
650 TABLET ORAL ONCE
Status: COMPLETED | OUTPATIENT
Start: 2025-04-14 | End: 2025-04-14

## 2025-04-14 RX ADMIN — SODIUM CHLORIDE 20 ML/HR: 9 INJECTION, SOLUTION INTRAVENOUS at 10:02

## 2025-04-14 RX ADMIN — ACETAMINOPHEN 650 MG: 325 TABLET ORAL at 10:03

## 2025-04-14 RX ADMIN — SODIUM CHLORIDE 770 MG: 9 INJECTION, SOLUTION INTRAVENOUS at 10:40

## 2025-04-14 RX ADMIN — DIPHENHYDRAMINE HYDROCHLORIDE 50 MG: 50 INJECTION INTRAMUSCULAR; INTRAVENOUS at 10:03

## 2025-04-14 NOTE — PROGRESS NOTES
OSW met with pt during infusion.  Pt accompanied by her mother.  Pt shared that she lost her grandfather last year whom she described as her best friend.  She is actively grieving this loss.  She was able to share good memories and stories.  Pt has anxiety and does have some medication to help.  She is not currently seeing any mental health providers.  Pt mother is able to help connect her with services if she decides to resume.  Pt has a good support system and there are no immediate needs identified.  Contact information provided and pt encouraged to reach out as needed.

## 2025-04-14 NOTE — TELEPHONE ENCOUNTER
Spoke w/ pt and let her know that her blood sugar is up to 184 and that Dr. Burns would like for her to follow up w/ her PCP regarding this.  I let her know that Dr. Burns said that  Hopefully w/ her beginning to decrease her steroids the blood sugars will improve  I also let her know that she has a slight elevation in her liver function tests and this also needs to be addressed by her PCP.  Pt v/u.

## 2025-04-14 NOTE — PROGRESS NOTES
Pt here for C1 Tx.  PIV started and labs drawn.  Pt with questions regarding steriod taper.  Dr. Burns notified and pt to started taking 2 pills twice a day and will re-evaluate next week with CBC.  Pt reports she has some anxiety issues and feeling nervous about today's treatment with support provided.  Pre-meds given per MAR.  Prior to starting rituxan pt reports some abdominal pain and feels like she needs to urinate.  Pt only ate banana this AM.  Instructed that she may need some food on stomach with tylenol she took.  Pt willing to eat peanut butter cracker and states she feels comfortable proceeding with the tx.  Tx given per MAR.  Pt tolerated well.  Pt d/c home with AVS given.

## 2025-04-17 ENCOUNTER — LAB (OUTPATIENT)
Dept: LAB | Facility: HOSPITAL | Age: 30
End: 2025-04-17
Payer: COMMERCIAL

## 2025-04-17 ENCOUNTER — OFFICE VISIT (OUTPATIENT)
Dept: ONCOLOGY | Facility: CLINIC | Age: 30
End: 2025-04-17
Payer: COMMERCIAL

## 2025-04-17 VITALS
BODY MASS INDEX: 53.42 KG/M2 | TEMPERATURE: 97.8 F | SYSTOLIC BLOOD PRESSURE: 132 MMHG | HEIGHT: 59 IN | DIASTOLIC BLOOD PRESSURE: 62 MMHG | WEIGHT: 265 LBS | RESPIRATION RATE: 20 BRPM | HEART RATE: 78 BPM | OXYGEN SATURATION: 98 %

## 2025-04-17 DIAGNOSIS — D59.30 HEMOLYTIC-UREMIC SYNDROME, UNSPECIFIED SUBTYPE: ICD-10-CM

## 2025-04-17 DIAGNOSIS — D59.10 AUTOIMMUNE HEMOLYTIC ANEMIA: Primary | ICD-10-CM

## 2025-04-17 LAB
BASOPHILS # BLD AUTO: 0.02 10*3/MM3 (ref 0–0.2)
BASOPHILS NFR BLD AUTO: 0.2 % (ref 0–1.5)
DEPRECATED RDW RBC AUTO: 51.4 FL (ref 37–54)
EOSINOPHIL # BLD AUTO: 0.01 10*3/MM3 (ref 0–0.4)
EOSINOPHIL NFR BLD AUTO: 0.1 % (ref 0.3–6.2)
ERYTHROCYTE [DISTWIDTH] IN BLOOD BY AUTOMATED COUNT: 14.3 % (ref 12.3–15.4)
HCT VFR BLD AUTO: 38.2 % (ref 34–46.6)
HGB BLD-MCNC: 11.9 G/DL (ref 12–15.9)
HOLD SPECIMEN: NORMAL
HOLD SPECIMEN: NORMAL
LYMPHOCYTES # BLD AUTO: 0.88 10*3/MM3 (ref 0.7–3.1)
LYMPHOCYTES NFR BLD AUTO: 7.3 % (ref 19.6–45.3)
MCH RBC QN AUTO: 31.4 PG (ref 26.6–33)
MCHC RBC AUTO-ENTMCNC: 31.2 G/DL (ref 31.5–35.7)
MCV RBC AUTO: 100.8 FL (ref 79–97)
MONOCYTES # BLD AUTO: 0.51 10*3/MM3 (ref 0.1–0.9)
MONOCYTES NFR BLD AUTO: 4.2 % (ref 5–12)
NEUTROPHILS NFR BLD AUTO: 10.68 10*3/MM3 (ref 1.7–7)
NEUTROPHILS NFR BLD AUTO: 88.2 % (ref 42.7–76)
PLATELET # BLD AUTO: 315 10*3/MM3 (ref 140–450)
PMV BLD AUTO: 8.8 FL (ref 6–12)
RBC # BLD AUTO: 3.79 10*6/MM3 (ref 3.77–5.28)
WBC NRBC COR # BLD AUTO: 12.1 10*3/MM3 (ref 3.4–10.8)

## 2025-04-17 PROCEDURE — 36415 COLL VENOUS BLD VENIPUNCTURE: CPT

## 2025-04-17 PROCEDURE — 85025 COMPLETE CBC W/AUTO DIFF WBC: CPT

## 2025-04-17 NOTE — PROGRESS NOTES
Hematology/Oncology Outpatient Follow Up    PATIENT NAME:Arlene Brady  :1995  MRN: [id number]  PRIMARY CARE PHYSICIAN: Daniel Sam MD  REFERRING PHYSICIAN: No ref. provider found    Chief Complaint   Patient presents with    Follow-up     Autoimmune hemolytic anemia              HISTORY OF PRESENT ILLNESS:       This is a 28 year old female has been referred secondary to anemia.  Patient has a longtime history of anemia.  She has rectal bleeding and had colonoscopy done a few days ago by Dr Vinny Lagos . She has internal and external hemorrhoids. She has a follow up with Dr Lagos.     Patient is amenorrheic for about 6 months.  She had history of menorrhagia. She is on hormone pills to regulate her cycles.   She has low energy, she was on oral iron supplements for a month. She has since ran out of her iron tablets.     Review of her CBCs indicate that she has had anemia with hemoglobin ranging between 8 and 12.3 g per DL.  Today her white count is 8, hemoglobin is 8, MCV is 101 and platelets are 352 with essentially unremarkable differentials.     She denies having blood in her urine     She is single, She is a CNA     Patient does not smoke and drinks occasionally     There is no family history of hematological problems.     Her mother had colon cancer,      2023: Methylmalonic acid level was normal at 343 patient had anemia work-up including a ferritin level which was 167, C-reactive protein was high at 1.96 BUN was 13, creatinine 0.9 LDH was 323 iron profile showed a elevated serum iron and iron saturation, haptoglobin was normal, reticulocyte count was elevated to 16 she has low folate at 3.1  2023: WBC 11.62, hemoglobin 8.0 g/dL, .9, platelets 353,000.  Flow cytometry showed an increased kappa lambda ratio, neutrophils with left shifted maturation and rare phenotypic aberrancy, monocytes with phenotypic aberrancy.  Bone marrow biopsy with molecular and cytogenetic studies  indicated to evaluate for myeloid neoplasm.  8/16/2023 CT of the neck, chest, abdomen and pelvis with contrast showed no evidence of definite pathologic lymphadenopathy concerning for lymphoproliferative disorder.  No acute findings present in the neck, chest, abdomen or pelvis.  8/25/2023: Patient had bone marrow aspiration and biopsy which basically showed normocellular bone marrow for age, decreased iron stores, negative for involvement by malignant lymphoma or metastatic malignancy.  Flow cytometry was unremarkable with no immunophenotypic abnormalities noted.  Cytogenetics showed a normal female karyotype  4/14/25: Patient was initiated on Rituxan weekly      Past Medical History:   Diagnosis Date    ADHD (attention deficit hyperactivity disorder)     Anxiety     Arthritis     Asthma     exercise induced asthma     Depression     Epilepsy     History of blood transfusion     Seizures        Past Surgical History:   Procedure Laterality Date    ABDOMINAL SURGERY      choley    TONSILLECTOMY           Current Outpatient Medications:     albuterol sulfate  (90 Base) MCG/ACT inhaler, inhale 2 puffs by mouth every 4 hours (Patient not taking: Reported on 4/17/2025), Disp: , Rfl:     ferrous sulfate 325 (65 FE) MG tablet, Take 1 tablet by mouth Daily With Breakfast., Disp: 30 tablet, Rfl: 3    fexofenadine (ALLEGRA) 180 MG tablet, Take 1 tablet by mouth Daily., Disp: , Rfl:     Flonase Allergy Relief 50 MCG/ACT nasal spray, = 2 spray(s), Nostril-Both, Daily, # 16 gm, 0 Refill(s), Pharmacy: [facility]/pharmacy #[id number], 2 spray(s) Nostril-Both Daily, 149, cm, 08/13/24 14:31:00 EDT, Height, 118.8, kg, 08/13/24 14:35:00 EDT, Weight Dosing, Disp: , Rfl:     folic acid (FOLVITE) 1 MG tablet, TAKE 1 TABLET BY MOUTH EVERY DAY, Disp: 90 tablet, Rfl: 0    folic acid (FOLVITE) 1 MG tablet, Take 1 tablet by mouth Daily., Disp: 30 tablet, Rfl: 2    Junel 1/20 1-20 MG-MCG per tablet, Take 1 tablet by mouth Every Evening., Disp: ,  Rfl:     nystatin-triamcinolone (MYCOLOG) 371806-0.1 UNIT/GM-% ointment, APPLY 1 APPLICATION TOPICALLY TWICE DAILY FOR 21 DAYS, Disp: , Rfl:     ondansetron (ZOFRAN) 8 MG tablet, Take 1 tablet by mouth 3 (Three) Times a Day As Needed for Nausea or Vomiting., Disp: 30 tablet, Rfl: 3    OXcarbazepine (TRILEPTAL) 300 MG tablet, Take 1.5 tablets by mouth 2 (Two) Times a Day., Disp: , Rfl:     pantoprazole (PROTONIX) 40 MG EC tablet, TAKE 1 TABLET BY MOUTH EVERY DAY, Disp: 90 tablet, Rfl: 1    predniSONE (DELTASONE) 10 MG tablet, Take 3 tablets by mouth 2 (Two) Times a Day. Take with food., Disp: 180 tablet, Rfl: 0    silver sulfadiazine (SILVADENE, SSD) 1 % cream, APPLY 1 APPLICATION TOPCIALLY TWICE A DAY FOR 30 DAYS, Disp: , Rfl:     Allergies   Allergen Reactions    Venofer [Iron Sucrose] Unknown - Low Severity    Cephalosporins Rash    Penicillins Rash       Family History   Problem Relation Age of Onset    Hypertension Father     Heart disease Father     Hyperlipidemia Father     Cancer Paternal Grandmother        Cancer-related family history includes Cancer in her paternal grandmother.    Social History     Tobacco Use    Smoking status: Never     Passive exposure: Never    Smokeless tobacco: Never   Vaping Use    Vaping status: Never Used   Substance Use Topics    Alcohol use: Yes     Comment: very infrequent     Drug use: Never       I have reviewed and confirmed the accuracy of the patient's history: Chief complaint, HPI, ROS, and Subjective as entered by the MA/LPN/RN. Bronwyn Burns MD 04/17/25        SUBJECTIVE:      Here today for follow-up so far she is doing well on weekly Rituxan.  She has had 1 dose.  She is currently on prednisone 20 mg twice a day will decrease to 10 mg twice a day on Monday  next week      Arlene Brady reports a pain score of 0.  Given her pain assessment as noted, treatment options were discussed and the following options were decided upon as a follow-up plan to  "address the patient's pain:  Continue current management by her primary care .           REVIEW OF SYSTEMS:      Review of Systems   Constitutional:  Positive for fatigue. Negative for chills and fever.   HENT:  Negative for congestion, drooling, ear discharge, rhinorrhea, sinus pressure and tinnitus.    Eyes:  Negative for photophobia, pain and discharge.   Respiratory:  Negative for apnea, choking and stridor.    Cardiovascular:  Negative for palpitations.   Gastrointestinal:  Negative for abdominal distention, abdominal pain and anal bleeding.   Endocrine: Negative for polydipsia and polyphagia.   Genitourinary:  Negative for decreased urine volume, flank pain and genital sores.   Musculoskeletal:  Positive for back pain. Negative for gait problem, neck pain and neck stiffness.   Skin:  Negative for color change, rash and wound.   Neurological:  Negative for tremors, seizures, syncope, facial asymmetry and speech difficulty.   Hematological:  Negative for adenopathy.   Psychiatric/Behavioral:  Negative for agitation, confusion, hallucinations and self-injury. The patient is not hyperactive.        Per subjective    OBJECTIVE:    Vitals:    04/17/25 1533   BP: 132/62   Pulse: 78   Resp: 20   Temp: 97.8 °F (36.6 °C)   TempSrc: Temporal   SpO2: 98%   Weight: 120 kg (265 lb)   Height: 149.9 cm (59\")   PainSc: 0-No pain                     Body mass index is 53.52 kg/m².    ECOG  (1) Restricted in physically strenuous activity, ambulatory and able to do work of light nature    Physical Exam  Vitals reviewed.   Constitutional:       General: She is not in acute distress.     Appearance: Normal appearance. She is not ill-appearing, toxic-appearing or diaphoretic.   HENT:      Head: Normocephalic and atraumatic.   Eyes:      Extraocular Movements: Extraocular movements intact.   Cardiovascular:      Rate and Rhythm: Normal rate and regular rhythm.      Heart sounds: No murmur heard.  Pulmonary:      Effort: Pulmonary " effort is normal. No respiratory distress.      Breath sounds: Normal breath sounds. No stridor. No wheezing.   Abdominal:      General: Bowel sounds are normal.      Palpations: Abdomen is soft.   Musculoskeletal:         General: Normal range of motion.      Cervical back: Normal range of motion.   Skin:     General: Skin is warm and dry.      Findings: No rash.   Neurological:      Mental Status: She is alert and oriented to person, place, and time.   Psychiatric:         Mood and Affect: Mood normal.         Behavior: Behavior normal.        I have reexamined the patient and the results are consistent with the previously documented exam. Bronwyn Burns MD        RECENT LABS    WBC   Date Value Ref Range Status   04/17/2025 12.10 (H) 3.40 - 10.80 10*3/mm3 Final     RBC   Date Value Ref Range Status   04/17/2025 3.79 3.77 - 5.28 10*6/mm3 Final   07/31/2023 2.80 (L) 3.77 - 5.28 x10E6/uL Final     Hemoglobin   Date Value Ref Range Status   04/17/2025 11.9 (L) 12.0 - 15.9 g/dL Final     Hematocrit   Date Value Ref Range Status   04/17/2025 38.2 34.0 - 46.6 % Final     MCV   Date Value Ref Range Status   04/17/2025 100.8 (H) 79.0 - 97.0 fL Final     MCH   Date Value Ref Range Status   04/17/2025 31.4 26.6 - 33.0 pg Final     MCHC   Date Value Ref Range Status   04/17/2025 31.2 (L) 31.5 - 35.7 g/dL Final     RDW   Date Value Ref Range Status   04/17/2025 14.3 12.3 - 15.4 % Final     RDW-SD   Date Value Ref Range Status   04/17/2025 51.4 37.0 - 54.0 fl Final     MPV   Date Value Ref Range Status   04/17/2025 8.8 6.0 - 12.0 fL Final     Platelets   Date Value Ref Range Status   04/17/2025 315 140 - 450 10*3/mm3 Final     Neutrophil %   Date Value Ref Range Status   04/17/2025 88.2 (H) 42.7 - 76.0 % Final     Lymphocyte %   Date Value Ref Range Status   04/17/2025 7.3 (L) 19.6 - 45.3 % Final     Monocyte %   Date Value Ref Range Status   04/17/2025 4.2 (L) 5.0 - 12.0 % Final     Eosinophil %   Date Value Ref Range  Status   04/17/2025 0.1 (L) 0.3 - 6.2 % Final     Basophil %   Date Value Ref Range Status   04/17/2025 0.2 0.0 - 1.5 % Final     Immature Grans %   Date Value Ref Range Status   10/09/2020 1.4 (H) 0.0 - 0.5 % Final     Neutrophils, Absolute   Date Value Ref Range Status   04/17/2025 10.68 (H) 1.70 - 7.00 10*3/mm3 Final     Lymphocytes, Absolute   Date Value Ref Range Status   04/17/2025 0.88 0.70 - 3.10 10*3/mm3 Final     Monocytes, Absolute   Date Value Ref Range Status   04/17/2025 0.51 0.10 - 0.90 10*3/mm3 Final     Eosinophils, Absolute   Date Value Ref Range Status   04/17/2025 0.01 0.00 - 0.40 10*3/mm3 Final     Basophils, Absolute   Date Value Ref Range Status   04/17/2025 0.02 0.00 - 0.20 10*3/mm3 Final     Immature Grans, Absolute   Date Value Ref Range Status   10/09/2020 0.09 (H) 0.00 - 0.05 10*3/mm3 Final     nRBC   Date Value Ref Range Status   02/23/2024 0.1 0.0 - 0.2 /100 WBC Final       Lab Results   Component Value Date    GLUCOSE 184 (H) 04/14/2025    BUN 25 (H) 04/14/2025    CREATININE 0.82 04/14/2025    EGFRIFNONA 88 07/26/2021    BCR 30.5 (H) 04/14/2025    K 4.2 04/14/2025    CO2 22.6 04/14/2025    CALCIUM 8.4 (L) 04/14/2025    ALBUMIN 4.0 04/14/2025    AST 45 (H) 04/14/2025    ALT 79 (H) 04/14/2025     Assessment & Plan       ASSESSMENT and plans:       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  Rule out lymphoproliferative disease.  Peripheral blood for flow cytometry shows increased kappa lambda ratio detected rare phenotypic aberrancy of the neutrophils as well as monocytes, possibility of a myeloid disorder was also entertained.  PNH screen was negative and G6PD was not deficient.  For now patient will continue on steroids.  But will begin steroid taper due to normalization of her hemoglobin down to 12.7 g per DL.  We will continue her steroid taper as her hemoglobin has improved to 13.0 g/dL today.  Hemoglobin is up to 11.1 g per DL t.   Decrease prednisone to 5 mg p.o. daily.  Continue folate replacement.  Continue PPI.  She is dependent on prednisone therefore would consider additional treatment with Rituxan to see if we will be able to completely resolve the hemolysis.  Currently on prednisone every other day.  She will reconsider Rituxan in the future  Unfortunately patient has relapsed with worsening Anemia: I have recommended Rituxan.  Currently getting Rituxan so far she is doing well.  Hemoglobin is up to 11.9 g per DL    Recurrent iron deficiency: She will Continue oral iron supplements  Status post bone marrow aspiration and biopsy which was essentially unremarkable  Folate deficiency: Continue folic acid  History of seizures  History of rectal bleeding : Recent  colonoscopy by Dr Vinny Lagos .  She no longer has rectal bleeding and she has completed her course of iron.  Follow-up with GI encouraged.  She has an appointment coming up next week with GI.  She denies rectal bleeding at this time.  Possible sensitivity reaction to Venofer: Patient's epigastric chest pain complaints seem to predate the infusion but she does also complain of itching on the bilateral forearms during the infusion.  Venofer was stopped and normal saline was ran along with 25 mg Benadryl IV given with resolution of the symptoms.          Plans     Continue Iron sulphate 325mg po daily #30 refills ,   Continue folic acid 1mg po daily #30 refill,   Continue  protonix.   Con tinue Rituxan 375 mg per square meter of body surface area intravenously (375 mg/m2) weekly for four weeks for hemolytic anemia.  Decrease prednisone to 10mg po q 12 starting next Monday 4/21/25. See me 4/28/25  Reviewed The benefits the side effects of Rituxan in detail.  Answered all her questions to her satisfaction.  Her mother was also present during this evaluation  Continue prednisone at  increased doses. Gave weaning instructions on a weekly basis  Continue folic caid  Viral hepatitis  panel, [facility] screen iron studies with ferritin were within normal limits  Patient was given information on Rituxan to review understands we may have to use it in the future  CBC reviewed  Continue PPI  Discussed the benefits and side effects of Rituxan in detail with patient  All questions answered    Patient verbalized understanding and is in agreement of the above plan.      .     Electronically signed by Bronwyn Burns MD, 04/17/25, 4:56 PM EDT.

## 2025-04-21 ENCOUNTER — HOSPITAL ENCOUNTER (OUTPATIENT)
Dept: ONCOLOGY | Facility: HOSPITAL | Age: 30
Discharge: HOME | End: 2025-04-21
Admitting: INTERNAL MEDICINE
Payer: COMMERCIAL

## 2025-04-21 VITALS
SYSTOLIC BLOOD PRESSURE: 126 MMHG | RESPIRATION RATE: 16 BRPM | OXYGEN SATURATION: 96 % | DIASTOLIC BLOOD PRESSURE: 81 MMHG | HEART RATE: 86 BPM | BODY MASS INDEX: 54.43 KG/M2 | TEMPERATURE: 97.8 F | WEIGHT: 270 LBS | HEIGHT: 59 IN

## 2025-04-21 DIAGNOSIS — D59.30 HEMOLYTIC-UREMIC SYNDROME, UNSPECIFIED SUBTYPE: Primary | ICD-10-CM

## 2025-04-21 LAB
BASOPHILS # BLD AUTO: 0.03 10*3/MM3 (ref 0–0.2)
BASOPHILS NFR BLD AUTO: 0.3 % (ref 0–1.5)
DEPRECATED RDW RBC AUTO: 51.4 FL (ref 37–54)
EOSINOPHIL # BLD AUTO: 0.1 10*3/MM3 (ref 0–0.4)
EOSINOPHIL NFR BLD AUTO: 0.9 % (ref 0.3–6.2)
ERYTHROCYTE [DISTWIDTH] IN BLOOD BY AUTOMATED COUNT: 14.3 % (ref 12.3–15.4)
HCT VFR BLD AUTO: 39.3 % (ref 34–46.6)
HGB BLD-MCNC: 12.3 G/DL (ref 12–15.9)
LYMPHOCYTES # BLD AUTO: 0.81 10*3/MM3 (ref 0.7–3.1)
LYMPHOCYTES NFR BLD AUTO: 7.5 % (ref 19.6–45.3)
MCH RBC QN AUTO: 31.5 PG (ref 26.6–33)
MCHC RBC AUTO-ENTMCNC: 31.3 G/DL (ref 31.5–35.7)
MCV RBC AUTO: 100.8 FL (ref 79–97)
MONOCYTES # BLD AUTO: 0.72 10*3/MM3 (ref 0.1–0.9)
MONOCYTES NFR BLD AUTO: 6.6 % (ref 5–12)
NEUTROPHILS NFR BLD AUTO: 84.7 % (ref 42.7–76)
NEUTROPHILS NFR BLD AUTO: 9.18 10*3/MM3 (ref 1.7–7)
PLATELET # BLD AUTO: 288 10*3/MM3 (ref 140–450)
PMV BLD AUTO: 9.3 FL (ref 6–12)
RBC # BLD AUTO: 3.9 10*6/MM3 (ref 3.77–5.28)
WBC NRBC COR # BLD AUTO: 10.84 10*3/MM3 (ref 3.4–10.8)

## 2025-04-21 PROCEDURE — 25010000002 RITUXIMAB-PVVR 500 MG/50ML SOLUTION 50 ML VIAL: Performed by: INTERNAL MEDICINE

## 2025-04-21 PROCEDURE — 25010000002 DIPHENHYDRAMINE PER 50 MG: Performed by: INTERNAL MEDICINE

## 2025-04-21 PROCEDURE — 96415 CHEMO IV INFUSION ADDL HR: CPT

## 2025-04-21 PROCEDURE — 25810000003 SODIUM CHLORIDE 0.9 % SOLUTION 250 ML FLEX CONT: Performed by: INTERNAL MEDICINE

## 2025-04-21 PROCEDURE — 96375 TX/PRO/DX INJ NEW DRUG ADDON: CPT

## 2025-04-21 PROCEDURE — 85025 COMPLETE CBC W/AUTO DIFF WBC: CPT | Performed by: INTERNAL MEDICINE

## 2025-04-21 PROCEDURE — 25010000002 RITUXIMAB-PVVR 100 MG/10ML SOLUTION 10 ML VIAL: Performed by: INTERNAL MEDICINE

## 2025-04-21 PROCEDURE — 96413 CHEMO IV INFUSION 1 HR: CPT

## 2025-04-21 RX ORDER — DIPHENHYDRAMINE HYDROCHLORIDE 50 MG/ML
25 INJECTION, SOLUTION INTRAMUSCULAR; INTRAVENOUS ONCE
Status: COMPLETED | OUTPATIENT
Start: 2025-04-21 | End: 2025-04-21

## 2025-04-21 RX ORDER — ACETAMINOPHEN 325 MG/1
650 TABLET ORAL ONCE
Status: COMPLETED | OUTPATIENT
Start: 2025-04-21 | End: 2025-04-21

## 2025-04-21 RX ORDER — SODIUM CHLORIDE 9 MG/ML
20 INJECTION, SOLUTION INTRAVENOUS ONCE
Status: DISCONTINUED | OUTPATIENT
Start: 2025-04-21 | End: 2025-04-22 | Stop reason: HOSPADM

## 2025-04-21 RX ADMIN — ACETAMINOPHEN 650 MG: 325 TABLET ORAL at 10:31

## 2025-04-21 RX ADMIN — SODIUM CHLORIDE 770 MG: 9 INJECTION, SOLUTION INTRAVENOUS at 11:05

## 2025-04-21 RX ADMIN — DIPHENHYDRAMINE HYDROCHLORIDE 25 MG: 50 INJECTION INTRAMUSCULAR; INTRAVENOUS at 10:36

## 2025-04-21 NOTE — PROGRESS NOTES
Patient in clinic for C1 D8 Rituxan. PIV placed with good blood return. Specimens sent to lab for processing. Patient states she feels a little stronger over the last week. She has interrupted sleep due to urinary frequency. Good appetite. Patient has pitting edema in bilateral feet, mild edema of bilateral ankles with pain when walking. Good capillary refill and pedal pulses. Swelling started on Saturday. Cony ordered okay for treatment today; patient was educated to elevate feet when sitting, use compression socks and can use cold compresses to ankles to help with discomfort. Patient was told to contact her PCP if the swelling moved up her legs past the knee or pain increased; patient verbalized understanding. Treatment given per MAR. PIV removed and patient discharged, declined AVS.

## 2025-04-28 ENCOUNTER — OFFICE VISIT (OUTPATIENT)
Dept: ONCOLOGY | Facility: CLINIC | Age: 30
End: 2025-04-28
Payer: COMMERCIAL

## 2025-04-28 ENCOUNTER — HOSPITAL ENCOUNTER (OUTPATIENT)
Dept: ONCOLOGY | Facility: HOSPITAL | Age: 30
Discharge: HOME | End: 2025-04-28
Admitting: INTERNAL MEDICINE
Payer: COMMERCIAL

## 2025-04-28 VITALS
DIASTOLIC BLOOD PRESSURE: 80 MMHG | OXYGEN SATURATION: 95 % | SYSTOLIC BLOOD PRESSURE: 116 MMHG | WEIGHT: 267 LBS | HEART RATE: 88 BPM | HEIGHT: 59 IN | BODY MASS INDEX: 53.83 KG/M2 | TEMPERATURE: 98 F | RESPIRATION RATE: 16 BRPM

## 2025-04-28 VITALS
WEIGHT: 267 LBS | OXYGEN SATURATION: 95 % | HEIGHT: 59 IN | TEMPERATURE: 98 F | RESPIRATION RATE: 16 BRPM | BODY MASS INDEX: 53.83 KG/M2 | HEART RATE: 94 BPM | DIASTOLIC BLOOD PRESSURE: 76 MMHG | SYSTOLIC BLOOD PRESSURE: 127 MMHG

## 2025-04-28 DIAGNOSIS — D59.30 HEMOLYTIC-UREMIC SYNDROME, UNSPECIFIED SUBTYPE: Primary | ICD-10-CM

## 2025-04-28 DIAGNOSIS — D59.10 AUTOIMMUNE HEMOLYTIC ANEMIA: ICD-10-CM

## 2025-04-28 DIAGNOSIS — D50.0 IRON DEFICIENCY ANEMIA DUE TO CHRONIC BLOOD LOSS: ICD-10-CM

## 2025-04-28 LAB
BASOPHILS # BLD AUTO: 0.05 10*3/MM3 (ref 0–0.2)
BASOPHILS NFR BLD AUTO: 0.5 % (ref 0–1.5)
DEPRECATED RDW RBC AUTO: 48.3 FL (ref 37–54)
EOSINOPHIL # BLD AUTO: 0.3 10*3/MM3 (ref 0–0.4)
EOSINOPHIL NFR BLD AUTO: 3 % (ref 0.3–6.2)
ERYTHROCYTE [DISTWIDTH] IN BLOOD BY AUTOMATED COUNT: 14.1 % (ref 12.3–15.4)
HBV SURFACE AB SER RIA-ACNC: NORMAL
HBV SURFACE AG SERPL QL IA: NORMAL
HCT VFR BLD AUTO: 36.7 % (ref 34–46.6)
HGB BLD-MCNC: 11.6 G/DL (ref 12–15.9)
LYMPHOCYTES # BLD AUTO: 1.13 10*3/MM3 (ref 0.7–3.1)
LYMPHOCYTES NFR BLD AUTO: 11.2 % (ref 19.6–45.3)
MCH RBC QN AUTO: 31.4 PG (ref 26.6–33)
MCHC RBC AUTO-ENTMCNC: 31.6 G/DL (ref 31.5–35.7)
MCV RBC AUTO: 99.5 FL (ref 79–97)
MONOCYTES # BLD AUTO: 0.75 10*3/MM3 (ref 0.1–0.9)
MONOCYTES NFR BLD AUTO: 7.4 % (ref 5–12)
NEUTROPHILS NFR BLD AUTO: 7.9 10*3/MM3 (ref 1.7–7)
NEUTROPHILS NFR BLD AUTO: 77.9 % (ref 42.7–76)
PLATELET # BLD AUTO: 313 10*3/MM3 (ref 140–450)
PMV BLD AUTO: 9.1 FL (ref 6–12)
RBC # BLD AUTO: 3.69 10*6/MM3 (ref 3.77–5.28)
WBC NRBC COR # BLD AUTO: 10.13 10*3/MM3 (ref 3.4–10.8)

## 2025-04-28 PROCEDURE — 96413 CHEMO IV INFUSION 1 HR: CPT

## 2025-04-28 PROCEDURE — 25810000003 SODIUM CHLORIDE 0.9 % SOLUTION 250 ML FLEX CONT: Performed by: INTERNAL MEDICINE

## 2025-04-28 PROCEDURE — 86706 HEP B SURFACE ANTIBODY: CPT | Performed by: INTERNAL MEDICINE

## 2025-04-28 PROCEDURE — 96415 CHEMO IV INFUSION ADDL HR: CPT

## 2025-04-28 PROCEDURE — 25010000002 RITUXIMAB-PVVR 500 MG/50ML SOLUTION 50 ML VIAL: Performed by: INTERNAL MEDICINE

## 2025-04-28 PROCEDURE — 85025 COMPLETE CBC W/AUTO DIFF WBC: CPT | Performed by: INTERNAL MEDICINE

## 2025-04-28 PROCEDURE — 99214 OFFICE O/P EST MOD 30 MIN: CPT | Performed by: INTERNAL MEDICINE

## 2025-04-28 PROCEDURE — 25010000002 RITUXIMAB-PVVR 100 MG/10ML SOLUTION 10 ML VIAL: Performed by: INTERNAL MEDICINE

## 2025-04-28 PROCEDURE — 25010000002 DIPHENHYDRAMINE PER 50 MG: Performed by: INTERNAL MEDICINE

## 2025-04-28 PROCEDURE — 25810000003 SODIUM CHLORIDE 0.9 % SOLUTION: Performed by: INTERNAL MEDICINE

## 2025-04-28 PROCEDURE — 96375 TX/PRO/DX INJ NEW DRUG ADDON: CPT

## 2025-04-28 PROCEDURE — 87340 HEPATITIS B SURFACE AG IA: CPT | Performed by: INTERNAL MEDICINE

## 2025-04-28 RX ORDER — DIPHENHYDRAMINE HYDROCHLORIDE 50 MG/ML
25 INJECTION, SOLUTION INTRAMUSCULAR; INTRAVENOUS ONCE
Status: COMPLETED | OUTPATIENT
Start: 2025-04-28 | End: 2025-04-28

## 2025-04-28 RX ORDER — SODIUM CHLORIDE 9 MG/ML
20 INJECTION, SOLUTION INTRAVENOUS ONCE
Status: COMPLETED | OUTPATIENT
Start: 2025-04-28 | End: 2025-04-28

## 2025-04-28 RX ORDER — ACETAMINOPHEN 325 MG/1
650 TABLET ORAL ONCE
Status: COMPLETED | OUTPATIENT
Start: 2025-04-28 | End: 2025-04-28

## 2025-04-28 RX ORDER — PREDNISONE 10 MG/1
30 TABLET ORAL 2 TIMES DAILY
Qty: 180 TABLET | Refills: 0 | Status: SHIPPED | OUTPATIENT
Start: 2025-04-28

## 2025-04-28 RX ADMIN — SODIUM CHLORIDE 20 ML/HR: 9 INJECTION, SOLUTION INTRAVENOUS at 09:48

## 2025-04-28 RX ADMIN — DIPHENHYDRAMINE HYDROCHLORIDE 25 MG: 50 INJECTION INTRAMUSCULAR; INTRAVENOUS at 09:53

## 2025-04-28 RX ADMIN — SODIUM CHLORIDE 770 MG: 9 INJECTION, SOLUTION INTRAVENOUS at 10:16

## 2025-04-28 RX ADMIN — ACETAMINOPHEN 650 MG: 325 TABLET ORAL at 09:48

## 2025-04-28 NOTE — PROGRESS NOTES
Patient came in for rituxan without new complaints. Treatment given and tolerated. Confirmed with DR. Burns that she wants patient to take 10mg prednisone daily until she see SAMUEL Cristina Tuesday. If hemoglobin is stable she could possibly go to every other day. Soraidan verbalized understanding and denies further needs today. AVS given.

## 2025-04-29 LAB — HBV CORE AB SERPL QL IA: NEGATIVE

## 2025-04-30 NOTE — PROGRESS NOTES
Hematology/Oncology Outpatient Follow Up    PATIENT NAME:Arlene Brady  :1995  MRN: [id number]  PRIMARY CARE PHYSICIAN: Daniel Sam MD  REFERRING PHYSICIAN: No ref. provider found    Chief Complaint   Patient presents with    Follow-up     Autoimmune hemolytic anemia          HISTORY OF PRESENT ILLNESS:       This is a 28 year old female has been referred secondary to anemia.  Patient has a longtime history of anemia.  She has rectal bleeding and had colonoscopy done a few days ago by Dr Vinny Lagos . She has internal and external hemorrhoids. She has a follow up with Dr Lagos.     Patient is amenorrheic for about 6 months.  She had history of menorrhagia. She is on hormone pills to regulate her cycles.   She has low energy, she was on oral iron supplements for a month. She has since ran out of her iron tablets.     Review of her CBCs indicate that she has had anemia with hemoglobin ranging between 8 and 12.3 g per DL.  Today her white count is 8, hemoglobin is 8, MCV is 101 and platelets are 352 with essentially unremarkable differentials.     She denies having blood in her urine     She is single, She is a CNA     Patient does not smoke and drinks occasionally     There is no family history of hematological problems.     Her mother had colon cancer,      2023: Methylmalonic acid level was normal at 343 patient had anemia work-up including a ferritin level which was 167, C-reactive protein was high at 1.96 BUN was 13, creatinine 0.9 LDH was 323 iron profile showed a elevated serum iron and iron saturation, haptoglobin was normal, reticulocyte count was elevated to 16 she has low folate at 3.1  2023: WBC 11.62, hemoglobin 8.0 g/dL, .9, platelets 353,000.  Flow cytometry showed an increased kappa lambda ratio, neutrophils with left shifted maturation and rare phenotypic aberrancy, monocytes with phenotypic aberrancy.  Bone marrow biopsy with molecular and cytogenetic studies  indicated to evaluate for myeloid neoplasm.  8/16/2023 CT of the neck, chest, abdomen and pelvis with contrast showed no evidence of definite pathologic lymphadenopathy concerning for lymphoproliferative disorder.  No acute findings present in the neck, chest, abdomen or pelvis.  8/25/2023: Patient had bone marrow aspiration and biopsy which basically showed normocellular bone marrow for age, decreased iron stores, negative for involvement by malignant lymphoma or metastatic malignancy.  Flow cytometry was unremarkable with no immunophenotypic abnormalities noted.  Cytogenetics showed a normal female karyotype  4/14/25: Patient was initiated on Rituxan weekly      Past Medical History:   Diagnosis Date    ADHD (attention deficit hyperactivity disorder)     Anxiety     Arthritis     Asthma     exercise induced asthma     Depression     Epilepsy     History of blood transfusion     Seizures        Past Surgical History:   Procedure Laterality Date    ABDOMINAL SURGERY      choley    TONSILLECTOMY           Current Outpatient Medications:     albuterol sulfate  (90 Base) MCG/ACT inhaler, inhale 2 puffs by mouth every 4 hours (Patient not taking: Reported on 4/14/2025), Disp: , Rfl:     ferrous sulfate 325 (65 FE) MG tablet, Take 1 tablet by mouth Daily With Breakfast., Disp: 30 tablet, Rfl: 3    fexofenadine (ALLEGRA) 180 MG tablet, Take 1 tablet by mouth Daily., Disp: , Rfl:     Flonase Allergy Relief 50 MCG/ACT nasal spray, = 2 spray(s), Nostril-Both, Daily, # 16 gm, 0 Refill(s), Pharmacy: [facility] #[id number], 2 spray(s) Nostril-Both Daily, 149, cm, 08/13/24 14:31:00 EDT, Height, 118.8, kg, 08/13/24 14:35:00 EDT, Weight Dosing, Disp: , Rfl:     folic acid (FOLVITE) 1 MG tablet, TAKE 1 TABLET BY MOUTH EVERY DAY, Disp: 90 tablet, Rfl: 0    folic acid (FOLVITE) 1 MG tablet, Take 1 tablet by mouth Daily., Disp: 30 tablet, Rfl: 2    Junel 1/20 1-20 MG-MCG per tablet, Take 1 tablet by mouth Every Evening., Disp: ,  Rfl:     nystatin-triamcinolone (MYCOLOG) 382579-9.1 UNIT/GM-% ointment, APPLY 1 APPLICATION TOPICALLY TWICE DAILY FOR 21 DAYS, Disp: , Rfl:     ondansetron (ZOFRAN) 8 MG tablet, Take 1 tablet by mouth 3 (Three) Times a Day As Needed for Nausea or Vomiting., Disp: 30 tablet, Rfl: 3    OXcarbazepine (TRILEPTAL) 300 MG tablet, Take 1.5 tablets by mouth 2 (Two) Times a Day., Disp: , Rfl:     pantoprazole (PROTONIX) 40 MG EC tablet, TAKE 1 TABLET BY MOUTH EVERY DAY, Disp: 90 tablet, Rfl: 1    predniSONE (DELTASONE) 10 MG tablet, TAKE 3 TABLETS BY MOUTH 2 (TWO) TIMES A DAY. TAKE WITH FOOD. (Patient taking differently: Take 3 tablets by mouth 2 (Two) Times a Day. Take with food.  Taking 10mg daily), Disp: 180 tablet, Rfl: 0    silver sulfadiazine (SILVADENE, SSD) 1 % cream, APPLY 1 APPLICATION TOPCIALLY TWICE A DAY FOR 30 DAYS, Disp: , Rfl:   No current facility-administered medications for this visit.    Allergies   Allergen Reactions    Venofer [Iron Sucrose] Unknown - Low Severity    Cephalosporins Rash    Penicillins Rash       Family History   Problem Relation Age of Onset    Hypertension Father     Heart disease Father     Hyperlipidemia Father     Cancer Paternal Grandmother        Cancer-related family history includes Cancer in her paternal grandmother.    Social History     Tobacco Use    Smoking status: Never     Passive exposure: Never    Smokeless tobacco: Never   Vaping Use    Vaping status: Never Used   Substance Use Topics    Alcohol use: Yes     Comment: very infrequent     Drug use: Never       I have reviewed and confirmed the accuracy of the patient's history: Chief complaint, HPI, ROS, and Subjective as entered by the MA/LPN/RN. Ibeth Espinal, APRN 05/05/25      SUBJECTIVE:    Patient is receiving her third week of Rituxan.  She is currently on prednisone 10 mg twice a day.  Hemoglobin is stable at 11.6 g per DL    5/5/25 Patient is receiving her 4th week of Rituxan. I was asked to see patient  "during her infusion. She denies any issues. She is currently on Prednisone 10 mg daily.  Hemoglobin today 10.2 down from 11.6 last week.She is accompanied by her mother.       Arlene Brady reports a pain score of .0  Given her pain assessment as noted, treatment options were discussed and the following options were decided upon as a follow-up plan to address the patient's pain:  Continue current management by her primary care .           REVIEW OF SYSTEMS:      Review of Systems   Constitutional:  Negative for chills and fever.   HENT:  Negative for congestion, drooling, ear discharge, rhinorrhea, sinus pressure and tinnitus.    Eyes:  Negative for photophobia, pain and discharge.   Respiratory:  Negative for apnea, choking and stridor.    Cardiovascular:  Negative for palpitations.   Gastrointestinal:  Negative for abdominal distention, abdominal pain and anal bleeding.   Endocrine: Negative for polydipsia and polyphagia.   Genitourinary:  Negative for decreased urine volume, flank pain and genital sores.   Musculoskeletal:  Negative for gait problem, neck pain and neck stiffness.   Skin:  Negative for color change, rash and wound.   Neurological:  Negative for tremors, seizures, syncope, facial asymmetry and speech difficulty.   Hematological:  Negative for adenopathy.   Psychiatric/Behavioral:  Negative for agitation, confusion, hallucinations and self-injury. The patient is not hyperactive.        Per subjective    OBJECTIVE:    Vitals:    05/05/25 0950   BP: 140/75   Pulse: 114   Temp: 97.3 °F (36.3 °C)   SpO2: 96%   Weight: 122 kg (269 lb)   Height: 149.9 cm (59.02\")                         Body mass index is 54.3 kg/m².    ECOG  (1) Restricted in physically strenuous activity, ambulatory and able to do work of light nature    Physical Exam  Vitals reviewed.   Constitutional:       General: She is not in acute distress.     Appearance: Normal appearance. She is not ill-appearing, toxic-appearing or " diaphoretic.   HENT:      Head: Normocephalic and atraumatic.      Right Ear: External ear normal.      Left Ear: External ear normal.      Nose: Nose normal.      Mouth/Throat:      Mouth: Mucous membranes are moist.   Eyes:      Extraocular Movements: Extraocular movements intact.   Cardiovascular:      Rate and Rhythm: Normal rate and regular rhythm.      Heart sounds: No murmur heard.  Pulmonary:      Effort: Pulmonary effort is normal. No respiratory distress.      Breath sounds: Normal breath sounds. No stridor. No wheezing.   Abdominal:      General: Bowel sounds are normal.      Palpations: Abdomen is soft.   Musculoskeletal:         General: Normal range of motion.      Cervical back: Normal range of motion.   Skin:     General: Skin is warm and dry.      Findings: No rash.   Neurological:      Mental Status: She is alert and oriented to person, place, and time.   Psychiatric:         Mood and Affect: Mood normal.         Behavior: Behavior normal.         Thought Content: Thought content normal.     I have reexamined the patient and the results are consistent with the previously documented exam. Ibeth Espinal, ELSY        RECENT LABS    WBC   Date Value Ref Range Status   05/05/2025 10.55 3.40 - 10.80 10*3/mm3 Final     RBC   Date Value Ref Range Status   05/05/2025 3.22 (L) 3.77 - 5.28 10*6/mm3 Final   07/31/2023 2.80 (L) 3.77 - 5.28 x10E6/uL Final     Hemoglobin   Date Value Ref Range Status   05/05/2025 10.2 (L) 12.0 - 15.9 g/dL Final     Hematocrit   Date Value Ref Range Status   05/05/2025 32.5 (L) 34.0 - 46.6 % Final     MCV   Date Value Ref Range Status   05/05/2025 100.9 (H) 79.0 - 97.0 fL Final     MCH   Date Value Ref Range Status   05/05/2025 31.7 26.6 - 33.0 pg Final     MCHC   Date Value Ref Range Status   05/05/2025 31.4 (L) 31.5 - 35.7 g/dL Final     RDW   Date Value Ref Range Status   05/05/2025 14.6 12.3 - 15.4 % Final     RDW-SD   Date Value Ref Range Status   05/05/2025 50.0 37.0  - 54.0 fl Final     MPV   Date Value Ref Range Status   05/05/2025 8.8 6.0 - 12.0 fL Final     Platelets   Date Value Ref Range Status   05/05/2025 352 140 - 450 10*3/mm3 Final     Neutrophil %   Date Value Ref Range Status   05/05/2025 75.3 42.7 - 76.0 % Final     Lymphocyte %   Date Value Ref Range Status   05/05/2025 15.0 (L) 19.6 - 45.3 % Final     Monocyte %   Date Value Ref Range Status   05/05/2025 6.5 5.0 - 12.0 % Final     Eosinophil %   Date Value Ref Range Status   05/05/2025 2.7 0.3 - 6.2 % Final     Basophil %   Date Value Ref Range Status   05/05/2025 0.5 0.0 - 1.5 % Final     Immature Grans %   Date Value Ref Range Status   10/09/2020 1.4 (H) 0.0 - 0.5 % Final     Neutrophils, Absolute   Date Value Ref Range Status   05/05/2025 7.95 (H) 1.70 - 7.00 10*3/mm3 Final     Lymphocytes, Absolute   Date Value Ref Range Status   05/05/2025 1.58 0.70 - 3.10 10*3/mm3 Final     Monocytes, Absolute   Date Value Ref Range Status   05/05/2025 0.69 0.10 - 0.90 10*3/mm3 Final     Eosinophils, Absolute   Date Value Ref Range Status   05/05/2025 0.28 0.00 - 0.40 10*3/mm3 Final     Basophils, Absolute   Date Value Ref Range Status   05/05/2025 0.05 0.00 - 0.20 10*3/mm3 Final     Immature Grans, Absolute   Date Value Ref Range Status   10/09/2020 0.09 (H) 0.00 - 0.05 10*3/mm3 Final     nRBC   Date Value Ref Range Status   02/23/2024 0.1 0.0 - 0.2 /100 WBC Final       Lab Results   Component Value Date    GLUCOSE 184 (H) 04/14/2025    BUN 25 (H) 04/14/2025    CREATININE 0.82 04/14/2025    EGFRIFNONA 88 07/26/2021    BCR 30.5 (H) 04/14/2025    K 4.2 04/14/2025    CO2 22.6 04/14/2025    CALCIUM 8.4 (L) 04/14/2025    ALBUMIN 4.0 04/14/2025    AST 45 (H) 04/14/2025    ALT 79 (H) 04/14/2025     Assessment & Plan       ASSESSMENT and plans:       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  Rule out lymphoproliferative disease.  Peripheral blood for flow cytometry shows increased  kappa lambda ratio detected rare phenotypic aberrancy of the neutrophils as well as monocytes, possibility of a myeloid disorder was also entertained.  PNH screen was negative and G6PD was not deficient.  For now patient will continue on steroids.  Hemoglobin today 10.2 down from 11.6 last week.  Will continue prednisone 10 mg daily for 1 more week and follow-up with PCP.  Continue folate replacement.  Continue PPI.  She is dependent on prednisone therefore would consider additional treatment with Rituxan to see if we will be able to completely resolve the hemolysis.  Currently week 4 of Rituxan today.    Unfortunately patient has relapsed with worsening Anemia: I have recommended Rituxan.  Currently getting Rituxan so far she is doing well.  Continue on prednisone 10 mg daily follow-up: Weekly CBC and office visit.  Currently receiving today week 4 of Rituxan.  Hemoglobin unfortunately down to 10.2 from last week at 11.6.    Recurrent iron deficiency: Continue oral iron supplementation  Status post bone marrow aspiration and biopsy which was essentially unremarkable  Folate deficiency: Continue folic acid  History of seizures  History of rectal bleeding : Recent  colonoscopy by Dr Vinny Lagos .  She no longer has rectal bleeding and she has completed her course of iron.  Follow-up with GI encouraged.  She has an appointment coming up next week with GI.  She denies rectal bleeding at this time.  Possible sensitivity reaction to Venofer: Patient's epigastric chest pain complaints seem to predate the infusion but she does also complain of itching on the bilateral forearms during the infusion.  Venofer was stopped and normal saline was ran along with 25 mg Benadryl IV given with resolution of the symptoms.          Plans     Continue Iron sulphate 325mg po daily #30 refills ,   Continue folic acid 1mg po daily #30 refill,   Continue  protonix.   Continue Rituxan 375 mg per square meter of body surface area  intravenously (375 mg/m2) weekly for four weeks for hemolytic anemia.  Today she is here for dose 4 Rituxan treatments  Maintain prednisone at 10mg po daily till next week .  If hemoglobin is between 11 and 12 g, will switch prednisone to 10 mg every other day.  Follow-up appointment next week.    Reviewed The benefits the side effects of Rituxan in detail.  Answered all her questions to her satisfaction.  Her mother was also present during this evaluation    Viral hepatitis panel, [facility] screen iron studies with ferritin were within normal limits  Patient was given information on Rituxan to review understands   CBC reviewed  Continue PPI  Discussed the benefits and side effects of Rituxan in detail with patient  All questions answered    Patient verbalized understanding and is in agreement of the above plan.      . Time spent on encounter including record review, history taking, exam, discussion, counseling and documentation at: 30 minutes

## 2025-05-02 DIAGNOSIS — D59.30 HEMOLYTIC-UREMIC SYNDROME, UNSPECIFIED SUBTYPE: Primary | ICD-10-CM

## 2025-05-05 ENCOUNTER — HOSPITAL ENCOUNTER (OUTPATIENT)
Dept: ONCOLOGY | Facility: HOSPITAL | Age: 30
Discharge: HOME | End: 2025-05-05
Admitting: INTERNAL MEDICINE
Payer: COMMERCIAL

## 2025-05-05 ENCOUNTER — OFFICE VISIT (OUTPATIENT)
Dept: ONCOLOGY | Facility: CLINIC | Age: 30
End: 2025-05-05
Payer: COMMERCIAL

## 2025-05-05 VITALS
WEIGHT: 269 LBS | BODY MASS INDEX: 54.23 KG/M2 | OXYGEN SATURATION: 96 % | HEART RATE: 90 BPM | SYSTOLIC BLOOD PRESSURE: 131 MMHG | RESPIRATION RATE: 16 BRPM | TEMPERATURE: 97.3 F | HEIGHT: 59 IN | DIASTOLIC BLOOD PRESSURE: 84 MMHG

## 2025-05-05 VITALS
SYSTOLIC BLOOD PRESSURE: 140 MMHG | BODY MASS INDEX: 54.23 KG/M2 | HEART RATE: 114 BPM | HEIGHT: 59 IN | WEIGHT: 269 LBS | TEMPERATURE: 97.3 F | DIASTOLIC BLOOD PRESSURE: 75 MMHG | OXYGEN SATURATION: 96 %

## 2025-05-05 DIAGNOSIS — D59.10 AUTOIMMUNE HEMOLYTIC ANEMIA: Primary | ICD-10-CM

## 2025-05-05 DIAGNOSIS — D59.30 HEMOLYTIC-UREMIC SYNDROME, UNSPECIFIED SUBTYPE: Primary | ICD-10-CM

## 2025-05-05 LAB
BASOPHILS # BLD AUTO: 0.05 10*3/MM3 (ref 0–0.2)
BASOPHILS NFR BLD AUTO: 0.5 % (ref 0–1.5)
DEPRECATED RDW RBC AUTO: 50 FL (ref 37–54)
EOSINOPHIL # BLD AUTO: 0.28 10*3/MM3 (ref 0–0.4)
EOSINOPHIL NFR BLD AUTO: 2.7 % (ref 0.3–6.2)
ERYTHROCYTE [DISTWIDTH] IN BLOOD BY AUTOMATED COUNT: 14.6 % (ref 12.3–15.4)
HCT VFR BLD AUTO: 32.5 % (ref 34–46.6)
HGB BLD-MCNC: 10.2 G/DL (ref 12–15.9)
LYMPHOCYTES # BLD AUTO: 1.58 10*3/MM3 (ref 0.7–3.1)
LYMPHOCYTES NFR BLD AUTO: 15 % (ref 19.6–45.3)
MCH RBC QN AUTO: 31.7 PG (ref 26.6–33)
MCHC RBC AUTO-ENTMCNC: 31.4 G/DL (ref 31.5–35.7)
MCV RBC AUTO: 100.9 FL (ref 79–97)
MONOCYTES # BLD AUTO: 0.69 10*3/MM3 (ref 0.1–0.9)
MONOCYTES NFR BLD AUTO: 6.5 % (ref 5–12)
NEUTROPHILS NFR BLD AUTO: 7.95 10*3/MM3 (ref 1.7–7)
NEUTROPHILS NFR BLD AUTO: 75.3 % (ref 42.7–76)
PLATELET # BLD AUTO: 352 10*3/MM3 (ref 140–450)
PMV BLD AUTO: 8.8 FL (ref 6–12)
RBC # BLD AUTO: 3.22 10*6/MM3 (ref 3.77–5.28)
WBC NRBC COR # BLD AUTO: 10.55 10*3/MM3 (ref 3.4–10.8)

## 2025-05-05 PROCEDURE — 85025 COMPLETE CBC W/AUTO DIFF WBC: CPT | Performed by: INTERNAL MEDICINE

## 2025-05-05 PROCEDURE — 25010000002 RITUXIMAB-PVVR 100 MG/10ML SOLUTION 10 ML VIAL: Performed by: INTERNAL MEDICINE

## 2025-05-05 PROCEDURE — 96375 TX/PRO/DX INJ NEW DRUG ADDON: CPT

## 2025-05-05 PROCEDURE — 25010000002 RITUXIMAB-PVVR 500 MG/50ML SOLUTION 50 ML VIAL: Performed by: INTERNAL MEDICINE

## 2025-05-05 PROCEDURE — 96415 CHEMO IV INFUSION ADDL HR: CPT

## 2025-05-05 PROCEDURE — 25810000003 SODIUM CHLORIDE 0.9 % SOLUTION 250 ML FLEX CONT: Performed by: INTERNAL MEDICINE

## 2025-05-05 PROCEDURE — 99214 OFFICE O/P EST MOD 30 MIN: CPT | Performed by: NURSE PRACTITIONER

## 2025-05-05 PROCEDURE — 25810000003 SODIUM CHLORIDE 0.9 % SOLUTION: Performed by: INTERNAL MEDICINE

## 2025-05-05 PROCEDURE — 25010000002 DIPHENHYDRAMINE PER 50 MG: Performed by: INTERNAL MEDICINE

## 2025-05-05 PROCEDURE — 96413 CHEMO IV INFUSION 1 HR: CPT

## 2025-05-05 RX ORDER — DIPHENHYDRAMINE HYDROCHLORIDE 50 MG/ML
25 INJECTION, SOLUTION INTRAMUSCULAR; INTRAVENOUS ONCE
Status: COMPLETED | OUTPATIENT
Start: 2025-05-05 | End: 2025-05-05

## 2025-05-05 RX ORDER — SODIUM CHLORIDE 9 MG/ML
20 INJECTION, SOLUTION INTRAVENOUS ONCE
Status: COMPLETED | OUTPATIENT
Start: 2025-05-05 | End: 2025-05-05

## 2025-05-05 RX ORDER — ACETAMINOPHEN 325 MG/1
650 TABLET ORAL ONCE
Status: COMPLETED | OUTPATIENT
Start: 2025-05-05 | End: 2025-05-05

## 2025-05-05 RX ADMIN — SODIUM CHLORIDE 770 MG: 9 INJECTION, SOLUTION INTRAVENOUS at 09:10

## 2025-05-05 RX ADMIN — DIPHENHYDRAMINE HYDROCHLORIDE 25 MG: 50 INJECTION INTRAMUSCULAR; INTRAVENOUS at 08:44

## 2025-05-05 RX ADMIN — ACETAMINOPHEN 650 MG: 325 TABLET ORAL at 08:44

## 2025-05-05 RX ADMIN — SODIUM CHLORIDE 20 ML/HR: 9 INJECTION, SOLUTION INTRAVENOUS at 08:44

## 2025-05-07 ENCOUNTER — TELEPHONE (OUTPATIENT)
Dept: ONCOLOGY | Facility: CLINIC | Age: 30
End: 2025-05-07
Payer: COMMERCIAL

## 2025-05-09 NOTE — PROGRESS NOTES
Hematology/Oncology Outpatient Follow Up    PATIENT NAME:Arlene Brady  :1995  MRN: [id number]  PRIMARY CARE PHYSICIAN: Daniel Sam MD  REFERRING PHYSICIAN: Daniel Sam MD    Chief Complaint   Patient presents with    Follow-up     Autoimmune hemolytic anemia              HISTORY OF PRESENT ILLNESS:       This is a 28 year old female has been referred secondary to anemia.  Patient has a longtime history of anemia.  She has rectal bleeding and had colonoscopy done a few days ago by Dr Vinny Lagos . She has internal and external hemorrhoids. She has a follow up with Dr Lagos.     Patient is amenorrheic for about 6 months.  She had history of menorrhagia. She is on hormone pills to regulate her cycles.   She has low energy, she was on oral iron supplements for a month. She has since ran out of her iron tablets.     Review of her CBCs indicate that she has had anemia with hemoglobin ranging between 8 and 12.3 g per DL.  Today her white count is 8, hemoglobin is 8, MCV is 101 and platelets are 352 with essentially unremarkable differentials.     She denies having blood in her urine     She is single, She is a CNA     Patient does not smoke and drinks occasionally     There is no family history of hematological problems.     Her mother had colon cancer,      2023: Methylmalonic acid level was normal at 343 patient had anemia work-up including a ferritin level which was 167, C-reactive protein was high at 1.96 BUN was 13, creatinine 0.9 LDH was 323 iron profile showed a elevated serum iron and iron saturation, haptoglobin was normal, reticulocyte count was elevated to 16 she has low folate at 3.1  2023: WBC 11.62, hemoglobin 8.0 g/dL, .9, platelets 353,000.  Flow cytometry showed an increased kappa lambda ratio, neutrophils with left shifted maturation and rare phenotypic aberrancy, monocytes with phenotypic aberrancy.  Bone marrow biopsy with molecular and cytogenetic studies  indicated to evaluate for myeloid neoplasm.  8/16/2023 CT of the neck, chest, abdomen and pelvis with contrast showed no evidence of definite pathologic lymphadenopathy concerning for lymphoproliferative disorder.  No acute findings present in the neck, chest, abdomen or pelvis.  8/25/2023: Patient had bone marrow aspiration and biopsy which basically showed normocellular bone marrow for age, decreased iron stores, negative for involvement by malignant lymphoma or metastatic malignancy.  Flow cytometry was unremarkable with no immunophenotypic abnormalities noted.  Cytogenetics showed a normal female karyotype  4/14/25: Patient was initiated on Rituxan weekly x 4 weeks      Past Medical History:   Diagnosis Date    ADHD (attention deficit hyperactivity disorder)     Anxiety     Arthritis     Asthma     exercise induced asthma     Depression     Epilepsy     History of blood transfusion     Seizures        Past Surgical History:   Procedure Laterality Date    ABDOMINAL SURGERY      choley    TONSILLECTOMY           Current Outpatient Medications:     albuterol sulfate  (90 Base) MCG/ACT inhaler, inhale 2 puffs by mouth every 4 hours (Patient not taking: Reported on 5/12/2025), Disp: , Rfl:     ferrous sulfate 325 (65 FE) MG tablet, Take 1 tablet by mouth Daily With Breakfast., Disp: 30 tablet, Rfl: 3    fexofenadine (ALLEGRA) 180 MG tablet, Take 1 tablet by mouth Daily., Disp: , Rfl:     Flonase Allergy Relief 50 MCG/ACT nasal spray, = 2 spray(s), Nostril-Both, Daily, # 16 gm, 0 Refill(s), Pharmacy: [facility]/pharmacy #[id number], 2 spray(s) Nostril-Both Daily, 149, cm, 08/13/24 14:31:00 EDT, Height, 118.8, kg, 08/13/24 14:35:00 EDT, Weight Dosing, Disp: , Rfl:     folic acid (FOLVITE) 1 MG tablet, TAKE 1 TABLET BY MOUTH EVERY DAY, Disp: 90 tablet, Rfl: 0    folic acid (FOLVITE) 1 MG tablet, Take 1 tablet by mouth Daily., Disp: 30 tablet, Rfl: 2    Junel 1/20 1-20 MG-MCG per tablet, Take 1 tablet by mouth Every Evening.,  Disp: , Rfl:     nystatin-triamcinolone (MYCOLOG) 452233-0.1 UNIT/GM-% ointment, APPLY 1 APPLICATION TOPICALLY TWICE DAILY FOR 21 DAYS, Disp: , Rfl:     ondansetron (ZOFRAN) 8 MG tablet, Take 1 tablet by mouth 3 (Three) Times a Day As Needed for Nausea or Vomiting., Disp: 30 tablet, Rfl: 3    OXcarbazepine (TRILEPTAL) 300 MG tablet, Take 1.5 tablets by mouth 2 (Two) Times a Day., Disp: , Rfl:     pantoprazole (PROTONIX) 40 MG EC tablet, TAKE 1 TABLET BY MOUTH EVERY DAY, Disp: 90 tablet, Rfl: 1    predniSONE (DELTASONE) 10 MG tablet, TAKE 3 TABLETS BY MOUTH 2 (TWO) TIMES A DAY. TAKE WITH FOOD. (Patient taking differently: Take 3 tablets by mouth 2 (Two) Times a Day. Take with food.  Taking 10mg daily), Disp: 180 tablet, Rfl: 0    silver sulfadiazine (SILVADENE, SSD) 1 % cream, APPLY 1 APPLICATION TOPCIALLY TWICE A DAY FOR 30 DAYS, Disp: , Rfl:     Allergies   Allergen Reactions    Venofer [Iron Sucrose] Unknown - Low Severity    Cephalosporins Rash    Penicillins Rash       Family History   Problem Relation Age of Onset    Hypertension Father     Heart disease Father     Hyperlipidemia Father     Cancer Paternal Grandmother        Cancer-related family history includes Cancer in her paternal grandmother.    Social History     Tobacco Use    Smoking status: Never     Passive exposure: Never    Smokeless tobacco: Never   Vaping Use    Vaping status: Never Used   Substance Use Topics    Alcohol use: Yes     Comment: very infrequent     Drug use: Never       I have reviewed and confirmed the accuracy of the patient's history: Chief complaint, HPI, ROS, and Subjective as entered by the MA/LPN/RN. Bronwyn Burns MD 05/12/25      SUBJECTIVE:    Patient is receiving her third week of Rituxan.  She is currently on prednisone 10 mg twice a day.  Hemoglobin is stable at 11.6 g per DL    5/5/25 Patient is receiving her 4th week of Rituxan. I was asked to see patient during her infusion. She denies any issues. She is  "currently on Prednisone 10 mg daily.  Hemoglobin today 10.2 down from 11.6 last week.She is accompanied by her mother.       Denies any acute issues.  She is here today for routine follow-up.  She has completed 4 weeks of weekly Rituxan.      Arlene Brady reports a pain score of 0.0  Given her pain assessment as noted, treatment options were discussed and the following options were decided upon as a follow-up plan to address the patient's pain:  Continue current management by her primary care .           REVIEW OF SYSTEMS:      Review of Systems   Constitutional:  Negative for chills and fever.   HENT:  Negative for congestion, drooling, ear discharge, rhinorrhea, sinus pressure and tinnitus.    Eyes:  Negative for photophobia, pain and discharge.   Respiratory:  Negative for apnea, choking and stridor.    Cardiovascular:  Negative for palpitations.   Gastrointestinal:  Negative for abdominal distention, abdominal pain and anal bleeding.   Endocrine: Negative for polydipsia and polyphagia.   Genitourinary:  Negative for decreased urine volume, flank pain and genital sores.   Musculoskeletal:  Negative for gait problem, neck pain and neck stiffness.   Skin:  Negative for color change, rash and wound.   Neurological:  Negative for tremors, seizures, syncope, facial asymmetry and speech difficulty.   Hematological:  Negative for adenopathy.   Psychiatric/Behavioral:  Negative for agitation, confusion, hallucinations and self-injury. The patient is not hyperactive.        Per subjective    OBJECTIVE:    Vitals:    05/12/25 1434   BP: 111/59   Pulse: 97   Resp: 20   Temp: 97.7 °F (36.5 °C)   TempSrc: Temporal   SpO2: 100%   Weight: 122 kg (270 lb)   Height: 149.9 cm (59\")   PainSc: 0-No pain                           Body mass index is 54.53 kg/m².    ECOG  (1) Restricted in physically strenuous activity, ambulatory and able to do work of light nature    Physical Exam  Vitals reviewed.   Constitutional:       " General: She is not in acute distress.     Appearance: Normal appearance. She is not ill-appearing, toxic-appearing or diaphoretic.   HENT:      Head: Normocephalic and atraumatic.      Right Ear: External ear normal.      Left Ear: External ear normal.      Nose: Nose normal.      Mouth/Throat:      Mouth: Mucous membranes are moist.   Eyes:      Extraocular Movements: Extraocular movements intact.   Cardiovascular:      Rate and Rhythm: Normal rate and regular rhythm.      Heart sounds: No murmur heard.  Pulmonary:      Effort: Pulmonary effort is normal. No respiratory distress.      Breath sounds: Normal breath sounds. No stridor. No wheezing.   Abdominal:      General: Bowel sounds are normal.      Palpations: Abdomen is soft.   Musculoskeletal:         General: Normal range of motion.      Cervical back: Normal range of motion.   Skin:     General: Skin is warm and dry.      Findings: No rash.   Neurological:      Mental Status: She is alert and oriented to person, place, and time.   Psychiatric:         Mood and Affect: Mood normal.         Behavior: Behavior normal.         Thought Content: Thought content normal.       I have reexamined the patient and the results are consistent with the previously documented exam. Bronwyn Burns MD        RECENT LABS    WBC   Date Value Ref Range Status   05/12/2025 10.69 3.40 - 10.80 10*3/mm3 Final     RBC   Date Value Ref Range Status   05/12/2025 2.89 (L) 3.77 - 5.28 10*6/mm3 Final   07/31/2023 2.80 (L) 3.77 - 5.28 x10E6/uL Final     Hemoglobin   Date Value Ref Range Status   05/12/2025 9.1 (L) 12.0 - 15.9 g/dL Final     Hematocrit   Date Value Ref Range Status   05/12/2025 29.7 (L) 34.0 - 46.6 % Final     MCV   Date Value Ref Range Status   05/12/2025 102.8 (H) 79.0 - 97.0 fL Final     MCH   Date Value Ref Range Status   05/12/2025 31.5 26.6 - 33.0 pg Final     MCHC   Date Value Ref Range Status   05/12/2025 30.6 (L) 31.5 - 35.7 g/dL Final     RDW   Date Value  Ref Range Status   05/12/2025 16.6 (H) 12.3 - 15.4 % Final     RDW-SD   Date Value Ref Range Status   05/12/2025 56.8 (H) 37.0 - 54.0 fl Final     MPV   Date Value Ref Range Status   05/12/2025 8.9 6.0 - 12.0 fL Final     Platelets   Date Value Ref Range Status   05/12/2025 343 140 - 450 10*3/mm3 Final     Neutrophil %   Date Value Ref Range Status   05/12/2025 77.0 (H) 42.7 - 76.0 % Final     Lymphocyte %   Date Value Ref Range Status   05/12/2025 14.3 (L) 19.6 - 45.3 % Final     Monocyte %   Date Value Ref Range Status   05/12/2025 5.8 5.0 - 12.0 % Final     Eosinophil %   Date Value Ref Range Status   05/12/2025 2.4 0.3 - 6.2 % Final     Basophil %   Date Value Ref Range Status   05/12/2025 0.5 0.0 - 1.5 % Final     Immature Grans %   Date Value Ref Range Status   10/09/2020 1.4 (H) 0.0 - 0.5 % Final     Neutrophils, Absolute   Date Value Ref Range Status   05/12/2025 8.23 (H) 1.70 - 7.00 10*3/mm3 Final     Lymphocytes, Absolute   Date Value Ref Range Status   05/12/2025 1.53 0.70 - 3.10 10*3/mm3 Final     Monocytes, Absolute   Date Value Ref Range Status   05/12/2025 0.62 0.10 - 0.90 10*3/mm3 Final     Eosinophils, Absolute   Date Value Ref Range Status   05/12/2025 0.26 0.00 - 0.40 10*3/mm3 Final     Basophils, Absolute   Date Value Ref Range Status   05/12/2025 0.05 0.00 - 0.20 10*3/mm3 Final     Immature Grans, Absolute   Date Value Ref Range Status   10/09/2020 0.09 (H) 0.00 - 0.05 10*3/mm3 Final     nRBC   Date Value Ref Range Status   02/23/2024 0.1 0.0 - 0.2 /100 WBC Final       Lab Results   Component Value Date    GLUCOSE 184 (H) 04/14/2025    BUN 25 (H) 04/14/2025    CREATININE 0.82 04/14/2025    EGFRIFNONA 88 07/26/2021    BCR 30.5 (H) 04/14/2025    K 4.2 04/14/2025    CO2 22.6 04/14/2025    CALCIUM 8.4 (L) 04/14/2025    ALBUMIN 4.0 04/14/2025    AST 45 (H) 04/14/2025    ALT 79 (H) 04/14/2025     Assessment & Plan       ASSESSMENT and plans:       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  Rule out lymphoproliferative disease.  Peripheral blood for flow cytometry shows increased kappa lambda ratio detected rare phenotypic aberrancy of the neutrophils as well as monocytes, possibility of a myeloid disorder was also entertained.  PNH screen was negative and G6PD was not deficient.  For now patient will continue on steroids.  Hemoglobin today 10.2 down from 11.6 last week.  Will continue prednisone 10 mg daily for 1 more week and follow-up with PCP.  Continue folate replacement.  Continue PPI.  She is dependent on prednisone therefore would consider additional treatment with Rituxan to see if we will be able to completely resolve the hemolysis.  Completed 4 weeks of Rituxan  Unfortunately patient has relapsed with worsening Anemia: I have recommended Rituxan.  Currently getting Rituxan so far she is doing well.  Continue on prednisone 10 mg daily follow-up: Weekly CBC and office visit.  Currently receiving today week 4 of Rituxan.  Hemoglobin unfortunately down to 10.2 from last week at 11.6.    Recurrent iron deficiency: Continue oral iron supplementation  Status post bone marrow aspiration and biopsy which was essentially unremarkable  Folate deficiency: Continue folic acid replacement  History of seizures  History of rectal bleeding : Recent  colonoscopy by Dr Vinny Lagos .  She no longer has rectal bleeding and she has completed her course of iron.  Follow-up with GI encouraged.  She has an appointment coming up next week with GI.  She denies rectal bleeding at this time.  Possible sensitivity reaction to Venofer: Patient's epigastric chest pain complaints seem to predate the infusion but she does also complain of itching on the bilateral forearms during the infusion.  Venofer was stopped and normal saline was ran along with 25 mg Benadryl IV given with resolution of the symptoms.          Plans     Continue Iron sulphate 325mg po daily #30 refills ,    Continue folic acid 1mg po daily #30 refill,   Continue protonix.   Haptoglobin, Ldh , retic count, CMP today, continue weekly cbc s and I will see her back in 3 weeks. 5/12/25   Continue Rituxan 375 mg per square meter of body surface area intravenously (375 mg/m2) weekly for four weeks for hemolytic anemia.  Today she is here for dose 4 Rituxan treatments  Maintain prednisone at 10mg po daily till next week .  If hemoglobin is between 11 and 12 g, will switch prednisone to 10 mg every other day.  Follow-up appointment next week.    Reviewed The benefits the side effects of Rituxan in detail.  Answered all her questions to her satisfaction.  Her mother was also present during this evaluation    Viral hepatitis panel, P[facility] screen iron studies with ferritin were within normal limits  Patient was given information on Rituxan to review understands   CBC reviewed  Continue PPI  Discussed the benefits and side effects of Rituxan in detail with patient  All questions answered    Patient verbalized understanding and is in agreement of the above plan.      Electronically signed by Bronwyn Burns MD, 05/12/25, 5:03 PM EDT.

## 2025-05-12 ENCOUNTER — LAB (OUTPATIENT)
Dept: LAB | Facility: HOSPITAL | Age: 30
End: 2025-05-12
Payer: COMMERCIAL

## 2025-05-12 ENCOUNTER — OFFICE VISIT (OUTPATIENT)
Dept: ONCOLOGY | Facility: CLINIC | Age: 30
End: 2025-05-12
Payer: COMMERCIAL

## 2025-05-12 VITALS
BODY MASS INDEX: 54.43 KG/M2 | HEIGHT: 59 IN | TEMPERATURE: 97.7 F | WEIGHT: 270 LBS | OXYGEN SATURATION: 100 % | SYSTOLIC BLOOD PRESSURE: 111 MMHG | RESPIRATION RATE: 20 BRPM | DIASTOLIC BLOOD PRESSURE: 59 MMHG | HEART RATE: 97 BPM

## 2025-05-12 DIAGNOSIS — D59.10 AUTOIMMUNE HEMOLYTIC ANEMIA: Primary | ICD-10-CM

## 2025-05-12 DIAGNOSIS — D59.30 HEMOLYTIC-UREMIC SYNDROME, UNSPECIFIED SUBTYPE: ICD-10-CM

## 2025-05-12 DIAGNOSIS — D59.10 AUTOIMMUNE HEMOLYTIC ANEMIA: ICD-10-CM

## 2025-05-12 LAB
BASOPHILS # BLD AUTO: 0.05 10*3/MM3 (ref 0–0.2)
BASOPHILS NFR BLD AUTO: 0.5 % (ref 0–1.5)
DEPRECATED RDW RBC AUTO: 56.8 FL (ref 37–54)
EOSINOPHIL # BLD AUTO: 0.26 10*3/MM3 (ref 0–0.4)
EOSINOPHIL NFR BLD AUTO: 2.4 % (ref 0.3–6.2)
ERYTHROCYTE [DISTWIDTH] IN BLOOD BY AUTOMATED COUNT: 16.6 % (ref 12.3–15.4)
HCT VFR BLD AUTO: 29.7 % (ref 34–46.6)
HGB BLD-MCNC: 9.1 G/DL (ref 12–15.9)
LYMPHOCYTES # BLD AUTO: 1.53 10*3/MM3 (ref 0.7–3.1)
LYMPHOCYTES NFR BLD AUTO: 14.3 % (ref 19.6–45.3)
MCH RBC QN AUTO: 31.5 PG (ref 26.6–33)
MCHC RBC AUTO-ENTMCNC: 30.6 G/DL (ref 31.5–35.7)
MCV RBC AUTO: 102.8 FL (ref 79–97)
MONOCYTES # BLD AUTO: 0.62 10*3/MM3 (ref 0.1–0.9)
MONOCYTES NFR BLD AUTO: 5.8 % (ref 5–12)
NEUTROPHILS NFR BLD AUTO: 77 % (ref 42.7–76)
NEUTROPHILS NFR BLD AUTO: 8.23 10*3/MM3 (ref 1.7–7)
PLATELET # BLD AUTO: 343 10*3/MM3 (ref 140–450)
PMV BLD AUTO: 8.9 FL (ref 6–12)
RBC # BLD AUTO: 2.89 10*6/MM3 (ref 3.77–5.28)
WBC NRBC COR # BLD AUTO: 10.69 10*3/MM3 (ref 3.4–10.8)

## 2025-05-12 PROCEDURE — 36415 COLL VENOUS BLD VENIPUNCTURE: CPT

## 2025-05-12 PROCEDURE — 99214 OFFICE O/P EST MOD 30 MIN: CPT | Performed by: INTERNAL MEDICINE

## 2025-05-12 PROCEDURE — 85025 COMPLETE CBC W/AUTO DIFF WBC: CPT

## 2025-05-20 ENCOUNTER — LAB (OUTPATIENT)
Dept: LAB | Facility: HOSPITAL | Age: 30
End: 2025-05-20
Payer: COMMERCIAL

## 2025-05-20 DIAGNOSIS — D59.10 AUTOIMMUNE HEMOLYTIC ANEMIA: ICD-10-CM

## 2025-05-20 LAB
ALBUMIN SERPL-MCNC: 4.4 G/DL (ref 3.5–5.2)
ALBUMIN/GLOB SERPL: 1.7 G/DL
ALP SERPL-CCNC: 64 U/L (ref 39–117)
ALT SERPL W P-5'-P-CCNC: 33 U/L (ref 1–33)
ANION GAP SERPL CALCULATED.3IONS-SCNC: 13.1 MMOL/L (ref 5–15)
AST SERPL-CCNC: 34 U/L (ref 1–32)
BILIRUB SERPL-MCNC: 0.9 MG/DL (ref 0–1.2)
BUN SERPL-MCNC: 13 MG/DL (ref 6–20)
BUN/CREAT SERPL: 18.6 (ref 7–25)
CALCIUM SPEC-SCNC: 9.3 MG/DL (ref 8.6–10.5)
CHLORIDE SERPL-SCNC: 104 MMOL/L (ref 98–107)
CO2 SERPL-SCNC: 23.9 MMOL/L (ref 22–29)
CREAT SERPL-MCNC: 0.7 MG/DL (ref 0.57–1)
EGFRCR SERPLBLD CKD-EPI 2021: 121 ML/MIN/1.73
GLOBULIN UR ELPH-MCNC: 2.6 GM/DL
GLUCOSE SERPL-MCNC: 108 MG/DL (ref 65–99)
HAPTOGLOB SERPL-MCNC: 108 MG/DL (ref 30–200)
LDH SERPL-CCNC: 452 U/L (ref 135–214)
POTASSIUM SERPL-SCNC: 4 MMOL/L (ref 3.5–5.2)
PROT SERPL-MCNC: 7 G/DL (ref 6–8.5)
RETICS # AUTO: 0.49 10*6/MM3 (ref 0.02–0.13)
RETICS/RBC NFR AUTO: 17.63 % (ref 0.7–1.9)
SODIUM SERPL-SCNC: 141 MMOL/L (ref 136–145)

## 2025-05-20 PROCEDURE — 83615 LACTATE (LD) (LDH) ENZYME: CPT | Performed by: INTERNAL MEDICINE

## 2025-05-20 PROCEDURE — 36415 COLL VENOUS BLD VENIPUNCTURE: CPT

## 2025-05-20 PROCEDURE — 80053 COMPREHEN METABOLIC PANEL: CPT | Performed by: INTERNAL MEDICINE

## 2025-05-20 PROCEDURE — 83010 ASSAY OF HAPTOGLOBIN QUANT: CPT | Performed by: INTERNAL MEDICINE

## 2025-05-20 PROCEDURE — 85045 AUTOMATED RETICULOCYTE COUNT: CPT | Performed by: INTERNAL MEDICINE

## 2025-05-21 DIAGNOSIS — D64.9 ANEMIA, UNSPECIFIED TYPE: ICD-10-CM

## 2025-05-21 RX ORDER — PANTOPRAZOLE SODIUM 40 MG/1
40 TABLET, DELAYED RELEASE ORAL DAILY
Qty: 90 TABLET | Refills: 1 | Status: SHIPPED | OUTPATIENT
Start: 2025-05-21

## 2025-05-22 DIAGNOSIS — D59.30 HEMOLYTIC-UREMIC SYNDROME, UNSPECIFIED SUBTYPE: ICD-10-CM

## 2025-05-22 DIAGNOSIS — D50.0 IRON DEFICIENCY ANEMIA DUE TO CHRONIC BLOOD LOSS: ICD-10-CM

## 2025-05-22 DIAGNOSIS — D50.9 IRON DEFICIENCY ANEMIA, UNSPECIFIED IRON DEFICIENCY ANEMIA TYPE: ICD-10-CM

## 2025-05-22 DIAGNOSIS — D59.10 AUTOIMMUNE HEMOLYTIC ANEMIA: Primary | ICD-10-CM

## 2025-05-23 ENCOUNTER — LAB (OUTPATIENT)
Dept: LAB | Facility: HOSPITAL | Age: 30
End: 2025-05-23
Payer: COMMERCIAL

## 2025-05-23 DIAGNOSIS — D59.30 HEMOLYTIC-UREMIC SYNDROME, UNSPECIFIED SUBTYPE: ICD-10-CM

## 2025-05-23 DIAGNOSIS — D59.10 AUTOIMMUNE HEMOLYTIC ANEMIA: ICD-10-CM

## 2025-05-23 DIAGNOSIS — D50.0 IRON DEFICIENCY ANEMIA DUE TO CHRONIC BLOOD LOSS: ICD-10-CM

## 2025-05-23 DIAGNOSIS — D59.10 AUTOIMMUNE HEMOLYTIC ANEMIA: Primary | ICD-10-CM

## 2025-05-23 DIAGNOSIS — D50.9 IRON DEFICIENCY ANEMIA, UNSPECIFIED IRON DEFICIENCY ANEMIA TYPE: ICD-10-CM

## 2025-05-23 LAB
BASOPHILS # BLD AUTO: 0.06 10*3/MM3 (ref 0–0.2)
BASOPHILS NFR BLD AUTO: 0.5 % (ref 0–1.5)
DAT C3: NEGATIVE
DAT IGG-SP REAG RBC-IMP: POSITIVE
DAT POLY-SP REAG RBC QL: POSITIVE
DEPRECATED RDW RBC AUTO: 63.2 FL (ref 37–54)
EOSINOPHIL # BLD AUTO: 0.34 10*3/MM3 (ref 0–0.4)
EOSINOPHIL NFR BLD AUTO: 2.8 % (ref 0.3–6.2)
ERYTHROCYTE [DISTWIDTH] IN BLOOD BY AUTOMATED COUNT: 17.7 % (ref 12.3–15.4)
HCT VFR BLD AUTO: 30.2 % (ref 34–46.6)
HGB BLD-MCNC: 9 G/DL (ref 12–15.9)
LYMPHOCYTES # BLD AUTO: 1.8 10*3/MM3 (ref 0.7–3.1)
LYMPHOCYTES NFR BLD AUTO: 15 % (ref 19.6–45.3)
MCH RBC QN AUTO: 31.1 PG (ref 26.6–33)
MCHC RBC AUTO-ENTMCNC: 29.8 G/DL (ref 31.5–35.7)
MCV RBC AUTO: 104.5 FL (ref 79–97)
MONOCYTES # BLD AUTO: 0.82 10*3/MM3 (ref 0.1–0.9)
MONOCYTES NFR BLD AUTO: 6.8 % (ref 5–12)
NEUTROPHILS NFR BLD AUTO: 74.9 % (ref 42.7–76)
NEUTROPHILS NFR BLD AUTO: 8.98 10*3/MM3 (ref 1.7–7)
PLATELET # BLD AUTO: 340 10*3/MM3 (ref 140–450)
PMV BLD AUTO: 9.1 FL (ref 6–12)
RBC # BLD AUTO: 2.89 10*6/MM3 (ref 3.77–5.28)
WBC NRBC COR # BLD AUTO: 12 10*3/MM3 (ref 3.4–10.8)

## 2025-05-23 PROCEDURE — 36415 COLL VENOUS BLD VENIPUNCTURE: CPT

## 2025-05-23 PROCEDURE — 85025 COMPLETE CBC W/AUTO DIFF WBC: CPT

## 2025-05-23 PROCEDURE — 86880 COOMBS TEST DIRECT: CPT | Performed by: INTERNAL MEDICINE

## 2025-05-27 ENCOUNTER — TELEPHONE (OUTPATIENT)
Dept: ONCOLOGY | Facility: CLINIC | Age: 30
End: 2025-05-27
Payer: COMMERCIAL

## 2025-05-27 ENCOUNTER — LAB (OUTPATIENT)
Dept: LAB | Facility: HOSPITAL | Age: 30
End: 2025-05-27
Payer: COMMERCIAL

## 2025-05-27 DIAGNOSIS — D50.0 IRON DEFICIENCY ANEMIA DUE TO CHRONIC BLOOD LOSS: ICD-10-CM

## 2025-05-27 DIAGNOSIS — D50.9 IRON DEFICIENCY ANEMIA, UNSPECIFIED IRON DEFICIENCY ANEMIA TYPE: ICD-10-CM

## 2025-05-27 DIAGNOSIS — D59.30 HEMOLYTIC-UREMIC SYNDROME, UNSPECIFIED SUBTYPE: ICD-10-CM

## 2025-05-27 DIAGNOSIS — D59.10 AUTOIMMUNE HEMOLYTIC ANEMIA: ICD-10-CM

## 2025-05-27 LAB
BASOPHILS # BLD AUTO: 0.08 10*3/MM3 (ref 0–0.2)
BASOPHILS NFR BLD AUTO: 0.9 % (ref 0–1.5)
DEPRECATED RDW RBC AUTO: 59.9 FL (ref 37–54)
EOSINOPHIL # BLD AUTO: 0.24 10*3/MM3 (ref 0–0.4)
EOSINOPHIL NFR BLD AUTO: 2.6 % (ref 0.3–6.2)
ERYTHROCYTE [DISTWIDTH] IN BLOOD BY AUTOMATED COUNT: 16.7 % (ref 12.3–15.4)
HCT VFR BLD AUTO: 30.5 % (ref 34–46.6)
HGB BLD-MCNC: 8.8 G/DL (ref 12–15.9)
LYMPHOCYTES # BLD AUTO: 1.68 10*3/MM3 (ref 0.7–3.1)
LYMPHOCYTES NFR BLD AUTO: 18 % (ref 19.6–45.3)
MCH RBC QN AUTO: 30.4 PG (ref 26.6–33)
MCHC RBC AUTO-ENTMCNC: 28.9 G/DL (ref 31.5–35.7)
MCV RBC AUTO: 105.5 FL (ref 79–97)
MONOCYTES # BLD AUTO: 0.82 10*3/MM3 (ref 0.1–0.9)
MONOCYTES NFR BLD AUTO: 8.8 % (ref 5–12)
NEUTROPHILS NFR BLD AUTO: 6.52 10*3/MM3 (ref 1.7–7)
NEUTROPHILS NFR BLD AUTO: 69.7 % (ref 42.7–76)
PLATELET # BLD AUTO: 315 10*3/MM3 (ref 140–450)
PMV BLD AUTO: 8.4 FL (ref 6–12)
RBC # BLD AUTO: 2.89 10*6/MM3 (ref 3.77–5.28)
WBC NRBC COR # BLD AUTO: 9.34 10*3/MM3 (ref 3.4–10.8)

## 2025-05-27 PROCEDURE — 36415 COLL VENOUS BLD VENIPUNCTURE: CPT

## 2025-05-27 PROCEDURE — 85025 COMPLETE CBC W/AUTO DIFF WBC: CPT

## 2025-05-27 NOTE — TELEPHONE ENCOUNTER
SPOKE WITH PATIENT TO SCHEDULE HER A 3 WEEK FOLLOW UP WITH MD, PATIENT STATED THAT SHE WILL TALK TO HER MOM AND CALL BACK

## 2025-05-30 NOTE — PROGRESS NOTES
Hematology/Oncology Outpatient Follow Up    PATIENT NAME:Arlene Brady  :1995  MRN: [id number]  PRIMARY CARE PHYSICIAN: Dainel Sam MD  REFERRING PHYSICIAN: Daniel Sam MD    Chief Complaint   Patient presents with    Follow-up     Autoimmune hemolytic anemia              HISTORY OF PRESENT ILLNESS:       This is a 28 year old female has been referred secondary to anemia.  Patient has a longtime history of anemia.  She has rectal bleeding and had colonoscopy done a few days ago by Dr Vinny Lagos . She has internal and external hemorrhoids. She has a follow up with Dr Lagos.     Patient is amenorrheic for about 6 months.  She had history of menorrhagia. She is on hormone pills to regulate her cycles.   She has low energy, she was on oral iron supplements for a month. She has since ran out of her iron tablets.     Review of her CBCs indicate that she has had anemia with hemoglobin ranging between 8 and 12.3 g per DL.  Today her white count is 8, hemoglobin is 8, MCV is 101 and platelets are 352 with essentially unremarkable differentials.     She denies having blood in her urine     She is single, She is a CNA     Patient does not smoke and drinks occasionally     There is no family history of hematological problems.     Her mother had colon cancer,      2023: Methylmalonic acid level was normal at 343 patient had anemia work-up including a ferritin level which was 167, C-reactive protein was high at 1.96 BUN was 13, creatinine 0.9 LDH was 323 iron profile showed a elevated serum iron and iron saturation, haptoglobin was normal, reticulocyte count was elevated to 16 she has low folate at 3.1  2023: WBC 11.62, hemoglobin 8.0 g/dL, .9, platelets 353,000.  Flow cytometry showed an increased kappa lambda ratio, neutrophils with left shifted maturation and rare phenotypic aberrancy, monocytes with phenotypic aberrancy.  Bone marrow biopsy with molecular and cytogenetic studies  indicated to evaluate for myeloid neoplasm.  8/16/2023 CT of the neck, chest, abdomen and pelvis with contrast showed no evidence of definite pathologic lymphadenopathy concerning for lymphoproliferative disorder.  No acute findings present in the neck, chest, abdomen or pelvis.  8/25/2023: Patient had bone marrow aspiration and biopsy which basically showed normocellular bone marrow for age, decreased iron stores, negative for involvement by malignant lymphoma or metastatic malignancy.  Flow cytometry was unremarkable with no immunophenotypic abnormalities noted.  Cytogenetics showed a normal female karyotype  4/14/25: Patient was initiated on Rituxan weekly x 4 weeks      Past Medical History:   Diagnosis Date    ADHD (attention deficit hyperactivity disorder)     Anxiety     Arthritis     Asthma     exercise induced asthma     Depression     Epilepsy     History of blood transfusion     Seizures        Past Surgical History:   Procedure Laterality Date    ABDOMINAL SURGERY      choley    TONSILLECTOMY           Current Outpatient Medications:     mupirocin (BACTROBAN) 2 % ointment, APPLY 1 GM ON THE SKIN TWICE A DAY, Disp: , Rfl:     albuterol sulfate  (90 Base) MCG/ACT inhaler, inhale 2 puffs by mouth every 4 hours (Patient not taking: Reported on 4/14/2025), Disp: , Rfl:     ferrous sulfate 325 (65 FE) MG tablet, Take 1 tablet by mouth Daily With Breakfast., Disp: 30 tablet, Rfl: 3    fexofenadine (ALLEGRA) 180 MG tablet, Take 1 tablet by mouth Daily., Disp: , Rfl:     Flonase Allergy Relief 50 MCG/ACT nasal spray, = 2 spray(s), Nostril-Both, Daily, # 16 gm, 0 Refill(s), Pharmacy: [facility]/pharmacy #[id number], 2 spray(s) Nostril-Both Daily, 149, cm, 08/13/24 14:31:00 EDT, Height, 118.8, kg, 08/13/24 14:35:00 EDT, Weight Dosing, Disp: , Rfl:     folic acid (FOLVITE) 1 MG tablet, TAKE 1 TABLET BY MOUTH EVERY DAY, Disp: 90 tablet, Rfl: 0    folic acid (FOLVITE) 1 MG tablet, Take 1 tablet by mouth Daily., Disp: 30  tablet, Rfl: 2    Junel 1/20 1-20 MG-MCG per tablet, Take 1 tablet by mouth Every Evening., Disp: , Rfl:     nystatin-triamcinolone (MYCOLOG) 735201-6.1 UNIT/GM-% ointment, APPLY 1 APPLICATION TOPICALLY TWICE DAILY FOR 21 DAYS, Disp: , Rfl:     ondansetron (ZOFRAN) 8 MG tablet, Take 1 tablet by mouth 3 (Three) Times a Day As Needed for Nausea or Vomiting., Disp: 30 tablet, Rfl: 3    OXcarbazepine (TRILEPTAL) 300 MG tablet, Take 1.5 tablets by mouth 2 (Two) Times a Day., Disp: , Rfl:     pantoprazole (PROTONIX) 40 MG EC tablet, TAKE 1 TABLET BY MOUTH EVERY DAY, Disp: 90 tablet, Rfl: 1    predniSONE (DELTASONE) 10 MG tablet, TAKE 3 TABLETS BY MOUTH 2 (TWO) TIMES A DAY. TAKE WITH FOOD. (Patient taking differently: Take 3 tablets by mouth 2 (Two) Times a Day. Take with food.  Taking 10mg daily), Disp: 180 tablet, Rfl: 0    silver sulfadiazine (SILVADENE, SSD) 1 % cream, APPLY 1 APPLICATION TOPCIALLY TWICE A DAY FOR 30 DAYS, Disp: , Rfl:     Allergies   Allergen Reactions    Venofer [Iron Sucrose] Unknown - Low Severity    Cephalosporins Rash    Penicillins Rash       Family History   Problem Relation Age of Onset    Hypertension Father     Heart disease Father     Hyperlipidemia Father     Cancer Paternal Grandmother        Cancer-related family history includes Cancer in her paternal grandmother.    Social History     Tobacco Use    Smoking status: Never     Passive exposure: Never    Smokeless tobacco: Never   Vaping Use    Vaping status: Never Used   Substance Use Topics    Alcohol use: Yes     Comment: very infrequent     Drug use: Never       I have reviewed and confirmed the accuracy of the patient's history: Chief complaint, HPI, ROS, and Subjective as entered by the MA/LPN/RN. Bronwyn Burns MD 06/02/25 6/2/25    SUBJECTIVE:    Patient is receiving her third week of Rituxan.  She is currently on prednisone 10 mg twice a day.  Hemoglobin is stable at 11.6 g per DL    5/5/25 Patient is receiving her 4th  "week of Rituxan. I was asked to see patient during her infusion. She denies any issues. She is currently on Prednisone 10 mg daily.  Hemoglobin today 10.2 down from 11.6 last week.She is accompanied by her mother.       Denies any acute issues.  She is here today for routine follow-up.  She has completed 4 weeks of weekly Rituxan.    She has been diagnosed with COVID 6/2/25      Arlene Brady reports a pain score of 0.0  Given her pain assessment as noted, treatment options were discussed and the following options were decided upon as a follow-up plan to address the patient's pain: Continue current management by her primary care.           REVIEW OF SYSTEMS:      Review of Systems   Constitutional:  Negative for chills and fever.   HENT:  Negative for congestion, drooling, ear discharge, rhinorrhea, sinus pressure and tinnitus.    Eyes:  Negative for photophobia, pain and discharge.   Respiratory:  Negative for apnea, choking and stridor.    Cardiovascular:  Negative for palpitations.   Gastrointestinal:  Negative for abdominal distention, abdominal pain and anal bleeding.   Endocrine: Negative for polydipsia and polyphagia.   Genitourinary:  Negative for decreased urine volume, flank pain and genital sores.   Musculoskeletal:  Negative for gait problem, neck pain and neck stiffness.   Skin:  Negative for color change, rash and wound.   Neurological:  Negative for tremors, seizures, syncope, facial asymmetry and speech difficulty.   Hematological:  Negative for adenopathy.   Psychiatric/Behavioral:  Negative for agitation, confusion, hallucinations and self-injury. The patient is not hyperactive.        Per subjective    OBJECTIVE:    Vitals:    06/02/25 1044   BP: 112/70   Pulse: 99   Resp: 20   Temp: 97.6 °F (36.4 °C)   TempSrc: Temporal   SpO2: 99%   Weight: 120 kg (265 lb)   Height: 149.9 cm (59\")   PainSc: 0-No pain       Body mass index is 53.52 kg/m².    ECOG  (1) Restricted in physically strenuous " activity, ambulatory and able to do work of light nature    Physical Exam  Vitals reviewed.   Constitutional:       General: She is not in acute distress.     Appearance: Normal appearance. She is not ill-appearing, toxic-appearing or diaphoretic.   HENT:      Head: Normocephalic and atraumatic.      Right Ear: External ear normal.      Left Ear: External ear normal.      Nose: Nose normal.      Mouth/Throat:      Mouth: Mucous membranes are moist.   Eyes:      Extraocular Movements: Extraocular movements intact.   Cardiovascular:      Rate and Rhythm: Normal rate and regular rhythm.      Heart sounds: No murmur heard.  Pulmonary:      Effort: Pulmonary effort is normal. No respiratory distress.      Breath sounds: Normal breath sounds. No stridor. No wheezing.   Abdominal:      General: Bowel sounds are normal.      Palpations: Abdomen is soft.   Musculoskeletal:         General: Normal range of motion.      Cervical back: Normal range of motion.   Skin:     General: Skin is warm and dry.      Findings: No rash.   Neurological:      Mental Status: She is alert and oriented to person, place, and time.   Psychiatric:         Mood and Affect: Mood normal.         Behavior: Behavior normal.         Thought Content: Thought content normal.     I have reexamined the patient and the results are consistent with the previously documented exam. Bronwyn Burns MD  6/2/25    RECENT LABS    WBC   Date Value Ref Range Status   06/02/2025 8.46 3.40 - 10.80 10*3/mm3 Final     RBC   Date Value Ref Range Status   06/02/2025 3.01 (L) 3.77 - 5.28 10*6/mm3 Final   07/31/2023 2.80 (L) 3.77 - 5.28 x10E6/uL Final     Hemoglobin   Date Value Ref Range Status   06/02/2025 9.3 (L) 12.0 - 15.9 g/dL Final     Hematocrit   Date Value Ref Range Status   06/02/2025 31.0 (L) 34.0 - 46.6 % Final     MCV   Date Value Ref Range Status   06/02/2025 103.0 (H) 79.0 - 97.0 fL Final     MCH   Date Value Ref Range Status   06/02/2025 30.9 26.6 -  33.0 pg Final     MCHC   Date Value Ref Range Status   06/02/2025 30.0 (L) 31.5 - 35.7 g/dL Final     RDW   Date Value Ref Range Status   06/02/2025 17.0 (H) 12.3 - 15.4 % Final     RDW-SD   Date Value Ref Range Status   06/02/2025 59.8 (H) 37.0 - 54.0 fl Final     MPV   Date Value Ref Range Status   06/02/2025 9.3 6.0 - 12.0 fL Final     Platelets   Date Value Ref Range Status   06/02/2025 334 140 - 450 10*3/mm3 Final     Neutrophil %   Date Value Ref Range Status   06/02/2025 67.3 42.7 - 76.0 % Final     Lymphocyte %   Date Value Ref Range Status   06/02/2025 20.4 19.6 - 45.3 % Final     Monocyte %   Date Value Ref Range Status   06/02/2025 8.9 5.0 - 12.0 % Final     Eosinophil %   Date Value Ref Range Status   06/02/2025 2.6 0.3 - 6.2 % Final     Basophil %   Date Value Ref Range Status   06/02/2025 0.8 0.0 - 1.5 % Final     Immature Grans %   Date Value Ref Range Status   10/09/2020 1.4 (H) 0.0 - 0.5 % Final     Neutrophils, Absolute   Date Value Ref Range Status   06/02/2025 5.69 1.70 - 7.00 10*3/mm3 Final     Lymphocytes, Absolute   Date Value Ref Range Status   06/02/2025 1.73 0.70 - 3.10 10*3/mm3 Final     Monocytes, Absolute   Date Value Ref Range Status   06/02/2025 0.75 0.10 - 0.90 10*3/mm3 Final     Eosinophils, Absolute   Date Value Ref Range Status   06/02/2025 0.22 0.00 - 0.40 10*3/mm3 Final     Basophils, Absolute   Date Value Ref Range Status   06/02/2025 0.07 0.00 - 0.20 10*3/mm3 Final     Immature Grans, Absolute   Date Value Ref Range Status   10/09/2020 0.09 (H) 0.00 - 0.05 10*3/mm3 Final     nRBC   Date Value Ref Range Status   02/23/2024 0.1 0.0 - 0.2 /100 WBC Final       Lab Results   Component Value Date    GLUCOSE 108 (H) 05/20/2025    BUN 13 05/20/2025    CREATININE 0.70 05/20/2025    EGFRIFNONA 88 07/26/2021    BCR 18.6 05/20/2025    K 4.0 05/20/2025    CO2 23.9 05/20/2025    CALCIUM 9.3 05/20/2025    ALBUMIN 4.4 05/20/2025    AST 34 (H) 05/20/2025    ALT 33 05/20/2025     Assessment &  Plan       ASSESSMENT and plans:       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  Rule out lymphoproliferative disease.  Peripheral blood for flow cytometry shows increased kappa lambda ratio detected rare phenotypic aberrancy of the neutrophils as well as monocytes, possibility of a myeloid disorder was also entertained.  PNH screen was negative and G6PD was not deficient.  For now patient will continue on steroids.  Hemoglobin today 10.2 down from 11.6 last week.  Will continue prednisone 10 mg daily for 1 more week and follow-up with PCP.  Continue folate replacement.  Continue PPI.  She is dependent on prednisone therefore would consider additional treatment with Rituxan to see if we will be able to completely resolve the hemolysis.  Completed 4 weeks of Rituxan. CBC reviewed  Unfortunately patient has relapsed with worsening Anemia: I have recommended Rituxan.  Currently getting Rituxan so far she is doing well.  Continue on prednisone 10 mg daily follow-up: Weekly CBC and office visit.  Currently receiving today week 4 of Rituxan.  Hemoglobin unfortunately down to 10.2 from last week at 11.6.    Recurrent iron deficiency: Continue oral iron supplements  Status post bone marrow aspiration and biopsy which was essentially unremarkable  Folate deficiency: Continue folic acid daily  History of seizures  History of rectal bleeding : Recent  colonoscopy by Dr Vinny Lagos .  She no longer has rectal bleeding and she has completed her course of iron.  Follow-up with GI encouraged.  She has an appointment coming up next week with GI.  She denies rectal bleeding at this time.  Possible sensitivity reaction to Venofer: Patient's epigastric chest pain complaints seem to predate the infusion but she does also complain of itching on the bilateral forearms during the infusion.  Venofer was stopped and normal saline was ran along with 25 mg Benadryl IV given with resolution of  the symptoms.          Plans     Continue Iron sulphate 325mg po daily #30 refills ,   Continue  folic acid 1mg po daily #30 refill,   Continue  protonix.   Fe studies with ferritin today, continue weekly cbc, fu in 4 weeks.    Status post  Rituxan 375 mg per square meter of body surface area intravenously (375 mg/m2) weekly for four weeks for hemolytic anemia.   Continue  prednisone at 10mg po daily  for now  Viral hepatitis panel, [facility] screen iron studies with ferritin were within normal limits  Patient was given information on Rituxan to review understands   CBC reviewed  Continue PPI  Discussed the benefits and side effects of Rituxan in detail with patient  All questions answered    Patient verbalized understanding and is in agreement of the above plan.        Electronically signed by Bronwyn Burns MD, 06/02/25, 5:02 PM EDT.

## 2025-06-02 ENCOUNTER — LAB (OUTPATIENT)
Dept: LAB | Facility: HOSPITAL | Age: 30
End: 2025-06-02
Payer: COMMERCIAL

## 2025-06-02 ENCOUNTER — OFFICE VISIT (OUTPATIENT)
Dept: ONCOLOGY | Facility: CLINIC | Age: 30
End: 2025-06-02
Payer: COMMERCIAL

## 2025-06-02 VITALS
RESPIRATION RATE: 20 BRPM | HEIGHT: 59 IN | TEMPERATURE: 97.6 F | BODY MASS INDEX: 53.42 KG/M2 | HEART RATE: 99 BPM | OXYGEN SATURATION: 99 % | WEIGHT: 265 LBS | SYSTOLIC BLOOD PRESSURE: 112 MMHG | DIASTOLIC BLOOD PRESSURE: 70 MMHG

## 2025-06-02 DIAGNOSIS — D59.10 AUTOIMMUNE HEMOLYTIC ANEMIA: Primary | ICD-10-CM

## 2025-06-02 PROBLEM — U07.1 DISEASE DUE TO SEVERE ACUTE RESPIRATORY SYNDROME CORONAVIRUS 2 (SARS-COV-2): Status: ACTIVE | Noted: 2025-05-28

## 2025-06-02 LAB
BASOPHILS # BLD AUTO: 0.07 10*3/MM3 (ref 0–0.2)
BASOPHILS NFR BLD AUTO: 0.8 % (ref 0–1.5)
DEPRECATED RDW RBC AUTO: 59.8 FL (ref 37–54)
EOSINOPHIL # BLD AUTO: 0.22 10*3/MM3 (ref 0–0.4)
EOSINOPHIL NFR BLD AUTO: 2.6 % (ref 0.3–6.2)
ERYTHROCYTE [DISTWIDTH] IN BLOOD BY AUTOMATED COUNT: 17 % (ref 12.3–15.4)
FERRITIN SERPL-MCNC: 277 NG/ML (ref 13–150)
HCT VFR BLD AUTO: 31 % (ref 34–46.6)
HGB BLD-MCNC: 9.3 G/DL (ref 12–15.9)
HOLD SPECIMEN: NORMAL
HOLD SPECIMEN: NORMAL
IRON 24H UR-MRATE: 98 MCG/DL (ref 37–145)
IRON SATN MFR SERPL: 25 % (ref 20–50)
LYMPHOCYTES # BLD AUTO: 1.73 10*3/MM3 (ref 0.7–3.1)
LYMPHOCYTES NFR BLD AUTO: 20.4 % (ref 19.6–45.3)
MCH RBC QN AUTO: 30.9 PG (ref 26.6–33)
MCHC RBC AUTO-ENTMCNC: 30 G/DL (ref 31.5–35.7)
MCV RBC AUTO: 103 FL (ref 79–97)
MONOCYTES # BLD AUTO: 0.75 10*3/MM3 (ref 0.1–0.9)
MONOCYTES NFR BLD AUTO: 8.9 % (ref 5–12)
NEUTROPHILS NFR BLD AUTO: 5.69 10*3/MM3 (ref 1.7–7)
NEUTROPHILS NFR BLD AUTO: 67.3 % (ref 42.7–76)
PLATELET # BLD AUTO: 334 10*3/MM3 (ref 140–450)
PMV BLD AUTO: 9.3 FL (ref 6–12)
RBC # BLD AUTO: 3.01 10*6/MM3 (ref 3.77–5.28)
TIBC SERPL-MCNC: 389 MCG/DL (ref 298–536)
TRANSFERRIN SERPL-MCNC: 261 MG/DL (ref 200–360)
WBC NRBC COR # BLD AUTO: 8.46 10*3/MM3 (ref 3.4–10.8)

## 2025-06-02 PROCEDURE — 85025 COMPLETE CBC W/AUTO DIFF WBC: CPT

## 2025-06-02 PROCEDURE — 99214 OFFICE O/P EST MOD 30 MIN: CPT | Performed by: INTERNAL MEDICINE

## 2025-06-02 PROCEDURE — 84466 ASSAY OF TRANSFERRIN: CPT | Performed by: INTERNAL MEDICINE

## 2025-06-02 PROCEDURE — 36415 COLL VENOUS BLD VENIPUNCTURE: CPT

## 2025-06-02 PROCEDURE — 82728 ASSAY OF FERRITIN: CPT | Performed by: INTERNAL MEDICINE

## 2025-06-02 PROCEDURE — 83540 ASSAY OF IRON: CPT | Performed by: INTERNAL MEDICINE

## 2025-06-02 RX ORDER — MUPIROCIN 20 MG/G
OINTMENT TOPICAL
COMMUNITY
Start: 2025-05-21

## 2025-06-10 ENCOUNTER — LAB (OUTPATIENT)
Dept: LAB | Facility: HOSPITAL | Age: 30
End: 2025-06-10
Payer: COMMERCIAL

## 2025-06-10 DIAGNOSIS — D50.0 IRON DEFICIENCY ANEMIA DUE TO CHRONIC BLOOD LOSS: ICD-10-CM

## 2025-06-10 DIAGNOSIS — D59.30 HEMOLYTIC-UREMIC SYNDROME, UNSPECIFIED SUBTYPE: ICD-10-CM

## 2025-06-10 DIAGNOSIS — D50.9 IRON DEFICIENCY ANEMIA, UNSPECIFIED IRON DEFICIENCY ANEMIA TYPE: ICD-10-CM

## 2025-06-10 DIAGNOSIS — D59.10 AUTOIMMUNE HEMOLYTIC ANEMIA: ICD-10-CM

## 2025-06-10 LAB
BASOPHILS # BLD AUTO: 0.05 10*3/MM3 (ref 0–0.2)
BASOPHILS NFR BLD AUTO: 0.6 % (ref 0–1.5)
DEPRECATED RDW RBC AUTO: 58.4 FL (ref 37–54)
EOSINOPHIL # BLD AUTO: 0.22 10*3/MM3 (ref 0–0.4)
EOSINOPHIL NFR BLD AUTO: 2.6 % (ref 0.3–6.2)
ERYTHROCYTE [DISTWIDTH] IN BLOOD BY AUTOMATED COUNT: 16.6 % (ref 12.3–15.4)
HCT VFR BLD AUTO: 31.2 % (ref 34–46.6)
HGB BLD-MCNC: 9.3 G/DL (ref 12–15.9)
LYMPHOCYTES # BLD AUTO: 1.35 10*3/MM3 (ref 0.7–3.1)
LYMPHOCYTES NFR BLD AUTO: 16.2 % (ref 19.6–45.3)
MCH RBC QN AUTO: 30.6 PG (ref 26.6–33)
MCHC RBC AUTO-ENTMCNC: 29.8 G/DL (ref 31.5–35.7)
MCV RBC AUTO: 102.6 FL (ref 79–97)
MONOCYTES # BLD AUTO: 0.82 10*3/MM3 (ref 0.1–0.9)
MONOCYTES NFR BLD AUTO: 9.9 % (ref 5–12)
NEUTROPHILS NFR BLD AUTO: 5.87 10*3/MM3 (ref 1.7–7)
NEUTROPHILS NFR BLD AUTO: 70.7 % (ref 42.7–76)
PLATELET # BLD AUTO: 325 10*3/MM3 (ref 140–450)
PMV BLD AUTO: 8.8 FL (ref 6–12)
RBC # BLD AUTO: 3.04 10*6/MM3 (ref 3.77–5.28)
WBC NRBC COR # BLD AUTO: 8.31 10*3/MM3 (ref 3.4–10.8)

## 2025-06-10 PROCEDURE — 36415 COLL VENOUS BLD VENIPUNCTURE: CPT

## 2025-06-10 PROCEDURE — 85025 COMPLETE CBC W/AUTO DIFF WBC: CPT

## 2025-06-17 ENCOUNTER — LAB (OUTPATIENT)
Dept: LAB | Facility: HOSPITAL | Age: 30
End: 2025-06-17
Payer: COMMERCIAL

## 2025-06-17 DIAGNOSIS — D59.10 AUTOIMMUNE HEMOLYTIC ANEMIA: ICD-10-CM

## 2025-06-17 DIAGNOSIS — D50.9 IRON DEFICIENCY ANEMIA, UNSPECIFIED IRON DEFICIENCY ANEMIA TYPE: ICD-10-CM

## 2025-06-17 DIAGNOSIS — D50.0 IRON DEFICIENCY ANEMIA DUE TO CHRONIC BLOOD LOSS: ICD-10-CM

## 2025-06-17 DIAGNOSIS — D59.30 HEMOLYTIC-UREMIC SYNDROME, UNSPECIFIED SUBTYPE: ICD-10-CM

## 2025-06-17 LAB
BASOPHILS # BLD AUTO: 0.06 10*3/MM3 (ref 0–0.2)
BASOPHILS NFR BLD AUTO: 0.4 % (ref 0–1.5)
DEPRECATED RDW RBC AUTO: 58.1 FL (ref 37–54)
EOSINOPHIL # BLD AUTO: 0.08 10*3/MM3 (ref 0–0.4)
EOSINOPHIL NFR BLD AUTO: 0.6 % (ref 0.3–6.2)
ERYTHROCYTE [DISTWIDTH] IN BLOOD BY AUTOMATED COUNT: 17 % (ref 12.3–15.4)
HCT VFR BLD AUTO: 32.6 % (ref 34–46.6)
HGB BLD-MCNC: 10 G/DL (ref 12–15.9)
LYMPHOCYTES # BLD AUTO: 2.55 10*3/MM3 (ref 0.7–3.1)
LYMPHOCYTES NFR BLD AUTO: 18.7 % (ref 19.6–45.3)
MCH RBC QN AUTO: 31 PG (ref 26.6–33)
MCHC RBC AUTO-ENTMCNC: 30.7 G/DL (ref 31.5–35.7)
MCV RBC AUTO: 100.9 FL (ref 79–97)
MONOCYTES # BLD AUTO: 1.09 10*3/MM3 (ref 0.1–0.9)
MONOCYTES NFR BLD AUTO: 8 % (ref 5–12)
NEUTROPHILS NFR BLD AUTO: 72.3 % (ref 42.7–76)
NEUTROPHILS NFR BLD AUTO: 9.89 10*3/MM3 (ref 1.7–7)
PLATELET # BLD AUTO: 392 10*3/MM3 (ref 140–450)
PMV BLD AUTO: 9.2 FL (ref 6–12)
RBC # BLD AUTO: 3.23 10*6/MM3 (ref 3.77–5.28)
WBC NRBC COR # BLD AUTO: 13.67 10*3/MM3 (ref 3.4–10.8)

## 2025-06-17 PROCEDURE — 36415 COLL VENOUS BLD VENIPUNCTURE: CPT

## 2025-06-17 PROCEDURE — 85025 COMPLETE CBC W/AUTO DIFF WBC: CPT

## 2025-06-24 ENCOUNTER — LAB (OUTPATIENT)
Dept: LAB | Facility: HOSPITAL | Age: 30
End: 2025-06-24
Payer: COMMERCIAL

## 2025-06-24 DIAGNOSIS — D50.9 IRON DEFICIENCY ANEMIA, UNSPECIFIED IRON DEFICIENCY ANEMIA TYPE: ICD-10-CM

## 2025-06-24 DIAGNOSIS — D59.30 HEMOLYTIC-UREMIC SYNDROME, UNSPECIFIED SUBTYPE: ICD-10-CM

## 2025-06-24 DIAGNOSIS — D50.0 IRON DEFICIENCY ANEMIA DUE TO CHRONIC BLOOD LOSS: ICD-10-CM

## 2025-06-24 DIAGNOSIS — D59.10 AUTOIMMUNE HEMOLYTIC ANEMIA: ICD-10-CM

## 2025-06-24 LAB
BASOPHILS # BLD AUTO: 0.04 10*3/MM3 (ref 0–0.2)
BASOPHILS NFR BLD AUTO: 0.5 % (ref 0–1.5)
DEPRECATED RDW RBC AUTO: 58.3 FL (ref 37–54)
EOSINOPHIL # BLD AUTO: 0.08 10*3/MM3 (ref 0–0.4)
EOSINOPHIL NFR BLD AUTO: 0.9 % (ref 0.3–6.2)
ERYTHROCYTE [DISTWIDTH] IN BLOOD BY AUTOMATED COUNT: 16.2 % (ref 12.3–15.4)
HCT VFR BLD AUTO: 35.4 % (ref 34–46.6)
HGB BLD-MCNC: 10.5 G/DL (ref 12–15.9)
LYMPHOCYTES # BLD AUTO: 1.01 10*3/MM3 (ref 0.7–3.1)
LYMPHOCYTES NFR BLD AUTO: 11.4 % (ref 19.6–45.3)
MCH RBC QN AUTO: 30.6 PG (ref 26.6–33)
MCHC RBC AUTO-ENTMCNC: 29.7 G/DL (ref 31.5–35.7)
MCV RBC AUTO: 103.2 FL (ref 79–97)
MONOCYTES # BLD AUTO: 0.52 10*3/MM3 (ref 0.1–0.9)
MONOCYTES NFR BLD AUTO: 5.9 % (ref 5–12)
NEUTROPHILS NFR BLD AUTO: 7.23 10*3/MM3 (ref 1.7–7)
NEUTROPHILS NFR BLD AUTO: 81.3 % (ref 42.7–76)
PLATELET # BLD AUTO: 362 10*3/MM3 (ref 140–450)
PMV BLD AUTO: 8.8 FL (ref 6–12)
RBC # BLD AUTO: 3.43 10*6/MM3 (ref 3.77–5.28)
WBC NRBC COR # BLD AUTO: 8.88 10*3/MM3 (ref 3.4–10.8)

## 2025-06-24 PROCEDURE — 85025 COMPLETE CBC W/AUTO DIFF WBC: CPT

## 2025-06-24 PROCEDURE — 36415 COLL VENOUS BLD VENIPUNCTURE: CPT

## 2025-07-01 ENCOUNTER — TELEPHONE (OUTPATIENT)
Dept: ONCOLOGY | Facility: CLINIC | Age: 30
End: 2025-07-01

## 2025-07-01 NOTE — TELEPHONE ENCOUNTER
Hub staff attempted to follow warm transfer process and was unsuccessful     Caller: Arlene Brady    Relationship to patient: Self    Best call back number: [phone]    Patient is needing: PT IS SCHEDULED TOMORROW AND WANTS TO KNOW IF THERE HAS BEEN ANY CANCELLATIONS FOR TODAY OR TOMORROW MORNING.  IF NO AVAILABILITY PLEASE CALL TO RESCHEDULE    PLEASE ADVISE

## 2025-07-03 ENCOUNTER — LAB (OUTPATIENT)
Dept: LAB | Facility: HOSPITAL | Age: 30
End: 2025-07-03
Payer: COMMERCIAL

## 2025-07-03 ENCOUNTER — OFFICE VISIT (OUTPATIENT)
Dept: ONCOLOGY | Facility: CLINIC | Age: 30
End: 2025-07-03
Payer: COMMERCIAL

## 2025-07-03 VITALS
WEIGHT: 273 LBS | SYSTOLIC BLOOD PRESSURE: 115 MMHG | TEMPERATURE: 97.2 F | DIASTOLIC BLOOD PRESSURE: 64 MMHG | RESPIRATION RATE: 20 BRPM | HEART RATE: 72 BPM | OXYGEN SATURATION: 99 % | HEIGHT: 59 IN | BODY MASS INDEX: 55.04 KG/M2

## 2025-07-03 DIAGNOSIS — D59.30 HEMOLYTIC-UREMIC SYNDROME, UNSPECIFIED SUBTYPE: ICD-10-CM

## 2025-07-03 DIAGNOSIS — D59.10 AUTOIMMUNE HEMOLYTIC ANEMIA: Primary | ICD-10-CM

## 2025-07-03 DIAGNOSIS — D50.9 IRON DEFICIENCY ANEMIA, UNSPECIFIED IRON DEFICIENCY ANEMIA TYPE: ICD-10-CM

## 2025-07-03 DIAGNOSIS — D50.0 IRON DEFICIENCY ANEMIA DUE TO CHRONIC BLOOD LOSS: ICD-10-CM

## 2025-07-03 PROBLEM — H52.223 REGULAR ASTIGMATISM OF BOTH EYES: Status: ACTIVE | Noted: 2025-06-24

## 2025-07-03 PROBLEM — H53.022 REFRACTIVE AMBLYOPIA OF LEFT EYE: Status: ACTIVE | Noted: 2025-06-24

## 2025-07-03 LAB
BASOPHILS # BLD AUTO: 0.04 10*3/MM3 (ref 0–0.2)
BASOPHILS NFR BLD AUTO: 0.3 % (ref 0–1.5)
DEPRECATED RDW RBC AUTO: 54.9 FL (ref 37–54)
EOSINOPHIL # BLD AUTO: 0.04 10*3/MM3 (ref 0–0.4)
EOSINOPHIL NFR BLD AUTO: 0.3 % (ref 0.3–6.2)
ERYTHROCYTE [DISTWIDTH] IN BLOOD BY AUTOMATED COUNT: 15.7 % (ref 12.3–15.4)
HCT VFR BLD AUTO: 36.7 % (ref 34–46.6)
HGB BLD-MCNC: 11.4 G/DL (ref 12–15.9)
HOLD SPECIMEN: NORMAL
HOLD SPECIMEN: NORMAL
LYMPHOCYTES # BLD AUTO: 1.35 10*3/MM3 (ref 0.7–3.1)
LYMPHOCYTES NFR BLD AUTO: 11.8 % (ref 19.6–45.3)
MCH RBC QN AUTO: 31.4 PG (ref 26.6–33)
MCHC RBC AUTO-ENTMCNC: 31.1 G/DL (ref 31.5–35.7)
MCV RBC AUTO: 101.1 FL (ref 79–97)
MONOCYTES # BLD AUTO: 0.52 10*3/MM3 (ref 0.1–0.9)
MONOCYTES NFR BLD AUTO: 4.5 % (ref 5–12)
NEUTROPHILS NFR BLD AUTO: 83.1 % (ref 42.7–76)
NEUTROPHILS NFR BLD AUTO: 9.49 10*3/MM3 (ref 1.7–7)
PLATELET # BLD AUTO: 370 10*3/MM3 (ref 140–450)
PMV BLD AUTO: 8.8 FL (ref 6–12)
RBC # BLD AUTO: 3.63 10*6/MM3 (ref 3.77–5.28)
WBC NRBC COR # BLD AUTO: 11.44 10*3/MM3 (ref 3.4–10.8)

## 2025-07-03 PROCEDURE — 85025 COMPLETE CBC W/AUTO DIFF WBC: CPT

## 2025-07-03 PROCEDURE — 36415 COLL VENOUS BLD VENIPUNCTURE: CPT

## 2025-07-03 NOTE — PROGRESS NOTES
Hematology/Oncology Outpatient Follow Up    PATIENT NAME:Arleen Brady  :1995  MRN: [id number]  PRIMARY CARE PHYSICIAN: Daniel Sam MD  REFERRING PHYSICIAN: Daniel Sam MD    Chief Complaint   Patient presents with    Follow-up     Autoimmune hemolytic anemia              HISTORY OF PRESENT ILLNESS:       This is a 28 year old female has been referred secondary to anemia.  Patient has a longtime history of anemia.  She has rectal bleeding and had colonoscopy done a few days ago by Dr Vinny Lagos . She has internal and external hemorrhoids. She has a follow up with Dr Lagos.     Patient is amenorrheic for about 6 months.  She had history of menorrhagia. She is on hormone pills to regulate her cycles.   She has low energy, she was on oral iron supplements for a month. She has since ran out of her iron tablets.     Review of her CBCs indicate that she has had anemia with hemoglobin ranging between 8 and 12.3 g per DL.  Today her white count is 8, hemoglobin is 8, MCV is 101 and platelets are 352 with essentially unremarkable differentials.     She denies having blood in her urine     She is single, She is a CNA     Patient does not smoke and drinks occasionally     There is no family history of hematological problems.     Her mother had colon cancer,      2023: Methylmalonic acid level was normal at 343 patient had anemia work-up including a ferritin level which was 167, C-reactive protein was high at 1.96 BUN was 13, creatinine 0.9 LDH was 323 iron profile showed a elevated serum iron and iron saturation, haptoglobin was normal, reticulocyte count was elevated to 16 she has low folate at 3.1  2023: WBC 11.62, hemoglobin 8.0 g/dL, .9, platelets 353,000.  Flow cytometry showed an increased kappa lambda ratio, neutrophils with left shifted maturation and rare phenotypic aberrancy, monocytes with phenotypic aberrancy.  Bone marrow biopsy with molecular and cytogenetic studies  indicated to evaluate for myeloid neoplasm.  8/16/2023 CT of the neck, chest, abdomen and pelvis with contrast showed no evidence of definite pathologic lymphadenopathy concerning for lymphoproliferative disorder.  No acute findings present in the neck, chest, abdomen or pelvis.  8/25/2023: Patient had bone marrow aspiration and biopsy which basically showed normocellular bone marrow for age, decreased iron stores, negative for involvement by malignant lymphoma or metastatic malignancy.  Flow cytometry was unremarkable with no immunophenotypic abnormalities noted.  Cytogenetics showed a normal female karyotype  4/14/25: Patient was initiated on Rituxan weekly x 4 weeks      Past Medical History:   Diagnosis Date    ADHD (attention deficit hyperactivity disorder)     Anxiety     Arthritis     Asthma     exercise induced asthma     Depression     Epilepsy     History of blood transfusion     Seizures        Past Surgical History:   Procedure Laterality Date    ABDOMINAL SURGERY      choley    TONSILLECTOMY           Current Outpatient Medications:     albuterol sulfate  (90 Base) MCG/ACT inhaler, inhale 2 puffs by mouth every 4 hours (Patient not taking: Reported on 7/3/2025), Disp: , Rfl:     ferrous sulfate 325 (65 FE) MG tablet, Take 1 tablet by mouth Daily With Breakfast., Disp: 30 tablet, Rfl: 3    fexofenadine (ALLEGRA) 180 MG tablet, Take 1 tablet by mouth Daily., Disp: , Rfl:     Flonase Allergy Relief 50 MCG/ACT nasal spray, = 2 spray(s), Nostril-Both, Daily, # 16 gm, 0 Refill(s), Pharmacy: [facility]/pharmacy #[id number], 2 spray(s) Nostril-Both Daily, 149, cm, 08/13/24 14:31:00 EDT, Height, 118.8, kg, 08/13/24 14:35:00 EDT, Weight Dosing, Disp: , Rfl:     folic acid (FOLVITE) 1 MG tablet, TAKE 1 TABLET BY MOUTH EVERY DAY, Disp: 90 tablet, Rfl: 0    folic acid (FOLVITE) 1 MG tablet, Take 1 tablet by mouth Daily., Disp: 30 tablet, Rfl: 2    Junel 1/20 1-20 MG-MCG per tablet, Take 1 tablet by mouth Every Evening.,  Disp: , Rfl:     mupirocin (BACTROBAN) 2 % ointment, APPLY 1 GM ON THE SKIN TWICE A DAY, Disp: , Rfl:     nystatin-triamcinolone (MYCOLOG) 483239-3.1 UNIT/GM-% ointment, APPLY 1 APPLICATION TOPICALLY TWICE DAILY FOR 21 DAYS, Disp: , Rfl:     ondansetron (ZOFRAN) 8 MG tablet, Take 1 tablet by mouth 3 (Three) Times a Day As Needed for Nausea or Vomiting., Disp: 30 tablet, Rfl: 3    OXcarbazepine (TRILEPTAL) 300 MG tablet, Take 1.5 tablets by mouth 2 (Two) Times a Day., Disp: , Rfl:     pantoprazole (PROTONIX) 40 MG EC tablet, TAKE 1 TABLET BY MOUTH EVERY DAY, Disp: 90 tablet, Rfl: 1    predniSONE (DELTASONE) 10 MG tablet, TAKE 3 TABLETS BY MOUTH 2 (TWO) TIMES A DAY. TAKE WITH FOOD. (Patient taking differently: Take 3 tablets by mouth 2 (Two) Times a Day. Take with food.  Taking 10mg daily), Disp: 180 tablet, Rfl: 0    silver sulfadiazine (SILVADENE, SSD) 1 % cream, APPLY 1 APPLICATION TOPCIALLY TWICE A DAY FOR 30 DAYS, Disp: , Rfl:     Allergies   Allergen Reactions    Venofer [Iron Sucrose] Unknown - Low Severity    Cephalosporins Rash    Penicillins Rash       Family History   Problem Relation Age of Onset    Hypertension Father     Heart disease Father     Hyperlipidemia Father     Cancer Paternal Grandmother        Cancer-related family history includes Cancer in her paternal grandmother.    Social History     Tobacco Use    Smoking status: Never     Passive exposure: Never    Smokeless tobacco: Never   Vaping Use    Vaping status: Never Used   Substance Use Topics    Alcohol use: Yes     Comment: very infrequent     Drug use: Never       I have reviewed and confirmed the accuracy of the patient's history: Chief complaint, HPI, ROS, and Subjective as entered by the MA/LPN/RN. Bronwyn Burns MD 07/03/25      SUBJECTIVE:    Denies any acute issues.  She is here today for routine follow-up.  She has completed 4 weeks of weekly Rituxan.    She is here for fu. She denies any new issues .      Arlene Little  "Jose Antonio reports a pain score of 0.0  Given her pain assessment as noted, treatment options were discussed and the following options were decided upon as a follow-up plan to address the patient's pain: Continue current management by her primary care.           REVIEW OF SYSTEMS:      Review of Systems   Constitutional:  Negative for chills and fever.   HENT:  Negative for congestion, drooling, ear discharge, rhinorrhea, sinus pressure and tinnitus.    Eyes:  Negative for photophobia, pain and discharge.   Respiratory:  Negative for apnea, choking and stridor.    Cardiovascular:  Negative for palpitations.   Gastrointestinal:  Negative for abdominal distention, abdominal pain and anal bleeding.   Endocrine: Negative for polydipsia and polyphagia.   Genitourinary:  Negative for decreased urine volume, flank pain and genital sores.   Musculoskeletal:  Negative for gait problem, neck pain and neck stiffness.   Skin:  Negative for color change, rash and wound.   Neurological:  Negative for tremors, seizures, syncope, facial asymmetry and speech difficulty.   Hematological:  Negative for adenopathy.   Psychiatric/Behavioral:  Negative for agitation, confusion, hallucinations and self-injury. The patient is not hyperactive.        Per subjective    OBJECTIVE:    Vitals:    07/03/25 1502   BP: 115/64   Pulse: 72   Resp: 20   Temp: 97.2 °F (36.2 °C)   TempSrc: Temporal   SpO2: 99%   Weight: 124 kg (273 lb)   Height: 149.9 cm (59\")   PainSc: 0-No pain         Body mass index is 55.14 kg/m².    ECOG  (1) Restricted in physically strenuous activity, ambulatory and able to do work of light nature    Physical Exam  Vitals reviewed.   Constitutional:       General: She is not in acute distress.     Appearance: Normal appearance. She is not ill-appearing, toxic-appearing or diaphoretic.   HENT:      Head: Normocephalic and atraumatic.      Right Ear: External ear normal.      Left Ear: External ear normal.      Nose: Nose normal.      " Mouth/Throat:      Mouth: Mucous membranes are moist.   Eyes:      Extraocular Movements: Extraocular movements intact.   Cardiovascular:      Rate and Rhythm: Normal rate and regular rhythm.      Heart sounds: No murmur heard.  Pulmonary:      Effort: Pulmonary effort is normal. No respiratory distress.      Breath sounds: Normal breath sounds. No stridor. No wheezing.   Abdominal:      General: Bowel sounds are normal.      Palpations: Abdomen is soft.   Musculoskeletal:         General: Normal range of motion.      Cervical back: Normal range of motion.   Skin:     General: Skin is warm and dry.      Findings: No rash.   Neurological:      Mental Status: She is alert and oriented to person, place, and time.   Psychiatric:         Mood and Affect: Mood normal.         Behavior: Behavior normal.         Thought Content: Thought content normal.     I have reexamined the patient and the results are consistent with the previously documented exam. Bronwyn Burns MD     RECENT LABS    WBC   Date Value Ref Range Status   07/03/2025 11.44 (H) 3.40 - 10.80 10*3/mm3 Final     RBC   Date Value Ref Range Status   07/03/2025 3.63 (L) 3.77 - 5.28 10*6/mm3 Final   07/31/2023 2.80 (L) 3.77 - 5.28 x10E6/uL Final     Hemoglobin   Date Value Ref Range Status   07/03/2025 11.4 (L) 12.0 - 15.9 g/dL Final     Hematocrit   Date Value Ref Range Status   07/03/2025 36.7 34.0 - 46.6 % Final     MCV   Date Value Ref Range Status   07/03/2025 101.1 (H) 79.0 - 97.0 fL Final     MCH   Date Value Ref Range Status   07/03/2025 31.4 26.6 - 33.0 pg Final     MCHC   Date Value Ref Range Status   07/03/2025 31.1 (L) 31.5 - 35.7 g/dL Final     RDW   Date Value Ref Range Status   07/03/2025 15.7 (H) 12.3 - 15.4 % Final     RDW-SD   Date Value Ref Range Status   07/03/2025 54.9 (H) 37.0 - 54.0 fl Final     MPV   Date Value Ref Range Status   07/03/2025 8.8 6.0 - 12.0 fL Final     Platelets   Date Value Ref Range Status   07/03/2025 370 140 -  450 10*3/mm3 Final     Neutrophil %   Date Value Ref Range Status   07/03/2025 83.1 (H) 42.7 - 76.0 % Final     Lymphocyte %   Date Value Ref Range Status   07/03/2025 11.8 (L) 19.6 - 45.3 % Final     Monocyte %   Date Value Ref Range Status   07/03/2025 4.5 (L) 5.0 - 12.0 % Final     Eosinophil %   Date Value Ref Range Status   07/03/2025 0.3 0.3 - 6.2 % Final     Basophil %   Date Value Ref Range Status   07/03/2025 0.3 0.0 - 1.5 % Final     Immature Grans %   Date Value Ref Range Status   10/09/2020 1.4 (H) 0.0 - 0.5 % Final     Neutrophils, Absolute   Date Value Ref Range Status   07/03/2025 9.49 (H) 1.70 - 7.00 10*3/mm3 Final     Lymphocytes, Absolute   Date Value Ref Range Status   07/03/2025 1.35 0.70 - 3.10 10*3/mm3 Final     Monocytes, Absolute   Date Value Ref Range Status   07/03/2025 0.52 0.10 - 0.90 10*3/mm3 Final     Eosinophils, Absolute   Date Value Ref Range Status   07/03/2025 0.04 0.00 - 0.40 10*3/mm3 Final     Basophils, Absolute   Date Value Ref Range Status   07/03/2025 0.04 0.00 - 0.20 10*3/mm3 Final     Immature Grans, Absolute   Date Value Ref Range Status   10/09/2020 0.09 (H) 0.00 - 0.05 10*3/mm3 Final     nRBC   Date Value Ref Range Status   02/23/2024 0.1 0.0 - 0.2 /100 WBC Final       Lab Results   Component Value Date    GLUCOSE 108 (H) 05/20/2025    BUN 13 05/20/2025    CREATININE 0.70 05/20/2025    EGFRIFNONA 88 07/26/2021    BCR 18.6 05/20/2025    K 4.0 05/20/2025    CO2 23.9 05/20/2025    CALCIUM 9.3 05/20/2025    ALBUMIN 4.4 05/20/2025    AST 34 (H) 05/20/2025    ALT 33 05/20/2025     Assessment & Plan       ASSESSMENT and plans:       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  Rule out lymphoproliferative disease.  Peripheral blood for flow cytometry shows increased kappa lambda ratio detected rare phenotypic aberrancy of the neutrophils as well as monocytes, possibility of a myeloid disorder was also entertained.  PNH screen was  negative and G6PD was not deficient.  For now patient will continue on steroids.  Hemoglobin today 10.2 down from 11.6 last week.  Will continue prednisone 10 mg daily for 1 more week and follow-up with PCP.  Continue folate replacement.  Continue PPI.  She is dependent on prednisone therefore would consider additional treatment with Rituxan to see if we will be able to completely resolve the hemolysis.  Completed 4 weeks of Rituxan.  CBC reviewed.  Her hemoglobin is up to 11.4 g per DL  Status post Rituxan infusion.  Hemoglobin is up to 11.4 g per DL.  Continue prednisone at 10 mg and hopefully be able to wean soon    Recurrent iron deficiency: Continue oral iron supplementation  Status post bone marrow aspiration and biopsy which was essentially unremarkable  Folate deficiency: Continue folic acid daily  History of seizures  History of rectal bleeding : Recent  colonoscopy by Dr Vinny Lagos .  She no longer has rectal bleeding and she has completed her course of iron.  Follow-up with GI encouraged.  She has an appointment coming up next week with GI.  She denies rectal bleeding at this time.  Possible sensitivity reaction to Venofer: Patient's epigastric chest pain complaints seem to predate the infusion but she does also complain of itching on the bilateral forearms during the infusion.  Venofer was stopped and normal saline was ran along with 25 mg Benadryl IV given with resolution of the symptoms.          Plans     As above  CBC in 2 weeks if hemoglobin has normalized we will begin to wean on the prednisone  Follow-up 4 weeks  Continue Iron sulphate 325mg po daily #30 refills ,   Continue  folic acid 1mg po daily #30 refill,   Continue  protonix.   Fe studies with ferritin today, continue weekly cbc, fu in 4 weeks.    Status post  Rituxan 375 mg per square meter of body surface area intravenously (375 mg/m2) weekly for four weeks for hemolytic anemia.   Continue  prednisone at 10mg po daily  for now  Viral  hepatitis panel, [facility] screen iron studies with ferritin were within normal limits  Continue PPI  Discussed the benefits and side effects of Rituxan in detail with patient  All questions answered    Patient verbalized understanding and is in agreement of the above plan.        Electronically signed by Bronwyn Burns MD, 07/03/25, 4:23 PM EDT.

## 2025-07-14 ENCOUNTER — LAB (OUTPATIENT)
Dept: LAB | Facility: HOSPITAL | Age: 30
End: 2025-07-14
Payer: COMMERCIAL

## 2025-07-14 DIAGNOSIS — D59.10 AUTOIMMUNE HEMOLYTIC ANEMIA: ICD-10-CM

## 2025-07-14 DIAGNOSIS — D59.30 HEMOLYTIC-UREMIC SYNDROME, UNSPECIFIED SUBTYPE: ICD-10-CM

## 2025-07-14 DIAGNOSIS — D50.9 IRON DEFICIENCY ANEMIA, UNSPECIFIED IRON DEFICIENCY ANEMIA TYPE: ICD-10-CM

## 2025-07-14 DIAGNOSIS — D50.0 IRON DEFICIENCY ANEMIA DUE TO CHRONIC BLOOD LOSS: ICD-10-CM

## 2025-07-14 LAB
BASOPHILS # BLD AUTO: 0.05 10*3/MM3 (ref 0–0.2)
BASOPHILS NFR BLD AUTO: 0.5 % (ref 0–1.5)
DEPRECATED RDW RBC AUTO: 53.9 FL (ref 37–54)
EOSINOPHIL # BLD AUTO: 0.3 10*3/MM3 (ref 0–0.4)
EOSINOPHIL NFR BLD AUTO: 2.8 % (ref 0.3–6.2)
ERYTHROCYTE [DISTWIDTH] IN BLOOD BY AUTOMATED COUNT: 14.8 % (ref 12.3–15.4)
HCT VFR BLD AUTO: 34.6 % (ref 34–46.6)
HGB BLD-MCNC: 11.1 G/DL (ref 12–15.9)
IMM GRANULOCYTES # BLD AUTO: 0.1 10*3/MM3 (ref 0–0.05)
IMM GRANULOCYTES NFR BLD AUTO: 0.9 % (ref 0–0.5)
LYMPHOCYTES # BLD AUTO: 1.79 10*3/MM3 (ref 0.7–3.1)
LYMPHOCYTES NFR BLD AUTO: 16.8 % (ref 19.6–45.3)
MCH RBC QN AUTO: 31.8 PG (ref 26.6–33)
MCHC RBC AUTO-ENTMCNC: 32.1 G/DL (ref 31.5–35.7)
MCV RBC AUTO: 99.1 FL (ref 79–97)
MONOCYTES # BLD AUTO: 0.76 10*3/MM3 (ref 0.1–0.9)
MONOCYTES NFR BLD AUTO: 7.1 % (ref 5–12)
NEUTROPHILS NFR BLD AUTO: 7.67 10*3/MM3 (ref 1.7–7)
NEUTROPHILS NFR BLD AUTO: 71.9 % (ref 42.7–76)
PLATELET # BLD AUTO: 325 10*3/MM3 (ref 140–450)
PMV BLD AUTO: 8.7 FL (ref 6–12)
RBC # BLD AUTO: 3.49 10*6/MM3 (ref 3.77–5.28)
WBC NRBC COR # BLD AUTO: 10.67 10*3/MM3 (ref 3.4–10.8)

## 2025-07-14 PROCEDURE — 85025 COMPLETE CBC W/AUTO DIFF WBC: CPT

## 2025-07-14 PROCEDURE — 36415 COLL VENOUS BLD VENIPUNCTURE: CPT

## 2025-07-16 ENCOUNTER — TELEPHONE (OUTPATIENT)
Dept: ONCOLOGY | Facility: CLINIC | Age: 30
End: 2025-07-16
Payer: COMMERCIAL

## 2025-07-16 NOTE — TELEPHONE ENCOUNTER
Caller: Arlene Brady    Relationship: Self    Best call back number:   Telephone Information:   Mobile [phone]     What is the best time to reach you: ANYTIME     What was the call regarding: PT IS WANTING TO R/S 7/31 FOR EARLIER IN THE DAY IF POSSIBLE IF NOT WILL KEEP ORG APPT. PLEASE CB TO ADVISE.

## 2025-07-28 RX ORDER — FERROUS SULFATE 325(65) MG
1 TABLET ORAL
Qty: 30 TABLET | Refills: 3 | Status: SHIPPED | OUTPATIENT
Start: 2025-07-28

## 2025-07-31 ENCOUNTER — OFFICE VISIT (OUTPATIENT)
Dept: ONCOLOGY | Facility: CLINIC | Age: 30
End: 2025-07-31
Payer: COMMERCIAL

## 2025-07-31 ENCOUNTER — LAB (OUTPATIENT)
Dept: LAB | Facility: HOSPITAL | Age: 30
End: 2025-07-31
Payer: COMMERCIAL

## 2025-07-31 VITALS
SYSTOLIC BLOOD PRESSURE: 122 MMHG | OXYGEN SATURATION: 96 % | DIASTOLIC BLOOD PRESSURE: 82 MMHG | TEMPERATURE: 97.4 F | HEIGHT: 59 IN | HEART RATE: 92 BPM | BODY MASS INDEX: 55.84 KG/M2 | WEIGHT: 277 LBS

## 2025-07-31 DIAGNOSIS — R53.83 FATIGUE, UNSPECIFIED TYPE: ICD-10-CM

## 2025-07-31 DIAGNOSIS — D59.30 HEMOLYTIC-UREMIC SYNDROME, UNSPECIFIED SUBTYPE: ICD-10-CM

## 2025-07-31 DIAGNOSIS — D59.10 AUTOIMMUNE HEMOLYTIC ANEMIA: Primary | ICD-10-CM

## 2025-07-31 DIAGNOSIS — D50.0 IRON DEFICIENCY ANEMIA DUE TO CHRONIC BLOOD LOSS: ICD-10-CM

## 2025-07-31 DIAGNOSIS — D50.9 IRON DEFICIENCY ANEMIA, UNSPECIFIED IRON DEFICIENCY ANEMIA TYPE: ICD-10-CM

## 2025-07-31 LAB
ALBUMIN SERPL-MCNC: 4.4 G/DL (ref 3.5–5.2)
ALBUMIN/GLOB SERPL: 2.2 G/DL
ALP SERPL-CCNC: 51 U/L (ref 39–117)
ALT SERPL W P-5'-P-CCNC: 43 U/L (ref 1–33)
ANION GAP SERPL CALCULATED.3IONS-SCNC: 14 MMOL/L (ref 5–15)
AST SERPL-CCNC: 29 U/L (ref 1–32)
BASOPHILS # BLD AUTO: 0.07 10*3/MM3 (ref 0–0.2)
BASOPHILS NFR BLD AUTO: 0.6 % (ref 0–1.5)
BILIRUB SERPL-MCNC: 1 MG/DL (ref 0–1.2)
BUN SERPL-MCNC: 14.5 MG/DL (ref 6–20)
BUN/CREAT SERPL: 19.9 (ref 7–25)
CALCIUM SPEC-SCNC: 9 MG/DL (ref 8.6–10.5)
CHLORIDE SERPL-SCNC: 104 MMOL/L (ref 98–107)
CO2 SERPL-SCNC: 24 MMOL/L (ref 22–29)
CREAT SERPL-MCNC: 0.73 MG/DL (ref 0.57–1)
DEPRECATED RDW RBC AUTO: 53.4 FL (ref 37–54)
EGFRCR SERPLBLD CKD-EPI 2021: 115 ML/MIN/1.73
EOSINOPHIL # BLD AUTO: 0.24 10*3/MM3 (ref 0–0.4)
EOSINOPHIL NFR BLD AUTO: 2 % (ref 0.3–6.2)
ERYTHROCYTE [DISTWIDTH] IN BLOOD BY AUTOMATED COUNT: 15 % (ref 12.3–15.4)
GLOBULIN UR ELPH-MCNC: 2 GM/DL
GLUCOSE SERPL-MCNC: 131 MG/DL (ref 65–99)
HCT VFR BLD AUTO: 33.2 % (ref 34–46.6)
HGB BLD-MCNC: 10.4 G/DL (ref 12–15.9)
HOLD SPECIMEN: NORMAL
HOLD SPECIMEN: NORMAL
IMM GRANULOCYTES # BLD AUTO: 0.28 10*3/MM3 (ref 0–0.05)
IMM GRANULOCYTES NFR BLD AUTO: 2.4 % (ref 0–0.5)
LYMPHOCYTES # BLD AUTO: 1.5 10*3/MM3 (ref 0.7–3.1)
LYMPHOCYTES NFR BLD AUTO: 12.6 % (ref 19.6–45.3)
MCH RBC QN AUTO: 31.2 PG (ref 26.6–33)
MCHC RBC AUTO-ENTMCNC: 31.3 G/DL (ref 31.5–35.7)
MCV RBC AUTO: 99.7 FL (ref 79–97)
MONOCYTES # BLD AUTO: 0.82 10*3/MM3 (ref 0.1–0.9)
MONOCYTES NFR BLD AUTO: 6.9 % (ref 5–12)
NEUTROPHILS NFR BLD AUTO: 75.5 % (ref 42.7–76)
NEUTROPHILS NFR BLD AUTO: 9 10*3/MM3 (ref 1.7–7)
PLATELET # BLD AUTO: 369 10*3/MM3 (ref 140–450)
PMV BLD AUTO: 8.8 FL (ref 6–12)
POTASSIUM SERPL-SCNC: 3.4 MMOL/L (ref 3.5–5.2)
PROT SERPL-MCNC: 6.4 G/DL (ref 6–8.5)
RBC # BLD AUTO: 3.33 10*6/MM3 (ref 3.77–5.28)
RETICS # AUTO: 0.51 10*6/MM3 (ref 0.02–0.13)
RETICS/RBC NFR AUTO: 14.83 % (ref 0.7–1.9)
SODIUM SERPL-SCNC: 142 MMOL/L (ref 136–145)
T-UPTAKE NFR SERPL: 1.25 TBI (ref 0.8–1.3)
T4 SERPL-MCNC: 11.7 MCG/DL (ref 4.5–11.7)
TSH SERPL DL<=0.05 MIU/L-ACNC: 1.83 UIU/ML (ref 0.27–4.2)
WBC NRBC COR # BLD AUTO: 11.91 10*3/MM3 (ref 3.4–10.8)

## 2025-07-31 PROCEDURE — 84479 ASSAY OF THYROID (T3 OR T4): CPT | Performed by: NURSE PRACTITIONER

## 2025-07-31 PROCEDURE — 85025 COMPLETE CBC W/AUTO DIFF WBC: CPT

## 2025-07-31 PROCEDURE — 80053 COMPREHEN METABOLIC PANEL: CPT | Performed by: NURSE PRACTITIONER

## 2025-07-31 PROCEDURE — 84443 ASSAY THYROID STIM HORMONE: CPT | Performed by: NURSE PRACTITIONER

## 2025-07-31 PROCEDURE — 36415 COLL VENOUS BLD VENIPUNCTURE: CPT

## 2025-07-31 PROCEDURE — 84436 ASSAY OF TOTAL THYROXINE: CPT | Performed by: NURSE PRACTITIONER

## 2025-07-31 PROCEDURE — 99214 OFFICE O/P EST MOD 30 MIN: CPT | Performed by: NURSE PRACTITIONER

## 2025-07-31 PROCEDURE — 85045 AUTOMATED RETICULOCYTE COUNT: CPT | Performed by: NURSE PRACTITIONER

## 2025-07-31 NOTE — PROGRESS NOTES
Hematology/Oncology Outpatient Follow Up    PATIENT NAME:Arlene Brady  :1995  MRN: [id number]  PRIMARY CARE PHYSICIAN: Daniel Sam MD  REFERRING PHYSICIAN: No ref. provider found    Chief Complaint   Patient presents with    Follow-up     Autoimmune hemolytic anemia          HISTORY OF PRESENT ILLNESS:       This is a 28 year old female has been referred secondary to anemia.  Patient has a longtime history of anemia.  She has rectal bleeding and had colonoscopy done a few days ago by Dr Vinny Lagos . She has internal and external hemorrhoids. She has a follow up with Dr Lagos.     Patient is amenorrheic for about 6 months.  She had history of menorrhagia. She is on hormone pills to regulate her cycles.   She has low energy, she was on oral iron supplements for a month. She has since ran out of her iron tablets.     Review of her CBCs indicate that she has had anemia with hemoglobin ranging between 8 and 12.3 g per DL.  Today her white count is 8, hemoglobin is 8, MCV is 101 and platelets are 352 with essentially unremarkable differentials.     She denies having blood in her urine     She is single, She is a CNA     Patient does not smoke and drinks occasionally     There is no family history of hematological problems.     Her mother had colon cancer,      2023: Methylmalonic acid level was normal at 343 patient had anemia work-up including a ferritin level which was 167, C-reactive protein was high at 1.96 BUN was 13, creatinine 0.9 LDH was 323 iron profile showed a elevated serum iron and iron saturation, haptoglobin was normal, reticulocyte count was elevated to 16 she has low folate at 3.1  2023: WBC 11.62, hemoglobin 8.0 g/dL, .9, platelets 353,000.  Flow cytometry showed an increased kappa lambda ratio, neutrophils with left shifted maturation and rare phenotypic aberrancy, monocytes with phenotypic aberrancy.  Bone marrow biopsy with molecular and cytogenetic studies  indicated to evaluate for myeloid neoplasm.  8/16/2023 CT of the neck, chest, abdomen and pelvis with contrast showed no evidence of definite pathologic lymphadenopathy concerning for lymphoproliferative disorder.  No acute findings present in the neck, chest, abdomen or pelvis.  8/25/2023: Patient had bone marrow aspiration and biopsy which basically showed normocellular bone marrow for age, decreased iron stores, negative for involvement by malignant lymphoma or metastatic malignancy.  Flow cytometry was unremarkable with no immunophenotypic abnormalities noted.  Cytogenetics showed a normal female karyotype  4/14/25: Patient was initiated on Rituxan weekly x 4 weeks      Past Medical History:   Diagnosis Date    ADHD (attention deficit hyperactivity disorder)     Anxiety     Arthritis     Asthma     exercise induced asthma     Depression     Epilepsy     History of blood transfusion     Seizures        Past Surgical History:   Procedure Laterality Date    ABDOMINAL SURGERY      choley    TONSILLECTOMY           Current Outpatient Medications:     ferrous sulfate 325 (65 FE) MG tablet, TAKE 1 TABLET BY MOUTH EVERY DAY WITH BREAKFAST, Disp: 30 tablet, Rfl: 3    fexofenadine (ALLEGRA) 180 MG tablet, Take 1 tablet by mouth Daily., Disp: , Rfl:     folic acid (FOLVITE) 1 MG tablet, TAKE 1 TABLET BY MOUTH EVERY DAY, Disp: 90 tablet, Rfl: 0    Junel 1/20 1-20 MG-MCG per tablet, Take 1 tablet by mouth Every Evening., Disp: , Rfl:     pantoprazole (PROTONIX) 40 MG EC tablet, TAKE 1 TABLET BY MOUTH EVERY DAY, Disp: 90 tablet, Rfl: 1    predniSONE (DELTASONE) 10 MG tablet, TAKE 3 TABLETS BY MOUTH 2 (TWO) TIMES A DAY. TAKE WITH FOOD. (Patient taking differently: Take 3 tablets by mouth 2 (Two) Times a Day. Take with food.  Taking 10mg daily), Disp: 180 tablet, Rfl: 0    albuterol sulfate  (90 Base) MCG/ACT inhaler, inhale 2 puffs by mouth every 4 hours (Patient not taking: Reported on 4/14/2025), Disp: , Rfl:      Flonase Allergy Relief 50 MCG/ACT nasal spray, = 2 spray(s), Nostril-Both, Daily, # 16 gm, 0 Refill(s), Pharmacy: [facility]/pharmacy #[id number], 2 spray(s) Nostril-Both Daily, 149, cm, 08/13/24 14:31:00 EDT, Height, 118.8, kg, 08/13/24 14:35:00 EDT, Weight Dosing (Patient not taking: Reported on 7/31/2025), Disp: , Rfl:     folic acid (FOLVITE) 1 MG tablet, Take 1 tablet by mouth Daily. (Patient not taking: Reported on 7/31/2025), Disp: 30 tablet, Rfl: 2    mupirocin (BACTROBAN) 2 % ointment, APPLY 1 GM ON THE SKIN TWICE A DAY (Patient not taking: Reported on 7/31/2025), Disp: , Rfl:     nystatin-triamcinolone (MYCOLOG) 713937-2.1 UNIT/GM-% ointment, APPLY 1 APPLICATION TOPICALLY TWICE DAILY FOR 21 DAYS (Patient not taking: Reported on 7/31/2025), Disp: , Rfl:     ondansetron (ZOFRAN) 8 MG tablet, Take 1 tablet by mouth 3 (Three) Times a Day As Needed for Nausea or Vomiting. (Patient not taking: Reported on 7/31/2025), Disp: 30 tablet, Rfl: 3    OXcarbazepine (TRILEPTAL) 300 MG tablet, Take 1.5 tablets by mouth 2 (Two) Times a Day. (Patient not taking: Reported on 7/31/2025), Disp: , Rfl:     silver sulfadiazine (SILVADENE, SSD) 1 % cream, APPLY 1 APPLICATION TOPCIALLY TWICE A DAY FOR 30 DAYS (Patient not taking: Reported on 7/31/2025), Disp: , Rfl:     Allergies   Allergen Reactions    Venofer [Iron Sucrose] Unknown - Low Severity    Cephalosporins Rash    Penicillins Rash       Family History   Problem Relation Age of Onset    Hypertension Father     Heart disease Father     Hyperlipidemia Father     Cancer Paternal Grandmother        Cancer-related family history includes Cancer in her paternal grandmother.    Social History     Tobacco Use    Smoking status: Never     Passive exposure: Never    Smokeless tobacco: Never   Vaping Use    Vaping status: Never Used   Substance Use Topics    Alcohol use: Yes     Comment: very infrequent     Drug use: Never         SUBJECTIVE:  7/31/2025: Arlene is here today for her routine  follow-up.  She reports that she has been doing well overall.  She has been working a lot due to switching jobs but is now only going to be doing 1 job and it will be less hours.  She has noted some increase in fatigue and decrease in her energy that she has related to working so much over time.  CBC today was reviewed with her and does show that her hemoglobin has dropped 1 g over the past 4 weeks.  She denies any signs or symptoms of bleeding.  She remains on her oral iron, folate replacement, prednisone 10 mg p.o. daily and also prophylactic PPI.      Arlene Brady reports a pain score of 0.0  Given her pain assessment as noted, treatment options were discussed and the following options were decided upon as a follow-up plan to address the patient's pain: Continue current management by her primary care.           REVIEW OF SYSTEMS:      Review of Systems   Constitutional:  Positive for fatigue. Negative for chills and fever.   HENT:  Negative for congestion, drooling, ear discharge, rhinorrhea, sinus pressure and tinnitus.    Eyes:  Negative for photophobia, pain and discharge.   Respiratory:  Negative for apnea, choking and stridor.    Cardiovascular:  Negative for palpitations.   Gastrointestinal:  Negative for abdominal distention, abdominal pain and anal bleeding.   Endocrine: Negative for polydipsia and polyphagia.   Genitourinary:  Negative for decreased urine volume, flank pain and genital sores.   Musculoskeletal:  Negative for gait problem, neck pain and neck stiffness.   Skin:  Negative for color change, rash and wound.   Neurological:  Negative for tremors, seizures, syncope, facial asymmetry and speech difficulty.   Hematological:  Negative for adenopathy.   Psychiatric/Behavioral:  Negative for agitation, confusion, hallucinations and self-injury. The patient is not hyperactive.        OBJECTIVE:    Vitals:    07/31/25 1407   BP: 122/82   Pulse: 92   Temp: 97.4 °F (36.3 °C)   SpO2: 96%  "  Weight: 126 kg (277 lb)   Height: 149.9 cm (59.02\")   PainSc: 0-No pain           Body mass index is 55.92 kg/m².    ECOG  (1) Restricted in physically strenuous activity, ambulatory and able to do work of light nature    Physical Exam  Vitals reviewed.   Constitutional:       General: She is not in acute distress.     Appearance: She is not toxic-appearing.   HENT:      Head: Normocephalic and atraumatic.   Eyes:      General: No scleral icterus.     Extraocular Movements: Extraocular movements intact.   Cardiovascular:      Rate and Rhythm: Normal rate and regular rhythm.   Pulmonary:      Effort: Pulmonary effort is normal. No respiratory distress.   Abdominal:      General: There is no distension.      Palpations: Abdomen is soft.   Musculoskeletal:         General: Normal range of motion.      Cervical back: Normal range of motion.   Skin:     General: Skin is warm.      Coloration: Skin is not jaundiced or pale.      Findings: No bruising, erythema or rash.   Neurological:      Mental Status: She is alert.   Psychiatric:         Mood and Affect: Mood normal.         Behavior: Behavior normal.       RECENT LABS    WBC   Date Value Ref Range Status   07/31/2025 11.91 (H) 3.40 - 10.80 10*3/mm3 Final     RBC   Date Value Ref Range Status   07/31/2025 3.33 (L) 3.77 - 5.28 10*6/mm3 Final   07/31/2023 2.80 (L) 3.77 - 5.28 x10E6/uL Final     Hemoglobin   Date Value Ref Range Status   07/31/2025 10.4 (L) 12.0 - 15.9 g/dL Final     Hematocrit   Date Value Ref Range Status   07/31/2025 33.2 (L) 34.0 - 46.6 % Final     MCV   Date Value Ref Range Status   07/31/2025 99.7 (H) 79.0 - 97.0 fL Final     MCH   Date Value Ref Range Status   07/31/2025 31.2 26.6 - 33.0 pg Final     MCHC   Date Value Ref Range Status   07/31/2025 31.3 (L) 31.5 - 35.7 g/dL Final     RDW   Date Value Ref Range Status   07/31/2025 15.0 12.3 - 15.4 % Final     RDW-SD   Date Value Ref Range Status   07/31/2025 53.4 37.0 - 54.0 fl Final     MPV   Date " Value Ref Range Status   07/31/2025 8.8 6.0 - 12.0 fL Final     Platelets   Date Value Ref Range Status   07/31/2025 369 140 - 450 10*3/mm3 Final     Neutrophil %   Date Value Ref Range Status   07/31/2025 75.5 42.7 - 76.0 % Final     Lymphocyte %   Date Value Ref Range Status   07/31/2025 12.6 (L) 19.6 - 45.3 % Final     Monocyte %   Date Value Ref Range Status   07/31/2025 6.9 5.0 - 12.0 % Final     Eosinophil %   Date Value Ref Range Status   07/31/2025 2.0 0.3 - 6.2 % Final     Basophil %   Date Value Ref Range Status   07/31/2025 0.6 0.0 - 1.5 % Final     Immature Grans %   Date Value Ref Range Status   07/31/2025 2.4 (H) 0.0 - 0.5 % Final     Neutrophils, Absolute   Date Value Ref Range Status   07/31/2025 9.00 (H) 1.70 - 7.00 10*3/mm3 Final     Lymphocytes, Absolute   Date Value Ref Range Status   07/31/2025 1.50 0.70 - 3.10 10*3/mm3 Final     Monocytes, Absolute   Date Value Ref Range Status   07/31/2025 0.82 0.10 - 0.90 10*3/mm3 Final     Eosinophils, Absolute   Date Value Ref Range Status   07/31/2025 0.24 0.00 - 0.40 10*3/mm3 Final     Basophils, Absolute   Date Value Ref Range Status   07/31/2025 0.07 0.00 - 0.20 10*3/mm3 Final     Immature Grans, Absolute   Date Value Ref Range Status   07/31/2025 0.28 (H) 0.00 - 0.05 10*3/mm3 Final     nRBC   Date Value Ref Range Status   02/23/2024 0.1 0.0 - 0.2 /100 WBC Final       Lab Results   Component Value Date    GLUCOSE 131 (H) 07/31/2025    BUN 14.5 07/31/2025    CREATININE 0.73 07/31/2025    EGFRIFNONA 88 07/26/2021    BCR 19.9 07/31/2025    K 3.4 (L) 07/31/2025    CO2 24.0 07/31/2025    CALCIUM 9.0 07/31/2025    ALBUMIN 4.4 07/31/2025    AST 29 07/31/2025    ALT 43 (H) 07/31/2025     Assessment & Plan       ASSESSMENT and plans:       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  Rule out lymphoproliferative disease.  Peripheral blood for flow cytometry shows increased kappa lambda ratio detected rare  phenotypic aberrancy of the neutrophils as well as monocytes, possibility of a myeloid disorder was also entertained.  PNH screen was negative and G6PD was not deficient.  For now patient will continue on steroids.  Hemoglobin today 10.2 down from 11.6 last week.  Will continue prednisone 10 mg daily for 1 more week and follow-up with PCP.  Continue folate replacement.  Continue PPI.  She is dependent on prednisone therefore would consider additional treatment with Rituxan to see if we will be able to completely resolve the hemolysis.  Completed 4 weeks of Rituxan.  CBC reviewed.  Hemoglobin has been gradually trending down over the last 4 weeks and is currently 10.4 g/dL.  Will continue prednisone 10 mg p.o. daily.  Status post Rituxan infusion.  Hemoglobin is 10.4 g/dL.  Continue prednisone at 10 mg and hopefully be able to wean soon    Recurrent iron deficiency: Continue oral iron supplementation  Status post bone marrow aspiration and biopsy which was essentially unremarkable  Folate deficiency: Continue folic acid daily  History of seizures  History of rectal bleeding : Recent  colonoscopy by Dr Vinny Lagos .  She no longer has rectal bleeding and she has completed her course of iron.  Follow-up with GI encouraged.  She has an appointment coming up next week with GI.  She denies rectal bleeding at this time.  Possible sensitivity reaction to Venofer: Patient's epigastric chest pain complaints seem to predate the infusion but she does also complain of itching on the bilateral forearms during the infusion.  Venofer was stopped and normal saline was ran along with 25 mg Benadryl IV given with resolution of the symptoms.          Plans     As above  Recheck CBC in 2 weeks for review by the MD  Follow-up in 4 weeks with Dr. Burns or sooner if needed  Continue ferrous sulfate 325 mg p.o. daily  Continue  folic acid 1mg po daily  Status post  Rituxan 375 mg per square meter of body surface area intravenously (375  mg/m2) weekly for four weeks for hemolytic anemia.   Continue  prednisone at 10mg po daily  for now  Viral hepatitis panel, [facility] screen iron studies with ferritin were within normal limits  Continue PPI  Discussed the benefits and side effects of Rituxan in detail with patient  Check thyroid panel due to fatigue  All questions answered    Patient verbalized understanding and is in agreement of the above plan.

## 2025-08-08 ENCOUNTER — TELEPHONE (OUTPATIENT)
Dept: ONCOLOGY | Facility: CLINIC | Age: 30
End: 2025-08-08
Payer: COMMERCIAL

## 2025-08-12 ENCOUNTER — LAB (OUTPATIENT)
Dept: LAB | Facility: HOSPITAL | Age: 30
End: 2025-08-12
Payer: COMMERCIAL

## 2025-08-12 DIAGNOSIS — D50.0 IRON DEFICIENCY ANEMIA DUE TO CHRONIC BLOOD LOSS: ICD-10-CM

## 2025-08-12 DIAGNOSIS — D59.30 HEMOLYTIC-UREMIC SYNDROME, UNSPECIFIED SUBTYPE: ICD-10-CM

## 2025-08-12 DIAGNOSIS — D50.9 IRON DEFICIENCY ANEMIA, UNSPECIFIED IRON DEFICIENCY ANEMIA TYPE: ICD-10-CM

## 2025-08-12 DIAGNOSIS — D59.10 AUTOIMMUNE HEMOLYTIC ANEMIA: ICD-10-CM

## 2025-08-12 LAB
BASOPHILS # BLD AUTO: 0.06 10*3/MM3 (ref 0–0.2)
BASOPHILS NFR BLD AUTO: 0.6 % (ref 0–1.5)
DEPRECATED RDW RBC AUTO: 58.1 FL (ref 37–54)
EOSINOPHIL # BLD AUTO: 0.05 10*3/MM3 (ref 0–0.4)
EOSINOPHIL NFR BLD AUTO: 0.5 % (ref 0.3–6.2)
ERYTHROCYTE [DISTWIDTH] IN BLOOD BY AUTOMATED COUNT: 15.9 % (ref 12.3–15.4)
HCT VFR BLD AUTO: 30.5 % (ref 34–46.6)
HGB BLD-MCNC: 9.4 G/DL (ref 12–15.9)
IMM GRANULOCYTES # BLD AUTO: 0.29 10*3/MM3 (ref 0–0.05)
IMM GRANULOCYTES NFR BLD AUTO: 2.9 % (ref 0–0.5)
LYMPHOCYTES # BLD AUTO: 1.25 10*3/MM3 (ref 0.7–3.1)
LYMPHOCYTES NFR BLD AUTO: 12.5 % (ref 19.6–45.3)
MCH RBC QN AUTO: 31.5 PG (ref 26.6–33)
MCHC RBC AUTO-ENTMCNC: 30.8 G/DL (ref 31.5–35.7)
MCV RBC AUTO: 102.3 FL (ref 79–97)
MONOCYTES # BLD AUTO: 0.4 10*3/MM3 (ref 0.1–0.9)
MONOCYTES NFR BLD AUTO: 4 % (ref 5–12)
NEUTROPHILS NFR BLD AUTO: 7.98 10*3/MM3 (ref 1.7–7)
NEUTROPHILS NFR BLD AUTO: 79.5 % (ref 42.7–76)
PLATELET # BLD AUTO: 307 10*3/MM3 (ref 140–450)
PMV BLD AUTO: 8.6 FL (ref 6–12)
RBC # BLD AUTO: 2.98 10*6/MM3 (ref 3.77–5.28)
WBC NRBC COR # BLD AUTO: 10.03 10*3/MM3 (ref 3.4–10.8)

## 2025-08-12 PROCEDURE — 36415 COLL VENOUS BLD VENIPUNCTURE: CPT

## 2025-08-12 PROCEDURE — 85025 COMPLETE CBC W/AUTO DIFF WBC: CPT

## 2025-08-14 RX ORDER — PREDNISONE 10 MG/1
30 TABLET ORAL 2 TIMES DAILY
Qty: 180 TABLET | Refills: 0 | Status: SHIPPED | OUTPATIENT
Start: 2025-08-14

## 2025-08-18 ENCOUNTER — TELEPHONE (OUTPATIENT)
Dept: ONCOLOGY | Facility: CLINIC | Age: 30
End: 2025-08-18
Payer: COMMERCIAL

## 2025-08-28 ENCOUNTER — OFFICE VISIT (OUTPATIENT)
Dept: ONCOLOGY | Facility: CLINIC | Age: 30
End: 2025-08-28
Payer: COMMERCIAL

## 2025-08-28 ENCOUNTER — LAB (OUTPATIENT)
Dept: LAB | Facility: HOSPITAL | Age: 30
End: 2025-08-28
Payer: COMMERCIAL

## 2025-08-28 VITALS
OXYGEN SATURATION: 97 % | TEMPERATURE: 97.3 F | DIASTOLIC BLOOD PRESSURE: 70 MMHG | RESPIRATION RATE: 20 BRPM | HEIGHT: 59 IN | SYSTOLIC BLOOD PRESSURE: 142 MMHG | HEART RATE: 90 BPM | WEIGHT: 279 LBS | BODY MASS INDEX: 56.24 KG/M2

## 2025-08-28 DIAGNOSIS — D50.0 IRON DEFICIENCY ANEMIA DUE TO CHRONIC BLOOD LOSS: Primary | ICD-10-CM

## 2025-08-28 DIAGNOSIS — D59.10 AUTOIMMUNE HEMOLYTIC ANEMIA: ICD-10-CM

## 2025-08-28 LAB
BASOPHILS # BLD AUTO: 0.06 10*3/MM3 (ref 0–0.2)
BASOPHILS NFR BLD AUTO: 0.7 % (ref 0–1.5)
DEPRECATED RDW RBC AUTO: 55.3 FL (ref 37–54)
EOSINOPHIL # BLD AUTO: 0.17 10*3/MM3 (ref 0–0.4)
EOSINOPHIL NFR BLD AUTO: 1.8 % (ref 0.3–6.2)
ERYTHROCYTE [DISTWIDTH] IN BLOOD BY AUTOMATED COUNT: 15 % (ref 12.3–15.4)
FERRITIN SERPL-MCNC: 213 NG/ML (ref 13–150)
HCT VFR BLD AUTO: 31.5 % (ref 34–46.6)
HGB BLD-MCNC: 10.1 G/DL (ref 12–15.9)
HOLD SPECIMEN: NORMAL
HOLD SPECIMEN: NORMAL
IMM GRANULOCYTES # BLD AUTO: 0.17 10*3/MM3 (ref 0–0.05)
IMM GRANULOCYTES NFR BLD AUTO: 1.8 % (ref 0–0.5)
IRON 24H UR-MRATE: 71 MCG/DL (ref 37–145)
IRON SATN MFR SERPL: 19 % (ref 20–50)
LYMPHOCYTES # BLD AUTO: 1.49 10*3/MM3 (ref 0.7–3.1)
LYMPHOCYTES NFR BLD AUTO: 16.2 % (ref 19.6–45.3)
MCH RBC QN AUTO: 32.3 PG (ref 26.6–33)
MCHC RBC AUTO-ENTMCNC: 32.1 G/DL (ref 31.5–35.7)
MCV RBC AUTO: 100.6 FL (ref 79–97)
MONOCYTES # BLD AUTO: 0.75 10*3/MM3 (ref 0.1–0.9)
MONOCYTES NFR BLD AUTO: 8.1 % (ref 5–12)
NEUTROPHILS NFR BLD AUTO: 6.58 10*3/MM3 (ref 1.7–7)
NEUTROPHILS NFR BLD AUTO: 71.4 % (ref 42.7–76)
PLATELET # BLD AUTO: 301 10*3/MM3 (ref 140–450)
PMV BLD AUTO: 8.8 FL (ref 6–12)
RBC # BLD AUTO: 3.13 10*6/MM3 (ref 3.77–5.28)
TIBC SERPL-MCNC: 380 MCG/DL (ref 298–536)
TRANSFERRIN SERPL-MCNC: 255 MG/DL (ref 200–360)
WBC NRBC COR # BLD AUTO: 9.22 10*3/MM3 (ref 3.4–10.8)

## 2025-08-28 PROCEDURE — 84466 ASSAY OF TRANSFERRIN: CPT | Performed by: INTERNAL MEDICINE

## 2025-08-28 PROCEDURE — 83540 ASSAY OF IRON: CPT | Performed by: INTERNAL MEDICINE

## 2025-08-28 PROCEDURE — 85025 COMPLETE CBC W/AUTO DIFF WBC: CPT

## 2025-08-28 PROCEDURE — 36415 COLL VENOUS BLD VENIPUNCTURE: CPT

## 2025-08-28 PROCEDURE — 82728 ASSAY OF FERRITIN: CPT | Performed by: INTERNAL MEDICINE

## 2025-08-28 RX ORDER — MELOXICAM 15 MG/1
1 TABLET ORAL DAILY
COMMUNITY
Start: 2025-07-31